# Patient Record
Sex: FEMALE | Race: WHITE | NOT HISPANIC OR LATINO | Employment: FULL TIME | URBAN - METROPOLITAN AREA
[De-identification: names, ages, dates, MRNs, and addresses within clinical notes are randomized per-mention and may not be internally consistent; named-entity substitution may affect disease eponyms.]

---

## 2017-01-03 ENCOUNTER — E-VISIT (OUTPATIENT)
Dept: FAMILY MEDICINE | Facility: CLINIC | Age: 37
End: 2017-01-03
Payer: COMMERCIAL

## 2017-01-03 DIAGNOSIS — I10 HYPERTENSION GOAL BP (BLOOD PRESSURE) < 140/90: Primary | ICD-10-CM

## 2017-01-03 DIAGNOSIS — R82.90 CLOUDY URINE: ICD-10-CM

## 2017-01-03 DIAGNOSIS — R82.90 ABNORMAL URINE ODOR: ICD-10-CM

## 2017-01-03 PROCEDURE — 99444 ZZC PHYSICIAN ONLINE EVALUATION & MANAGEMENT SERVICE: CPT | Performed by: FAMILY MEDICINE

## 2017-01-05 ENCOUNTER — E-VISIT (OUTPATIENT)
Dept: FAMILY MEDICINE | Facility: CLINIC | Age: 37
End: 2017-01-05
Payer: COMMERCIAL

## 2017-01-05 DIAGNOSIS — R21 RASH: ICD-10-CM

## 2017-01-05 DIAGNOSIS — M79.661 PAIN OF RIGHT LOWER LEG: Primary | ICD-10-CM

## 2017-01-05 PROCEDURE — 99207 ZZC NO CHARGE NURSE ONLY: CPT | Performed by: FAMILY MEDICINE

## 2017-01-06 ENCOUNTER — TRANSFERRED RECORDS (OUTPATIENT)
Dept: HEALTH INFORMATION MANAGEMENT | Facility: CLINIC | Age: 37
End: 2017-01-06

## 2017-01-08 ENCOUNTER — E-VISIT (OUTPATIENT)
Dept: FAMILY MEDICINE | Facility: CLINIC | Age: 37
End: 2017-01-08
Payer: COMMERCIAL

## 2017-01-08 DIAGNOSIS — I10 HYPERTENSION GOAL BP (BLOOD PRESSURE) < 140/90: Primary | ICD-10-CM

## 2017-01-08 DIAGNOSIS — R00.2 PALPITATIONS: ICD-10-CM

## 2017-01-08 PROCEDURE — 99444 ZZC PHYSICIAN ONLINE EVALUATION & MANAGEMENT SERVICE: CPT | Performed by: FAMILY MEDICINE

## 2017-01-15 ENCOUNTER — E-VISIT (OUTPATIENT)
Dept: FAMILY MEDICINE | Facility: CLINIC | Age: 37
End: 2017-01-15
Payer: COMMERCIAL

## 2017-01-15 ENCOUNTER — MYC MEDICAL ADVICE (OUTPATIENT)
Dept: FAMILY MEDICINE | Facility: CLINIC | Age: 37
End: 2017-01-15

## 2017-01-15 DIAGNOSIS — R82.90 CLOUDY URINE: Primary | ICD-10-CM

## 2017-01-15 DIAGNOSIS — I10 HYPERTENSION GOAL BP (BLOOD PRESSURE) < 140/90: ICD-10-CM

## 2017-01-15 DIAGNOSIS — K76.0 FATTY INFILTRATION OF LIVER: ICD-10-CM

## 2017-01-15 DIAGNOSIS — R82.90 ABNORMAL URINE ODOR: ICD-10-CM

## 2017-01-15 PROCEDURE — 99444 ZZC PHYSICIAN ONLINE EVALUATION & MANAGEMENT SERVICE: CPT | Performed by: FAMILY MEDICINE

## 2017-01-16 ENCOUNTER — DOCUMENTATION ONLY (OUTPATIENT)
Dept: FAMILY MEDICINE | Facility: CLINIC | Age: 37
End: 2017-01-16

## 2017-01-19 ENCOUNTER — OFFICE VISIT (OUTPATIENT)
Dept: FAMILY MEDICINE | Facility: CLINIC | Age: 37
End: 2017-01-19
Payer: COMMERCIAL

## 2017-01-19 VITALS
WEIGHT: 202.5 LBS | HEART RATE: 115 BPM | HEIGHT: 64 IN | SYSTOLIC BLOOD PRESSURE: 132 MMHG | DIASTOLIC BLOOD PRESSURE: 96 MMHG | OXYGEN SATURATION: 99 % | BODY MASS INDEX: 34.57 KG/M2 | TEMPERATURE: 99.1 F

## 2017-01-19 DIAGNOSIS — K76.0 FATTY INFILTRATION OF LIVER: ICD-10-CM

## 2017-01-19 DIAGNOSIS — I10 HYPERTENSION GOAL BP (BLOOD PRESSURE) < 140/90: Primary | ICD-10-CM

## 2017-01-19 DIAGNOSIS — R82.90 ABNORMAL URINE ODOR: ICD-10-CM

## 2017-01-19 DIAGNOSIS — L93.0 DISCOID LUPUS: ICD-10-CM

## 2017-01-19 DIAGNOSIS — R21 RASH: ICD-10-CM

## 2017-01-19 DIAGNOSIS — Z78.0 MENOPAUSE: ICD-10-CM

## 2017-01-19 DIAGNOSIS — G47.419 PRIMARY NARCOLEPSY WITHOUT CATAPLEXY: ICD-10-CM

## 2017-01-19 DIAGNOSIS — R82.90 CLOUDY URINE: ICD-10-CM

## 2017-01-19 LAB
ALBUMIN SERPL-MCNC: 3.9 G/DL (ref 3.4–5)
ALBUMIN UR-MCNC: NEGATIVE MG/DL
ALP SERPL-CCNC: 82 U/L (ref 40–150)
ALT SERPL W P-5'-P-CCNC: 59 U/L (ref 0–50)
ANION GAP SERPL CALCULATED.3IONS-SCNC: 9 MMOL/L (ref 3–14)
APPEARANCE UR: CLEAR
AST SERPL W P-5'-P-CCNC: 29 U/L (ref 0–45)
BACTERIA #/AREA URNS HPF: ABNORMAL /HPF
BILIRUB SERPL-MCNC: 0.2 MG/DL (ref 0.2–1.3)
BILIRUB UR QL STRIP: NEGATIVE
BUN SERPL-MCNC: 20 MG/DL (ref 7–30)
CALCIUM SERPL-MCNC: 10.1 MG/DL (ref 8.5–10.1)
CHLORIDE SERPL-SCNC: 100 MMOL/L (ref 94–109)
CO2 SERPL-SCNC: 26 MMOL/L (ref 20–32)
COLOR UR AUTO: YELLOW
CREAT SERPL-MCNC: 0.49 MG/DL (ref 0.52–1.04)
CREAT UR-MCNC: 45 MG/DL
GFR SERPL CREATININE-BSD FRML MDRD: ABNORMAL ML/MIN/1.7M2
GLUCOSE SERPL-MCNC: 114 MG/DL (ref 70–99)
GLUCOSE UR STRIP-MCNC: NEGATIVE MG/DL
HGB UR QL STRIP: ABNORMAL
KETONES UR STRIP-MCNC: NEGATIVE MG/DL
LEUKOCYTE ESTERASE UR QL STRIP: NEGATIVE
MICRO REPORT STATUS: NORMAL
MICROALBUMIN UR-MCNC: 11 MG/L
MICROALBUMIN/CREAT UR: 24.5 MG/G CR (ref 0–25)
NITRATE UR QL: NEGATIVE
NON-SQ EPI CELLS #/AREA URNS LPF: ABNORMAL /LPF
PH UR STRIP: 5.5 PH (ref 5–7)
POTASSIUM SERPL-SCNC: 4 MMOL/L (ref 3.4–5.3)
PROT SERPL-MCNC: 7.6 G/DL (ref 6.8–8.8)
RBC #/AREA URNS AUTO: ABNORMAL /HPF (ref 0–2)
SODIUM SERPL-SCNC: 135 MMOL/L (ref 133–144)
SP GR UR STRIP: 1.01 (ref 1–1.03)
SPECIMEN SOURCE: NORMAL
URN SPEC COLLECT METH UR: ABNORMAL
UROBILINOGEN UR STRIP-ACNC: 0.2 EU/DL (ref 0.2–1)
WBC #/AREA URNS AUTO: ABNORMAL /HPF (ref 0–2)
WET PREP SPEC: NORMAL

## 2017-01-19 PROCEDURE — 87210 SMEAR WET MOUNT SALINE/INK: CPT | Performed by: FAMILY MEDICINE

## 2017-01-19 PROCEDURE — 81001 URINALYSIS AUTO W/SCOPE: CPT | Performed by: FAMILY MEDICINE

## 2017-01-19 PROCEDURE — 99214 OFFICE O/P EST MOD 30 MIN: CPT | Performed by: FAMILY MEDICINE

## 2017-01-19 PROCEDURE — 36415 COLL VENOUS BLD VENIPUNCTURE: CPT | Performed by: FAMILY MEDICINE

## 2017-01-19 PROCEDURE — 80053 COMPREHEN METABOLIC PANEL: CPT | Performed by: FAMILY MEDICINE

## 2017-01-19 PROCEDURE — 82043 UR ALBUMIN QUANTITATIVE: CPT | Performed by: FAMILY MEDICINE

## 2017-01-19 RX ORDER — ESTRADIOL 1 MG/1
1 TABLET ORAL DAILY
Qty: 30 TABLET | Refills: 0 | Status: SHIPPED | OUTPATIENT
Start: 2017-01-19 | End: 2017-01-24

## 2017-01-19 RX ORDER — LOSARTAN POTASSIUM 50 MG/1
50 TABLET ORAL 2 TIMES DAILY
Qty: 60 TABLET | Refills: 0 | Status: SHIPPED | OUTPATIENT
Start: 2017-01-19 | End: 2017-02-22

## 2017-01-19 NOTE — PROGRESS NOTES
Discussed with her in clinic.  Prescribed alternative at this time; she is also planning on contacting her insurance.

## 2017-01-19 NOTE — PROGRESS NOTES
SUBJECTIVE:                                                    Sharmaine José is a 36 year old female who presents to clinic today for the following health issues:          Medication Followup of : Losartan    Taking Medication as prescribed: yes    Side Effects:  Tired    Medication Helping Symptoms:  yes         Patient Active Problem List   Diagnosis     Allergic state     Mild intermittent asthma     Generalized hyperhidrosis     Tobacco use disorder     Interstitial cystitis     Menopause - surgical     Narcolepsy     Cervical dysplasia     Chronic pain     CARDIOVASCULAR SCREENING; LDL GOAL LESS THAN 160     Migraine     Lumbago     Hyperlipidemia LDL goal <160     H/O: hysterectomy     Health Care Home     Discoid lupus     Generalized anxiety disorder     Fatty infiltration of liver     Primary narcolepsy without cataplexy     Hypertension goal BP (blood pressure) < 140/90       Current Outpatient Prescriptions   Medication Sig Dispense Refill     losartan (COZAAR) 50 MG tablet Take 1 tablet (50 mg) by mouth daily 30 tablet 0     dextroamphetamine (DEXTROSTAT) 10 MG tablet Take 1 tablet (10 mg) by mouth 2 times daily  0     estrogens, conjugated, (PREMARIN) 1.25 MG tablet Take 1 tablet (1.25 mg) by mouth daily 30 tablet prn     DULoxetine (CYMBALTA) 60 MG capsule Take 1 capsule (60 mg) by mouth daily 90 capsule 1     albuterol (PROAIR HFA, PROVENTIL HFA, VENTOLIN HFA) 108 (90 BASE) MCG/ACT inhaler Inhale 2 puffs into the lungs every 6 hours 1 Inhaler 0     EPINEPHrine (EPIPEN) 0.3 MG/0.3ML injection Inject 0.3 mLs (0.3 mg) into the muscle once as needed 2 each 1     ondansetron (ZOFRAN) 8 MG tablet Take by mouth every 8 hours as needed for nausea       fluticasone (FLONASE) 50 MCG/ACT nasal spray Spray 1-2 sprays into both nostrils daily as needed for rhinitis or allergies       Multiple Vitamin (MULTI VITAMIN DAILY PO)        ibuprofen 200 MG capsule Take 600 mg by mouth 3 times daily 120 capsule       "acetaminophen (TYLENOL) 500 MG tablet Take 1-2 tablets (500-1,000 mg) by mouth every 6 hours as needed for mild pain           ROS:  CONSTITUTIONAL:NEGATIVE for fever, chills, change in weight . Anticipates seeing Endocrinology regarding weight.  CV:  occasional pounding. HR always a little high.  :  See some of her previous documentation. She has noticed some cloudiness and abnormal order to her urine.  PSYCHIATRIC: NEGATIVE for changes in mood or affect    Blood pressure has been all over the place. Has been on for about three weeks.  Has been improved.  Notes especially in the am can be high.  Takes the medication in the am.  Currently on one of the dextroamphetamine; hoping to go to 2.    Ankle lesion improved. Lateral aspect skin. Thick. Will sometimes be thick and bleed. No longer red.   Uses vaseline intensive care lotion.  Does see skin specialist; has not thought lupus.      Her current stimulant medication is at a lower dose due to the blood pressure. She notes feeling best with the current type of medication and anticipates an increased dose when blood pressure stabilizes.     Also notes that she has been on Premarin for a long time. Aware that the prior authorization has been denied at this time.    OBJECTIVE:                                                    /96 mmHg  Pulse 115  Temp(Src) 99.1  F (37.3  C) (Oral)  Ht 5' 4\" (1.626 m)  Wt 202 lb 8 oz (91.853 kg)  BMI 34.74 kg/m2  SpO2 99%  LMP 09/25/2006  Body mass index is 34.74 kg/(m^2).     She notes her cuff read pretty true to ours.    GENERAL APPEARANCE: alert and no distress  RESP: lungs clear to auscultation - no rales, rhonchi or wheezes  CV: regular rates and rhythm  MS: extremities normal- no gross deformities noted  SKIN:  Lateral right ankle has a thickend, lichenified area several inches in diameter.  There is no erythema.  PSYCH: mentation appears normal and affect normal/bright    Recent Labs   Lab Test  05/24/16   0932  " 02/05/14   1358  11/15/11   1032   CHOL  273*  300*  275*   HDL  43*  68  47*   LDL  162*  153*  Cannot estimate LDL when triglyceride exceeds 400 mg/dL  172*   TRIG  339*  397*  454*   CHOLHDLRATIO   --   4.4  5.9*              ASSESSMENT/PLAN:                                                      Hypertension goal BP (blood pressure) < 140/90   Not at optimal control. She does note that this seems to vary some during the day. At this time will increase her losartan to BID.  - Comprehensive metabolic panel  - Albumin Random Urine Quantitative  - losartan (COZAAR) 50 MG tablet; Take 1 tablet (50 mg) by mouth 2 times daily  - Basic metabolic panel  (Ca, Cl, CO2, Creat, Gluc, K, Na, BUN); Future    Primary narcolepsy without cataplexy   This is being managed through a specialist. Anticipating that she will increase her dose of medication once her blood pressure stabilizes. We would then need to observe her blood pressure. She does have her own cuff.    Fatty infiltration of liver   Discuss this. Encourage weight loss. Also discuss it's important to treat metabolic abnormalities such as glucose and cholesterol.  We discussed her lipids. Anticipate adding Lipitor, but will hold off as there are several other medication changes.  - Comprehensive metabolic panel    Menopause - surgical   See some of her MyChart and E visits.  At this time, anticipates trying estradiol. Discussed that we can probably resubmit for Premarin if alternative is not working well for her.    Cloudy urine    - *UA reflex to Microscopic and Culture ([facility] (except [place] and [place])  - Wet prep    Abnormal urine odor    - *UA reflex to Microscopic and Culture ([facility] (except [place] and [place])  - Wet prep    Rash   Uncertain etiology. She is seeing specialists.    Discoid lupus   Sing specialist.      Patient Instructions   Increase losartan to twice daily.    Follow up in  about a month.  We can see how you are doing on the different estrogen, recheck bp and consider starting atorvastatin.          Temi Marsh MD, MD  [facility]

## 2017-01-19 NOTE — NURSING NOTE
"Chief Complaint   Patient presents with     Recheck Medication       Initial LMP 09/25/2006 Estimated body mass index is 33.80 kg/(m^2) as calculated from the following:    Height as of 11/21/16: 5' 4\" (1.626 m).    Weight as of 11/21/16: 197 lb (89.359 kg).  BP completed using cuff size: large  Joseluis Biskupski CMA        "

## 2017-01-19 NOTE — MR AVS SNAPSHOT
After Visit Summary   1/19/2017    Sharmaine José    MRN: [id number]           Patient Information     Date Of Birth          1980        Visit Information        Provider Department      1/19/2017 10:10 AM Temi Marsh MD [facility] [place]        Today's Diagnoses     Hypertension goal BP (blood pressure) < 140/90    -  1     Cloudy urine         Abnormal urine odor         Fatty infiltration of liver         Menopause - surgical           Care Instructions    Increase losartan to twice daily.    Follow up in about a month.  We can see how you are doing on the different estrogen, recheck bp and consider starting atorvastatin.          Follow-ups after your visit        Who to contact     If you have questions or need follow up information about today's clinic visit or your schedule please contact [facility] directly at [phone].  Normal or non-critical lab and imaging results will be communicated to you by MyChart, letter or phone within 4 business days after the clinic has received the results. If you do not hear from us within 7 days, please contact the clinic through [employer] or phone. If you have a critical or abnormal lab result, we will notify you by phone as soon as possible.  Submit refill requests through [employer] or call your pharmacy and they will forward the refill request to us. Please allow 3 business days for your refill to be completed.          Additional Information About Your Visit        [employer] Information     [employer] gives you secure access to your electronic health record. If you see a primary care provider, you can also send messages to your care team and make appointments. If you have questions, please call your primary care clinic.  If you do not have a primary care provider, please call [phone] and they will assist you.        Care EveryWhere ID     This is your Care EveryWhere ID. This could be used by other organizations to access  "your [place] medical records  [id number]        Your Vitals Were     Pulse Temperature Height BMI (Body Mass Index) Pulse Oximetry Last Period    115 99.1  F (37.3  C) (Oral) 5' 4\" (1.626 m) 34.74 kg/m2 99% 09/25/2006       Blood Pressure from Last 3 Encounters:   01/19/17 132/96   11/21/16 148/100   05/24/16 122/84    Weight from Last 3 Encounters:   01/19/17 202 lb 8 oz (91.853 kg)   11/21/16 197 lb (89.359 kg)   05/24/16 191 lb 4.8 oz (86.773 kg)              We Performed the Following     *UA reflex to Microscopic and Culture ([facility] (except [place] and [place])     Albumin Random Urine Quantitative     Basic metabolic panel     Comprehensive metabolic panel     Urine Microscopic     Wet prep          Today's Medication Changes          These changes are accurate as of: 1/19/17 10:53 AM.  If you have any questions, ask your nurse or doctor.               Start taking these medicines.        Dose/Directions    estradiol 1 MG tablet   Commonly known as:  ESTRACE   Used for:  Menopause   Started by:  Temi Marsh MD        Dose:  1 mg   Take 1 tablet (1 mg) by mouth daily   Quantity:  30 tablet   Refills:  0         These medicines have changed or have updated prescriptions.        Dose/Directions    losartan 50 MG tablet   Commonly known as:  COZAAR   This may have changed:  when to take this   Used for:  Hypertension goal BP (blood pressure) < 140/90   Changed by:  Temi Marsh MD        Dose:  50 mg   Take 1 tablet (50 mg) by mouth 2 times daily   Quantity:  60 tablet   Refills:  0         Stop taking these medicines if you haven't already. Please contact your care team if you have questions.     estrogens (conjugated) 1.25 MG tablet   Commonly known as:  PREMARIN   Stopped by:  Temi Marsh MD                Where to get your medicines      [facility] [id number] [place] [address] [address] AT [facility] [address]  [address], [facility] [address]     Phone:  [phone]    - estradiol 1 MG tablet  - losartan 50 MG tablet             Primary Care Provider Office Phone # Fax #    Temi Marsh [phone]       [facility] [address]  [facility] [address]        Thank you!     Thank you for choosing [facility]  for your care. Our goal is always to provide you with excellent care. Hearing back from our patients is one way we can continue to improve our services. Please take a few minutes to complete the written survey that you may receive in the mail after your visit with us. Thank you!             Your Updated Medication List - Protect others around you: Learn how to safely use, store and throw away your medicines at www.disposemymeds.org.          This list is accurate as of: 1/19/17 10:53 AM.  Always use your most recent med list.                   Brand Name Dispense Instructions for use    acetaminophen 500 MG tablet    TYLENOL     Take 1-2 tablets (500-1,000 mg) by mouth every 6 hours as needed for mild pain       albuterol 108 (90 BASE) MCG/ACT Inhaler    PROAIR HFA/PROVENTIL HFA/VENTOLIN HFA    1 Inhaler    Inhale 2 puffs into the lungs every 6 hours       dextroamphetamine 10 MG tablet    DEXTROSTAT     Take 1 tablet (10 mg) by mouth 2 times daily       DULoxetine 60 MG EC capsule    CYMBALTA    90 capsule    Take 1 capsule (60 mg) by mouth daily       EPINEPHrine 0.3 MG/0.3ML injection     2 each    Inject 0.3 mLs (0.3 mg) into the muscle once as needed       estradiol 1 MG tablet    ESTRACE    30 tablet    Take 1 tablet (1 mg) by mouth daily       FLONASE 50 MCG/ACT spray   Generic drug:  fluticasone      Spray 1-2 sprays into both nostrils daily as needed for rhinitis or allergies       ibuprofen 200 MG capsule     120 capsule    Take 600 mg by mouth 3 times daily       losartan 50 MG tablet    COZAAR    60 tablet    Take 1 tablet (50  mg) by mouth 2 times daily       MULTI VITAMIN DAILY PO          ondansetron 8 MG tablet    ZOFRAN     Take by mouth every 8 hours as needed for nausea

## 2017-01-19 NOTE — PATIENT INSTRUCTIONS
Increase losartan to twice daily.    Follow up in about a month.  We can see how you are doing on the different estrogen, recheck bp and consider starting atorvastatin.

## 2017-01-20 ENCOUNTER — E-VISIT (OUTPATIENT)
Dept: FAMILY MEDICINE | Facility: CLINIC | Age: 37
End: 2017-01-20
Payer: COMMERCIAL

## 2017-01-20 DIAGNOSIS — N95.1 SYMPTOMATIC MENOPAUSAL OR FEMALE CLIMACTERIC STATES: Primary | ICD-10-CM

## 2017-01-20 DIAGNOSIS — I10 HYPERTENSION GOAL BP (BLOOD PRESSURE) < 140/90: ICD-10-CM

## 2017-01-20 DIAGNOSIS — N95.1 MENOPAUSAL SYMPTOMS: Primary | ICD-10-CM

## 2017-01-20 PROCEDURE — 99444 ZZC PHYSICIAN ONLINE EVALUATION & MANAGEMENT SERVICE: CPT | Performed by: FAMILY MEDICINE

## 2017-01-20 PROCEDURE — 99207 ZZC NO BILLABLE SERVICE THIS VISIT: CPT | Performed by: FAMILY MEDICINE

## 2017-01-20 NOTE — TELEPHONE ENCOUNTER
Can you please find the PA or call the phone number she notes; to find out why the PA was denied?     From her note:    From what I was told, you, as my physician, are able to   call: [phone], to find out WHY the Premarin PA was   denied, what could be changed, as well as reopening the   prior auth, and starting an appeal, if needed.  (If it comes   to that, please start an appeal.)

## 2017-01-23 ENCOUNTER — MYC MEDICAL ADVICE (OUTPATIENT)
Dept: FAMILY MEDICINE | Facility: CLINIC | Age: 37
End: 2017-01-23

## 2017-01-23 DIAGNOSIS — N95.1 SYMPTOMATIC MENOPAUSAL OR FEMALE CLIMACTERIC STATES: Primary | ICD-10-CM

## 2017-01-23 NOTE — Clinical Note
"[facility]  [address] [facility] [id number] [phone]          January 24, 2017  Re:  Sharmaine José                                                                                                                     [address] [facility] [address]            Dear To Whom it May Concern:    I am writing to request prior authorization for Premarin. I have some clarifying information as well as additional information after a trial of one of the alternatives.    Sharmaine is a 36 year old woman who experienced a surgical menopause in 2007. She certainly is symptomatic if not on her estrogen. We have had this coded as Menopause; I am changing to a different Menopause code as I suspect the other was interpreted as \"asymptomatic\".    Additionally, she has now tried estradiol (pill form). She experienced increased blood pressure, headaches, hot flashes and mood swings/irritability. She is feeling miserable.    Please allow her to return to her previous Premarin where she was feeling better.         Sincerely,         Temi Marsh MD    "

## 2017-01-24 RX ORDER — ESTRADIOL 1 MG/1
1 TABLET ORAL DAILY
Qty: 30 TABLET | Refills: 0 | COMMUNITY
Start: 2017-01-24 | End: 2017-05-08 | Stop reason: SINTOL

## 2017-01-24 NOTE — TELEPHONE ENCOUNTER
Will close this out with a No Charge.  She initiated 2 e visits same day. We have gone back and forth on the other one so leaving that open at this time.

## 2017-01-24 NOTE — TELEPHONE ENCOUNTER
Please resubmit for PA for premarin.     I will draft a letter you can attach. (drafted)    At this time, I am putting in a different code (N95.1). It sounds like they interpreted the previous code as assymptomatic.  Also, she has now tried Estradiol and her bp went up, headaches worsened and she became very irritable...     Thanks!!!

## 2017-01-29 NOTE — TELEPHONE ENCOUNTER
Will close at this time.   She has also had some [employer] messages and has done other E visits; another recent one that I put a no charge on as thinking the discussion was part of this visit.

## 2017-02-01 NOTE — TELEPHONE ENCOUNTER
"Patient asking if insurance has responded to prior auth request.  Please see patient mychart message below:    \"Hi, Dr Marsh & [facility] Staff!    I got a msg from Dr Marsh last week about my Prior Auth for Premarin (with new info), being done with high priority.     Has a response been given? Approval or denial?     I also contacted the main desk to speak to a nurse, but a msg was take instead, last week.     I spoke to [facility] directly, and was given a phone number to do Prior Authorizations faster/on the spot. The number is, (for future reference): [phone] (8-6 CST M-F)    Hope all is well!   Let me know when you hear something, one way or another!  Thx!  Sharmaine\"    Yolie Pascal RN    "

## 2017-02-02 ENCOUNTER — E-VISIT (OUTPATIENT)
Dept: FAMILY MEDICINE | Facility: CLINIC | Age: 37
End: 2017-02-02
Payer: COMMERCIAL

## 2017-02-02 DIAGNOSIS — I10 HYPERTENSION GOAL BP (BLOOD PRESSURE) < 140/90: ICD-10-CM

## 2017-02-02 DIAGNOSIS — N95.1 SYMPTOMATIC MENOPAUSAL OR FEMALE CLIMACTERIC STATES: Primary | ICD-10-CM

## 2017-02-02 PROCEDURE — 99444 ZZC PHYSICIAN ONLINE EVALUATION & MANAGEMENT SERVICE: CPT | Performed by: FAMILY MEDICINE

## 2017-02-02 NOTE — TELEPHONE ENCOUNTER
Sent signed letter along with records related to this medication for [facility] to review.   Rubi Clark, RN  Triage Nurse

## 2017-02-02 NOTE — TELEPHONE ENCOUNTER
See the letter I did 1/24.   I have asked to have it resubmitted back then.    I suppose you could send part of th encounter if they want it documented in her chart...    Thank you!

## 2017-02-02 NOTE — TELEPHONE ENCOUNTER
"Patient called [employer] appeals and there were issues as to \"can only submit a prior auth every 90 days with the same provider and medication\". Also they had the dx code as asymptomatic menopausal state instead of menopause-surgical. She also discussed side effects of estradiol as rash, HA, and elevated BP.    This nurse called the insurance appeal dept at [phone]. Answered their questions including patient side effects and other medication tried. Also that we have dx of medication as menopause-surgical. The rep will send appeal to committee to approve. Is also requesting documentation of patient side effects of estrace noted in chart to be faxed to [phone] as part of the consideration process.    I do see a mychart e-visit from 1/20 concerning patient symptoms. I do not know if it would be appropriate to fax this or a portion of or a letter from you concerning side effects that were discussed.    Please advise.    Yolie Pascal RN      "

## 2017-02-14 ENCOUNTER — MYC MEDICAL ADVICE (OUTPATIENT)
Dept: FAMILY MEDICINE | Facility: CLINIC | Age: 37
End: 2017-02-14

## 2017-02-14 ENCOUNTER — E-VISIT (OUTPATIENT)
Dept: FAMILY MEDICINE | Facility: CLINIC | Age: 37
End: 2017-02-14
Payer: COMMERCIAL

## 2017-02-14 ENCOUNTER — E-VISIT (OUTPATIENT)
Dept: FAMILY MEDICINE | Facility: CLINIC | Age: 37
End: 2017-02-14

## 2017-02-14 DIAGNOSIS — R04.0 EPISTAXIS: Primary | ICD-10-CM

## 2017-02-14 PROCEDURE — 99444 ZZC PHYSICIAN ONLINE EVALUATION & MANAGEMENT SERVICE: CPT | Performed by: FAMILY MEDICINE

## 2017-02-14 NOTE — MR AVS SNAPSHOT
After Visit Summary   2/14/2017    Sharmaine José    MRN: [id number]           Patient Information     Date Of Birth          1980        Visit Information        Provider Department      2/14/2017 8:33 PM Temi Marsh MD [facility]        Today's Diagnoses     Epistaxis    -  1       Follow-ups after your visit        Additional Services     OTOLARYNGOLOGY REFERRAL       Your provider has referred you to: [facility]: [facility] [phone]   [email or web address]  [place] [phone]   [email or web address]    Please be aware that coverage of these services is subject to the terms and limitations of your health insurance plan.  Call member services at your health plan with any benefit or coverage questions.      Please bring the following with you to your appointment:    (1) Any X-Rays, CTs or MRIs which have been performed.  Contact the facility where they were done to arrange for  prior to your scheduled appointment.   (2) List of current medications  (3) This referral request   (4) Any documents/labs given to you for this referral                  Your next 10 appointments already scheduled     Feb 20, 2017  1:30 PM CST   [facility] Visit with [facility] Luna MD   [facility]    [address] [place] [address] [phone]            Feb 23, 2017 10:50 AM CST   MyChart Long with Temi Marsh MD   [facility]    [address] [facility] [address] [phone]              Who to contact     If you have questions or need follow up information about today's clinic visit or your schedule please contact [facility] directly at [phone].  Normal or non-critical lab and imaging results will be communicated to you by Call[employer]  letter or phone within 4 business days after the clinic has received the results. If you do not hear from us within 7 days, please contact the clinic through [employer] or phone. If you have a critical or abnormal lab result, we will notify you by phone as soon as possible.  Submit refill requests through [employer] or call your pharmacy and they will forward the refill request to us. Please allow 3 business days for your refill to be completed.          Additional Information About Your Visit        [email or web address] Information     [employer] gives you secure access to your electronic health record. If you see a primary care provider, you can also send messages to your care team and make appointments. If you have questions, please call your primary care clinic.  If you do not have a primary care provider, please call [phone] and they will assist you.        Care EveryWhere ID     This is your Care EveryWhere ID. This could be used by other organizations to access your [place] medical records  [id number]        Your Vitals Were     Last Period                   09/25/2006            Blood Pressure from Last 3 Encounters:   01/19/17 (!) 132/96   11/21/16 (!) 148/100   05/24/16 122/84    Weight from Last 3 Encounters:   01/19/17 202 lb 8 oz (91.9 kg)   11/21/16 197 lb (89.4 kg)   05/24/16 191 lb 4.8 oz (86.8 kg)              We Performed the Following     OTOLARYNGOLOGY REFERRAL        Primary Care Provider Office Phone # Fax #    Temi Marsh [phone]       [facility] [address]  [facility] [address]        Thank you!     Thank you for choosing [facility]  for your care. Our goal is always to provide you with excellent care. Hearing back from our patients is one way we can continue to improve our services. Please take a few minutes to complete the written survey that you may receive in the mail after your visit with us. Thank you!             Your Updated Medication  List - Protect others around you: Learn how to safely use, store and throw away your medicines at www.disposemymeds.org.          This list is accurate as of: 2/14/17 11:59 PM.  Always use your most recent med list.                   Brand Name Dispense Instructions for use    acetaminophen 500 MG tablet    TYLENOL     Take 1-2 tablets (500-1,000 mg) by mouth every 6 hours as needed for mild pain       albuterol 108 (90 BASE) MCG/ACT Inhaler    PROAIR HFA/PROVENTIL HFA/VENTOLIN HFA    1 Inhaler    Inhale 2 puffs into the lungs every 6 hours       dextroamphetamine 10 MG tablet    DEXTROSTAT     Take 1 tablet (10 mg) by mouth 2 times daily       DULoxetine 60 MG EC capsule    CYMBALTA    90 capsule    Take 1 capsule (60 mg) by mouth daily       EPINEPHrine 0.3 MG/0.3ML injection     2 each    Inject 0.3 mLs (0.3 mg) into the muscle once as needed       estradiol 1 MG tablet    ESTRACE    30 tablet    Take 1 tablet (1 mg) by mouth daily       FLONASE 50 MCG/ACT spray   Generic drug:  fluticasone      Spray 1-2 sprays into both nostrils daily as needed for rhinitis or allergies       ibuprofen 200 MG capsule     120 capsule    Take 600 mg by mouth 3 times daily       losartan 50 MG tablet    COZAAR    60 tablet    Take 1 tablet (50 mg) by mouth 2 times daily       MULTI VITAMIN DAILY PO          ondansetron 8 MG tablet    ZOFRAN     Take by mouth every 8 hours as needed for nausea

## 2017-02-14 NOTE — MR AVS SNAPSHOT
After Visit Summary   2/14/2017    Sharmaine José    MRN: [id number]           Patient Information     Date Of Birth          1980        Visit Information        Provider Department      2/14/2017 5:27 PM Temi Marsh MD [facility]        Today's Diagnoses     Epistaxis    -  1       Follow-ups after your visit        Your next 10 appointments already scheduled     Feb 20, 2017  1:30 PM CST   New Visit with Shirley Luna MD   [facility] ([facility])    [address]  [address]  [place] [address] [phone]            Feb 23, 2017 10:50 AM CST   MyChart Long with Temi Marsh MD   [facility] ([facility]    [address] [address] [address] [phone]              Who to contact     If you have questions or need follow up information about today's clinic visit or your schedule please contact [facility] directly at [phone].  Normal or non-critical lab and imaging results will be communicated to you by Flipiturehart, letter or phone within 4 business days after the clinic has received the results. If you do not hear from us within 7 days, please contact the clinic through Flipiturehart or phone. If you have a critical or abnormal lab result, we will notify you by phone as soon as possible.  Submit refill requests through Mumart or call your pharmacy and they will forward the refill request to us. Please allow 3 business days for your refill to be completed.          Additional Information About Your Visit        MyChart Information     Mumart gives you secure access to your electronic health record. If you see a primary care provider, you can also send messages to your care team and make appointments. If you have questions, please call your primary care clinic.  If you do not have a primary care provider, please call [phone] and they will assist you.         Care EveryWhere ID     This is your Care EveryWhere ID. This could be used by other organizations to access your [place] medical records  [id number]        Your Vitals Were     Last Period                   09/25/2006            Blood Pressure from Last 3 Encounters:   01/19/17 (!) 132/96   11/21/16 (!) 148/100   05/24/16 122/84    Weight from Last 3 Encounters:   01/19/17 202 lb 8 oz (91.9 kg)   11/21/16 197 lb (89.4 kg)   05/24/16 191 lb 4.8 oz (86.8 kg)              Today, you had the following     No orders found for display       Primary Care Provider Office Phone # Fax #    Temi Marsh [phone]       [facility] [address] [facility] [address]        Thank you!     Thank you for choosing [facility]  for your care. Our goal is always to provide you with excellent care. Hearing back from our patients is one way we can continue to improve our services. Please take a few minutes to complete the written survey that you may receive in the mail after your visit with us. Thank you!             Your Updated Medication List - Protect others around you: Learn how to safely use, store and throw away your medicines at www.disposemymeds.org.          This list is accurate as of: 2/14/17 11:59 PM.  Always use your most recent med list.                   Brand Name Dispense Instructions for use    acetaminophen 500 MG tablet    TYLENOL     Take 1-2 tablets (500-1,000 mg) by mouth every 6 hours as needed for mild pain       albuterol 108 (90 BASE) MCG/ACT Inhaler    PROAIR HFA/PROVENTIL HFA/VENTOLIN HFA    1 Inhaler    Inhale 2 puffs into the lungs every 6 hours       dextroamphetamine 10 MG tablet    DEXTROSTAT     Take 1 tablet (10 mg) by mouth 2 times daily       DULoxetine 60 MG EC capsule    CYMBALTA    90 capsule    Take 1 capsule (60 mg) by mouth daily       EPINEPHrine 0.3 MG/0.3ML injection     2 each    Inject 0.3 mLs (0.3 mg) into the muscle once as  needed       estradiol 1 MG tablet    ESTRACE    30 tablet    Take 1 tablet (1 mg) by mouth daily       FLONASE 50 MCG/ACT spray   Generic drug:  fluticasone      Spray 1-2 sprays into both nostrils daily as needed for rhinitis or allergies       ibuprofen 200 MG capsule     120 capsule    Take 600 mg by mouth 3 times daily       losartan 50 MG tablet    COZAAR    60 tablet    Take 1 tablet (50 mg) by mouth 2 times daily       MULTI VITAMIN DAILY PO          ondansetron 8 MG tablet    ZOFRAN     Take by mouth every 8 hours as needed for nausea

## 2017-02-16 ENCOUNTER — E-VISIT (OUTPATIENT)
Dept: FAMILY MEDICINE | Facility: CLINIC | Age: 37
End: 2017-02-16
Payer: COMMERCIAL

## 2017-02-16 DIAGNOSIS — M54.5 ACUTE LOW BACK PAIN, UNSPECIFIED BACK PAIN LATERALITY, WITH SCIATICA PRESENCE UNSPECIFIED: Primary | ICD-10-CM

## 2017-02-16 DIAGNOSIS — J06.9 UPPER RESPIRATORY TRACT INFECTION, UNSPECIFIED TYPE: ICD-10-CM

## 2017-02-16 PROCEDURE — 99444 ZZC PHYSICIAN ONLINE EVALUATION & MANAGEMENT SERVICE: CPT | Performed by: FAMILY MEDICINE

## 2017-02-16 NOTE — TELEPHONE ENCOUNTER
See additional encounter from 2/15/2017. She sent in new e-visit instead of reply; will close this and NC the other one when closing.

## 2017-02-21 ENCOUNTER — MYC MEDICAL ADVICE (OUTPATIENT)
Dept: FAMILY MEDICINE | Facility: CLINIC | Age: 37
End: 2017-02-21

## 2017-02-22 ENCOUNTER — E-VISIT (OUTPATIENT)
Dept: FAMILY MEDICINE | Facility: CLINIC | Age: 37
End: 2017-02-22
Payer: COMMERCIAL

## 2017-02-22 DIAGNOSIS — I10 HYPERTENSION GOAL BP (BLOOD PRESSURE) < 140/90: ICD-10-CM

## 2017-02-22 PROCEDURE — 99207 ZZC NO CHARGE NURSE ONLY: CPT | Performed by: FAMILY MEDICINE

## 2017-02-22 RX ORDER — LOSARTAN POTASSIUM 50 MG/1
50 TABLET ORAL 2 TIMES DAILY
Qty: 60 TABLET | Refills: 0 | Status: SHIPPED | OUTPATIENT
Start: 2017-02-22 | End: 2017-03-23

## 2017-02-27 PROBLEM — E66.9 NON MORBID OBESITY: Status: ACTIVE | Noted: 2017-02-27

## 2017-03-01 ENCOUNTER — E-VISIT (OUTPATIENT)
Dept: FAMILY MEDICINE | Facility: CLINIC | Age: 37
End: 2017-03-01
Payer: COMMERCIAL

## 2017-03-01 DIAGNOSIS — I10 HYPERTENSION GOAL BP (BLOOD PRESSURE) < 140/90: Primary | ICD-10-CM

## 2017-03-01 PROCEDURE — 99444 ZZC PHYSICIAN ONLINE EVALUATION & MANAGEMENT SERVICE: CPT | Performed by: FAMILY MEDICINE

## 2017-03-07 ENCOUNTER — E-VISIT (OUTPATIENT)
Dept: FAMILY MEDICINE | Facility: CLINIC | Age: 37
End: 2017-03-07
Payer: COMMERCIAL

## 2017-03-07 DIAGNOSIS — Z13.6 CARDIOVASCULAR SCREENING; LDL GOAL LESS THAN 160: Primary | ICD-10-CM

## 2017-03-07 DIAGNOSIS — I10 HYPERTENSION GOAL BP (BLOOD PRESSURE) < 140/90: ICD-10-CM

## 2017-03-07 PROCEDURE — 99444 ZZC PHYSICIAN ONLINE EVALUATION & MANAGEMENT SERVICE: CPT | Performed by: FAMILY MEDICINE

## 2017-03-08 ENCOUNTER — E-VISIT (OUTPATIENT)
Dept: FAMILY MEDICINE | Facility: CLINIC | Age: 37
End: 2017-03-08
Payer: COMMERCIAL

## 2017-03-08 DIAGNOSIS — M79.671 RIGHT FOOT PAIN: Primary | ICD-10-CM

## 2017-03-08 PROCEDURE — 99444 ZZC PHYSICIAN ONLINE EVALUATION & MANAGEMENT SERVICE: CPT | Performed by: FAMILY MEDICINE

## 2017-03-10 DIAGNOSIS — I10 HYPERTENSION GOAL BP (BLOOD PRESSURE) < 140/90: ICD-10-CM

## 2017-03-10 DIAGNOSIS — Z13.6 CARDIOVASCULAR SCREENING; LDL GOAL LESS THAN 160: ICD-10-CM

## 2017-03-10 LAB
ANION GAP SERPL CALCULATED.3IONS-SCNC: 7 MMOL/L (ref 3–14)
BUN SERPL-MCNC: 13 MG/DL (ref 7–30)
CALCIUM SERPL-MCNC: 9.2 MG/DL (ref 8.5–10.1)
CHLORIDE SERPL-SCNC: 100 MMOL/L (ref 94–109)
CHOLEST SERPL-MCNC: 237 MG/DL
CO2 SERPL-SCNC: 29 MMOL/L (ref 20–32)
CREAT SERPL-MCNC: 0.47 MG/DL (ref 0.52–1.04)
GFR SERPL CREATININE-BSD FRML MDRD: ABNORMAL ML/MIN/1.7M2
GLUCOSE SERPL-MCNC: 124 MG/DL (ref 70–99)
HDLC SERPL-MCNC: 29 MG/DL
LDLC SERPL CALC-MCNC: ABNORMAL MG/DL
LDLC SERPL DIRECT ASSAY-MCNC: 145 MG/DL
NONHDLC SERPL-MCNC: 208 MG/DL
POTASSIUM SERPL-SCNC: 3.9 MMOL/L (ref 3.4–5.3)
SODIUM SERPL-SCNC: 136 MMOL/L (ref 133–144)
TRIGL SERPL-MCNC: 493 MG/DL

## 2017-03-10 PROCEDURE — 83721 ASSAY OF BLOOD LIPOPROTEIN: CPT | Mod: 59 | Performed by: FAMILY MEDICINE

## 2017-03-10 PROCEDURE — 80048 BASIC METABOLIC PNL TOTAL CA: CPT | Performed by: FAMILY MEDICINE

## 2017-03-10 PROCEDURE — 80061 LIPID PANEL: CPT | Performed by: FAMILY MEDICINE

## 2017-03-10 PROCEDURE — 36415 COLL VENOUS BLD VENIPUNCTURE: CPT | Performed by: FAMILY MEDICINE

## 2017-03-12 ENCOUNTER — E-VISIT (OUTPATIENT)
Dept: FAMILY MEDICINE | Facility: CLINIC | Age: 37
End: 2017-03-12
Payer: COMMERCIAL

## 2017-03-12 DIAGNOSIS — M79.671 RIGHT FOOT PAIN: Primary | ICD-10-CM

## 2017-03-12 PROCEDURE — 99207 ZZC NO BILLABLE SERVICE THIS VISIT: CPT | Performed by: FAMILY MEDICINE

## 2017-03-12 NOTE — MR AVS SNAPSHOT
After Visit Summary   3/12/2017    Sharmaine José    MRN: [id number]           Patient Information     Date Of Birth          1980        Visit Information        Provider Department      3/12/2017 6:02 PM Temi Marsh MD [facility]        Today's Diagnoses     Right foot pain    -  1       Follow-ups after your visit        Your next 10 appointments already scheduled     Mar 13, 2017  9:30 AM CDT   Callie Pitts with Temi Marsh MD   [facility]    [address] [address] [phone]              Who to contact     If you have questions or need follow up information about today's clinic visit or your schedule please contact [facility] directly at [phone].  Normal or non-critical lab and imaging results will be communicated to you by MyChart, letter or phone within 4 business days after the clinic has received the results. If you do not hear from us within 7 days, please contact the clinic through MyChart or phone. If you have a critical or abnormal lab result, we will notify you by phone as soon as possible.  Submit refill requests through App Partner or call your pharmacy and they will forward the refill request to us. Please allow 3 business days for your refill to be completed.          Additional Information About Your Visit        MyChart Information     App Partner gives you secure access to your electronic health record. If you see a primary care provider, you can also send messages to your care team and make appointments. If you have questions, please call your primary care clinic.  If you do not have a primary care provider, please call [phone] and they will assist you.        Care EveryWhere ID     This is your Care EveryWhere ID. This could be used by other organizations to access your [place] medical records  [id number]        Your Vitals Were     Last Period                    09/25/2006            Blood Pressure from Last 3 Encounters:   01/19/17 (!) 132/96   11/21/16 (!) 148/100   05/24/16 122/84    Weight from Last 3 Encounters:   01/19/17 202 lb 8 oz (91.9 kg)   11/21/16 197 lb (89.4 kg)   05/24/16 191 lb 4.8 oz (86.8 kg)              Today, you had the following     No orders found for display       Primary Care Provider Office Phone # Fax #    Temi Marsh [phone]       [facility] [address]  [facility] [id number]        Thank you!     Thank you for choosing [facility]  for your care. Our goal is always to provide you with excellent care. Hearing back from our patients is one way we can continue to improve our services. Please take a few minutes to complete the written survey that you may receive in the mail after your visit with us. Thank you!             Your Updated Medication List - Protect others around you: Learn how to safely use, store and throw away your medicines at www.disposemymeds.org.          This list is accurate as of: 3/12/17  7:15 PM.  Always use your most recent med list.                   Brand Name Dispense Instructions for use    acetaminophen 500 MG tablet    TYLENOL     Take 1-2 tablets (500-1,000 mg) by mouth every 6 hours as needed for mild pain       albuterol 108 (90 BASE) MCG/ACT Inhaler    PROAIR HFA/PROVENTIL HFA/VENTOLIN HFA    1 Inhaler    Inhale 2 puffs into the lungs every 6 hours       dextroamphetamine 10 MG tablet    DEXTROSTAT     Take 1 tablet (10 mg) by mouth 2 times daily       DULoxetine 60 MG EC capsule    CYMBALTA    90 capsule    Take 1 capsule (60 mg) by mouth daily       EPINEPHrine 0.3 MG/0.3ML injection     2 each    Inject 0.3 mLs (0.3 mg) into the muscle once as needed       estradiol 1 MG tablet    ESTRACE    30 tablet    Take 1 tablet (1 mg) by mouth daily       FLONASE 50 MCG/ACT spray   Generic drug:  fluticasone      Spray 1-2 sprays into both nostrils daily as  needed for rhinitis or allergies       ibuprofen 200 MG capsule     120 capsule    Take 600 mg by mouth 3 times daily       losartan 50 MG tablet    COZAAR    60 tablet    Take 1 tablet (50 mg) by mouth 2 times daily       MULTI VITAMIN DAILY PO          ondansetron 8 MG tablet    ZOFRAN     Take by mouth every 8 hours as needed for nausea

## 2017-03-13 ENCOUNTER — OFFICE VISIT (OUTPATIENT)
Dept: FAMILY MEDICINE | Facility: CLINIC | Age: 37
End: 2017-03-13
Payer: COMMERCIAL

## 2017-03-13 VITALS
BODY MASS INDEX: 35.55 KG/M2 | WEIGHT: 208.2 LBS | SYSTOLIC BLOOD PRESSURE: 138 MMHG | TEMPERATURE: 99.9 F | HEART RATE: 122 BPM | DIASTOLIC BLOOD PRESSURE: 94 MMHG | OXYGEN SATURATION: 96 % | HEIGHT: 64 IN | RESPIRATION RATE: 16 BRPM

## 2017-03-13 DIAGNOSIS — E78.1 HYPERTRIGLYCERIDEMIA: ICD-10-CM

## 2017-03-13 DIAGNOSIS — F17.200 SMOKER: ICD-10-CM

## 2017-03-13 DIAGNOSIS — I10 HYPERTENSION GOAL BP (BLOOD PRESSURE) < 140/90: Primary | ICD-10-CM

## 2017-03-13 DIAGNOSIS — Z13.6 CARDIOVASCULAR SCREENING; LDL GOAL LESS THAN 160: ICD-10-CM

## 2017-03-13 DIAGNOSIS — E66.01 MORBID OBESITY, UNSPECIFIED OBESITY TYPE (H): ICD-10-CM

## 2017-03-13 DIAGNOSIS — M79.671 RIGHT FOOT PAIN: ICD-10-CM

## 2017-03-13 PROCEDURE — 99214 OFFICE O/P EST MOD 30 MIN: CPT | Performed by: FAMILY MEDICINE

## 2017-03-13 RX ORDER — LOSARTAN POTASSIUM AND HYDROCHLOROTHIAZIDE 12.5; 1 MG/1; MG/1
1 TABLET ORAL DAILY
Qty: 30 TABLET | Refills: 0 | Status: SHIPPED | OUTPATIENT
Start: 2017-03-13 | End: 2017-03-23

## 2017-03-13 ASSESSMENT — ANXIETY QUESTIONNAIRES
3. WORRYING TOO MUCH ABOUT DIFFERENT THINGS: NOT AT ALL
5. BEING SO RESTLESS THAT IT IS HARD TO SIT STILL: NOT AT ALL
IF YOU CHECKED OFF ANY PROBLEMS ON THIS QUESTIONNAIRE, HOW DIFFICULT HAVE THESE PROBLEMS MADE IT FOR YOU TO DO YOUR WORK, TAKE CARE OF THINGS AT HOME, OR GET ALONG WITH OTHER PEOPLE: NOT DIFFICULT AT ALL
1. FEELING NERVOUS, ANXIOUS, OR ON EDGE: SEVERAL DAYS
6. BECOMING EASILY ANNOYED OR IRRITABLE: NOT AT ALL
2. NOT BEING ABLE TO STOP OR CONTROL WORRYING: NOT AT ALL
7. FEELING AFRAID AS IF SOMETHING AWFUL MIGHT HAPPEN: NOT AT ALL
GAD7 TOTAL SCORE: 1

## 2017-03-13 ASSESSMENT — PATIENT HEALTH QUESTIONNAIRE - PHQ9: 5. POOR APPETITE OR OVEREATING: NOT AT ALL

## 2017-03-13 NOTE — PATIENT INSTRUCTIONS
Look up hip pressure cooking.    ---------------------    Increase the blood pressure medication to the combination with HCTZ.    I would like to recheck in 3-4 weeks.

## 2017-03-13 NOTE — NURSING NOTE
"Chief Complaint   Patient presents with     Hypertension     Musculoskeletal Problem       Initial BP (!) 138/94 (BP Location: Right arm, Patient Position: Chair, Cuff Size: Adult Large)  Pulse 122  Temp 99.9  F (37.7  C) (Oral)  Resp 16  Ht 5' 4\" (1.626 m)  Wt 208 lb 3.2 oz (94.4 kg)  LMP 09/25/2006  SpO2 96%  BMI 35.74 kg/m2 Estimated body mass index is 35.74 kg/(m^2) as calculated from the following:    Height as of this encounter: 5' 4\" (1.626 m).    Weight as of this encounter: 208 lb 3.2 oz (94.4 kg).  Medication Reconciliation: complete   Cesia Almodovar, MERLENE        "

## 2017-03-13 NOTE — MR AVS SNAPSHOT
After Visit Summary   3/13/2017    Sharmaine José    MRN: [id number]           Patient Information     Date Of Birth          1980        Visit Information        Provider Department      3/13/2017 9:30 AM Temi [facility] MD [facility]        Today's Diagnoses     Hypertension goal BP (blood pressure) < 140/90    -  1    CARDIOVASCULAR SCREENING; LDL GOAL LESS THAN 160        Morbid obesity, unspecified obesity type (H)        Right foot pain          Care Instructions        Look up hip pressure cooking.    ---------------------    Increase the blood pressure medication to the combination with HCTZ.    I would like to recheck in 3-4 weeks.            Follow-ups after your visit        Additional Services     NUTRITION REFERRAL       Your provider has referred you to: XANDER: [facility] [phone]   [email or web address]    Please be aware that coverage of these services is subject to the terms and limitations of your health insurance plan.  Call member services at your health plan with any benefit or coverage questions.      Please bring the following with you to your appointment:    (1) This referral request  (2) Any documents given to you regarding this referral  (3) Any specific questions you have about diet and/or food choices            PODIATRY/FOOT & ANKLE SURGERY REFERRAL       Your provider has referred you to: XANDER: [facility] [phone]   [email or web address]    Please be aware that coverage of these services is subject to the terms and limitations of your health insurance plan.  Call member services at your health plan with any benefit or coverage questions.      Please bring the following to your appointment:  >>   Any x-rays, CTs or MRIs which have been performed.  Contact the facility where they were done to arrange for  prior to your  "scheduled appointment.    >>   List of current medications   >>   This referral request   >>   Any documents/labs given to you for this referral                  Who to contact     If you have questions or need follow up information about today's clinic visit or your schedule please contact [facility] directly at [phone].  Normal or non-critical lab and imaging results will be communicated to you by MyChart, letter or phone within 4 business days after the clinic has received the results. If you do not hear from us within 7 days, please contact the clinic through FreshDigitalGrouphart or phone. If you have a critical or abnormal lab result, we will notify you by phone as soon as possible.  Submit refill requests through Xecced or call your pharmacy and they will forward the refill request to us. Please allow 3 business days for your refill to be completed.          Additional Information About Your Visit        MyChart Information     Xecced gives you secure access to your electronic health record. If you see a primary care provider, you can also send messages to your care team and make appointments. If you have questions, please call your primary care clinic.  If you do not have a primary care provider, please call [phone] and they will assist you.        Care EveryWhere ID     This is your Care EveryWhere ID. This could be used by other organizations to access your [place] medical records  [id number]        Your Vitals Were     Pulse Temperature Respirations Height Last Period Pulse Oximetry    122 99.9  F (37.7  C) (Oral) 16 5' 4\" (1.626 m) 09/25/2006 96%    BMI (Body Mass Index)                   35.74 kg/m2            Blood Pressure from Last 3 Encounters:   03/13/17 (!) 138/94   01/19/17 (!) 132/96   11/21/16 (!) 148/100    Weight from Last 3 Encounters:   03/13/17 208 lb 3.2 oz (94.4 kg)   01/19/17 202 lb 8 oz (91.9 kg)   11/21/16 197 lb (89.4 kg)              We Performed the Following     " DEPRESSION ACTION PLAN (DAP)     NUTRITION REFERRAL     PODIATRY/FOOT & ANKLE SURGERY REFERRAL          Today's Medication Changes          These changes are accurate as of: 3/13/17 10:28 AM.  If you have any questions, ask your nurse or doctor.               Start taking these medicines.        Dose/Directions    losartan-hydrochlorothiazide 100-12.5 MG per tablet   Commonly known as:  HYZAAR   Used for:  Hypertension goal BP (blood pressure) < 140/90        Dose:  1 tablet   Take 1 tablet by mouth daily   Quantity:  30 tablet   Refills:  0            Where to get your medicines      These medications were sent to [facility] [address] [address] [facility] [address] AT [facility] [place]  [address], [facility] [address]     Phone:  [phone]     losartan-hydrochlorothiazide 100-12.5 MG per tablet                Primary Care Provider Office Phone # Fax #    Temi Marsh [phone]       [facility] [address]  [facility] [phone]        Thank you!     Thank you for choosing [facility]  for your care. Our goal is always to provide you with excellent care. Hearing back from our patients is one way we can continue to improve our services. Please take a few minutes to complete the written survey that you may receive in the mail after your visit with us. Thank you!             Your Updated Medication List - Protect others around you: Learn how to safely use, store and throw away your medicines at www.disposemymeds.org.          This list is accurate as of: 3/13/17 10:28 AM.  Always use your most recent med list.                   Brand Name Dispense Instructions for use    acetaminophen 500 MG tablet    TYLENOL     Take 1-2 tablets (500-1,000 mg) by mouth every 6 hours as needed for mild pain       albuterol 108 (90 BASE) MCG/ACT Inhaler    PROAIR HFA/PROVENTIL HFA/VENTOLIN HFA    1 Inhaler    Inhale 2 puffs into the  lungs every 6 hours       dextroamphetamine 10 MG tablet    DEXTROSTAT     Take 1 tablet (10 mg) by mouth 2 times daily       DULoxetine 60 MG EC capsule    CYMBALTA    90 capsule    Take 1 capsule (60 mg) by mouth daily       EPINEPHrine 0.3 MG/0.3ML injection     2 each    Inject 0.3 mLs (0.3 mg) into the muscle once as needed       estradiol 1 MG tablet    ESTRACE    30 tablet    Take 1 tablet (1 mg) by mouth daily       FLONASE 50 MCG/ACT spray   Generic drug:  fluticasone      Spray 1-2 sprays into both nostrils daily as needed for rhinitis or allergies       ibuprofen 200 MG capsule     120 capsule    Take 600 mg by mouth 3 times daily       losartan 50 MG tablet    COZAAR    60 tablet    Take 1 tablet (50 mg) by mouth 2 times daily       losartan-hydrochlorothiazide 100-12.5 MG per tablet    HYZAAR    30 tablet    Take 1 tablet by mouth daily       MULTI VITAMIN DAILY PO

## 2017-03-13 NOTE — PROGRESS NOTES
SUBJECTIVE:                                                    Sharmaine José is a 36 year old female who presents to clinic today for the following health issues:      Hypertension Follow-up      Outpatient blood pressures are not being checked.    Low Salt Diet: no added salt       Amount of exercise or physical activity: 2 times per week    Problems taking medications regularly: No    Medication side effects: tiredness  Diet: regular (no restrictions)    Here to discuss labs and foot pain    Problem list and histories reviewed & adjusted, as indicated.  Additional history:   See under ROS    Patient Active Problem List   Diagnosis     Allergic state     Mild intermittent asthma     Generalized hyperhidrosis     Tobacco use disorder     Interstitial cystitis     Symptomatic menopausal or female climacteric states     Narcolepsy     Cervical dysplasia     Chronic pain     CARDIOVASCULAR SCREENING; LDL GOAL LESS THAN 160     Migraine     Lumbago     Hyperlipidemia LDL goal <160     H/O: hysterectomy     Health Care Home     Discoid lupus     Generalized anxiety disorder     Fatty infiltration of liver     Primary narcolepsy without cataplexy     Hypertension goal BP (blood pressure) < 140/90     Elevated BMI       Current Outpatient Prescriptions   Medication Sig Dispense Refill     losartan (COZAAR) 50 MG tablet Take 1 tablet (50 mg) by mouth 2 times daily 60 tablet 0     estradiol (ESTRACE) 1 MG tablet Take 1 tablet (1 mg) by mouth daily 30 tablet 0     dextroamphetamine (DEXTROSTAT) 10 MG tablet Take 1 tablet (10 mg) by mouth 2 times daily  0     DULoxetine (CYMBALTA) 60 MG capsule Take 1 capsule (60 mg) by mouth daily 90 capsule 1     albuterol (PROAIR HFA, PROVENTIL HFA, VENTOLIN HFA) 108 (90 BASE) MCG/ACT inhaler Inhale 2 puffs into the lungs every 6 hours 1 Inhaler 0     EPINEPHrine (EPIPEN) 0.3 MG/0.3ML injection Inject 0.3 mLs (0.3 mg) into the muscle once as needed 2 each 1     fluticasone (FLONASE) 50  "MCG/ACT nasal spray Spray 1-2 sprays into both nostrils daily as needed for rhinitis or allergies       Multiple Vitamin (MULTI VITAMIN DAILY PO)        ibuprofen 200 MG capsule Take 600 mg by mouth 3 times daily 120 capsule      acetaminophen (TYLENOL) 500 MG tablet Take 1-2 tablets (500-1,000 mg) by mouth every 6 hours as needed for mild pain               Reviewed and updated as needed this visit by clinical staff  Allergies       Reviewed and updated as needed this visit by Provider         ROS:  CONSTITUTIONAL:NEGATIVE for fever, chills, change in weight  RESP:NEGATIVE for significant cough or SOB  CV: NEGATIVE for chest pain, palpitations or peripheral edema  MUSCULOSKELETAL: see below  PSYCHIATRIC: NEGATIVE for changes in mood or affect    Right foot pain. A little better today. Has been about 2 weeks. No known injury; there was a keyboard she dropped a couple days earlier from brief height. Probably not related.   Hurts with flexing toes upward; not with walking.    Just started taking cozaar daily.     Starting a class next week around [employer] and yoga.    OBJECTIVE:                                                    BP (!) 138/94 (BP Location: Right arm, Patient Position: Chair, Cuff Size: Adult Large)  Pulse 122  Temp 99.9  F (37.7  C) (Oral)  Resp 16  Ht 5' 4\" (1.626 m)  Wt 208 lb 3.2 oz (94.4 kg)  LMP 09/25/2006  SpO2 96%  BMI 35.74 kg/m2  Body mass index is 35.74 kg/(m^2).  GENERAL APPEARANCE: alert and no distress  RESP: lungs clear to auscultation - no rales, rhonchi or wheezes  CV: regular rates and rhythm  MS: Tender to palpate second metatarsal, right foot.   PSYCH: mentation appears normal and affect normal/bright        Component      Latest Ref Rng & Units 5/24/2016 1/19/2017   Sodium      133 - 144 mmol/L  135   Potassium      3.4 - 5.3 mmol/L  4.0   Chloride      94 - 109 mmol/L  100   Carbon Dioxide      20 - 32 mmol/L  26   Anion Gap      3 - 14 mmol/L  9   Glucose      70 - 99 mg/dL  114 " (H)   Urea Nitrogen      7 - 30 mg/dL  20   Creatinine      0.52 - 1.04 mg/dL  0.49 (L)   GFR Estimate      >60 mL/min/1.7m2  >90 . . .   GFR Estimate If Black      >60 mL/min/1.7m2  >90 . . .   Calcium      8.5 - 10.1 mg/dL  10.1   Bilirubin Total      0.2 - 1.3 mg/dL  0.2   Albumin      3.4 - 5.0 g/dL  3.9   Protein Total      6.8 - 8.8 g/dL  7.6   Alkaline Phosphatase      40 - 150 U/L  82   ALT      0 - 50 U/L  59 (H)   AST      0 - 45 U/L  29   Cholesterol      <200 mg/dL 273 (H)    Triglycerides      <150 mg/dL 339 (H)    HDL Cholesterol      >49 mg/dL 43 (L)    LDL Cholesterol Calculated      <100 mg/dL 162 (H)    Non HDL Cholesterol      <130 mg/dL 230 (H)    Hemoglobin A1C      4.3 - 6.0 % 5.8    LDL Cholesterol Direct      <100 mg/dL       Component      Latest Ref Rng & Units 3/10/2017   Sodium      133 - 144 mmol/L 136   Potassium      3.4 - 5.3 mmol/L 3.9   Chloride      94 - 109 mmol/L 100   Carbon Dioxide      20 - 32 mmol/L 29   Anion Gap      3 - 14 mmol/L 7   Glucose      70 - 99 mg/dL 124 (H)   Urea Nitrogen      7 - 30 mg/dL 13   Creatinine      0.52 - 1.04 mg/dL 0.47 (L)   GFR Estimate      >60 mL/min/1.7m2 >90 . . .   GFR Estimate If Black      >60 mL/min/1.7m2 >90 . . .   Calcium      8.5 - 10.1 mg/dL 9.2   Bilirubin Total      0.2 - 1.3 mg/dL    Albumin      3.4 - 5.0 g/dL    Protein Total      6.8 - 8.8 g/dL    Alkaline Phosphatase      40 - 150 U/L    ALT      0 - 50 U/L    AST      0 - 45 U/L    Cholesterol      <200 mg/dL 237 (H)   Triglycerides      <150 mg/dL 493 (H)   HDL Cholesterol      >49 mg/dL 29 (L)   LDL Cholesterol Calculated      <100 mg/dL Cannot estimate LDL when triglyceride exceeds 400 mg/dL   Non HDL Cholesterol      <130 mg/dL 208 (H)   Hemoglobin A1C      4.3 - 6.0 %    LDL Cholesterol Direct      <100 mg/dL 145 (H)       The ASCVD Risk score (Omawatson MANUEL Jr., et al., 2013) failed to calculate for the following reasons:    The 2013 ASCVD risk score is only valid for ages 40  to 79     10 year CV risk: 22% 10; year optimal risk 0.3% (calculated as if she were age 40)           ASSESSMENT/PLAN:                                                        Hypertension goal BP (blood pressure) < 140/90  Not at optimal control. Adding HCTZ.   - losartan-hydrochlorothiazide (HYZAAR) 100-12.5 MG per tablet; Take 1 tablet by mouth daily    Hypertriglyceridemia  Reviewed with her. discussed fish oil.  - NUTRITION REFERRAL    Morbid obesity, unspecified obesity type (H)  Encourage weight loss. She is starting some exercise.   We also discussed some nutrition.   - NUTRITION REFERRAL    Right foot pain  Uncertain etiology.   - PODIATRY/FOOT & ANKLE SURGERY REFERRAL    Smoker  Encourage cessation. She is not ready at this time. She would like to lose some weight prior to this.     CARDIOVASCULAR SCREENING; LDL GOAL LESS THAN 160    - NUTRITION REFERRAL      Patient Instructions       Look up hip pressure cooking.    ---------------------    Increase the blood pressure medication to the combination with HCTZ.    I would like to recheck in 3-4 weeks.          Temi Marsh MD, MD  [facility]

## 2017-03-13 NOTE — LETTER
My Depression Action Plan  Name: Sharmaine José   Date of Birth 1980  Date: 3/13/2017    My doctor: Temi Marsh   My clinic: [facility]  [id number] [place] [id number] [phone]          GREEN    ZONE   Good Control    What it looks like:     Things are going generally well. You have normal up s and down s. You may even feel depressed from time to time, but bad moods usually last less than a day.   What you need to do:  1. Continue to care for yourself (see self care plan)  2. Check your depression survival kit and update it as needed  3. Follow your physician s recommendations including any medication.  4. Do not stop taking medication unless you consult with your physician first.           YELLOW         ZONE Getting Worse    What it looks like:     Depression is starting to interfere with your life.     It may be hard to get out of bed; you may be starting to isolate yourself from others.    Symptoms of depression are starting to last most all day and this has happened for several days.     You may have suicidal thoughts but they are not constant.   What you need to do:     1. Call your care team, your response to treatment will improve if you keep your care team informed of your progress. Yellow periods are signs an adjustment may need to be made.     2. Continue your self-care, even if you have to fake it!    3. Talk to someone in your support network    4. Open up your depression survival kit           RED    ZONE Medical Alert - Get Help    What it looks like:     Depression is seriously interfering with your life.     You may experience these or other symptoms: You can t get out of bed most days, can t work or engage in other necessary activities, you have trouble taking care of basic hygiene, or basic responsibilities, thoughts of suicide or death that will not go away, self-injurious behavior.     What you need to do:  1. Call your care team and  request a same-day appointment. If they are not available (weekends or after hours) call your local crisis line, emergency room or 911.      Electronically signed by: Cesia Almodovar, March 13, 2017    Depression Self Care Plan / Survival Kit    Self-Care for Depression  Here s the deal. Your body and mind are really not as separate as most people think.  What you do and think affects how you feel and how you feel influences what you do and think. This means if you do things that people who feel good do, it will help you feel better.  Sometimes this is all it takes.  There is also a place for medication and therapy depending on how severe your depression is, so be sure to consult with your medical provider and/ or Behavioral Health Consultant if your symptoms are worsening or not improving.     In order to better manage my stress, I will:    Exercise  Get some form of exercise, every day. This will help reduce pain and release endorphins, the  feel good  chemicals in your brain. This is almost as good as taking antidepressants!  This is not the same as joining a gym and then never going! (they count on that by the way ) It can be as simple as just going for a walk or doing some gardening, anything that will get you moving.      Hygiene   Maintain good hygiene (Get out of bed in the morning, Make your bed, Brush your teeth, Take a shower, and Get dressed like you were going to work, even if you are unemployed).  If your clothes don't fit try to get ones that do.    Diet  I will strive to eat foods that are good for me, drink plenty of water, and avoid excessive sugar, caffeine, alcohol, and other mood-altering substances.  Some foods that are helpful in depression are: complex carbohydrates, B vitamins, flaxseed, fish or fish oil, fresh fruits and vegetables.    Psychotherapy  I agree to participate in Individual Therapy (if recommended).    Medication  If prescribed medications, I agree to take them.  Missing  doses can result in serious side effects.  I understand that drinking alcohol, or other illicit drug use, may cause potential side effects.  I will not stop my medication abruptly without first discussing it with my provider.    Staying Connected With Others  I will stay in touch with my friends, family members, and my primary care provider/team.    Use your imagination  Be creative.  We all have a creative side; it doesn t matter if it s oil painting, sand castles, or mud pies! This will also kick up the endorphins.    Witness Beauty  (AKA stop and smell the roses) Take a look outside, even in mid-winter. Notice colors, textures. Watch the squirrels and birds.     Service to others  Be of service to others.  There is always someone else in need.  By helping others we can  get out of ourselves  and remember the really important things.  This also provides opportunities for practicing all the other parts of the program.    Humor  Laugh and be silly!  Adjust your TV habits for less news and crime-drama and more comedy.    Control your stress  Try breathing deep, massage therapy, biofeedback, and meditation. Find time to relax each day.     My support system    Clinic Contact:  Phone number:    Contact 1:  Phone number:    Contact 2:  Phone number:    Mandaeism/:  Phone number:    Therapist:  Phone number:    Local crisis center:    Phone number:    Other community support:  Phone number:

## 2017-03-14 ENCOUNTER — E-VISIT (OUTPATIENT)
Dept: FAMILY MEDICINE | Facility: CLINIC | Age: 37
End: 2017-03-14
Payer: COMMERCIAL

## 2017-03-14 DIAGNOSIS — Z13.6 CARDIOVASCULAR SCREENING; LDL GOAL LESS THAN 160: ICD-10-CM

## 2017-03-14 DIAGNOSIS — E78.1 HIGH TRIGLYCERIDES: ICD-10-CM

## 2017-03-14 DIAGNOSIS — R79.89 LOW SERUM CREATININE: Primary | ICD-10-CM

## 2017-03-14 PROCEDURE — 99444 ZZC PHYSICIAN ONLINE EVALUATION & MANAGEMENT SERVICE: CPT | Performed by: FAMILY MEDICINE

## 2017-03-14 ASSESSMENT — ANXIETY QUESTIONNAIRES: GAD7 TOTAL SCORE: 1

## 2017-03-14 ASSESSMENT — PATIENT HEALTH QUESTIONNAIRE - PHQ9: SUM OF ALL RESPONSES TO PHQ QUESTIONS 1-9: 2

## 2017-03-16 PROBLEM — E78.1 HIGH TRIGLYCERIDES: Status: ACTIVE | Noted: 2017-03-16

## 2017-03-18 ENCOUNTER — E-VISIT (OUTPATIENT)
Dept: FAMILY MEDICINE | Facility: CLINIC | Age: 37
End: 2017-03-18
Payer: COMMERCIAL

## 2017-03-18 DIAGNOSIS — I10 HYPERTENSION GOAL BP (BLOOD PRESSURE) < 140/90: Primary | ICD-10-CM

## 2017-03-18 PROCEDURE — 99444 ZZC PHYSICIAN ONLINE EVALUATION & MANAGEMENT SERVICE: CPT | Performed by: FAMILY MEDICINE

## 2017-03-18 NOTE — MR AVS SNAPSHOT
After Visit Summary   3/18/2017    Sharmaine José    MRN: [id number]           Patient Information     Date Of Birth          1980        Visit Information        Provider Department      3/18/2017 1:27 PM Temi Marsh MD [facility]        Today's Diagnoses     Hypertension goal BP (blood pressure) < 140/90    -  1       Follow-ups after your visit        Who to contact     If you have questions or need follow up information about today's clinic visit or your schedule please contact [facility] directly at [phone].  Normal or non-critical lab and imaging results will be communicated to you by IronGatehart, letter or phone within 4 business days after the clinic has received the results. If you do not hear from us within 7 days, please contact the clinic through [employer] or phone. If you have a critical or abnormal lab result, we will notify you by phone as soon as possible.  Submit refill requests through [employer] or call your pharmacy and they will forward the refill request to us. Please allow 3 business days for your refill to be completed.          Additional Information About Your Visit        MyChart Information     [employer] gives you secure access to your electronic health record. If you see a primary care provider, you can also send messages to your care team and make appointments. If you have questions, please call your primary care clinic.  If you do not have a primary care provider, please call [phone] and they will assist you.        Care EveryWhere ID     This is your Care EveryWhere ID. This could be used by other organizations to access your [facility] medical records  [id number]        Your Vitals Were     Last Period                   09/25/2006            Blood Pressure from Last 3 Encounters:   03/13/17 (!) 138/94   01/19/17 (!) 132/96   11/21/16 (!) 148/100    Weight from Last 3 Encounters:   03/13/17 208 lb 3.2 oz (94.4 kg)   01/19/17 202  lb 8 oz (91.9 kg)   11/21/16 197 lb (89.4 kg)              Today, you had the following     No orders found for display         Today's Medication Changes          These changes are accurate as of: 3/18/17 11:59 PM.  If you have any questions, ask your nurse or doctor.               Start taking these medicines.        Dose/Directions    amLODIPine 2.5 MG tablet   Commonly known as:  NORVASC   Used for:  Hypertension goal BP (blood pressure) < 140/90        Dose:  2.5 mg   Take 1 tablet (2.5 mg) by mouth daily   Quantity:  30 tablet   Refills:  0         These medicines have changed or have updated prescriptions.        Dose/Directions    losartan 100 MG tablet   Commonly known as:  COZAAR   This may have changed:    - medication strength  - how much to take  - when to take this   Used for:  Hypertension goal BP (blood pressure) < 140/90        Dose:  100 mg   Take 1 tablet (100 mg) by mouth daily   Quantity:  30 tablet   Refills:  0         Stop taking these medicines if you haven't already. Please contact your care team if you have questions.     losartan-hydrochlorothiazide 100-12.5 MG per tablet   Commonly known as:  HYZAAR                Where to get your medicines      These medications were sent to [facility] [address] [place] [address] [address] AT [facility]  [address], [facility] [address]     Phone:  [phone]     amLODIPine 2.5 MG tablet    losartan 100 MG tablet                Primary Care Provider Office Phone # Fax #    Temi Marsh [phone]       [facility] [address]  [facility] [address]        Thank you!     Thank you for choosing [facility]  for your care. Our goal is always to provide you with excellent care. Hearing back from our patients is one way we can continue to improve our services. Please take a few minutes to complete the written survey that you may receive in the mail  after your visit with us. Thank you!             Your Updated Medication List - Protect others around you: Learn how to safely use, store and throw away your medicines at www.disposemymeds.org.          This list is accurate as of: 3/18/17 11:59 PM.  Always use your most recent med list.                   Brand Name Dispense Instructions for use    acetaminophen 500 MG tablet    TYLENOL     Take 1-2 tablets (500-1,000 mg) by mouth every 6 hours as needed for mild pain       albuterol 108 (90 BASE) MCG/ACT Inhaler    PROAIR HFA/PROVENTIL HFA/VENTOLIN HFA    1 Inhaler    Inhale 2 puffs into the lungs every 6 hours       amLODIPine 2.5 MG tablet    NORVASC    30 tablet    Take 1 tablet (2.5 mg) by mouth daily       dextroamphetamine 10 MG tablet    DEXTROSTAT     Take 1 tablet (10 mg) by mouth 2 times daily       DULoxetine 60 MG EC capsule    CYMBALTA    90 capsule    Take 1 capsule (60 mg) by mouth daily       EPINEPHrine 0.3 MG/0.3ML injection     2 each    Inject 0.3 mLs (0.3 mg) into the muscle once as needed       estradiol 1 MG tablet    ESTRACE    30 tablet    Take 1 tablet (1 mg) by mouth daily       FLONASE 50 MCG/ACT spray   Generic drug:  fluticasone      Spray 1-2 sprays into both nostrils daily as needed for rhinitis or allergies       ibuprofen 200 MG capsule     120 capsule    Take 600 mg by mouth 3 times daily       losartan 100 MG tablet    COZAAR    30 tablet    Take 1 tablet (100 mg) by mouth daily       MULTI VITAMIN DAILY PO

## 2017-03-21 ENCOUNTER — E-VISIT (OUTPATIENT)
Dept: FAMILY MEDICINE | Facility: CLINIC | Age: 37
End: 2017-03-21
Payer: COMMERCIAL

## 2017-03-21 DIAGNOSIS — I10 HYPERTENSION GOAL BP (BLOOD PRESSURE) < 140/90: Primary | ICD-10-CM

## 2017-03-21 PROCEDURE — 99444 ZZC PHYSICIAN ONLINE EVALUATION & MANAGEMENT SERVICE: CPT | Performed by: FAMILY MEDICINE

## 2017-03-23 ENCOUNTER — MYC MEDICAL ADVICE (OUTPATIENT)
Dept: FAMILY MEDICINE | Facility: CLINIC | Age: 37
End: 2017-03-23

## 2017-03-23 ENCOUNTER — ALLIED HEALTH/NURSE VISIT (OUTPATIENT)
Dept: EDUCATION SERVICES | Facility: CLINIC | Age: 37
End: 2017-03-23
Payer: COMMERCIAL

## 2017-03-23 ENCOUNTER — E-VISIT (OUTPATIENT)
Dept: FAMILY MEDICINE | Facility: CLINIC | Age: 37
End: 2017-03-23
Payer: COMMERCIAL

## 2017-03-23 DIAGNOSIS — T30.0 BURN: ICD-10-CM

## 2017-03-23 DIAGNOSIS — R09.82 POST-NASAL DRIP: ICD-10-CM

## 2017-03-23 DIAGNOSIS — E78.5 HYPERLIPIDEMIA LDL GOAL <160: Primary | ICD-10-CM

## 2017-03-23 DIAGNOSIS — E66.01 MORBID OBESITY, UNSPECIFIED OBESITY TYPE (H): ICD-10-CM

## 2017-03-23 DIAGNOSIS — I10 HYPERTENSION GOAL BP (BLOOD PRESSURE) < 140/90: Primary | ICD-10-CM

## 2017-03-23 PROCEDURE — 97802 MEDICAL NUTRITION INDIV IN: CPT

## 2017-03-23 PROCEDURE — 99444 ZZC PHYSICIAN ONLINE EVALUATION & MANAGEMENT SERVICE: CPT | Performed by: FAMILY MEDICINE

## 2017-03-23 RX ORDER — AMLODIPINE BESYLATE 2.5 MG/1
2.5 TABLET ORAL DAILY
Qty: 30 TABLET | Refills: 0 | Status: SHIPPED | OUTPATIENT
Start: 2017-03-23 | End: 2017-05-08 | Stop reason: DRUGHIGH

## 2017-03-23 RX ORDER — LOSARTAN POTASSIUM 100 MG/1
100 TABLET ORAL DAILY
Qty: 30 TABLET | Refills: 0 | Status: SHIPPED | OUTPATIENT
Start: 2017-03-23 | End: 2017-04-25

## 2017-03-23 NOTE — PATIENT INSTRUCTIONS
1. Your overnight beverages are likely close to 500+ calories.   Other options: Crystal Light (?), Bertha (?), Powerade Zero    2. Lunch needs to become more consistent -- start small, build from there    3. Eat within an hour of waking, then don't go more than 4-5 hours without eating thereafter     4. Eventually, work on breakfast -- getting in some fruit as well maybe (?), a slice of PB toast (?), etc    5. Ideally you want 14-21 grams of protein (2-3 ounces) at each meal     Physical Activity  Take a look at [employer] pools OR [employer] pools to be able to use in the evenings  Aim for at least 3-4 days a week of physical activity for at least 30 minutes (more is better! :))    For Probiotics:  The more bacteria the better (12 billion or More is best)  Pick the further out expiration date  Doesn't mater if it is a capsule or a powder  Usually tolerated best taken right before a meal  Combo of bifido and lacto bacteria  Fridge keeps them stable longer

## 2017-03-23 NOTE — TELEPHONE ENCOUNTER
Copied from another encounter, but applies to this:      FYI~ I saw the nutritionist/dietitian this morning!  (Um...   LOVE HER!)  She gave me a bunch of info, and a plan of   attack, plus longer/long term goals, etc.  (I assume you   will be able to see her notes.  If not, I can explain/bring   in the info she gave me.)       BP meds:  Yes!  100 mg tabs of the Losartan would be great!    I have some time left before needing a refill, but I don't   think I will make it until we planned for me to be seen.     About the Amlodipine:  Yes, I would like to give it a go,   but, is it okay if you send in a script for it, and then I   can pick it up, read over all the info first, and then make   a final decision?  (I looked at the info online, but   sometimes everything isn't on there.  I find the pamphlet   that comes with the med is the most concise.)

## 2017-03-23 NOTE — MR AVS SNAPSHOT
After Visit Summary   3/23/2017    Sharmaine José    MRN: [id number]           Patient Information     Date Of Birth          1980        Visit Information        Provider Department      3/23/2017 1:46 PM Temi Marsh MD [facility]        Today's Diagnoses     Hypertension goal BP (blood pressure) < 140/90    -  1    Morbid obesity, unspecified obesity type (H)        Burn        Post-nasal drip           Follow-ups after your visit        Who to contact     If you have questions or need follow up information about today's clinic visit or your schedule please contact [facility] directly at [phone].  Normal or non-critical lab and imaging results will be communicated to you by MyChart, letter or phone within 4 business days after the clinic has received the results. If you do not hear from us within 7 days, please contact the clinic through Buzz360[employer] or phone. If you have a critical or abnormal lab result, we will notify you by phone as soon as possible.  Submit refill requests through [employer] or call your pharmacy and they will forward the refill request to us. Please allow 3 business days for your refill to be completed.          Additional Information About Your Visit        MyChart Information     [employer] gives you secure access to your electronic health record. If you see a primary care provider, you can also send messages to your care team and make appointments. If you have questions, please call your primary care clinic.  If you do not have a primary care provider, please call [phone] and they will assist you.        Care EveryWhere ID     This is your Care EveryWhere ID. This could be used by other organizations to access your [facility] medical records  [id number]        Your Vitals Were     Last Period                   09/25/2006            Blood Pressure from Last 3 Encounters:   03/13/17 (!) 138/94   01/19/17 (!) 132/96   11/21/16 (!)  148/100    Weight from Last 3 Encounters:   03/13/17 208 lb 3.2 oz (94.4 kg)   01/19/17 202 lb 8 oz (91.9 kg)   11/21/16 197 lb (89.4 kg)              Today, you had the following     No orders found for display       Primary Care Provider Office Phone # Fax #    Temi Marsh [phone]       [facility] [address]  [facility] [address]        Thank you!     Thank you for choosing [facility]  for your care. Our goal is always to provide you with excellent care. Hearing back from our patients is one way we can continue to improve our services. Please take a few minutes to complete the written survey that you may receive in the mail after your visit with us. Thank you!             Your Updated Medication List - Protect others around you: Learn how to safely use, store and throw away your medicines at www.disposemymeds.org.          This list is accurate as of: 3/23/17 11:59 PM.  Always use your most recent med list.                   Brand Name Dispense Instructions for use    acetaminophen 500 MG tablet    TYLENOL     Take 1-2 tablets (500-1,000 mg) by mouth every 6 hours as needed for mild pain       albuterol 108 (90 BASE) MCG/ACT Inhaler    PROAIR HFA/PROVENTIL HFA/VENTOLIN HFA    1 Inhaler    Inhale 2 puffs into the lungs every 6 hours       amLODIPine 2.5 MG tablet    NORVASC    30 tablet    Take 1 tablet (2.5 mg) by mouth daily       dextroamphetamine 10 MG tablet    DEXTROSTAT     Take 1 tablet (10 mg) by mouth 2 times daily       DULoxetine 60 MG EC capsule    CYMBALTA    90 capsule    Take 1 capsule (60 mg) by mouth daily       EPINEPHrine 0.3 MG/0.3ML injection     2 each    Inject 0.3 mLs (0.3 mg) into the muscle once as needed       estradiol 1 MG tablet    ESTRACE    30 tablet    Take 1 tablet (1 mg) by mouth daily       FLONASE 50 MCG/ACT spray   Generic drug:  fluticasone      Spray 1-2 sprays into both nostrils daily as needed for rhinitis or  allergies       ibuprofen 200 MG capsule     120 capsule    Take 600 mg by mouth 3 times daily       losartan 100 MG tablet    COZAAR    30 tablet    Take 1 tablet (100 mg) by mouth daily       MULTI VITAMIN DAILY PO

## 2017-03-23 NOTE — PROGRESS NOTES
Medical Nutrition Therapy  Visit Type:Initial assessment and intervention    Sharmaine José presents today for MNT and education related to weight management and hyperlipidemia.   She is accompanied by self.     ASSESSMENT:   Patient comments/concerns relating to nutrition: wants to lose weight - prediabetic, high blood pressure, etc is now an issue    Feels she eats too much dairy, and too much orange juice  Also doesn't feel that she eats enough of the right foods  A lot of family issues led to her not thinking about her weight, finally in a place to start making changes    NUTRITION HISTORY:  On meds for narcolepsy, so it not very hungry during the day    Breakfast: cottage cheese Or yogurt with some water  Lunch: skips OR a snack of some kind, glass of juice OR [employer] breakfast bars (blueberry)  Dinner: roast (4oz), veggies, sometimes meat on a bun with milk (FF [employer] - 12oz)  Snacks: middle of the night -- drinks milk (12-24 ounces) + orange juice (16-24 ounces), string cheese maybe (or American Cheese slice)  Beverages: water during the day OR a 1/2 of a small can of PepNitch    Misses meals? yes  Eats out:  2-4 meals/per month ([employer], maybe a [employer])    Previous diet education:  No     Food allergies/intolerances: papaya, cranberries    Diet is high in: calcium and sugar  Diet is low in: fiber, fruits, protein and vegetables    EXERCISE: Just started a 7 week all day [employer] program with her mom, can also do this at home    SOCIO/ECONOMIC:   Lives with: Mom and Dad    MEDICATIONS:  Current Outpatient Prescriptions   Medication     losartan-hydrochlorothiazide (HYZAAR) 100-12.5 MG per tablet     losartan (COZAAR) 50 MG tablet     estradiol (ESTRACE) 1 MG tablet     dextroamphetamine (DEXTROSTAT) 10 MG tablet     DULoxetine (CYMBALTA) 60 MG capsule     albuterol (PROAIR HFA, PROVENTIL HFA, VENTOLIN HFA) 108 (90 BASE) MCG/ACT inhaler     EPINEPHrine (EPIPEN) 0.3 MG/0.3ML injection      fluticasone (FLONASE) 50 MCG/ACT nasal spray     Multiple Vitamin (MULTI VITAMIN DAILY PO)     ibuprofen 200 MG capsule     acetaminophen (TYLENOL) 500 MG tablet     No current facility-administered medications for this visit.        LABS:  Last Basic Metabolic Panel:  Lab Results   Component Value Date     03/10/2017      Lab Results   Component Value Date    POTASSIUM 3.9 03/10/2017     Lab Results   Component Value Date    CHLORIDE 100 03/10/2017     Lab Results   Component Value Date    JULIANA 9.2 03/10/2017     Lab Results   Component Value Date    CO2 29 03/10/2017     Lab Results   Component Value Date    BUN 13 03/10/2017     Lab Results   Component Value Date    CR 0.47 03/10/2017     Lab Results   Component Value Date     03/10/2017       ANTHROPOMETRICS:  Vitals: LMP 09/25/2006  There is no height or weight on file to calculate BMI.      Wt Readings from Last 5 Encounters:   03/13/17 94.4 kg (208 lb 3.2 oz)   01/19/17 91.9 kg (202 lb 8 oz)   11/21/16 89.4 kg (197 lb)   05/24/16 86.8 kg (191 lb 4.8 oz)   05/22/16 81.6 kg (180 lb)       Weight Change: increasing for the last year    NUTRITION DIAGNOSIS: Overweight/Obesity related to poor food choices as evidenced by patient diet history    NUTRITION INTERVENTION:  Education given to support: general nutrition guidelines, weight reduction, consistent meals, exercise, fiber and heart healthy diet  Education Materials Provided: My Plate Planner/Choose My Plate and Heart Health Guidelines  Motivational Interviewing    PATIENT'S BEHAVIOR CHANGE GOALS:   See Patient Instructions for patient stated behavior change goals. AVS was printed and given to patient at today's appointment.    MONITOR / EVALUATE:  RD will monitor/evaluate:  Progress toward meeting stated nutrition-related goals  Weight change    FOLLOW-UP:  Follow up with RD as needed.    Marion Rivera RD LD CDE  Time spent in minutes: 45  Encounter: Individual

## 2017-03-23 NOTE — TELEPHONE ENCOUNTER
From another mychart note; looks like some additional information and questions for this visit, so will respond here:      Dr. KIRK,     Sorry!  I forgot one thing on the weird throat stuff...     I have been getting a lot more of what I call 'nasty throat blobs.'       They are these weird chunks of yuck.  They smell HORRENDOUS, and they seem to hang out in my tonsil area.  I used to try to get them out, but it hurts to do and makes me gag/throw up to reach back that far.  They are yellowish white, smell awful, and randomly come up, (or go down), from the back of my throat, behind the tonsils on each side.  (more the right than the left).  Either way, I have tried brushing my teeth a zillion times a day, mouthwash, gargling, you name it... No difference!  I can deal with the sore throat when they get large/cause soar throat & swollen glands.  The bad nasty breath is my huge issue.  I have talked to my dentist, who looked at me like I was completely insane.       I don't think you have my [facility] medical records.  (I wish you did!)  I had a lot of tonsil issues and chronic strep as a baby and child.  I was on antibiotics for years before I was deemed a 'carrier' of strep.  I am not.  Sometimes my tonsils swell to touching in the middle, but once the yellowish yuck blobs leave, I feel fine.       Strange right?  Should I see you or ENT?     Last thing, I swear!   I burned myself about a week and a half ago, (the day I saw you), and it blistered pretty bad.  I happened to itch it in my sleep and the blisters popped, and it started to bleed.  I kept it clean and dry.  It crusted over, so I tried to keep it from getting soggy wet.  It ended up to oozing greenish goo/pus from cracks in the greenish yellow crust.  At that point it was hot, red, and pretty ishy/gross looking.  I was due for a bleach bath in general, so I did that.  The crust and pus got soggy and went away, but it started to bleed a lot.  I left it open to air,  and cut a shirt so nothing would touch it.  (It is on my stomach, I got it reaching for a high shelf with a cookie sheet fresh from the broiler on the stove.)  Now it has less heat, but still looks pretty angry.  Any thoughts on how to keep it infection free?  (I already know it is going to scar, so no biggie there.)     Thanks!  Have a great weekend!   Sharmaine

## 2017-03-23 NOTE — MR AVS SNAPSHOT
After Visit Summary   3/23/2017    Sharmaine José    MRN: [id number]           Patient Information     Date Of Birth          1980        Visit Information        Provider Department      3/23/2017 8:30 AM [facility] DIABETIC ED RESOURCE [facility]        Care Instructions    1. Your overnight beverages are likely close to 500+ calories.   Other options: Crystal Light (?), Harvey (?), Powerade Zero    2. Lunch needs to become more consistent -- start small, build from there    3. Eat within an hour of waking, then don't go more than 4-5 hours without eating thereafter     4. Eventually, work on breakfast -- getting in some fruit as well maybe (?), a slice of PB toast (?), etc    5. Ideally you want 14-21 grams of protein (2-3 ounces) at each meal     Physical Activity  Take a look at [employer] pools OR [employer] pools to be able to use in the evenings  Aim for at least 3-4 days a week of physical activity for at least 30 minutes (more is better! :))    For Probiotics:  The more bacteria the better (12 billion or More is best)  Pick the further out expiration date  Doesn't mater if it is a capsule or a powder  Usually tolerated best taken right before a meal  Combo of bifido and lacto bacteria  Fridge keeps them stable longer        Follow-ups after your visit        Who to contact     If you have questions or need follow up information about today's clinic visit or your schedule please contact [facility] directly at [phone].  Normal or non-critical lab and imaging results will be communicated to you by MyChart, letter or phone within 4 business days after the clinic has received the results. If you do not hear from us within 7 days, please contact the clinic through "[employer],"t or phone. If you have a critical or abnormal lab result, we will notify you by phone as soon as possible.  Submit refill requests through Okeo or call your pharmacy and they will forward the  refill request to us. Please allow 3 business days for your refill to be completed.          Additional Information About Your Visit        Pivit Labshart Information     TheDigitel gives you secure access to your electronic health record. If you see a primary care provider, you can also send messages to your care team and make appointments. If you have questions, please call your primary care clinic.  If you do not have a primary care provider, please call [phone] and they will assist you.        Care EveryWhere ID     This is your Care EveryWhere ID. This could be used by other organizations to access your [place] medical records  [id number]        Your Vitals Were     Last Period                   09/25/2006            Blood Pressure from Last 3 Encounters:   03/13/17 (!) 138/94   01/19/17 (!) 132/96   11/21/16 (!) 148/100    Weight from Last 3 Encounters:   03/13/17 94.4 kg (208 lb 3.2 oz)   01/19/17 91.9 kg (202 lb 8 oz)   11/21/16 89.4 kg (197 lb)              Today, you had the following     No orders found for display       Primary Care Provider Office Phone # Fax #    Temi Marsh [phone]       [facility] [address] [facility] [address]        Thank you!     Thank you for choosing [facility]  for your care. Our goal is always to provide you with excellent care. Hearing back from our patients is one way we can continue to improve our services. Please take a few minutes to complete the written survey that you may receive in the mail after your visit with us. Thank you!             Your Updated Medication List - Protect others around you: Learn how to safely use, store and throw away your medicines at www.disposemymeds.org.          This list is accurate as of: 3/23/17  9:16 AM.  Always use your most recent med list.                   Brand Name Dispense Instructions for use    acetaminophen 500 MG tablet    TYLENOL     Take 1-2 tablets (500-1,000 mg)  by mouth every 6 hours as needed for mild pain       albuterol 108 (90 BASE) MCG/ACT Inhaler    PROAIR HFA/PROVENTIL HFA/VENTOLIN HFA    1 Inhaler    Inhale 2 puffs into the lungs every 6 hours       dextroamphetamine 10 MG tablet    DEXTROSTAT     Take 1 tablet (10 mg) by mouth 2 times daily       DULoxetine 60 MG EC capsule    CYMBALTA    90 capsule    Take 1 capsule (60 mg) by mouth daily       EPINEPHrine 0.3 MG/0.3ML injection     2 each    Inject 0.3 mLs (0.3 mg) into the muscle once as needed       estradiol 1 MG tablet    ESTRACE    30 tablet    Take 1 tablet (1 mg) by mouth daily       FLONASE 50 MCG/ACT spray   Generic drug:  fluticasone      Spray 1-2 sprays into both nostrils daily as needed for rhinitis or allergies       ibuprofen 200 MG capsule     120 capsule    Take 600 mg by mouth 3 times daily       losartan 50 MG tablet    COZAAR    60 tablet    Take 1 tablet (50 mg) by mouth 2 times daily       losartan-hydrochlorothiazide 100-12.5 MG per tablet    HYZAAR    30 tablet    Take 1 tablet by mouth daily       MULTI VITAMIN DAILY PO

## 2017-04-03 ENCOUNTER — E-VISIT (OUTPATIENT)
Dept: FAMILY MEDICINE | Facility: CLINIC | Age: 37
End: 2017-04-03
Payer: COMMERCIAL

## 2017-04-03 DIAGNOSIS — R45.82 FEELING WORRIED: Primary | ICD-10-CM

## 2017-04-03 PROCEDURE — 99444 ZZC PHYSICIAN ONLINE EVALUATION & MANAGEMENT SERVICE: CPT | Performed by: FAMILY MEDICINE

## 2017-04-13 ENCOUNTER — E-VISIT (OUTPATIENT)
Dept: FAMILY MEDICINE | Facility: CLINIC | Age: 37
End: 2017-04-13
Payer: COMMERCIAL

## 2017-04-13 DIAGNOSIS — R31.9 HEMATURIA: Primary | ICD-10-CM

## 2017-04-13 PROCEDURE — 99444 ZZC PHYSICIAN ONLINE EVALUATION & MANAGEMENT SERVICE: CPT | Performed by: FAMILY MEDICINE

## 2017-04-25 ENCOUNTER — MYC MEDICAL ADVICE (OUTPATIENT)
Dept: FAMILY MEDICINE | Facility: CLINIC | Age: 37
End: 2017-04-25

## 2017-04-25 ENCOUNTER — TELEPHONE (OUTPATIENT)
Dept: FAMILY MEDICINE | Facility: CLINIC | Age: 37
End: 2017-04-25

## 2017-04-25 DIAGNOSIS — I10 HYPERTENSION GOAL BP (BLOOD PRESSURE) < 140/90: ICD-10-CM

## 2017-04-25 RX ORDER — LOSARTAN POTASSIUM 100 MG/1
100 TABLET ORAL DAILY
Qty: 30 TABLET | Refills: 0 | Status: SHIPPED | OUTPATIENT
Start: 2017-04-25 | End: 2017-05-26

## 2017-04-25 NOTE — TELEPHONE ENCOUNTER
losartan      Last Written Prescription Date: 3/17/2017  Last Fill Quantity: 30, # refills: 1  Last Office Visit with [facility] or [facility] prescribing provider: 3/17/2017 e-visit.  Next 5 appointments (look out 90 days)     May 08, 2017 10:50 AM CDT   Callie Pitts with Temi Marsh MD   [facility]    [address] [place] [address] [phone]                   Potassium   Date Value Ref Range Status   03/10/2017 3.9 3.4 - 5.3 mmol/L Final     Creatinine   Date Value Ref Range Status   03/10/2017 0.47 (L) 0.52 - 1.04 mg/dL Final     BP Readings from Last 3 Encounters:   03/13/17 (!) 138/94   01/19/17 (!) 132/96   11/21/16 (!) 148/100     Prescription approved per [facility] Refill Protocol.    One month only. Patient has appt 5/8/2017.    Advised to do nurse only or walk in pharmacy for BP ck.    Yolie Pascal RN

## 2017-05-04 ENCOUNTER — E-VISIT (OUTPATIENT)
Dept: FAMILY MEDICINE | Facility: CLINIC | Age: 37
End: 2017-05-04
Payer: COMMERCIAL

## 2017-05-04 ENCOUNTER — MYC MEDICAL ADVICE (OUTPATIENT)
Dept: FAMILY MEDICINE | Facility: CLINIC | Age: 37
End: 2017-05-04

## 2017-05-04 DIAGNOSIS — Z59.71 INSURANCE COVERAGE PROBLEMS: Primary | ICD-10-CM

## 2017-05-04 PROCEDURE — 99444 ZZC PHYSICIAN ONLINE EVALUATION & MANAGEMENT SERVICE: CPT | Performed by: FAMILY MEDICINE

## 2017-05-04 NOTE — MR AVS SNAPSHOT
After Visit Summary   5/4/2017    Sharmaine José    MRN: [id number]           Patient Information     Date Of Birth          1980        Visit Information        Provider Department      5/4/2017 2:27 PM Temi Marsh MD [facility]        Today's Diagnoses     Insurance coverage problems    -  1       Follow-ups after your visit        Who to contact     If you have questions or need follow up information about today's clinic visit or your schedule please contact [facility] directly at [phone].  Normal or non-critical lab and imaging results will be communicated to you by Indigiohart, letter or phone within 4 business days after the clinic has received the results. If you do not hear from us within 7 days, please contact the clinic through ezTaxit or phone. If you have a critical or abnormal lab result, we will notify you by phone as soon as possible.  Submit refill requests through Cloudmach or call your pharmacy and they will forward the refill request to us. Please allow 3 business days for your refill to be completed.          Additional Information About Your Visit        MyChart Information     Cloudmach gives you secure access to your electronic health record. If you see a primary care provider, you can also send messages to your care team and make appointments. If you have questions, please call your primary care clinic.  If you do not have a primary care provider, please call [phone] and they will assist you.        Care EveryWhere ID     This is your Care EveryWhere ID. This could be used by other organizations to access your [place] medical records  [id number]        Your Vitals Were     Last Period                   09/25/2006            Blood Pressure from Last 3 Encounters:   05/08/17 (!) 132/98   03/13/17 (!) 138/94   01/19/17 (!) 132/96    Weight from Last 3 Encounters:   05/08/17 208 lb 8 oz (94.6 kg)   03/13/17 208 lb 3.2 oz (94.4 kg)    01/19/17 202 lb 8 oz (91.9 kg)              Today, you had the following     No orders found for display       Primary Care Provider Office Phone # Fax #    Temi Marsh [phone]       [facility] [address] [address]  [facility] [address]        Thank you!     Thank you for choosing [facility]  for your care. Our goal is always to provide you with excellent care. Hearing back from our patients is one way we can continue to improve our services. Please take a few minutes to complete the written survey that you may receive in the mail after your visit with us. Thank you!             Your Updated Medication List - Protect others around you: Learn how to safely use, store and throw away your medicines at www.disposemymeds.org.          This list is accurate as of: 5/4/17 11:59 PM.  Always use your most recent med list.                   Brand Name Dispense Instructions for use    acetaminophen 500 MG tablet    TYLENOL     Take 1-2 tablets (500-1,000 mg) by mouth every 6 hours as needed for mild pain       albuterol 108 (90 BASE) MCG/ACT Inhaler    PROAIR HFA/PROVENTIL HFA/VENTOLIN HFA    1 Inhaler    Inhale 2 puffs into the lungs every 6 hours       amLODIPine 2.5 MG tablet    NORVASC    30 tablet    Take 1 tablet (2.5 mg) by mouth daily       dextroamphetamine 10 MG tablet    DEXTROSTAT     Take 1 tablet (10 mg) by mouth 2 times daily       DULoxetine 60 MG EC capsule    CYMBALTA    90 capsule    Take 1 capsule (60 mg) by mouth daily       EPINEPHrine 0.3 MG/0.3ML injection     2 each    Inject 0.3 mLs (0.3 mg) into the muscle once as needed       estradiol 1 MG tablet    ESTRACE    30 tablet    Take 1 tablet (1 mg) by mouth daily       FLONASE 50 MCG/ACT spray   Generic drug:  fluticasone      Spray 1-2 sprays into both nostrils daily as needed for rhinitis or allergies       ibuprofen 200 MG capsule     120 capsule    Take 600 mg by mouth 3 times daily       losartan  100 MG tablet    COZAAR    30 tablet    Take 1 tablet (100 mg) by mouth daily

## 2017-05-04 NOTE — TELEPHONE ENCOUNTER
Can you do a PA for her premarin?       She has new insurance; we did go through this in January with her old.    I did find this; it may be helpful:      Additionally, she has now tried estradiol (pill form). She experienced increased blood pressure, headaches, hot flashes and mood swings/irritability. She is feeling miserable.

## 2017-05-08 ENCOUNTER — OFFICE VISIT (OUTPATIENT)
Dept: FAMILY MEDICINE | Facility: CLINIC | Age: 37
End: 2017-05-08
Payer: COMMERCIAL

## 2017-05-08 ENCOUNTER — MYC MEDICAL ADVICE (OUTPATIENT)
Dept: FAMILY MEDICINE | Facility: CLINIC | Age: 37
End: 2017-05-08

## 2017-05-08 ENCOUNTER — E-VISIT (OUTPATIENT)
Dept: FAMILY MEDICINE | Facility: CLINIC | Age: 37
End: 2017-05-08
Payer: COMMERCIAL

## 2017-05-08 VITALS
TEMPERATURE: 99.1 F | WEIGHT: 208.5 LBS | SYSTOLIC BLOOD PRESSURE: 132 MMHG | OXYGEN SATURATION: 96 % | HEART RATE: 112 BPM | HEIGHT: 64 IN | DIASTOLIC BLOOD PRESSURE: 98 MMHG | BODY MASS INDEX: 35.59 KG/M2

## 2017-05-08 DIAGNOSIS — T43.205D ANTIDEPRESSANT DISCONTINUATION SYNDROME, SUBSEQUENT ENCOUNTER: Primary | ICD-10-CM

## 2017-05-08 DIAGNOSIS — J45.20 MILD INTERMITTENT ASTHMA WITHOUT COMPLICATION: ICD-10-CM

## 2017-05-08 DIAGNOSIS — K21.9 GASTROESOPHAGEAL REFLUX DISEASE, ESOPHAGITIS PRESENCE NOT SPECIFIED: ICD-10-CM

## 2017-05-08 DIAGNOSIS — G47.419 PRIMARY NARCOLEPSY WITHOUT CATAPLEXY: ICD-10-CM

## 2017-05-08 DIAGNOSIS — I10 HYPERTENSION GOAL BP (BLOOD PRESSURE) < 140/90: Primary | ICD-10-CM

## 2017-05-08 DIAGNOSIS — E66.01 MORBID OBESITY, UNSPECIFIED OBESITY TYPE (H): ICD-10-CM

## 2017-05-08 PROCEDURE — 99214 OFFICE O/P EST MOD 30 MIN: CPT | Performed by: FAMILY MEDICINE

## 2017-05-08 PROCEDURE — 99207 ZZC NO BILLABLE SERVICE THIS VISIT: CPT | Performed by: FAMILY MEDICINE

## 2017-05-08 RX ORDER — DULOXETIN HYDROCHLORIDE 30 MG/1
CAPSULE, DELAYED RELEASE ORAL
Qty: 60 CAPSULE | Refills: 0 | Status: SHIPPED | OUTPATIENT
Start: 2017-05-08 | End: 2017-05-08

## 2017-05-08 RX ORDER — AMLODIPINE BESYLATE 5 MG/1
5 TABLET ORAL DAILY
Qty: 30 TABLET | Refills: 1 | Status: SHIPPED | OUTPATIENT
Start: 2017-05-08 | End: 2017-06-06 | Stop reason: SINTOL

## 2017-05-08 RX ORDER — DULOXETIN HYDROCHLORIDE 20 MG/1
20 CAPSULE, DELAYED RELEASE ORAL 2 TIMES DAILY
Qty: 120 CAPSULE | Refills: 0 | Status: SHIPPED | OUTPATIENT
Start: 2017-05-08 | End: 2017-05-08

## 2017-05-08 RX ORDER — DULOXETIN HYDROCHLORIDE 30 MG/1
CAPSULE, DELAYED RELEASE ORAL
Qty: 60 CAPSULE | Refills: 0 | Status: SHIPPED | OUTPATIENT
Start: 2017-05-08 | End: 2017-12-22

## 2017-05-08 RX ORDER — DULOXETIN HYDROCHLORIDE 20 MG/1
CAPSULE, DELAYED RELEASE ORAL
Qty: 120 CAPSULE | Refills: 0 | Status: SHIPPED | OUTPATIENT
Start: 2017-05-08 | End: 2017-09-05

## 2017-05-08 NOTE — MR AVS SNAPSHOT
"              After Visit Summary   5/8/2017    Sharmaine José    MRN: [id number]           Patient Information     Date Of Birth          1980        Visit Information        Provider Department      5/8/2017 10:50 AM Temi Marsh MD [facility]        Today's Diagnoses     Hypertension goal BP (blood pressure) < 140/90    -  1    Mild intermittent asthma without complication           Follow-ups after your visit        Who to contact     If you have questions or need follow up information about today's clinic visit or your schedule please contact [facility] directly at [phone].  Normal or non-critical lab and imaging results will be communicated to you by Vortalhart, letter or phone within 4 business days after the clinic has received the results. If you do not hear from us within 7 days, please contact the clinic through IXI-Playt or phone. If you have a critical or abnormal lab result, we will notify you by phone as soon as possible.  Submit refill requests through Sellbrite or call your pharmacy and they will forward the refill request to us. Please allow 3 business days for your refill to be completed.          Additional Information About Your Visit        MyChart Information     Sellbrite gives you secure access to your electronic health record. If you see a primary care provider, you can also send messages to your care team and make appointments. If you have questions, please call your primary care clinic.  If you do not have a primary care provider, please call [phone] and they will assist you.        Care EveryWhere ID     This is your Care EveryWhere ID. This could be used by other organizations to access your [place] medical records  [id number]        Your Vitals Were     Pulse Temperature Height Last Period Pulse Oximetry BMI (Body Mass Index)    112 99.1  F (37.3  C) (Oral) 5' 4\" (1.626 m) 09/25/2006 96% 35.79 kg/m2       Blood Pressure from Last 3 " Encounters:   05/08/17 (!) 132/98   03/13/17 (!) 138/94   01/19/17 (!) 132/96    Weight from Last 3 Encounters:   05/08/17 208 lb 8 oz (94.6 kg)   03/13/17 208 lb 3.2 oz (94.4 kg)   01/19/17 202 lb 8 oz (91.9 kg)              We Performed the Following     Asthma Action Plan (AAP)          Today's Medication Changes          These changes are accurate as of: 5/8/17 11:34 AM.  If you have any questions, ask your nurse or doctor.               These medicines have changed or have updated prescriptions.        Dose/Directions    amLODIPine 5 MG tablet   Commonly known as:  NORVASC   This may have changed:    - medication strength  - how much to take   Used for:  Hypertension goal BP (blood pressure) < 140/90   Changed by:  Temi Marsh MD        Dose:  5 mg   Take 1 tablet (5 mg) by mouth daily   Quantity:  30 tablet   Refills:  1         Stop taking these medicines if you haven't already. Please contact your care team if you have questions.     estradiol 1 MG tablet   Commonly known as:  ESTRACE   Stopped by:  Temi Marsh MD                Where to get your medicines      These medications were sent to [facility] [address] [place] [address] [address] AT [facility] [place]  [address], [facility] [address]     Phone:  [phone]     amLODIPine 5 MG tablet                Primary Care Provider Office Phone # Fax #    Temi Marsh [phone]       [facility] [address]  [facility] [address]        Thank you!     Thank you for choosing [facility]  for your care. Our goal is always to provide you with excellent care. Hearing back from our patients is one way we can continue to improve our services. Please take a few minutes to complete the written survey that you may receive in the mail after your visit with us. Thank you!             Your Updated Medication List - Protect others around you: Learn how to safely  use, store and throw away your medicines at www.disposemymeds.org.          This list is accurate as of: 5/8/17 11:34 AM.  Always use your most recent med list.                   Brand Name Dispense Instructions for use    acetaminophen 500 MG tablet    TYLENOL     Take 1-2 tablets (500-1,000 mg) by mouth every 6 hours as needed for mild pain       albuterol 108 (90 BASE) MCG/ACT Inhaler    PROAIR HFA/PROVENTIL HFA/VENTOLIN HFA    1 Inhaler    Inhale 2 puffs into the lungs every 6 hours       amLODIPine 5 MG tablet    NORVASC    30 tablet    Take 1 tablet (5 mg) by mouth daily       dextroamphetamine 10 MG tablet    DEXTROSTAT     Take 1 tablet (10 mg) by mouth 2 times daily       DULoxetine 60 MG EC capsule    CYMBALTA    90 capsule    Take 1 capsule (60 mg) by mouth daily       EPINEPHrine 0.3 MG/0.3ML injection     2 each    Inject 0.3 mLs (0.3 mg) into the muscle once as needed       FLONASE 50 MCG/ACT spray   Generic drug:  fluticasone      Spray 1-2 sprays into both nostrils daily as needed for rhinitis or allergies       ibuprofen 200 MG capsule     120 capsule    Take 600 mg by mouth 3 times daily       losartan 100 MG tablet    COZAAR    30 tablet    Take 1 tablet (100 mg) by mouth daily       MULTIVITAMIN/EXTRA VITAMIN D3 PO      Take by mouth daily       PREMARIN 1.25 MG tablet   Generic drug:  estrogens (conjugated)      Take by mouth daily       PROBIOTIC FORMULA PO      Take by mouth daily

## 2017-05-08 NOTE — TELEPHONE ENCOUNTER
See e visit from today and see visit on 5/4.    Please go ahead with PA for premarin.   Looks like we will not see denial at this time.   She has the phone number...

## 2017-05-08 NOTE — NURSING NOTE
"Chief Complaint   Patient presents with     Hypertension     medication recheck     Asthma     medication recheck       Initial LMP 09/25/2006 Estimated body mass index is 35.74 kg/(m^2) as calculated from the following:    Height as of 3/13/17: 5' 4\" (1.626 m).    Weight as of 3/13/17: 208 lb 3.2 oz (94.4 kg).  Medication Reconciliation: complete   Cesia Almodovar, MERLENE      "

## 2017-05-08 NOTE — LETTER
My Asthma Action Plan  Name: Sharmaine José   YOB: 1980  Date: 5/8/2017   My doctor: Temi Marsh MD, MD   My clinic: [facility]        My Control Medicine: none  My Rescue Medicine: Albuterol (Proair/Ventolin/Proventil) inhaler when needed   My Asthma Severity: intermittent  Avoid your asthma triggers: humidity and heavy exercise (use the inhaler preventively)               GREEN ZONE     Good Control    I feel good    No cough or wheeze    Can work, sleep and play without asthma symptoms       Take your asthma control medicine every day.     1. If exercise triggers your asthma, take your rescue medication    15 minutes before exercise or sports, and    During exercise if you have asthma symptoms  2. Spacer to use with inhaler: If you have a spacer, make sure to use it with your inhaler             YELLOW ZONE     Getting Worse  I have ANY of these:    I do not feel good    Cough or wheeze    Chest feels tight    Wake up at night   1. Keep taking your Green Zone medications  2. Start taking your rescue medicine:    every 20 minutes for up to 1 hour. Then every 4 hours for 24-48 hours.  3. If you stay in the Yellow Zone for more than 12-24 hours, contact your doctor.  4. If you do not return to the Green Zone in 12-24 hours or you get worse, start taking your oral steroid medicine if prescribed by your provider.           RED ZONE     Medical Alert - Get Help  I have ANY of these:    I feel awful    Medicine is not helping    Breathing getting harder    Trouble walking or talking    Nose opens wide to breathe       1. Take your rescue medicine NOW  2. If your provider has prescribed an oral steroid medicine, start taking it NOW  3. Call your doctor NOW  4. If you are still in the Red Zone after 20 minutes and you have not reached your doctor:    Take your rescue medicine again and    Call 911 or go to the emergency room right away    See your regular doctor within 2 weeks of an  Emergency Room or Urgent Care visit for follow-up treatment.        Electronically signed by: Temi Marsh MD, May 8, 2017    Annual Reminders:  Meet with Asthma Educator,  Flu Shot in the Fall, consider Pneumonia Vaccination for patients with asthma (aged 19 and older).    Pharmacy: [facility] [address] [place] [address] [address] [place] [address]                    Asthma Triggers  How To Control Things That Make Your Asthma Worse    Triggers are things that make your asthma worse.  Look at the list below to help you find your triggers and what you can do about them.  You can help prevent asthma flare-ups by staying away from your triggers.      Trigger                                                          What you can do   Cigarette Smoke  Tobacco smoke can make asthma worse. Do not allow smoking in your home, car or around you.  Be sure no one smokes at a child s day care or school.  If you smoke, ask your health care provider for ways to help you quit.  Ask family members to quit too.  Ask your health care provider for a referral to Quit Plan to help you quit smoking, or call 7-334-251-PLAN.     Colds, Flu, Bronchitis  These are common triggers of asthma. Wash your hands often.  Don t touch your eyes, nose or mouth.  Get a flu shot every year.     Dust Mites  These are tiny bugs that live in cloth or carpet. They are too small to see. Wash sheets and blankets in hot water every week.   Encase pillows and mattress in dust mite proof covers.  Avoid having carpet if you can. If you have carpet, vacuum weekly.   Use a dust mask and HEPA vacuum.   Pollen and Outdoor Mold  Some people are allergic to trees, grass, or weed pollen, or molds. Try to keep your windows closed.  Limit time out doors when pollen count is high.   Ask you health care provider about taking medicine during allergy season.     Animal Dander  Some people are allergic to skin flakes, urine or saliva from  pets with fur or feathers. Keep pets with fur or feathers out of your home.    If you can t keep the pet outdoors, then keep the pet out of your bedroom.  Keep the bedroom door closed.  Keep pets off cloth furniture and away from stuffed toys.     Mice, Rats, and Cockroaches  Some people are allergic to the waste from these pests.   Cover food and garbage.  Clean up spills and food crumbs.  Store grease in the refrigerator.   Keep food out of the bedroom.   Indoor Mold  This can be a trigger if your home has high moisture. Fix leaking faucets, pipes, or other sources of water.   Clean moldy surfaces.  Dehumidify basement if it is damp and smelly.   Smoke, Strong Odors, and Sprays  These can reduce air quality. Stay away from strong odors and sprays, such as perfume, powder, hair spray, paints, smoke incense, paint, cleaning products, candles and new carpet.   Exercise or Sports  Some people with asthma have this trigger. Be active!  Ask your doctor about taking medicine before sports or exercise to prevent symptoms.    Warm up for 5-10 minutes before and after sports or exercise.     Other Triggers of Asthma  Cold air:  Cover your nose and mouth with a scarf.  Sometimes laughing or crying can be a trigger.  Some medicines and food can trigger asthma.

## 2017-05-08 NOTE — PROGRESS NOTES
SUBJECTIVE:                                                    Sharmaine José is a 36 year old female who presents to clinic today for the following health issues:      Hypertension Follow-up      Outpatient blood pressures are not being checked.    Low Salt Diet: no added salt     Asthma Follow-Up    Was ACT completed today?      Amount of exercise or physical activity: 3 times per week    Problems taking medications regularly: No    Medication side effects: tiredness    Diet: more healthy eating         Problem list and histories reviewed & adjusted, as indicated.  Additional history:   See under ROS    Patient Active Problem List   Diagnosis     Allergic state     Mild intermittent asthma     Generalized hyperhidrosis     Tobacco use disorder     Interstitial cystitis     Symptomatic menopausal or female climacteric states     Narcolepsy     Cervical dysplasia     Chronic pain     CARDIOVASCULAR SCREENING; LDL GOAL LESS THAN 160     Migraine     Lumbago     Hyperlipidemia LDL goal <160     H/O: hysterectomy     Health Care Home     Discoid lupus     Generalized anxiety disorder     Fatty infiltration of liver     Primary narcolepsy without cataplexy     Hypertension goal BP (blood pressure) < 140/90     elevated bmi (H)     Hypertriglyceridemia     High triglycerides       Current Outpatient Prescriptions   Medication Sig Dispense Refill     estrogens, conjugated, (PREMARIN) 1.25 MG tablet Take by mouth daily       Multiple Vitamins-Minerals (MULTIVITAMIN/EXTRA VITAMIN D3 PO) Take by mouth daily       Probiotic Product (PROBIOTIC FORMULA PO) Take by mouth daily       losartan (COZAAR) 100 MG tablet Take 1 tablet (100 mg) by mouth daily 30 tablet 0     amLODIPine (NORVASC) 2.5 MG tablet Take 1 tablet (2.5 mg) by mouth daily 30 tablet 0     dextroamphetamine (DEXTROSTAT) 10 MG tablet Take 1 tablet (10 mg) by mouth 2 times daily  0     DULoxetine (CYMBALTA) 60 MG capsule Take 1 capsule (60 mg) by mouth daily 90  "capsule 1     albuterol (PROAIR HFA, PROVENTIL HFA, VENTOLIN HFA) 108 (90 BASE) MCG/ACT inhaler Inhale 2 puffs into the lungs every 6 hours 1 Inhaler 0     EPINEPHrine (EPIPEN) 0.3 MG/0.3ML injection Inject 0.3 mLs (0.3 mg) into the muscle once as needed 2 each 1     fluticasone (FLONASE) 50 MCG/ACT nasal spray Spray 1-2 sprays into both nostrils daily as needed for rhinitis or allergies       ibuprofen 200 MG capsule Take 600 mg by mouth 3 times daily 120 capsule      acetaminophen (TYLENOL) 500 MG tablet Take 1-2 tablets (500-1,000 mg) by mouth every 6 hours as needed for mild pain               Reviewed and updated as needed this visit by clinical staff       Reviewed and updated as needed this visit by Provider         ROS:  C: NEGATIVE for fever, chills, change in weight  R: NEGATIVE for significant cough or SOB  CV: NEGATIVE for chest pain, palpitations or peripheral edema x occasional palpitations.      May want to try going off cymbalta at some point, but notes 30 mg was too drastic of a decrease.  Had nausea, vomiting, diarrhea, heartburn. Shocky type feeling if moves eyes fast.   Was put on this for fibromyalgia; now notes does not have that. Not sure if helping for mood.     HCTZ; notes had problems with that.     Found the dietician to be helpful.  Is working on losing weight.    Still on one tablet dextroamphetamine. Anticipates going to 1.5 once her bp is controlled; would take another half later in the day.   Has been checking bp at home, but does not seem to be accurate.     Neck is not clicking any more.  Had bad heartburn; took longer to go away, but now better with zantac.     OBJECTIVE:                                                    BP (!) 132/98 (BP Location: Right arm, Patient Position: Chair, Cuff Size: Adult Large)  Pulse 112  Temp 99.1  F (37.3  C) (Oral)  Ht 5' 4\" (1.626 m)  Wt 208 lb 8 oz (94.6 kg)  LMP 09/25/2006  SpO2 96%  BMI 35.79 kg/m2  Body mass index is 35.79 " kg/(m^2).  GENERAL APPEARANCE: alert and no distress  RESP: lungs clear to auscultation - no rales, rhonchi or wheezes  CV: regular rates and rhythm  MS: no ankle edema.   PSYCH: mentation appears normal and affect normal/bright      ACT Total Scores 5/8/2017   ACT TOTAL SCORE (Goal Greater than or Equal to 20) 25   In the past 12 months, how many times did you visit the emergency room for your asthma without being admitted to the hospital? 0   In the past 12 months, how many times were you hospitalized overnight because of your asthma? 0            ASSESSMENT/PLAN:                                                      Hypertension goal BP (blood pressure) < 140/90  Not at optimal control.  Discussed increasing amlodipine. Reviewed potential side effects.  - amLODIPine (NORVASC) 5 MG tablet; Take 1 tablet (5 mg) by mouth daily    Primary narcolepsy without cataplexy  On stimulant. Waiting for bp to be under better control and anticipates increase dose.     Morbid obesity, unspecified obesity type (H)  She is working on this. Found the information from the dietician to be informative. Discussed some around nutrition and exercise.    Gastroesophageal reflux disease, esophagitis presence not specified  She notes currently doing well. Discussed this may improve with weight loss as well.     Mild intermittent asthma without complication  Doing well.  - Asthma Action Plan (AAP)    Follow up in 3-4 weeks regarding change in bp medication.    Temi Marsh MD, MD  [facility]

## 2017-05-09 ASSESSMENT — ASTHMA QUESTIONNAIRES: ACT_TOTALSCORE: 25

## 2017-05-09 NOTE — TELEPHONE ENCOUNTER
Can you let her know that....    I think she is trying to be proactive; you'd think there might be a process and she might know it...    Thanks!

## 2017-05-09 NOTE — TELEPHONE ENCOUNTER
We have not received anything from the pharmacy, and a PA cannot be started without any information. I would assume that we will have to wait for her to fill it next, and once they run it through, they will let us know if it needs a PA. For now, there is not much to be done without anything from the pharmacy.    Gaby Mclaughlin MA

## 2017-05-09 NOTE — TELEPHONE ENCOUNTER
Pt called back, informed her that I attempted to start a PA, but was told that a PA was not needed. She informed me that was not what they told her, but she is going to call her insurance and clarify what really needs to be done.    Will wait to hear from patient before doing anything further.   Gaby Mclaughlin MA

## 2017-05-09 NOTE — TELEPHONE ENCOUNTER
Pt called back to inform that she also talked to insurance, and they informed her that PA is good until 02/2018.   Gaby Mclaughlin MA

## 2017-05-12 ENCOUNTER — E-VISIT (OUTPATIENT)
Dept: FAMILY MEDICINE | Facility: CLINIC | Age: 37
End: 2017-05-12
Payer: COMMERCIAL

## 2017-05-12 DIAGNOSIS — M79.671 RIGHT FOOT PAIN: ICD-10-CM

## 2017-05-12 DIAGNOSIS — R60.9 EDEMA, UNSPECIFIED TYPE: Primary | ICD-10-CM

## 2017-05-12 DIAGNOSIS — I10 HYPERTENSION GOAL BP (BLOOD PRESSURE) < 140/90: ICD-10-CM

## 2017-05-12 PROCEDURE — 99444 ZZC PHYSICIAN ONLINE EVALUATION & MANAGEMENT SERVICE: CPT | Performed by: FAMILY MEDICINE

## 2017-05-12 NOTE — MR AVS SNAPSHOT
After Visit Summary   5/12/2017    Sharmaine José    MRN: [id number]           Patient Information     Date Of Birth          1980        Visit Information        Provider Department      5/12/2017 5:49 PM Temi Marsh MD [facility] [place]        Today's Diagnoses     Edema, unspecified type    -  1    Right foot pain        Hypertension goal BP (blood pressure) < 140/90           Follow-ups after your visit        Who to contact     If you have questions or need follow up information about today's clinic visit or your schedule please contact [facility] directly at [phone].  Normal or non-critical lab and imaging results will be communicated to you by MyChart, letter or phone within 4 business days after the clinic has received the results. If you do not hear from us within 7 days, please contact the clinic through [employer] or phone. If you have a critical or abnormal lab result, we will notify you by phone as soon as possible.  Submit refill requests through [employer] or call your pharmacy and they will forward the refill request to us. Please allow 3 business days for your refill to be completed.          Additional Information About Your Visit        MyChart Information     [employer] gives you secure access to your electronic health record. If you see a primary care provider, you can also send messages to your care team and make appointments. If you have questions, please call your primary care clinic.  If you do not have a primary care provider, please call [phone] and they will assist you.        Care EveryWhere ID     This is your Care EveryWhere ID. This could be used by other organizations to access your [place] medical records  [id number]        Your Vitals Were     Last Period                   09/25/2006            Blood Pressure from Last 3 Encounters:   05/08/17 (!) 132/98   03/13/17 (!) 138/94   01/19/17 (!) 132/96    Weight from Last 3  Encounters:   05/08/17 208 lb 8 oz (94.6 kg)   03/13/17 208 lb 3.2 oz (94.4 kg)   01/19/17 202 lb 8 oz (91.9 kg)              Today, you had the following     No orders found for display       Primary Care Provider Office Phone # Fax #    Temi Marsh [phone]       [facility] [address]  [facility] [address]        Thank you!     Thank you for choosing [facility]  for your care. Our goal is always to provide you with excellent care. Hearing back from our patients is one way we can continue to improve our services. Please take a few minutes to complete the written survey that you may receive in the mail after your visit with us. Thank you!             Your Updated Medication List - Protect others around you: Learn how to safely use, store and throw away your medicines at www.disposemymeds.org.          This list is accurate as of: 5/12/17 11:59 PM.  Always use your most recent med list.                   Brand Name Dispense Instructions for use    acetaminophen 500 MG tablet    TYLENOL     Take 1-2 tablets (500-1,000 mg) by mouth every 6 hours as needed for mild pain       albuterol 108 (90 BASE) MCG/ACT Inhaler    PROAIR HFA/PROVENTIL HFA/VENTOLIN HFA    1 Inhaler    Inhale 2 puffs into the lungs every 6 hours       amLODIPine 5 MG tablet    NORVASC    30 tablet    Take 1 tablet (5 mg) by mouth daily       dextroamphetamine 10 MG tablet    DEXTROSTAT     Take 1 tablet (10 mg) by mouth 2 times daily       * DULoxetine 60 MG EC capsule    CYMBALTA    90 capsule    Take 1 capsule (60 mg) by mouth daily       * DULoxetine 30 MG EC capsule    CYMBALTA    60 capsule    1 po daily with a 20 mg x 4 weeks (50 mg total), then none for the next month (40 mg), then 1 po daily for the subsequent month (30 mg). (weaning slowly)       * DULoxetine 20 MG EC capsule    CYMBALTA    120 capsule    1 po daily with the 30 mg x one month (50 mg total), then 2 po daily for the  next month (40 mg total), then none for the next month (30 mg total), then 1 po daily for the next month (20 mg total). (weaning slowly)       EPINEPHrine 0.3 MG/0.3ML injection     2 each    Inject 0.3 mLs (0.3 mg) into the muscle once as needed       FLONASE 50 MCG/ACT spray   Generic drug:  fluticasone      Spray 1-2 sprays into both nostrils daily as needed for rhinitis or allergies       ibuprofen 200 MG capsule     120 capsule    Take 600 mg by mouth 3 times daily       losartan 100 MG tablet    COZAAR    30 tablet    Take 1 tablet (100 mg) by mouth daily       MULTIVITAMIN/EXTRA VITAMIN D3 PO      Take by mouth daily       PREMARIN 1.25 MG tablet   Generic drug:  estrogens (conjugated)      Take by mouth daily       PROBIOTIC FORMULA PO      Take by mouth daily       * Notice:  This list has 3 medication(s) that are the same as other medications prescribed for you. Read the directions carefully, and ask your doctor or other care provider to review them with you.

## 2017-05-13 NOTE — TELEPHONE ENCOUNTER
Attempted to call after reading her message.   LM on her voice mail; cell phone to look at her [employer] message or can call me back.

## 2017-05-23 DIAGNOSIS — Z78.0 MENOPAUSE: ICD-10-CM

## 2017-05-23 NOTE — TELEPHONE ENCOUNTER
Discontinued  PREMARIN 1.25MG TABLETS  In chart as: PREMARIN 1.25 MG tablet  The source prescription has been discontinued.

## 2017-05-25 RX ORDER — ESTROGENS, CONJUGATED 1.25 MG
TABLET ORAL
Qty: 90 TABLET | Status: SHIPPED | OUTPATIENT
Start: 2017-05-25 | End: 2017-08-19

## 2017-05-25 NOTE — TELEPHONE ENCOUNTER
Routing refill request to provider for review/approval because:  Medication is reported/historical. Looks like a PA was approved until 2/18.  Please sign if ok.  Rubi Clark, MY[facility]  Triage Nurse

## 2017-05-26 DIAGNOSIS — I10 HYPERTENSION GOAL BP (BLOOD PRESSURE) < 140/90: ICD-10-CM

## 2017-05-26 RX ORDER — LOSARTAN POTASSIUM 100 MG/1
100 TABLET ORAL DAILY
Qty: 30 TABLET | Refills: 0 | Status: SHIPPED | OUTPATIENT
Start: 2017-05-26 | End: 2017-06-21

## 2017-05-26 NOTE — TELEPHONE ENCOUNTER
Patient will run out of this medication prior to the end of the long weekend, please send RX to selected pharmacy ASAP if approving.    Losartan (COZAAR) 100 MG tablet        Last Written Prescription Date: 04/25/2017  Last Fill Quantity: 30, # refills: 0  Last Office Visit with [facility], [facility] or [facility] prescribing provider: 05/08/2017       Potassium   Date Value Ref Range Status   03/10/2017 3.9 3.4 - 5.3 mmol/L Final     Creatinine   Date Value Ref Range Status   03/10/2017 0.47 (L) 0.52 - 1.04 mg/dL Final     BP Readings from Last 3 Encounters:   05/08/17 (!) 132/98   03/13/17 (!) 138/94   01/19/17 (!) 132/96

## 2017-05-26 NOTE — TELEPHONE ENCOUNTER
Medication is being filled for 1 time refill only due to:  Patient needs to be seen because LOV 5/8/2017 RTC 3-4 weeks for BP follow up..     Patient informed per kitty.    Yolie Pascal RN

## 2017-05-31 ENCOUNTER — TELEPHONE (OUTPATIENT)
Dept: FAMILY MEDICINE | Facility: CLINIC | Age: 37
End: 2017-05-31

## 2017-05-31 ENCOUNTER — E-VISIT (OUTPATIENT)
Dept: FAMILY MEDICINE | Facility: CLINIC | Age: 37
End: 2017-05-31
Payer: COMMERCIAL

## 2017-05-31 DIAGNOSIS — M54.50 LUMBAGO: ICD-10-CM

## 2017-05-31 DIAGNOSIS — Z71.89 COUNSELING FOR ESTROGEN REPLACEMENT THERAPY: ICD-10-CM

## 2017-05-31 DIAGNOSIS — N87.9 CERVICAL DYSPLASIA: ICD-10-CM

## 2017-05-31 DIAGNOSIS — G89.29 CHRONIC PAIN: Primary | ICD-10-CM

## 2017-05-31 DIAGNOSIS — S91.319A: Primary | ICD-10-CM

## 2017-05-31 DIAGNOSIS — R05.9 COUGH: ICD-10-CM

## 2017-05-31 PROCEDURE — 99444 ZZC PHYSICIAN ONLINE EVALUATION & MANAGEMENT SERVICE: CPT | Performed by: FAMILY MEDICINE

## 2017-05-31 NOTE — TELEPHONE ENCOUNTER
Pt called stating she would like to have a referral for the chiropractor.  Has [facility] and needs referral.  Would like to see Maki Aaron DC [facility]  [address] # [place].  [phone] for her chronic pain.  Thought this could be something that may help with the chronic pain.    Provider Use for [facility] [phone].      PCP to approve and sign if feels pt would benefit from referral to Chiro.      Pharmacy called pt to let her know a Rx was ready, when she picked up Rx she didn't look at it as she figured it was her cozaar, Rx was Cymbalta 60 mg which she is not taking any more.  Writer called pharmacy as pt is worried she will need a PA to  new Rx for weanig off Cymbalta (which is working well weaning-pt stated).  Pharmacist said there should not be a problem with refilling Rx of Cymbalta 30 mg.   Debbie Lizarraga RN

## 2017-06-05 ENCOUNTER — E-VISIT (OUTPATIENT)
Dept: FAMILY MEDICINE | Facility: CLINIC | Age: 37
End: 2017-06-05
Payer: COMMERCIAL

## 2017-06-05 DIAGNOSIS — I10 HYPERTENSION GOAL BP (BLOOD PRESSURE) < 140/90: Primary | ICD-10-CM

## 2017-06-05 DIAGNOSIS — L93.0 DISCOID LUPUS: Primary | ICD-10-CM

## 2017-06-05 PROCEDURE — 99444 ZZC PHYSICIAN ONLINE EVALUATION & MANAGEMENT SERVICE: CPT | Performed by: FAMILY MEDICINE

## 2017-06-05 PROCEDURE — 99207 ZZC NO CHARGE NURSE ONLY: CPT | Performed by: FAMILY MEDICINE

## 2017-06-05 NOTE — MR AVS SNAPSHOT
After Visit Summary   6/5/2017    Sharmaine José    MRN: [id number]           Patient Information     Date Of Birth          1980        Visit Information        Provider Department      6/5/2017 11:52 AM Temi Marsh MD [facility] [place]        Today's Diagnoses     Discoid lupus    -  1       Follow-ups after your visit        Who to contact     If you have questions or need follow up information about today's clinic visit or your schedule please contact [facility] directly at [phone].  Normal or non-critical lab and imaging results will be communicated to you by [employer], letter or phone within 4 business days after the clinic has received the results. If you do not hear from us within 7 days, please contact the clinic through [employer] or phone. If you have a critical or abnormal lab result, we will notify you by phone as soon as possible.  Submit refill requests through [employer] or call your pharmacy and they will forward the refill request to us. Please allow 3 business days for your refill to be completed.          Additional Information About Your Visit        MyChart Information     [employer] gives you secure access to your electronic health record. If you see a primary care provider, you can also send messages to your care team and make appointments. If you have questions, please call your primary care clinic.  If you do not have a primary care provider, please call [phone] and they will assist you.        Care EveryWhere ID     This is your Care EveryWhere ID. This could be used by other organizations to access your [place] medical records  [id number]        Your Vitals Were     Last Period                   09/25/2006            Blood Pressure from Last 3 Encounters:   05/08/17 (!) 132/98   03/13/17 (!) 138/94   01/19/17 (!) 132/96    Weight from Last 3 Encounters:   05/08/17 208 lb 8 oz (94.6 kg)   03/13/17 208 lb 3.2 oz (94.4 kg)   01/19/17 202 lb  8 oz (91.9 kg)              Today, you had the following     No orders found for display       Primary Care Provider Office Phone # Fax #    Temi Marsh [phone]       [facility] [address]  [facility] [address]        Thank you!     Thank you for choosing [facility]  for your care. Our goal is always to provide you with excellent care. Hearing back from our patients is one way we can continue to improve our services. Please take a few minutes to complete the written survey that you may receive in the mail after your visit with us. Thank you!             Your Updated Medication List - Protect others around you: Learn how to safely use, store and throw away your medicines at www.disposemymeds.org.          This list is accurate as of: 6/5/17  1:56 PM.  Always use your most recent med list.                   Brand Name Dispense Instructions for use    acetaminophen 500 MG tablet    TYLENOL     Take 1-2 tablets (500-1,000 mg) by mouth every 6 hours as needed for mild pain       albuterol 108 (90 BASE) MCG/ACT Inhaler    PROAIR HFA/PROVENTIL HFA/VENTOLIN HFA    1 Inhaler    Inhale 2 puffs into the lungs every 6 hours       amLODIPine 5 MG tablet    NORVASC    30 tablet    Take 1 tablet (5 mg) by mouth daily       dextroamphetamine 10 MG tablet    DEXTROSTAT     Take 1 tablet (10 mg) by mouth 2 times daily       * DULoxetine 60 MG EC capsule    CYMBALTA    90 capsule    Take 1 capsule (60 mg) by mouth daily       * DULoxetine 30 MG EC capsule    CYMBALTA    60 capsule    1 po daily with a 20 mg x 4 weeks (50 mg total), then none for the next month (40 mg), then 1 po daily for the subsequent month (30 mg). (weaning slowly)       * DULoxetine 20 MG EC capsule    CYMBALTA    120 capsule    1 po daily with the 30 mg x one month (50 mg total), then 2 po daily for the next month (40 mg total), then none for the next month (30 mg total), then 1 po daily for the next  month (20 mg total). (weaning slowly)       EPINEPHrine 0.3 MG/0.3ML injection     2 each    Inject 0.3 mLs (0.3 mg) into the muscle once as needed       FLONASE 50 MCG/ACT spray   Generic drug:  fluticasone      Spray 1-2 sprays into both nostrils daily as needed for rhinitis or allergies       ibuprofen 200 MG capsule     120 capsule    Take 600 mg by mouth 3 times daily       losartan 100 MG tablet    COZAAR    30 tablet    Take 1 tablet (100 mg) by mouth daily       MULTIVITAMIN/EXTRA VITAMIN D3 PO      Take by mouth daily       PREMARIN 1.25 MG tablet   Generic drug:  estrogens (conjugated)     90 tablet    TAKE 1 TABLET BY MOUTH EVERY DAY       PROBIOTIC FORMULA PO      Take by mouth daily       * Notice:  This list has 3 medication(s) that are the same as other medications prescribed for you. Read the directions carefully, and ask your doctor or other care provider to review them with you.

## 2017-06-05 NOTE — MR AVS SNAPSHOT
After Visit Summary   6/5/2017    Sharmaine José    MRN: [id number]           Patient Information     Date Of Birth          1980        Visit Information        Provider Department      6/5/2017 11:44 AM Temi Marsh MD [facility]        Today's Diagnoses     Hypertension goal BP (blood pressure) < 140/90    -  1       Follow-ups after your visit        Who to contact     If you have questions or need follow up information about today's clinic visit or your schedule please contact [facility] directly at [phone].  Normal or non-critical lab and imaging results will be communicated to you by Syniversehart, letter or phone within 4 business days after the clinic has received the results. If you do not hear from us within 7 days, please contact the clinic through Piczot or phone. If you have a critical or abnormal lab result, we will notify you by phone as soon as possible.  Submit refill requests through Triptrotting or call your pharmacy and they will forward the refill request to us. Please allow 3 business days for your refill to be completed.          Additional Information About Your Visit        MyChart Information     Triptrotting gives you secure access to your electronic health record. If you see a primary care provider, you can also send messages to your care team and make appointments. If you have questions, please call your primary care clinic.  If you do not have a primary care provider, please call [phone] and they will assist you.        Care EveryWhere ID     This is your Care EveryWhere ID. This could be used by other organizations to access your [place] medical records  [id number]        Your Vitals Were     Last Period                   09/25/2006            Blood Pressure from Last 3 Encounters:   05/08/17 (!) 132/98   03/13/17 (!) 138/94   01/19/17 (!) 132/96    Weight from Last 3 Encounters:   05/08/17 208 lb 8 oz (94.6 kg)   03/13/17 208 lb  3.2 oz (94.4 kg)   01/19/17 202 lb 8 oz (91.9 kg)              Today, you had the following     No orders found for display         Today's Medication Changes          These changes are accurate as of: 6/5/17 11:59 PM.  If you have any questions, ask your nurse or doctor.               Start taking these medicines.        Dose/Directions    metoprolol 25 MG 24 hr tablet   Commonly known as:  TOPROL-XL   Used for:  Hypertension goal BP (blood pressure) < 140/90   Started by:  Temi Marsh MD        Dose:  25 mg   Take 1 tablet (25 mg) by mouth daily   Quantity:  30 tablet   Refills:  0         Stop taking these medicines if you haven't already. Please contact your care team if you have questions.     amLODIPine 5 MG tablet   Commonly known as:  NORVASC   Stopped by:  Temi Marsh MD                Where to get your medicines      These medications were sent to [facility] [address] [place] [address] [address] [address] AT [facility] [place]  [address], [facility] [address]     Phone:  [phone]     metoprolol 25 MG 24 hr tablet                Primary Care Provider Office Phone # Fax #    Temi Marsh [phone]       [facility] [address]  [facility] [address]        Thank you!     Thank you for choosing [facility]  for your care. Our goal is always to provide you with excellent care. Hearing back from our patients is one way we can continue to improve our services. Please take a few minutes to complete the written survey that you may receive in the mail after your visit with us. Thank you!             Your Updated Medication List - Protect others around you: Learn how to safely use, store and throw away your medicines at www.disposemymeds.org.          This list is accurate as of: 6/5/17 11:59 PM.  Always use your most recent med list.                   Brand Name Dispense Instructions for use    acetaminophen 500 MG  tablet    TYLENOL     Take 1-2 tablets (500-1,000 mg) by mouth every 6 hours as needed for mild pain       albuterol 108 (90 BASE) MCG/ACT Inhaler    PROAIR HFA/PROVENTIL HFA/VENTOLIN HFA    1 Inhaler    Inhale 2 puffs into the lungs every 6 hours       dextroamphetamine 10 MG tablet    DEXTROSTAT     Take 1 tablet (10 mg) by mouth 2 times daily       * DULoxetine 60 MG EC capsule    CYMBALTA    90 capsule    Take 1 capsule (60 mg) by mouth daily       * DULoxetine 30 MG EC capsule    CYMBALTA    60 capsule    1 po daily with a 20 mg x 4 weeks (50 mg total), then none for the next month (40 mg), then 1 po daily for the subsequent month (30 mg). (weaning slowly)       * DULoxetine 20 MG EC capsule    CYMBALTA    120 capsule    1 po daily with the 30 mg x one month (50 mg total), then 2 po daily for the next month (40 mg total), then none for the next month (30 mg total), then 1 po daily for the next month (20 mg total). (weaning slowly)       EPINEPHrine 0.3 MG/0.3ML injection     2 each    Inject 0.3 mLs (0.3 mg) into the muscle once as needed       FLONASE 50 MCG/ACT spray   Generic drug:  fluticasone      Spray 1-2 sprays into both nostrils daily as needed for rhinitis or allergies       ibuprofen 200 MG capsule     120 capsule    Take 600 mg by mouth 3 times daily       losartan 100 MG tablet    COZAAR    30 tablet    Take 1 tablet (100 mg) by mouth daily       metoprolol 25 MG 24 hr tablet    TOPROL-XL    30 tablet    Take 1 tablet (25 mg) by mouth daily       MULTIVITAMIN/EXTRA VITAMIN D3 PO      Take by mouth daily       PREMARIN 1.25 MG tablet   Generic drug:  estrogens (conjugated)     90 tablet    TAKE 1 TABLET BY MOUTH EVERY DAY       PROBIOTIC FORMULA PO      Take by mouth daily       * Notice:  This list has 3 medication(s) that are the same as other medications prescribed for you. Read the directions carefully, and ask your doctor or other care provider to review them with you.

## 2017-06-05 NOTE — TELEPHONE ENCOUNTER
The following was sent soon after the original message in another e visit encounter. I did NC that and putting here as well:    I forgot to mention this in the evisit I just sent.     I have started a steroid cream for my skin, (Hydrocortisone   Valerate Cream, 0.2%), that I use on all the affected areas   of my discoid lupus.  Mostly arms & buttocks.     My right ankle area is pretty gross, and I was given samples   of an alpha hydroxy acid, and gycolic acid combo, that I   have started using on my ankle area, once per day.  It seems   to be helping, slowly.  I started this about a week ago.   (Just as an fyi, in case the steroid cream, and/or the ankle   cream, could be part of the problem.)     Thanks!  (Sorry for the 2 messages!)  E-Visit Terms/Conditions Accepted: yes

## 2017-06-06 RX ORDER — METOPROLOL SUCCINATE 25 MG/1
25 TABLET, EXTENDED RELEASE ORAL DAILY
Qty: 30 TABLET | Refills: 0 | Status: SHIPPED | OUTPATIENT
Start: 2017-06-06 | End: 2017-07-13 | Stop reason: DRUGHIGH

## 2017-06-09 ENCOUNTER — E-VISIT (OUTPATIENT)
Dept: FAMILY MEDICINE | Facility: CLINIC | Age: 37
End: 2017-06-09
Payer: COMMERCIAL

## 2017-06-09 DIAGNOSIS — R51.9 FACIAL PAIN: ICD-10-CM

## 2017-06-09 DIAGNOSIS — T14.8XXA PUNCTURE WOUND: Primary | ICD-10-CM

## 2017-06-09 PROCEDURE — 99444 ZZC PHYSICIAN ONLINE EVALUATION & MANAGEMENT SERVICE: CPT | Performed by: FAMILY MEDICINE

## 2017-06-09 NOTE — TELEPHONE ENCOUNTER
Please update her chart with her new phone number:      PS!  I got a new cell/home number:  [phone]  E-Visit Terms/Conditions Accepted: yes

## 2017-06-09 NOTE — MR AVS SNAPSHOT
After Visit Summary   6/9/2017    Sharmaine José    MRN: [id number]           Patient Information     Date Of Birth          1980        Visit Information        Provider Department      6/9/2017 2:55 PM Temi Marsh MD [facility]        Today's Diagnoses     Puncture wound    -  1    Facial pain           Follow-ups after your visit        Who to contact     If you have questions or need follow up information about today's clinic visit or your schedule please contact [facility] directly at [phone].  Normal or non-critical lab and imaging results will be communicated to you by trivagohart, letter or phone within 4 business days after the clinic has received the results. If you do not hear from us within 7 days, please contact the clinic through [email or web address] or phone. If you have a critical or abnormal lab result, we will notify you by phone as soon as possible.  Submit refill requests through SI-BONE or call your pharmacy and they will forward the refill request to us. Please allow 3 business days for your refill to be completed.          Additional Information About Your Visit        MyChart Information     SI-BONE gives you secure access to your electronic health record. If you see a primary care provider, you can also send messages to your care team and make appointments. If you have questions, please call your primary care clinic.  If you do not have a primary care provider, please call [phone] and they will assist you.        Care EveryWhere ID     This is your Care EveryWhere ID. This could be used by other organizations to access your [facility] medical records  [id number]        Your Vitals Were     Last Period                   09/25/2006            Blood Pressure from Last 3 Encounters:   05/08/17 (!) 132/98   03/13/17 (!) 138/94   01/19/17 (!) 132/96    Weight from Last 3 Encounters:   05/08/17 208 lb 8 oz (94.6 kg)   03/13/17 208 lb 3.2 oz (94.4 kg)    01/19/17 202 lb 8 oz (91.9 kg)              Today, you had the following     No orders found for display       Primary Care Provider Office Phone # Fax #    Temi Marsh [phone]       [facility] [address]  [facility] [address]        Thank you!     Thank you for choosing [facility]  for your care. Our goal is always to provide you with excellent care. Hearing back from our patients is one way we can continue to improve our services. Please take a few minutes to complete the written survey that you may receive in the mail after your visit with us. Thank you!             Your Updated Medication List - Protect others around you: Learn how to safely use, store and throw away your medicines at www.disposemymeds.org.          This list is accurate as of: 6/9/17 11:59 PM.  Always use your most recent med list.                   Brand Name Dispense Instructions for use    acetaminophen 500 MG tablet    TYLENOL     Take 1-2 tablets (500-1,000 mg) by mouth every 6 hours as needed for mild pain       albuterol 108 (90 BASE) MCG/ACT Inhaler    PROAIR HFA/PROVENTIL HFA/VENTOLIN HFA    1 Inhaler    Inhale 2 puffs into the lungs every 6 hours       dextroamphetamine 10 MG tablet    DEXTROSTAT     Take 1 tablet (10 mg) by mouth 2 times daily       * DULoxetine 60 MG EC capsule    CYMBALTA    90 capsule    Take 1 capsule (60 mg) by mouth daily       * DULoxetine 30 MG EC capsule    CYMBALTA    60 capsule    1 po daily with a 20 mg x 4 weeks (50 mg total), then none for the next month (40 mg), then 1 po daily for the subsequent month (30 mg). (weaning slowly)       * DULoxetine 20 MG EC capsule    CYMBALTA    120 capsule    1 po daily with the 30 mg x one month (50 mg total), then 2 po daily for the next month (40 mg total), then none for the next month (30 mg total), then 1 po daily for the next month (20 mg total). (weaning slowly)       EPINEPHrine 0.3 MG/0.3ML  injection     2 each    Inject 0.3 mLs (0.3 mg) into the muscle once as needed       FLONASE 50 MCG/ACT spray   Generic drug:  fluticasone      Spray 1-2 sprays into both nostrils daily as needed for rhinitis or allergies       ibuprofen 200 MG capsule     120 capsule    Take 600 mg by mouth 3 times daily       losartan 100 MG tablet    COZAAR    30 tablet    Take 1 tablet (100 mg) by mouth daily       metoprolol 25 MG 24 hr tablet    TOPROL-XL    30 tablet    Take 1 tablet (25 mg) by mouth daily       MULTIVITAMIN/EXTRA VITAMIN D3 PO      Take by mouth daily       PREMARIN 1.25 MG tablet   Generic drug:  estrogens (conjugated)     90 tablet    TAKE 1 TABLET BY MOUTH EVERY DAY       PROBIOTIC FORMULA PO      Take by mouth daily       * Notice:  This list has 3 medication(s) that are the same as other medications prescribed for you. Read the directions carefully, and ask your doctor or other care provider to review them with you.

## 2017-06-13 ENCOUNTER — E-VISIT (OUTPATIENT)
Dept: FAMILY MEDICINE | Facility: CLINIC | Age: 37
End: 2017-06-13
Payer: COMMERCIAL

## 2017-06-13 DIAGNOSIS — M62.830 BACK MUSCLE SPASM: Primary | ICD-10-CM

## 2017-06-13 PROCEDURE — 99444 ZZC PHYSICIAN ONLINE EVALUATION & MANAGEMENT SERVICE: CPT | Performed by: FAMILY MEDICINE

## 2017-06-13 RX ORDER — CYCLOBENZAPRINE HCL 10 MG
10 TABLET ORAL
Qty: 12 TABLET | Refills: 0 | Status: SHIPPED | OUTPATIENT
Start: 2017-06-13 | End: 2017-07-03

## 2017-06-21 DIAGNOSIS — I10 HYPERTENSION GOAL BP (BLOOD PRESSURE) < 140/90: ICD-10-CM

## 2017-06-22 RX ORDER — LOSARTAN POTASSIUM 100 MG/1
TABLET ORAL
Qty: 30 TABLET | Refills: 0 | Status: SHIPPED | OUTPATIENT
Start: 2017-06-22 | End: 2017-07-13

## 2017-06-22 NOTE — TELEPHONE ENCOUNTER
She is to continue on this, but we should be having her come in for an appointment.  We should be checking bp and pulse  She also needs a bmp.    (looks like she has one on 7/13)

## 2017-06-22 NOTE — TELEPHONE ENCOUNTER
losartan (COZAAR) 100 MG      Last Written Prescription Date: 5/26/17  Last Fill Quantity: 30, # refills: 0  Last Office Visit with [facility], P or [facility] prescribing provider: 5/8/17  Next 5 appointments (look out 90 days)     Jul 13, 2017  9:30 AM CDT   Callie Pitts with Temi Marsh MD   [facility] ([address] [address] [phone]                   Potassium   Date Value Ref Range Status   03/10/2017 3.9 3.4 - 5.3 mmol/L Final     Creatinine   Date Value Ref Range Status   03/10/2017 0.47 (L) 0.52 - 1.04 mg/dL Final     BP Readings from Last 3 Encounters:   05/08/17 (!) 132/98   03/13/17 (!) 138/94   01/19/17 (!) 132/96

## 2017-06-22 NOTE — TELEPHONE ENCOUNTER
Routing to Dr. Marsh to approve. Chart review unclear if patient is supposed to continue on Losartan with the Metoprolol or stop this med.     Losartan 100mg      Last Written Prescription Date: 5/26/17  Last Fill Quantity: 30, # refills: 0  Last Office Visit with [facility], P or [facility] prescribing provider: 5/8/17  Next 5 appointments (look out 90 days)     Jul 13, 2017  9:30 AM CDT   Callie Pitts with Temi Marsh MD   [facility]    [address] [place] [address] [phone]                   Potassium   Date Value Ref Range Status   03/10/2017 3.9 3.4 - 5.3 mmol/L Final     Creatinine   Date Value Ref Range Status   03/10/2017 0.47 (L) 0.52 - 1.04 mg/dL Final     BP Readings from Last 3 Encounters:   05/08/17 (!) 132/98   03/13/17 (!) 138/94   01/19/17 (!) 132/96

## 2017-07-03 DIAGNOSIS — M62.830 BACK MUSCLE SPASM: ICD-10-CM

## 2017-07-03 RX ORDER — CYCLOBENZAPRINE HCL 10 MG
10 TABLET ORAL
Qty: 12 TABLET | Refills: 0 | Status: SHIPPED | OUTPATIENT
Start: 2017-07-03 | End: 2017-07-24

## 2017-07-03 NOTE — TELEPHONE ENCOUNTER
Pt calls.  She is requesting a refill on her cyclobenzaprine.  She is using it for her back and her jaw.  She has an appt on July 13.  She is requesting a few just to get by.    Routing refill request to provider for review/approval because:  Drug not on the [facility] refill protocol     Will forward to CHANCE Lazcano as Dr. Marsh is out of the office.

## 2017-07-13 ENCOUNTER — OFFICE VISIT (OUTPATIENT)
Dept: FAMILY MEDICINE | Facility: CLINIC | Age: 37
End: 2017-07-13
Payer: COMMERCIAL

## 2017-07-13 VITALS
BODY MASS INDEX: 36.21 KG/M2 | HEIGHT: 64 IN | SYSTOLIC BLOOD PRESSURE: 132 MMHG | OXYGEN SATURATION: 98 % | TEMPERATURE: 99.9 F | HEART RATE: 110 BPM | WEIGHT: 212.1 LBS | DIASTOLIC BLOOD PRESSURE: 90 MMHG | RESPIRATION RATE: 16 BRPM

## 2017-07-13 DIAGNOSIS — E66.01 MORBID OBESITY, UNSPECIFIED OBESITY TYPE (H): ICD-10-CM

## 2017-07-13 DIAGNOSIS — L98.9 SKIN LESION: ICD-10-CM

## 2017-07-13 DIAGNOSIS — I10 HYPERTENSION GOAL BP (BLOOD PRESSURE) < 140/90: Primary | ICD-10-CM

## 2017-07-13 DIAGNOSIS — G47.419 PRIMARY NARCOLEPSY WITHOUT CATAPLEXY: ICD-10-CM

## 2017-07-13 PROCEDURE — 99214 OFFICE O/P EST MOD 30 MIN: CPT | Performed by: FAMILY MEDICINE

## 2017-07-13 RX ORDER — LOSARTAN POTASSIUM 100 MG/1
100 TABLET ORAL DAILY
Qty: 30 TABLET | Refills: 1 | Status: SHIPPED | OUTPATIENT
Start: 2017-07-13 | End: 2017-07-17 | Stop reason: ALTCHOICE

## 2017-07-13 RX ORDER — METOPROLOL SUCCINATE 50 MG/1
50 TABLET, EXTENDED RELEASE ORAL DAILY
Qty: 30 TABLET | Refills: 1 | Status: SHIPPED | OUTPATIENT
Start: 2017-07-13 | End: 2017-07-17 | Stop reason: SINTOL

## 2017-07-13 RX ORDER — DEXTROAMPHETAMINE SULFATE 10 MG/1
10 TABLET ORAL DAILY
Refills: 0 | COMMUNITY
Start: 2017-07-13

## 2017-07-13 NOTE — PROGRESS NOTES
SUBJECTIVE:                                                    Sharmaine José is a 37 year old female who presents to clinic today for the following health issues:      Hypertension Follow-up      Outpatient blood pressures- random     Low Salt Diet: no added salt      Amount of exercise or physical activity: swimming 3-5 times per week    Problems taking medications regularly: No    Medication side effects: none    Diet: regular (no restrictions)          Problem list and histories reviewed & adjusted, as indicated.  Additional history:   See under ROS     Patient Active Problem List   Diagnosis     Allergic state     Mild intermittent asthma     Generalized hyperhidrosis     Tobacco use disorder     Interstitial cystitis     Symptomatic menopausal or female climacteric states     Narcolepsy     Cervical dysplasia     Chronic pain     CARDIOVASCULAR SCREENING; LDL GOAL LESS THAN 160     Migraine     Lumbago     Hyperlipidemia LDL goal <160     H/O: hysterectomy     Health Care Home     Discoid lupus     Generalized anxiety disorder     Fatty infiltration of liver     Primary narcolepsy without cataplexy     Hypertension goal BP (blood pressure) < 140/90     elevated bmi (H)     Hypertriglyceridemia     High triglycerides       Current Outpatient Prescriptions   Medication Sig Dispense Refill     dextroamphetamine (DEXTROSTAT) 10 MG tablet Take 1 tablet (10 mg) by mouth daily  0     cyclobenzaprine (FLEXERIL) 10 MG tablet Take 1 tablet (10 mg) by mouth nightly as needed for muscle spasms 12 tablet 0     losartan (COZAAR) 100 MG tablet TAKE ONE TABLET BY MOUTH ONCE DAILY 30 tablet 0     metoprolol (TOPROL-XL) 25 MG 24 hr tablet Take 1 tablet (25 mg) by mouth daily 30 tablet 0     PREMARIN 1.25 MG tablet TAKE 1 TABLET BY MOUTH EVERY DAY 90 tablet PRN     Multiple Vitamins-Minerals (MULTIVITAMIN/EXTRA VITAMIN D3 PO) Take by mouth daily       Probiotic Product (PROBIOTIC FORMULA PO) Take by mouth daily        DULoxetine (CYMBALTA) 30 MG EC capsule 1 po daily with a 20 mg x 4 weeks (50 mg total), then none for the next month (40 mg), then 1 po daily for the subsequent month (30 mg). (weaning slowly) 60 capsule 0     DULoxetine (CYMBALTA) 20 MG EC capsule 1 po daily with the 30 mg x one month (50 mg total), then 2 po daily for the next month (40 mg total), then none for the next month (30 mg total), then 1 po daily for the next month (20 mg total). (weaning slowly) 120 capsule 0     albuterol (PROAIR HFA, PROVENTIL HFA, VENTOLIN HFA) 108 (90 BASE) MCG/ACT inhaler Inhale 2 puffs into the lungs every 6 hours 1 Inhaler 0     EPINEPHrine (EPIPEN) 0.3 MG/0.3ML injection Inject 0.3 mLs (0.3 mg) into the muscle once as needed 2 each 1     fluticasone (FLONASE) 50 MCG/ACT nasal spray Spray 1-2 sprays into both nostrils daily as needed for rhinitis or allergies       ibuprofen 200 MG capsule Take 600 mg by mouth 3 times daily 120 capsule      [DISCONTINUED] DULoxetine (CYMBALTA) 60 MG capsule Take 1 capsule (60 mg) by mouth daily 90 capsule 1           Reviewed and updated as needed this visit by clinical staff  Tobacco  Allergies  Med Hx  Surg Hx  Fam Hx  Soc Hx      Reviewed and updated as needed this visit by Provider         ROS:  CONSTITUTIONAL:NEGATIVE for fever, chills, change in weight.  pants fitting better. But no weight loss.  RESP:NEGATIVE for significant cough or SOB  CV: NEGATIVE for chest pain, palpitations or peripheral edema  PSYCHIATRIC: NEGATIVE for changes in mood or affect    She notes she has been getting similar readings to us, but her cuff may not be reliable.    Currently on once daily of her stimulant medication.  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  Notes that her stimulant will wear off towards the end of the day. Hoping to be able to go to bid stimulant.   Will see that provider in September.    She was in to see Derm yesterday and forgot to ask about a skin lesion on her elbow.  "Relatively new; within the last 6 months.    OBJECTIVE:     /90 (BP Location: Right arm, Cuff Size: Adult Large)  Pulse 110  Temp 99.9  F (37.7  C) (Oral)  Resp 16  Ht 5' 4\" (1.626 m)  Wt 212 lb 1.6 oz (96.2 kg)  LMP 09/25/2006  SpO2 98%  BMI 36.41 kg/m2  Body mass index is 36.41 kg/(m^2).   Reviewed weight curve.  HR 96 upon repeat    GENERAL APPEARANCE: alert and no distress  CV: regular rates and rhythm  MS: no edema  SKIN: Skin lesion distal to left elbow is well circumscribed, slightly raised, round. Slightly crusty to palpation. ~ 3.5 mm in diameter (measured); one edge is darker than the rest.  She notes new in the last 6 months.  Bandage on right anterior thigh from biopsy.  PSYCH: mentation appears normal and affect normal/bright          ASSESSMENT/PLAN:       Hypertension goal BP (blood pressure) < 140/90  increasing metoprolol.  Discussed effect on HR which may make a difference with exercise tolerability. Her HR is currently on the higher end.   - metoprolol (TOPROL-XL) 50 MG 24 hr tablet; Take 1 tablet (50 mg) by mouth daily  - losartan (COZAAR) 100 MG tablet; Take 1 tablet (100 mg) by mouth daily    Morbid obesity, unspecified obesity type (H)  She is working on both diet and exercise. Feels things are fitting better; perhaps she has started converting to more muscle.     Primary narcolepsy without cataplexy  hoping to increase her stimulant to bid. She will see that provider in September; it may depend on her blood pressure.    Skin lesion  she does have a 6 month follow up with Derm. I don't think the current lesion is worrisome, though there is one area that is darker. Recommend to watch this and if changing, to see Derm sooner.  She does not know of pathology around recent biopsy. She notes feeling surprised they recommended to keep it covered x 10 days. Discussed healing.      Patient Instructions       I would like to see you again in 3-4 weeks. We are going up on the metoprolol. " Let me know if you have any difficulty with that.             Temi Marsh MD, MD  [facility]

## 2017-07-13 NOTE — PATIENT INSTRUCTIONS
I would like to see you again in 3-4 weeks. We are going up on the metoprolol. Let me know if you have any difficulty with that.

## 2017-07-13 NOTE — NURSING NOTE
"Chief Complaint   Patient presents with     Hypertension     medication recheck       Initial /90 (BP Location: Right arm, Cuff Size: Adult Large)  Pulse 110  Temp 99.9  F (37.7  C) (Oral)  Resp 16  Ht 5' 4\" (1.626 m)  Wt 212 lb 1.6 oz (96.2 kg)  LMP 09/25/2006  SpO2 98%  BMI 36.41 kg/m2 Estimated body mass index is 36.41 kg/(m^2) as calculated from the following:    Height as of this encounter: 5' 4\" (1.626 m).    Weight as of this encounter: 212 lb 1.6 oz (96.2 kg).  Medication Reconciliation: complete   Cesia Almodovar, CMA      "

## 2017-07-13 NOTE — MR AVS SNAPSHOT
After Visit Summary   7/13/2017    Sharmaine José    MRN: [id number]           Patient Information     Date Of Birth          1980        Visit Information        Provider Department      7/13/2017 9:30 AM Temi Marsh MD [facility]        Today's Diagnoses     Hypertension goal BP (blood pressure) < 140/90    -  1      Care Instructions        I would like to see you again in 3-4 weeks. We are going up on the metoprolol. Let me know if you have any difficulty with that.                 Follow-ups after your visit        Who to contact     If you have questions or need follow up information about today's clinic visit or your schedule please contact [facility] directly at [phone].  Normal or non-critical lab and imaging results will be communicated to you by [employer], letter or phone within 4 business days after the clinic has received the results. If you do not hear from us within 7 days, please contact the clinic through Grid20[employer] or phone. If you have a critical or abnormal lab result, we will notify you by phone as soon as possible.  Submit refill requests through [facility] or call your pharmacy and they will forward the refill request to us. Please allow 3 business days for your refill to be completed.          Additional Information About Your Visit        MyChart Information     [facility] gives you secure access to your electronic health record. If you see a primary care provider, you can also send messages to your care team and make appointments. If you have questions, please call your primary care clinic.  If you do not have a primary care provider, please call [phone] and they will assist you.        Care EveryWhere ID     This is your Care EveryWhere ID. This could be used by other organizations to access your [place] medical records  [id number]        Your Vitals Were     Pulse Temperature Respirations Height Last Period Pulse Oximetry    110  "99.9  F (37.7  C) (Oral) 16 5' 4\" (1.626 m) 09/25/2006 98%    BMI (Body Mass Index)                   36.41 kg/m2            Blood Pressure from Last 3 Encounters:   07/13/17 132/90   05/08/17 (!) 132/98   03/13/17 (!) 138/94    Weight from Last 3 Encounters:   07/13/17 212 lb 1.6 oz (96.2 kg)   05/08/17 208 lb 8 oz (94.6 kg)   03/13/17 208 lb 3.2 oz (94.4 kg)              Today, you had the following     No orders found for display         Today's Medication Changes          These changes are accurate as of: 7/13/17 10:20 AM.  If you have any questions, ask your nurse or doctor.               These medicines have changed or have updated prescriptions.        Dose/Directions    dextroamphetamine 10 MG tablet   Commonly known as:  DEXTROSTAT   This may have changed:  when to take this   Changed by:  Temi Marsh MD        Dose:  10 mg   Take 1 tablet (10 mg) by mouth daily   Refills:  0       losartan 100 MG tablet   Commonly known as:  COZAAR   This may have changed:  See the new instructions.   Used for:  Hypertension goal BP (blood pressure) < 140/90   Changed by:  Temi Marsh MD        Dose:  100 mg   Take 1 tablet (100 mg) by mouth daily   Quantity:  30 tablet   Refills:  1       metoprolol 50 MG 24 hr tablet   Commonly known as:  TOPROL-XL   This may have changed:    - medication strength  - how much to take   Used for:  Hypertension goal BP (blood pressure) < 140/90   Changed by:  Temi Marsh MD        Dose:  50 mg   Take 1 tablet (50 mg) by mouth daily   Quantity:  30 tablet   Refills:  1            Where to get your medicines      These medications were sent to [facility] - [place], [place] - [address] AT [facility] [address]  [address], [facility] [address]     Phone:  [phone]     losartan 100 MG tablet    metoprolol 50 MG 24 hr tablet                Primary Care Provider Office Phone # Fax #    Temi Marsh [phone]    "    [facility] [address]  [facility] [address]        Equal Access to Services     ROCKANIRUDH CASSI : Hadii aad ku hadasho Soomaali, waaxda luqadaha, qaybta kaalmada adeegyada, kayla mcclellandn lyric nazario laParishlana morgan. So [facility] [phone].    ATENCIÓN: Si habla español, tiene a lema disposición servicios gratuitos de asistencia lingüística. Llame al [phone].    We comply with applicable federal civil rights laws and [place] laws. We do not discriminate on the basis of race, color, national origin, age, disability sex, sexual orientation or gender identity.            Thank you!     Thank you for choosing [facility]  for your care. Our goal is always to provide you with excellent care. Hearing back from our patients is one way we can continue to improve our services. Please take a few minutes to complete the written survey that you may receive in the mail after your visit with us. Thank you!             Your Updated Medication List - Protect others around you: Learn how to safely use, store and throw away your medicines at www.disposemymeds.org.          This list is accurate as of: 7/13/17 10:20 AM.  Always use your most recent med list.                   Brand Name Dispense Instructions for use Diagnosis    albuterol 108 (90 BASE) MCG/ACT Inhaler    PROAIR HFA/PROVENTIL HFA/VENTOLIN HFA    1 Inhaler    Inhale 2 puffs into the lungs every 6 hours    Mild intermittent asthma without complication       cyclobenzaprine 10 MG tablet    FLEXERIL    12 tablet    Take 1 tablet (10 mg) by mouth nightly as needed for muscle spasms    Back muscle spasm       dextroamphetamine 10 MG tablet    DEXTROSTAT     Take 1 tablet (10 mg) by mouth daily        * DULoxetine 30 MG EC capsule    CYMBALTA    60 capsule    1 po daily with a 20 mg x 4 weeks (50 mg total), then none for the next month (40 mg), then 1 po daily for the subsequent month (30 mg). (weaning slowly)    Antidepressant  discontinuation syndrome, subsequent encounter       * DULoxetine 20 MG EC capsule    CYMBALTA    120 capsule    1 po daily with the 30 mg x one month (50 mg total), then 2 po daily for the next month (40 mg total), then none for the next month (30 mg total), then 1 po daily for the next month (20 mg total). (weaning slowly)    Antidepressant discontinuation syndrome, subsequent encounter       EPINEPHrine 0.3 MG/0.3ML injection     2 each    Inject 0.3 mLs (0.3 mg) into the muscle once as needed    Bee sting allergy       FLONASE 50 MCG/ACT spray   Generic drug:  fluticasone      Spray 1-2 sprays into both nostrils daily as needed for rhinitis or allergies        ibuprofen 200 MG capsule     120 capsule    Take 600 mg by mouth 3 times daily        losartan 100 MG tablet    COZAAR    30 tablet    Take 1 tablet (100 mg) by mouth daily    Hypertension goal BP (blood pressure) < 140/90       metoprolol 50 MG 24 hr tablet    TOPROL-XL    30 tablet    Take 1 tablet (50 mg) by mouth daily    Hypertension goal BP (blood pressure) < 140/90       MULTIVITAMIN/EXTRA VITAMIN D3 PO      Take by mouth daily        PREMARIN 1.25 MG tablet   Generic drug:  estrogens (conjugated)     90 tablet    TAKE 1 TABLET BY MOUTH EVERY DAY    Menopause       PROBIOTIC FORMULA PO      Take by mouth daily        * Notice:  This list has 2 medication(s) that are the same as other medications prescribed for you. Read the directions carefully, and ask your doctor or other care provider to review them with you.

## 2017-07-24 ENCOUNTER — MYC REFILL (OUTPATIENT)
Dept: FAMILY MEDICINE | Facility: CLINIC | Age: 37
End: 2017-07-24

## 2017-07-24 DIAGNOSIS — J45.20 MILD INTERMITTENT ASTHMA WITHOUT COMPLICATION: ICD-10-CM

## 2017-07-24 DIAGNOSIS — M62.830 BACK MUSCLE SPASM: ICD-10-CM

## 2017-07-25 RX ORDER — CYCLOBENZAPRINE HCL 10 MG
10 TABLET ORAL
Qty: 30 TABLET | Refills: 0 | Status: SHIPPED | OUTPATIENT
Start: 2017-07-25 | End: 2017-08-23

## 2017-07-25 RX ORDER — ALBUTEROL SULFATE 90 UG/1
2 AEROSOL, METERED RESPIRATORY (INHALATION) EVERY 6 HOURS
Qty: 1 INHALER | Refills: 4 | Status: SHIPPED | OUTPATIENT
Start: 2017-07-25 | End: 2017-12-22

## 2017-07-25 NOTE — TELEPHONE ENCOUNTER
Message from [facility]:  Original authorizing provider: Temi Marsh MD, MD Sharmaine José would like a refill of the following medications:  albuterol (PROAIR HFA, PROVENTIL HFA, VENTOLIN HFA) 108 (90 BASE) MCG/ACT inhaler [Temi Marsh MD, MD]    Preferred pharmacy: [facility] [address] [place] [address] [address] AT [facility] [address]    Comment:      Medication renewals requested in this message routed to other providers:  cyclobenzaprine (FLEXERIL) 10 MG tablet [Clare Dominguez, APRN CNP]

## 2017-07-25 NOTE — TELEPHONE ENCOUNTER
Albuterol Inhaler       Last Written Prescription Date: 5/24/2016  Last Fill Quantity: 1, # refills: 0    Last Office Visit with [facility], [facility] or [facility] prescribing provider:  7/13/2017   Future Office Visit:       Date of Last Asthma Action Plan Letter:   Asthma Action Plan Q1 Year    Topic Date Due     Asthma Action Plan - yearly  05/08/2018      Asthma Control Test:   ACT Total Scores 5/8/2017   ACT TOTAL SCORE -   ASTHMA ER VISITS -   ASTHMA HOSPITALIZATIONS -   ACT TOTAL SCORE (Goal Greater than or Equal to 20) 25   In the past 12 months, how many times did you visit the emergency room for your asthma without being admitted to the hospital? 0   In the past 12 months, how many times were you hospitalized overnight because of your asthma? 0       Date of Last Spirometry Test:   No results found for this or any previous visit.          Prescription approved per [facility] Refill Protocol.    Maki SORENSON RN, BSN, PHN  Veneta Flex RN

## 2017-07-25 NOTE — TELEPHONE ENCOUNTER
cyclobenzaprine      Last Written Prescription Date:  7/3/2017  Last Fill Quantity: 12,   # refills: 0  Last Office Visit with [facility], [facility] or [facility] prescribing provider: 7/13/2017  Future Office visit:       Routing refill request to provider for review/approval because:  Drug not on the [facility], [facility] or [facility] refill protocol or controlled substance      [place] reviewed:  reviewed- no concerns    [place]:  [email or web address]    Maki SORENSON RN, BSN, PHN  Bk Murillo RN\

## 2017-07-25 NOTE — TELEPHONE ENCOUNTER
Message from [employer]:  Original authorizing provider: MARGA Lazcano Ra, CNP would like a refill of the following medications:  cyclobenzaprine (FLEXERIL) 10 MG tablet [MARGA Lazcano Ra, CNP]    Preferred pharmacy: [facility] [address] [place] [address] [address] AT [facility] [address]    Comment:      Medication renewals requested in this message routed to other providers:  albuterol (PROAIR HFA, PROVENTIL HFA, VENTOLIN HFA) 108 (90 BASE) MCG/ACT inhaler [Temi Marsh MD, MD]

## 2017-08-09 ENCOUNTER — TELEPHONE (OUTPATIENT)
Dept: FAMILY MEDICINE | Facility: CLINIC | Age: 37
End: 2017-08-09

## 2017-08-09 DIAGNOSIS — Z23 RABIES, NEED FOR PROPHYLACTIC VACCINATION AGAINST: Primary | ICD-10-CM

## 2017-08-09 NOTE — TELEPHONE ENCOUNTER
Pt calling back to know what alternate options are available for mantoux? Pt states that she is allergic to mantoux(has had localized irritation & swelling in the past from the med). Notified that we can do quanteferon lab test in place on mantoux, if that comes back positive, she might need chest x-ray. Pt will call us back if she would like to make an appointment for quanteferon.     En, RN  Triage Nurse

## 2017-08-09 NOTE — TELEPHONE ENCOUNTER
Patient is calling to ask if she could get the Rabies vaccines.  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  She would like to have them done at the [facility] in [place]  As it is closer to her home. Please advise if this is ok, and not sure if they would   Be ordered by us, and then sent to them? She is aware that this is a 3 shot series.   She will be in and out tomorrow, so ok to leave message for her.     Rubi Clark, RN  Triage Nurse

## 2017-08-10 NOTE — TELEPHONE ENCOUNTER
I think we order rabies vaccine special.  She could call [facility] and see what they recommend/ require.    Thanks!

## 2017-08-10 NOTE — TELEPHONE ENCOUNTER
Called patient to let her know, phone number for [facility] is [phone].  Left message to have her call them.    Rubi Clark, RN  Triage Nurse

## 2017-08-11 NOTE — TELEPHONE ENCOUNTER
Informed patient of provider message. Patient will make appt at [facility].     Yolie Pascal RN

## 2017-08-11 NOTE — TELEPHONE ENCOUNTER
Patient contacted [facility]. They stock the vaccine. Requesting order to be placed by pcp and patient can have done there. Series of 3 shots. 2nd one 7 days after first and 3rd 21-28 days later depending on brand.    Please place order.    Contact patient when done.    Yolie Pascal RN

## 2017-08-11 NOTE — TELEPHONE ENCOUNTER
I did sign the order t'd up; not sure if it is correct.  Please let her know.   Would anticipate they would route me a cosign for any correction.

## 2017-08-15 NOTE — TELEPHONE ENCOUNTER
Medication had to be ordered and we still haven't received it yet.  Left message for pt to call back.  Should be in sometime tomorrow, but can't guarantee when.  Please reschedule pt's nurse only appt for later in the day, or on Thursday to make sure we will have the vaccine available.

## 2017-08-16 ENCOUNTER — MYC MEDICAL ADVICE (OUTPATIENT)
Dept: INTERNAL MEDICINE | Facility: CLINIC | Age: 37
End: 2017-08-16

## 2017-08-18 ENCOUNTER — ALLIED HEALTH/NURSE VISIT (OUTPATIENT)
Dept: NURSING | Facility: CLINIC | Age: 37
End: 2017-08-18
Payer: COMMERCIAL

## 2017-08-18 DIAGNOSIS — Z23 NEED FOR VACCINATION: Primary | ICD-10-CM

## 2017-08-18 PROCEDURE — 90675 RABIES VACCINE IM: CPT

## 2017-08-18 PROCEDURE — 90471 IMMUNIZATION ADMIN: CPT

## 2017-08-19 ENCOUNTER — E-VISIT (OUTPATIENT)
Dept: FAMILY MEDICINE | Facility: CLINIC | Age: 37
End: 2017-08-19
Payer: COMMERCIAL

## 2017-08-19 DIAGNOSIS — Z53.9 ERRONEOUS ENCOUNTER--DISREGARD: Primary | ICD-10-CM

## 2017-08-19 PROCEDURE — 99444 ZZC PHYSICIAN ONLINE EVALUATION & MANAGEMENT SERVICE: CPT | Performed by: FAMILY MEDICINE

## 2017-08-19 NOTE — MR AVS SNAPSHOT
After Visit Summary   8/19/2017    Sharmaine José    MRN: [id number]           Patient Information     Date Of Birth          1980        Visit Information        Provider Department      8/19/2017 1:25 PM Temi Marsh MD [facility]        Today's Diagnoses     ERRONEOUS ENCOUNTER--DISREGARD    -  1       Follow-ups after your visit        Your next 10 appointments already scheduled     Sep 08, 2017 11:00 AM CDT   Nurse Only with [facility]UR[facility]   [facility]    [address] [id number] [phone]            Sep 22, 2017  8:50 AM CDT   MyChart Long with Temi Marsh MD   [facility]    [address] [facility] [address] [phone]              Who to contact     If you have questions or need follow up information about today's clinic visit or your schedule please contact [facility] directly at [phone].  Normal or non-critical lab and imaging results will be communicated to you by [employer], letter or phone within 4 business days after the clinic has received the results. If you do not hear from us within 7 days, please contact the clinic through F[employer]EC[employer] or phone. If you have a critical or abnormal lab result, we will notify you by phone as soon as possible.  Submit refill requests through [employer] or call your pharmacy and they will forward the refill request to us. Please allow 3 business days for your refill to be completed.          Additional Information About Your Visit        MyChart Information     [employer] gives you secure access to your electronic health record. If you see a primary care provider, you can also send messages to your care team and make appointments. If you have questions, please call your primary care clinic.  If you do not have a primary care provider, please call [phone] and they  will assist you.        Care EveryWhere ID     This is your Care EveryWhere ID. This could be used by other organizations to access your [place] medical records  [id number]        Your Vitals Were     Last Period                   09/25/2006            Blood Pressure from Last 3 Encounters:   07/13/17 132/90   05/08/17 (!) 132/98   03/13/17 (!) 138/94    Weight from Last 3 Encounters:   07/13/17 212 lb 1.6 oz (96.2 kg)   05/08/17 208 lb 8 oz (94.6 kg)   03/13/17 208 lb 3.2 oz (94.4 kg)              Today, you had the following     No orders found for display         Today's Medication Changes          These changes are accurate as of: 8/19/17 11:59 PM.  If you have any questions, ask your nurse or doctor.               These medicines have changed or have updated prescriptions.        Dose/Directions    PREMARIN 1.25 MG tablet   This may have changed:  See the new instructions.   Used for:  Menopause   Generic drug:  estrogens (conjugated)   Changed by:  Temi Marsh MD        TAKE ONE TABLET BY MOUTH EVERY DAY   Quantity:  90 tablet   Refills:  3            Where to get your medicines      These medications were sent to [facility] [address] [place] [address] [address] AT [facility] [address]  [address], [facility] [address]     Phone:  [phone]     PREMARIN 1.25 MG tablet                Primary Care Provider Office Phone # Fax #    Temi Marsh [phone] [address]  [facility] [address]        Equal Access to Services     [facility] CenterDREW AH: Hadii aad ku hadasho Soomaali, waaxda luqadaha, qaybta kaalmada adeegyada, kayla morgan. So [facility] [phone].    ATENCIÓN: Si habla español, tiene a lema disposición servicios gratuitos de asistencia lingüística. Llame al [phone].    We comply with applicable federal civil rights laws and [place] laws. We do not discriminate on the basis of race, color, national  origin, age, disability sex, sexual orientation or gender identity.            Thank you!     Thank you for choosing [facility]  for your care. Our goal is always to provide you with excellent care. Hearing back from our patients is one way we can continue to improve our services. Please take a few minutes to complete the written survey that you may receive in the mail after your visit with us. Thank you!             Your Updated Medication List - Protect others around you: Learn how to safely use, store and throw away your medicines at www.disposemymeds.org.          This list is accurate as of: 8/19/17 11:59 PM.  Always use your most recent med list.                   Brand Name Dispense Instructions for use Diagnosis    albuterol 108 (90 BASE) MCG/ACT Inhaler    PROAIR HFA/PROVENTIL HFA/VENTOLIN HFA    1 Inhaler    Inhale 2 puffs into the lungs every 6 hours    Mild intermittent asthma without complication       dextroamphetamine 10 MG tablet    DEXTROSTAT     Take 1 tablet (10 mg) by mouth daily        * DULoxetine 30 MG EC capsule    CYMBALTA    60 capsule    1 po daily with a 20 mg x 4 weeks (50 mg total), then none for the next month (40 mg), then 1 po daily for the subsequent month (30 mg). (weaning slowly)    Antidepressant discontinuation syndrome, subsequent encounter       * DULoxetine 20 MG EC capsule    CYMBALTA    120 capsule    1 po daily with the 30 mg x one month (50 mg total), then 2 po daily for the next month (40 mg total), then none for the next month (30 mg total), then 1 po daily for the next month (20 mg total). (weaning slowly)    Antidepressant discontinuation syndrome, subsequent encounter       EPINEPHrine 0.3 MG/0.3ML injection 2-pack    EPIPEN/ADRENACLICK/or ANY BX GENERIC EQUIV    2 each    Inject 0.3 mLs (0.3 mg) into the muscle once as needed    Bee sting allergy       FLONASE 50 MCG/ACT spray   Generic drug:  fluticasone      Spray 1-2 sprays into both nostrils  daily as needed for rhinitis or allergies        ibuprofen 200 MG capsule     120 capsule    Take 600 mg by mouth 3 times daily        MULTIVITAMIN/EXTRA VITAMIN D3 PO      Take by mouth daily        PREMARIN 1.25 MG tablet   Generic drug:  estrogens (conjugated)     90 tablet    TAKE ONE TABLET BY MOUTH EVERY DAY    Menopause       PROBIOTIC FORMULA PO      Take by mouth daily        * Notice:  This list has 2 medication(s) that are the same as other medications prescribed for you. Read the directions carefully, and ask your doctor or other care provider to review them with you.

## 2017-08-23 ENCOUNTER — MYC REFILL (OUTPATIENT)
Dept: FAMILY MEDICINE | Facility: CLINIC | Age: 37
End: 2017-08-23

## 2017-08-23 DIAGNOSIS — I10 HYPERTENSION GOAL BP (BLOOD PRESSURE) < 140/90: ICD-10-CM

## 2017-08-23 DIAGNOSIS — M62.830 BACK MUSCLE SPASM: ICD-10-CM

## 2017-08-23 RX ORDER — CYCLOBENZAPRINE HCL 10 MG
10 TABLET ORAL
Qty: 30 TABLET | Refills: 0 | Status: CANCELLED | OUTPATIENT
Start: 2017-08-23

## 2017-08-23 RX ORDER — VALSARTAN 320 MG/1
320 TABLET ORAL DAILY
Qty: 30 TABLET | Refills: 0 | Status: SHIPPED | OUTPATIENT
Start: 2017-08-23 | End: 2017-09-22

## 2017-08-23 NOTE — TELEPHONE ENCOUNTER
Message from [employer] [employer]:  Original authorizing provider: Temi Marsh MD, MD Sharmaine José would like a refill of the following medications:  valsartan (DIOVAN) 320 MG tablet [Temi Marsh MD, MD]  cyclobenzaprine (FLEXERIL) 10 MG tablet [Temi Marsh MD, MD]    Preferred pharmacy: [facility] [address] AT [facility] [address]    Comment:

## 2017-08-24 RX ORDER — CYCLOBENZAPRINE HCL 10 MG
10 TABLET ORAL
Qty: 30 TABLET | Refills: 0 | Status: SHIPPED | OUTPATIENT
Start: 2017-08-24 | End: 2017-10-27

## 2017-08-25 ENCOUNTER — ALLIED HEALTH/NURSE VISIT (OUTPATIENT)
Dept: NURSING | Facility: CLINIC | Age: 37
End: 2017-08-25
Payer: COMMERCIAL

## 2017-08-25 DIAGNOSIS — Z23 NEED FOR VACCINATION: Primary | ICD-10-CM

## 2017-08-25 PROCEDURE — 90675 RABIES VACCINE IM: CPT

## 2017-08-25 PROCEDURE — 90471 IMMUNIZATION ADMIN: CPT

## 2017-08-25 NOTE — NURSING NOTE
Screening Questionnaire for Adult Immunization    Are you sick today?   No   Do you have allergies to medications, food, a vaccine component or latex?   Yes   Have you ever had a serious reaction after receiving a vaccination?   Yes   Do you have a long-term health problem with heart disease, lung disease, asthma, kidney disease, metabolic disease (e.g. diabetes), anemia, or other blood disorder?   Yes   Do you have cancer, leukemia, HIV/AIDS, or any other immune system problem?   No   In the past 3 months, have you taken medications that affect  your immune system, such as prednisone, other steroids, or anticancer drugs; drugs for the treatment of rheumatoid arthritis, Crohn s disease, or psoriasis; or have you had radiation treatments?   No   Have you had a seizure, or a brain or other nervous system problem?   No   During the past year, have you received a transfusion of blood or blood     products, or been given immune (gamma) globulin or antiviral drug?   No   For women: Are you pregnant or is there a chance you could become        pregnant during the next month?   No   Have you received any vaccinations in the past 4 weeks?   No     Immunization questionnaire was positive for at least one answer.  Notified Dr. Sales.        Per orders of Dr. Sales, injection of Rabies given by Roseline Snyder. Patient instructed to remain in clinic for 15 minutes afterwards, and to report any adverse reaction to me immediately.       Screening performed by Roseline Snyder on 8/25/2017 at 9:31 AM.

## 2017-08-25 NOTE — MR AVS SNAPSHOT
After Visit Summary   8/25/2017    Sharmaine José    MRN: [id number]           Patient Information     Date Of Birth          1980        Visit Information        Provider Department      8/25/2017 9:00 AM OX IM SOUTH - NURSE [facility]        Today's Diagnoses     Need for vaccination    -  1       Follow-ups after your visit        Your next 10 appointments already scheduled     Sep 08, 2017 11:00 AM CDT   Nurse Only with OX IM [facility]TH - NURSE   [facility]    [address] [id number] [phone]            Sep 22, 2017  8:50 AM CDT   Callie Pitts with Temi Marsh MD   [facility]    [address] [facility] [address] [phone]              Who to contact     If you have questions or need follow up information about today's clinic visit or your schedule please contact [facility] directly at [phone].  Normal or non-critical lab and imaging results will be communicated to you by Kuli Kulihart, letter or phone within 4 business days after the clinic has received the results. If you do not hear from us within 7 days, please contact the clinic through Kuli Kulihart or phone. If you have a critical or abnormal lab result, we will notify you by phone as soon as possible.  Submit refill requests through Spotivate or call your pharmacy and they will forward the refill request to us. Please allow 3 business days for your refill to be completed.          Additional Information About Your Visit        MyChart Information     Spotivate gives you secure access to your electronic health record. If you see a primary care provider, you can also send messages to your care team and make appointments. If you have questions, please call your primary care clinic.  If you do not have a primary care provider, please call  [phone] and they will assist you.        Care EveryWhere ID     This is your Care EveryWhere ID. This could be used by other organizations to access your [place] medical records  [id number]        Your Vitals Were     Last Period                   09/25/2006            Blood Pressure from Last 3 Encounters:   07/13/17 132/90   05/08/17 (!) 132/98   03/13/17 (!) 138/94    Weight from Last 3 Encounters:   07/13/17 212 lb 1.6 oz (96.2 kg)   05/08/17 208 lb 8 oz (94.6 kg)   03/13/17 208 lb 3.2 oz (94.4 kg)              We Performed the Following     RABIES VACCINE, IM (IMOVAX)     VACCINE ADMINISTRATION, INITIAL        Primary Care Provider Office Phone # Fax #    Temi Marsh [phone] [address]  [facility] [address]        Equal Access to Services     [facility]: Hadii aad ku hadasho Soomaali, waaxda luqadaha, qaybta kaalmada adeegyada, waxay idiin hayaan adeeg khluis braga . So [facility] [phone].    ATENCIÓN: Si habla español, tiene a lema disposición servicios gratuitos de asistencia lingüística. Llame al [phone].    We comply with applicable federal civil rights laws and [place] laws. We do not discriminate on the basis of race, color, national origin, age, disability sex, sexual orientation or gender identity.            Thank you!     Thank you for choosing [facility]  for your care. Our goal is always to provide you with excellent care. Hearing back from our patients is one way we can continue to improve our services. Please take a few minutes to complete the written survey that you may receive in the mail after your visit with us. Thank you!             Your Updated Medication List - Protect others around you: Learn how to safely use, store and throw away your medicines at www.disposemymeds.org.          This list is accurate as of: 8/25/17  9:32 AM.  Always use your most recent med list.                   Brand Name Dispense  Instructions for use Diagnosis    albuterol 108 (90 BASE) MCG/ACT Inhaler    PROAIR HFA/PROVENTIL HFA/VENTOLIN HFA    1 Inhaler    Inhale 2 puffs into the lungs every 6 hours    Mild intermittent asthma without complication       cyclobenzaprine 10 MG tablet    FLEXERIL    30 tablet    Take 1 tablet (10 mg) by mouth nightly as needed for muscle spasms    Back muscle spasm       dextroamphetamine 10 MG tablet    DEXTROSTAT     Take 1 tablet (10 mg) by mouth daily        * DULoxetine 30 MG EC capsule    CYMBALTA    60 capsule    1 po daily with a 20 mg x 4 weeks (50 mg total), then none for the next month (40 mg), then 1 po daily for the subsequent month (30 mg). (weaning slowly)    Antidepressant discontinuation syndrome, subsequent encounter       * DULoxetine 20 MG EC capsule    CYMBALTA    120 capsule    1 po daily with the 30 mg x one month (50 mg total), then 2 po daily for the next month (40 mg total), then none for the next month (30 mg total), then 1 po daily for the next month (20 mg total). (weaning slowly)    Antidepressant discontinuation syndrome, subsequent encounter       EPINEPHrine 0.3 MG/0.3ML injection 2-pack    EPIPEN/ADRENACLICK/or ANY BX GENERIC EQUIV    2 each    Inject 0.3 mLs (0.3 mg) into the muscle once as needed    Bee sting allergy       FLONASE 50 MCG/ACT spray   Generic drug:  fluticasone      Spray 1-2 sprays into both nostrils daily as needed for rhinitis or allergies        ibuprofen 200 MG capsule     120 capsule    Take 600 mg by mouth 3 times daily        MULTIVITAMIN/EXTRA VITAMIN D3 PO      Take by mouth daily        PREMARIN 1.25 MG tablet   Generic drug:  estrogens (conjugated)     90 tablet    TAKE ONE TABLET BY MOUTH EVERY DAY    Menopause       PROBIOTIC FORMULA PO      Take by mouth daily        valsartan 320 MG tablet    DIOVAN    30 tablet    Take 1 tablet (320 mg) by mouth daily    Hypertension goal BP (blood pressure) < 140/90       * Notice:  This list has 2  medication(s) that are the same as other medications prescribed for you. Read the directions carefully, and ask your doctor or other care provider to review them with you.

## 2017-09-04 ENCOUNTER — MYC MEDICAL ADVICE (OUTPATIENT)
Dept: FAMILY MEDICINE | Facility: CLINIC | Age: 37
End: 2017-09-04

## 2017-09-04 ENCOUNTER — MYC REFILL (OUTPATIENT)
Dept: FAMILY MEDICINE | Facility: CLINIC | Age: 37
End: 2017-09-04

## 2017-09-04 DIAGNOSIS — T43.205D ANTIDEPRESSANT DISCONTINUATION SYNDROME, SUBSEQUENT ENCOUNTER: ICD-10-CM

## 2017-09-04 RX ORDER — DULOXETIN HYDROCHLORIDE 20 MG/1
CAPSULE, DELAYED RELEASE ORAL
Qty: 120 CAPSULE | Refills: 0 | Status: CANCELLED | OUTPATIENT
Start: 2017-09-04

## 2017-09-05 RX ORDER — DULOXETIN HYDROCHLORIDE 20 MG/1
20 CAPSULE, DELAYED RELEASE ORAL DAILY
Qty: 30 CAPSULE | Refills: 0 | Status: SHIPPED | OUTPATIENT
Start: 2017-09-05 | End: 2017-10-08

## 2017-09-05 NOTE — TELEPHONE ENCOUNTER
Message from Ideal Powerhart:  Original authorizing provider: Temi Marsh MD, MD Sharmaine José would like a refill of the following medications:  DULoxetine (CYMBALTA) 20 MG EC capsule [Temi Marsh MD, MD]    Preferred pharmacy: [facility] [address] [place] [address] [address] AT [facility] [address]    Comment:  Re: Msg to Dr. Marsh

## 2017-09-15 ENCOUNTER — MYC MEDICAL ADVICE (OUTPATIENT)
Dept: FAMILY MEDICINE | Facility: CLINIC | Age: 37
End: 2017-09-15

## 2017-09-15 ENCOUNTER — ALLIED HEALTH/NURSE VISIT (OUTPATIENT)
Dept: NURSING | Facility: CLINIC | Age: 37
End: 2017-09-15
Payer: COMMERCIAL

## 2017-09-15 DIAGNOSIS — Z23 NEED FOR VACCINATION: Primary | ICD-10-CM

## 2017-09-15 PROCEDURE — 90675 RABIES VACCINE IM: CPT

## 2017-09-15 PROCEDURE — 90471 IMMUNIZATION ADMIN: CPT

## 2017-09-15 NOTE — NURSING NOTE
Screening Questionnaire for Adult Immunization    Are you sick today?   No   Do you have allergies to medications, food, a vaccine component or latex?   No   Have you ever had a serious reaction after receiving a vaccination?   No   Do you have a long-term health problem with heart disease, lung disease, asthma, kidney disease, metabolic disease (e.g. diabetes), anemia, or other blood disorder?   No   Do you have cancer, leukemia, HIV/AIDS, or any other immune system problem?   No   In the past 3 months, have you taken medications that affect  your immune system, such as prednisone, other steroids, or anticancer drugs; drugs for the treatment of rheumatoid arthritis, Crohn s disease, or psoriasis; or have you had radiation treatments?   No   Have you had a seizure, or a brain or other nervous system problem?   No   During the past year, have you received a transfusion of blood or blood     products, or been given immune (gamma) globulin or antiviral drug?   No   For women: Are you pregnant or is there a chance you could become        pregnant during the next month?   No   Have you received any vaccinations in the past 4 weeks?   No     Immunization questionnaire answers were all negative.        Per orders of , injection of Rabies given by Jennie Arriaga. Patient instructed to remain in clinic for 15 minutes afterwards, and to report any adverse reaction to me immediately.       Screening performed by Jennie Arriaga on 9/15/2017 at 9:38 AM.

## 2017-09-15 NOTE — MR AVS SNAPSHOT
After Visit Summary   9/15/2017    Sharmaine José    MRN: [id number]           Patient Information     Date Of Birth          1980        Visit Information        Provider Department      9/15/2017 11:00 AM OX IM SOUTH - NURSE [facility]        Today's Diagnoses     Need for vaccination    -  1       Follow-ups after your visit        Your next 10 appointments already scheduled     Sep 15, 2017 11:00 AM CDT   Nurse Only with OX IM SOUTH - NURSE   [facility]    [address] [id number] [phone]            Sep 22, 2017  8:50 AM CDT   Callie Pitts with Temi Marsh MD   [facility]    [address] [facility] [address] [phone]              Who to contact     If you have questions or need follow up information about today's clinic visit or your schedule please contact [facility] directly at [phone].  Normal or non-critical lab and imaging results will be communicated to you by Roving Planethart, letter or phone within 4 business days after the clinic has received the results. If you do not hear from us within 7 days, please contact the clinic through Roving Planethart or phone. If you have a critical or abnormal lab result, we will notify you by phone as soon as possible.  Submit refill requests through FatTail or call your pharmacy and they will forward the refill request to us. Please allow 3 business days for your refill to be completed.          Additional Information About Your Visit        MyChart Information     FatTail gives you secure access to your electronic health record. If you see a primary care provider, you can also send messages to your care team and make appointments. If you have questions, please call your primary care clinic.  If you do not have a primary care provider, please call  [phone] and they will assist you.        Care EveryWhere ID     This is your Care EveryWhere ID. This could be used by other organizations to access your [place] medical records  [id number]        Your Vitals Were     Last Period                   09/25/2006            Blood Pressure from Last 3 Encounters:   07/13/17 132/90   05/08/17 (!) 132/98   03/13/17 (!) 138/94    Weight from Last 3 Encounters:   07/13/17 212 lb 1.6 oz (96.2 kg)   05/08/17 208 lb 8 oz (94.6 kg)   03/13/17 208 lb 3.2 oz (94.4 kg)              Today, you had the following     No orders found for display       Primary Care Provider Office Phone # Fax #    Temi Marsh [phone]       [address] [address] [facility] [address]        Equal Access to Services     [facility]: Hadii aad ku hadasho Soomaali, waaxda luqadaha, qaybta kaalmada adeegyada, waxay eldonin hayaan adeagueda braga . So [facility] [place] [phone].    ATENCIÓN: Si habla español, tiene a lema disposición servicios gratuitos de asistencia lingüística. Llame al [phone].    We comply with applicable federal civil rights laws and [place] laws. We do not discriminate on the basis of race, color, national origin, age, disability sex, sexual orientation or gender identity.            Thank you!     Thank you for choosing [facility]  for your care. Our goal is always to provide you with excellent care. Hearing back from our patients is one way we can continue to improve our services. Please take a few minutes to complete the written survey that you may receive in the mail after your visit with us. Thank you!             Your Updated Medication List - Protect others around you: Learn how to safely use, store and throw away your medicines at www.disposemymeds.org.          This list is accurate as of: 9/15/17  9:43 AM.  Always use your most recent med list.                   Brand Name Dispense Instructions for use Diagnosis    albuterol  108 (90 BASE) MCG/ACT Inhaler    PROAIR HFA/PROVENTIL HFA/VENTOLIN HFA    1 Inhaler    Inhale 2 puffs into the lungs every 6 hours    Mild intermittent asthma without complication       cyclobenzaprine 10 MG tablet    FLEXERIL    30 tablet    Take 1 tablet (10 mg) by mouth nightly as needed for muscle spasms    Back muscle spasm       dextroamphetamine 10 MG tablet    DEXTROSTAT     Take 1 tablet (10 mg) by mouth daily        * DULoxetine 30 MG EC capsule    CYMBALTA    60 capsule    1 po daily with a 20 mg x 4 weeks (50 mg total), then none for the next month (40 mg), then 1 po daily for the subsequent month (30 mg). (weaning slowly)    Antidepressant discontinuation syndrome, subsequent encounter       * DULoxetine 20 MG EC capsule    CYMBALTA    30 capsule    Take 1 capsule (20 mg) by mouth daily    Antidepressant discontinuation syndrome, subsequent encounter       EPINEPHrine 0.3 MG/0.3ML injection 2-pack    EPIPEN/ADRENACLICK/or ANY BX GENERIC EQUIV    2 each    Inject 0.3 mLs (0.3 mg) into the muscle once as needed    Bee sting allergy       FLONASE 50 MCG/ACT spray   Generic drug:  fluticasone      Spray 1-2 sprays into both nostrils daily as needed for rhinitis or allergies        ibuprofen 200 MG capsule     120 capsule    Take 600 mg by mouth 3 times daily        MULTIVITAMIN/EXTRA VITAMIN D3 PO      Take by mouth daily        PREMARIN 1.25 MG tablet   Generic drug:  estrogens (conjugated)     90 tablet    TAKE ONE TABLET BY MOUTH EVERY DAY    Menopause       PROBIOTIC FORMULA PO      Take by mouth daily        valsartan 320 MG tablet    DIOVAN    30 tablet    Take 1 tablet (320 mg) by mouth daily    Hypertension goal BP (blood pressure) < 140/90       * Notice:  This list has 2 medication(s) that are the same as other medications prescribed for you. Read the directions carefully, and ask your doctor or other care provider to review them with you.

## 2017-09-22 ENCOUNTER — OFFICE VISIT (OUTPATIENT)
Dept: FAMILY MEDICINE | Facility: CLINIC | Age: 37
End: 2017-09-22
Payer: COMMERCIAL

## 2017-09-22 VITALS
OXYGEN SATURATION: 97 % | TEMPERATURE: 99.2 F | WEIGHT: 204 LBS | HEART RATE: 116 BPM | SYSTOLIC BLOOD PRESSURE: 122 MMHG | HEIGHT: 64 IN | BODY MASS INDEX: 34.83 KG/M2 | DIASTOLIC BLOOD PRESSURE: 82 MMHG | RESPIRATION RATE: 16 BRPM

## 2017-09-22 DIAGNOSIS — G47.419 PRIMARY NARCOLEPSY WITHOUT CATAPLEXY: ICD-10-CM

## 2017-09-22 DIAGNOSIS — I10 HYPERTENSION GOAL BP (BLOOD PRESSURE) < 140/90: Primary | ICD-10-CM

## 2017-09-22 DIAGNOSIS — Z23 NEED FOR PROPHYLACTIC VACCINATION AND INOCULATION AGAINST INFLUENZA: ICD-10-CM

## 2017-09-22 PROCEDURE — 99213 OFFICE O/P EST LOW 20 MIN: CPT | Mod: 25 | Performed by: FAMILY MEDICINE

## 2017-09-22 PROCEDURE — 36415 COLL VENOUS BLD VENIPUNCTURE: CPT | Performed by: FAMILY MEDICINE

## 2017-09-22 PROCEDURE — 90471 IMMUNIZATION ADMIN: CPT | Performed by: FAMILY MEDICINE

## 2017-09-22 PROCEDURE — 80048 BASIC METABOLIC PNL TOTAL CA: CPT | Performed by: FAMILY MEDICINE

## 2017-09-22 PROCEDURE — 90686 IIV4 VACC NO PRSV 0.5 ML IM: CPT | Performed by: FAMILY MEDICINE

## 2017-09-22 RX ORDER — VALSARTAN 320 MG/1
320 TABLET ORAL DAILY
Qty: 90 TABLET | Refills: 1 | Status: SHIPPED | OUTPATIENT
Start: 2017-09-22 | End: 2018-03-23

## 2017-09-22 NOTE — MR AVS SNAPSHOT
After Visit Summary   9/22/2017    Sharmaine José    MRN: [id number]           Patient Information     Date Of Birth          1980        Visit Information        Provider Department      9/22/2017 8:50 AM Temi Marsh MD [facility]        Today's Diagnoses     Hypertension goal BP (blood pressure) < 140/90    -  1    Need for prophylactic vaccination and inoculation against influenza           Follow-ups after your visit        Follow-up notes from your care team     Return in about 6 months (around 3/22/2018).      Who to contact     If you have questions or need follow up information about today's clinic visit or your schedule please contact [facility] directly at [phone].  Normal or non-critical lab and imaging results will be communicated to you by [employer]ee Informaticshart, letter or phone within 4 business days after the clinic has received the results. If you do not hear from us within 7 days, please contact the clinic through Ap[employer]ee Informaticshart or phone. If you have a critical or abnormal lab result, we will notify you by phone as soon as possible.  Submit refill requests through Meg[employer] Communications or call your pharmacy and they will forward the refill request to us. Please allow 3 business days for your refill to be completed.          Additional Information About Your Visit        MyChart Information     [employer]eg[employer] gives you secure access to your electronic health record. If you see a primary care provider, you can also send messages to your care team and make appointments. If you have questions, please call your primary care clinic.  If you do not have a primary care provider, please call [phone] and they will assist you.        Care EveryWhere ID     This is your Care EveryWhere ID. This could be used by other organizations to access your [place] medical records  [id number]        Your Vitals Were     Pulse Temperature Respirations Height Last Period Pulse Oximetry    116 99.2  " F (37.3  C) (Oral) 16 5' 4\" (1.626 m) 09/25/2006 97%    BMI (Body Mass Index)                   35.02 kg/m2            Blood Pressure from Last 3 Encounters:   09/22/17 122/82   07/13/17 132/90   05/08/17 (!) 132/98    Weight from Last 3 Encounters:   09/22/17 204 lb (92.5 kg)   07/13/17 212 lb 1.6 oz (96.2 kg)   05/08/17 208 lb 8 oz (94.6 kg)              We Performed the Following     Basic metabolic panel     FLU VAC, SPLIT VIRUS IM > 3 YO (QUADRIVALENT) [26463]     Vaccine Administration, Initial [67482]          Where to get your medicines      These medications were sent to [facility] [address] [place] [address] - [address] AT [facility] [place]  [address], [facility] [address]     Phone:  [phone]     valsartan 320 MG tablet          Primary Care Provider Office Phone # Fax #    Temi Marsh [phone]       [address] [facility] [address]        Equal Access to Services     [facility]: Hadii aad ku hadasho Soomaali, waaxda luqadaha, qaybta kaalmada adeegyada, kayla colónin haylana morgan. So [facility] [phone].    ATENCIÓN: Si habla español, tiene a lema disposición servicios gratuitos de asistencia lingüística. Llame al [phone].    We comply with applicable federal civil rights laws and [place] laws. We do not discriminate on the basis of race, color, national origin, age, disability sex, sexual orientation or gender identity.            Thank you!     Thank you for choosing [facility]  for your care. Our goal is always to provide you with excellent care. Hearing back from our patients is one way we can continue to improve our services. Please take a few minutes to complete the written survey that you may receive in the mail after your visit with us. Thank you!             Your Updated Medication List - Protect others around you: Learn how to safely use, store and throw away your medicines at " www.disposemymeds.org.          This list is accurate as of: 9/22/17  9:18 AM.  Always use your most recent med list.                   Brand Name Dispense Instructions for use Diagnosis    albuterol 108 (90 BASE) MCG/ACT Inhaler    PROAIR HFA/PROVENTIL HFA/VENTOLIN HFA    1 Inhaler    Inhale 2 puffs into the lungs every 6 hours    Mild intermittent asthma without complication       cyclobenzaprine 10 MG tablet    FLEXERIL    30 tablet    Take 1 tablet (10 mg) by mouth nightly as needed for muscle spasms    Back muscle spasm       dextroamphetamine 10 MG tablet    DEXTROSTAT     Take 10 mg by mouth daily Takes 1-2 tablets daily        * DULoxetine 30 MG EC capsule    CYMBALTA    60 capsule    1 po daily with a 20 mg x 4 weeks (50 mg total), then none for the next month (40 mg), then 1 po daily for the subsequent month (30 mg). (weaning slowly)    Antidepressant discontinuation syndrome, subsequent encounter       * DULoxetine 20 MG EC capsule    CYMBALTA    30 capsule    Take 1 capsule (20 mg) by mouth daily    Antidepressant discontinuation syndrome, subsequent encounter       EPINEPHrine 0.3 MG/0.3ML injection 2-pack    EPIPEN/ADRENACLICK/or ANY BX GENERIC EQUIV    2 each    Inject 0.3 mLs (0.3 mg) into the muscle once as needed    Bee sting allergy       FLONASE 50 MCG/ACT spray   Generic drug:  fluticasone      Spray 1-2 sprays into both nostrils daily as needed for rhinitis or allergies        ibuprofen 200 MG capsule     120 capsule    Take 600 mg by mouth 3 times daily        MULTIVITAMIN/EXTRA VITAMIN D3 PO      Take by mouth daily        PREMARIN 1.25 MG tablet   Generic drug:  estrogens (conjugated)     90 tablet    TAKE ONE TABLET BY MOUTH EVERY DAY    Menopause       PROBIOTIC FORMULA PO      Take by mouth daily        valsartan 320 MG tablet    DIOVAN    90 tablet    Take 1 tablet (320 mg) by mouth daily    Hypertension goal BP (blood pressure) < 140/90       * Notice:  This list has 2 medication(s)  that are the same as other medications prescribed for you. Read the directions carefully, and ask your doctor or other care provider to review them with you.

## 2017-09-22 NOTE — PROGRESS NOTES
SUBJECTIVE:   Sharmaine José is a 37 year old female who presents to clinic today for the following health issues:      Hypertension Follow-up      Outpatient blood pressures are being checked at home.  Results are tyw897's /low 70's    Low Salt Diet: no added salt        Amount of exercise or physical activity: few times per week    Problems taking medications regularly: No    Medication side effects: none  Diet: regular (no restrictions)          Problem list and histories reviewed & adjusted, as indicated.  Additional history:     See under ROS    Patient Active Problem List   Diagnosis     Allergic state     Mild intermittent asthma     Generalized hyperhidrosis     Tobacco use disorder     Interstitial cystitis     Symptomatic menopausal or female climacteric states     Narcolepsy     Cervical dysplasia     Chronic pain     CARDIOVASCULAR SCREENING; LDL GOAL LESS THAN 160     Migraine     Lumbago     Hyperlipidemia LDL goal <160     H/O: hysterectomy     Health Care Home     Discoid lupus     Generalized anxiety disorder     Fatty infiltration of liver     Primary narcolepsy without cataplexy     Hypertension goal BP (blood pressure) < 140/90     elevated bmi (H)     Hypertriglyceridemia     High triglycerides       Current Outpatient Prescriptions   Medication Sig Dispense Refill     DULoxetine (CYMBALTA) 20 MG EC capsule Take 1 capsule (20 mg) by mouth daily 30 capsule 0     cyclobenzaprine (FLEXERIL) 10 MG tablet Take 1 tablet (10 mg) by mouth nightly as needed for muscle spasms 30 tablet 0     PREMARIN 1.25 MG tablet TAKE ONE TABLET BY MOUTH EVERY DAY 90 tablet 3     valsartan (DIOVAN) 320 MG tablet Take 1 tablet (320 mg) by mouth daily 30 tablet 0     albuterol (PROAIR HFA/PROVENTIL HFA/VENTOLIN HFA) 108 (90 BASE) MCG/ACT Inhaler Inhale 2 puffs into the lungs every 6 hours 1 Inhaler 4     dextroamphetamine (DEXTROSTAT) 10 MG tablet Take 10 mg by mouth daily Takes 1-2 tablets daily  0     Multiple  "Vitamins-Minerals (MULTIVITAMIN/EXTRA VITAMIN D3 PO) Take by mouth daily       Probiotic Product (PROBIOTIC FORMULA PO) Take by mouth daily       EPINEPHrine (EPIPEN) 0.3 MG/0.3ML injection Inject 0.3 mLs (0.3 mg) into the muscle once as needed 2 each 1     fluticasone (FLONASE) 50 MCG/ACT nasal spray Spray 1-2 sprays into both nostrils daily as needed for rhinitis or allergies       ibuprofen 200 MG capsule Take 600 mg by mouth 3 times daily 120 capsule      DULoxetine (CYMBALTA) 30 MG EC capsule 1 po daily with a 20 mg x 4 weeks (50 mg total), then none for the next month (40 mg), then 1 po daily for the subsequent month (30 mg). (weaning slowly) (Patient not taking: Reported on 9/22/2017) 60 capsule 0         Reviewed and updated as needed this visit by clinical staffTobacco  Allergies  Med Hx  Surg Hx  Fam Hx  Soc Hx      Reviewed and updated as needed this visit by Provider         ROS:  CONSTITUTIONAL:some weight loss. Was swimming. Now at school, active.   RESP:NEGATIVE for significant cough or SOB  CV: NEGATIVE for chest pain, palpitations or peripheral edema  PSYCHIATRIC: NEGATIVE for changes in mood or affect    Notes bp lower at home. Usually 120's/70's.   Twice got down to 90/60. Had symptoms.  This is rare. Had been long days in the classroom with hot room, sweaty. Lots of standing.     Is back on 2 tabs of dextroamphetamine.  Notes bp probably lower on the days that she only takes 1-1.5.        OBJECTIVE:     /82 (BP Location: Right arm, Cuff Size: Adult Large)  Pulse 116  Temp 99.2  F (37.3  C) (Oral)  Resp 16  Ht 5' 4\" (1.626 m)  Wt 204 lb (92.5 kg)  LMP 09/25/2006  SpO2 97%  BMI 35.02 kg/m2  Body mass index is 35.02 kg/(m^2).  GENERAL APPEARANCE: alert and no distress  RESP: lungs clear to auscultation - no rales, rhonchi or wheezes  CV: regular rates and rhythm  MS: no edema.   PSYCH: mentation appears normal and affect normal/bright    PHQ-9 SCORE 11/21/2016 3/13/2017 9/15/2017 "   Total Score - - -   Total Score MyChart - - 0   Total Score 2 2 0       KIRBY-7 SCORE 11/21/2016 3/13/2017 9/15/2017   Total Score - - -   Total Score - - 0 (minimal anxiety)   Total Score 2 1 0               ASSESSMENT/PLAN:     Hypertension goal BP (blood pressure) < 140/90  Satisfactory control.  - valsartan (DIOVAN) 320 MG tablet; Take 1 tablet (320 mg) by mouth daily  - Basic metabolic panel    Primary narcolepsy without cataplexy  Currently back on her stimulant. Notes she is doing well.     Need for prophylactic vaccination and inoculation against influenza    - FLU VAC, SPLIT VIRUS IM > 3 YO (QUADRIVALENT) [08387]  - Vaccine Administration, Initial [86905]      Follow up in 6 months.    Temi Marsh MD, MD  [facility]          Injectable Influenza Immunization Documentation    1.  Is the person to be vaccinated sick today?   No    2. Does the person to be vaccinated have an allergy to a component   of the vaccine?   No    3. Has the person to be vaccinated ever had a serious reaction   to influenza vaccine in the past?   No    4. Has the person to be vaccinated ever had Guillain-Barré syndrome?   No    Form completed by Cesia Almodovar [facility]

## 2017-09-22 NOTE — NURSING NOTE
"Chief Complaint   Patient presents with     Hypertension       Initial /82 (BP Location: Right arm, Cuff Size: Adult Large)  Pulse 116  Temp 99.2  F (37.3  C) (Oral)  Resp 16  Ht 5' 4\" (1.626 m)  Wt 204 lb (92.5 kg)  LMP 09/25/2006  SpO2 97%  BMI 35.02 kg/m2 Estimated body mass index is 35.02 kg/(m^2) as calculated from the following:    Height as of this encounter: 5' 4\" (1.626 m).    Weight as of this encounter: 204 lb (92.5 kg).  Medication Reconciliation: complete   Cesia Almodovar, MERLENE    "

## 2017-09-23 LAB
ANION GAP SERPL CALCULATED.3IONS-SCNC: 5 MMOL/L (ref 3–14)
BUN SERPL-MCNC: 8 MG/DL (ref 7–30)
CALCIUM SERPL-MCNC: 9.2 MG/DL (ref 8.5–10.1)
CHLORIDE SERPL-SCNC: 102 MMOL/L (ref 94–109)
CO2 SERPL-SCNC: 29 MMOL/L (ref 20–32)
CREAT SERPL-MCNC: 0.5 MG/DL (ref 0.52–1.04)
GFR SERPL CREATININE-BSD FRML MDRD: >90 ML/MIN/1.7M2
GLUCOSE SERPL-MCNC: 101 MG/DL (ref 70–99)
POTASSIUM SERPL-SCNC: 4 MMOL/L (ref 3.4–5.3)
SODIUM SERPL-SCNC: 136 MMOL/L (ref 133–144)

## 2017-10-01 ENCOUNTER — E-VISIT (OUTPATIENT)
Dept: FAMILY MEDICINE | Facility: CLINIC | Age: 37
End: 2017-10-01
Payer: COMMERCIAL

## 2017-10-01 DIAGNOSIS — J01.00 ACUTE MAXILLARY SINUSITIS, RECURRENCE NOT SPECIFIED: Primary | ICD-10-CM

## 2017-10-01 DIAGNOSIS — J45.21 MILD INTERMITTENT ASTHMA WITH ACUTE EXACERBATION: ICD-10-CM

## 2017-10-01 PROCEDURE — 99207 ZZC NO BILLABLE SERVICE THIS VISIT: CPT | Performed by: FAMILY MEDICINE

## 2017-10-01 NOTE — LETTER
[facility]  [address] [address]  [phone]          October 5, 2017  Re:  Sharmaine José                                                                                                                     [address] [facility] [address]            Dear To Whom it May Concern:    Sharmaine has been ill.  Please excuse her absence from classes 10/2 - 10/5.        Sincerely,         Temi Marsh MD

## 2017-10-01 NOTE — MR AVS SNAPSHOT
After Visit Summary   10/1/2017    Sharmaine José    MRN: [id number]           Patient Information     Date Of Birth          1980        Visit Information        Provider Department      10/1/2017 5:09 PM Temi Marsh MD [facility]        Today's Diagnoses     Acute maxillary sinusitis, recurrence not specified    -  1    Mild intermittent asthma with acute exacerbation           Follow-ups after your visit        Who to contact     If you have questions or need follow up information about today's clinic visit or your schedule please contact [facility] directly at [phone].  Normal or non-critical lab and imaging results will be communicated to you by [employer], letter or phone within 4 business days after the clinic has received the results. If you do not hear from us within 7 days, please contact the clinic through [employer] or phone. If you have a critical or abnormal lab result, we will notify you by phone as soon as possible.  Submit refill requests through [employer] or call your pharmacy and they will forward the refill request to us. Please allow 3 business days for your refill to be completed.          Additional Information About Your Visit        MyChart Information     [employer] gives you secure access to your electronic health record. If you see a primary care provider, you can also send messages to your care team and make appointments. If you have questions, please call your primary care clinic.  If you do not have a primary care provider, please call [phone] and they will assist you.        Care EveryWhere ID     This is your Care EveryWhere ID. This could be used by other organizations to access your [facility] medical records  [id number]        Your Vitals Were     Last Period                   09/25/2006            Blood Pressure from Last 3 Encounters:   10/02/17 120/80   09/22/17 122/82   07/13/17 132/90    Weight from Last 3 Encounters:    10/02/17 203 lb (92.1 kg)   09/22/17 204 lb (92.5 kg)   07/13/17 212 lb 1.6 oz (96.2 kg)              Today, you had the following     No orders found for display       Primary Care Provider Office Phone # Fax #    Temi Marsh [phone]       [address] [address] [facility] [address]        Equal Access to Services     [facility] : Hadii aad ku hadasho Soomaali, waaxda luqadaha, qaybta kaalmada adeegyada, waxay idiin hayaan adeeg kharash la'lana . So [facility] [phone].    ATENCIÓN: Si habla español, tiene a lema disposición servicios gratuitos de asistencia lingüística. Llame al [phone].    We comply with applicable federal civil rights laws and [place] laws. We do not discriminate on the basis of race, color, national origin, age, disability, sex, sexual orientation, or gender identity.            Thank you!     Thank you for choosing [facility]  for your care. Our goal is always to provide you with excellent care. Hearing back from our patients is one way we can continue to improve our services. Please take a few minutes to complete the written survey that you may receive in the mail after your visit with us. Thank you!             Your Updated Medication List - Protect others around you: Learn how to safely use, store and throw away your medicines at www.disposemymeds.org.          This list is accurate as of: 10/1/17 11:59 PM.  Always use your most recent med list.                   Brand Name Dispense Instructions for use Diagnosis    albuterol 108 (90 BASE) MCG/ACT Inhaler    PROAIR HFA/PROVENTIL HFA/VENTOLIN HFA    1 Inhaler    Inhale 2 puffs into the lungs every 6 hours    Mild intermittent asthma without complication       cyclobenzaprine 10 MG tablet    FLEXERIL    30 tablet    Take 1 tablet (10 mg) by mouth nightly as needed for muscle spasms    Back muscle spasm       dextroamphetamine 10 MG tablet    DEXTROSTAT     Take 10 mg by mouth daily Takes 1-2 tablets  daily        * DULoxetine 30 MG EC capsule    CYMBALTA    60 capsule    1 po daily with a 20 mg x 4 weeks (50 mg total), then none for the next month (40 mg), then 1 po daily for the subsequent month (30 mg). (weaning slowly)    Antidepressant discontinuation syndrome, subsequent encounter       * DULoxetine 20 MG EC capsule    CYMBALTA    30 capsule    Take 1 capsule (20 mg) by mouth daily    Antidepressant discontinuation syndrome, subsequent encounter       EPINEPHrine 0.3 MG/0.3ML injection 2-pack    EPIPEN/ADRENACLICK/or ANY BX GENERIC EQUIV    2 each    Inject 0.3 mLs (0.3 mg) into the muscle once as needed    Bee sting allergy       FLONASE 50 MCG/ACT spray   Generic drug:  fluticasone      Spray 1-2 sprays into both nostrils daily as needed for rhinitis or allergies        ibuprofen 200 MG capsule     120 capsule    Take 600 mg by mouth 3 times daily        MULTIVITAMIN/EXTRA VITAMIN D3 PO      Take by mouth daily        PREMARIN 1.25 MG tablet   Generic drug:  estrogens (conjugated)     90 tablet    TAKE ONE TABLET BY MOUTH EVERY DAY    Menopause       PROBIOTIC FORMULA PO      Take by mouth daily        valsartan 320 MG tablet    DIOVAN    90 tablet    Take 1 tablet (320 mg) by mouth daily    Hypertension goal BP (blood pressure) < 140/90       * Notice:  This list has 2 medication(s) that are the same as other medications prescribed for you. Read the directions carefully, and ask your doctor or other care provider to review them with you.

## 2017-10-02 ENCOUNTER — OFFICE VISIT (OUTPATIENT)
Dept: URGENT CARE | Facility: URGENT CARE | Age: 37
End: 2017-10-02
Payer: COMMERCIAL

## 2017-10-02 VITALS
DIASTOLIC BLOOD PRESSURE: 80 MMHG | TEMPERATURE: 99.6 F | SYSTOLIC BLOOD PRESSURE: 120 MMHG | BODY MASS INDEX: 34.84 KG/M2 | WEIGHT: 203 LBS

## 2017-10-02 DIAGNOSIS — R05.8 PRODUCTIVE COUGH: ICD-10-CM

## 2017-10-02 DIAGNOSIS — J45.901 MILD ASTHMA WITH EXACERBATION, UNSPECIFIED WHETHER PERSISTENT: Primary | ICD-10-CM

## 2017-10-02 DIAGNOSIS — J01.90 ACUTE SINUSITIS WITH SYMPTOMS > 10 DAYS: ICD-10-CM

## 2017-10-02 PROCEDURE — 99214 OFFICE O/P EST MOD 30 MIN: CPT | Performed by: PHYSICIAN ASSISTANT

## 2017-10-02 RX ORDER — PREDNISONE 20 MG/1
20 TABLET ORAL 2 TIMES DAILY
Qty: 10 TABLET | Refills: 0 | Status: SHIPPED | OUTPATIENT
Start: 2017-10-02 | End: 2017-12-22

## 2017-10-02 RX ORDER — ALBUTEROL SULFATE 90 UG/1
2 AEROSOL, METERED RESPIRATORY (INHALATION) EVERY 6 HOURS
Qty: 1 INHALER | Refills: 0 | Status: SHIPPED | OUTPATIENT
Start: 2017-10-02 | End: 2018-06-01

## 2017-10-02 RX ORDER — CODEINE PHOSPHATE AND GUAIFENESIN 10; 100 MG/5ML; MG/5ML
5-10 SOLUTION ORAL
Qty: 120 ML | Refills: 0 | Status: SHIPPED | OUTPATIENT
Start: 2017-10-02 | End: 2017-12-22

## 2017-10-02 RX ORDER — AZITHROMYCIN 250 MG/1
TABLET, FILM COATED ORAL
Qty: 6 TABLET | Refills: 0 | Status: SHIPPED | OUTPATIENT
Start: 2017-10-02 | End: 2017-11-15

## 2017-10-02 NOTE — LETTER
[facility]  [address] [address]  [phone]      October 2, 2017    RE:  Sharmaine José                                                                                                                                                       [address] [facility] [address]            To whom it may concern:    Sharmaine TRINITY José was seen in the urgent care today for an illness.         Sincerely,        Suresh Mayorga [facility]

## 2017-10-02 NOTE — NURSING NOTE
"Chief Complaint   Patient presents with     URI     sx for past week,headache,sinus pressure,cough,emesis today     Headache       Initial /80 (BP Location: Left arm, Patient Position: Chair, Cuff Size: Adult Regular)  Temp 99.6  F (37.6  C) (Oral)  Wt 203 lb (92.1 kg)  LMP 09/25/2006  BMI 34.84 kg/m2 Estimated body mass index is 34.84 kg/(m^2) as calculated from the following:    Height as of 9/22/17: 5' 4\" (1.626 m).    Weight as of this encounter: 203 lb (92.1 kg).  Medication Reconciliation: complete [place]    "

## 2017-10-02 NOTE — MR AVS SNAPSHOT
After Visit Summary   10/2/2017    Sharmaine José    MRN: [id number]           Patient Information     Date Of Birth          1980        Visit Information        Provider Department      10/2/2017 11:55 AM Suresh Mayorga PA-C [facility]        Today's Diagnoses     Mild asthma with exacerbation, unspecified whether persistent    -  1    Acute sinusitis with symptoms > 10 days        Productive cough           Follow-ups after your visit        Who to contact     If you have questions or need follow up information about today's clinic visit or your schedule please contact [facility] directly at [phone].  Normal or non-critical lab and imaging results will be communicated to you by MyChart, letter or phone within 4 business days after the clinic has received the results. If you do not hear from us within 7 days, please contact the clinic through [employer] or phone. If you have a critical or abnormal lab result, we will notify you by phone as soon as possible.  Submit refill requests through [employer] or call your pharmacy and they will forward the refill request to us. Please allow 3 business days for your refill to be completed.          Additional Information About Your Visit        MyChart Information     [employer] gives you secure access to your electronic health record. If you see a primary care provider, you can also send messages to your care team and make appointments. If you have questions, please call your primary care clinic.  If you do not have a primary care provider, please call [phone] and they will assist you.        Care EveryWhere ID     This is your Care EveryWhere ID. This could be used by other organizations to access your [facility] medical records  [id number]        Your Vitals Were     Temperature Last Period BMI (Body Mass Index)             99.6  F (37.6  C) (Oral) 09/25/2006 34.84 kg/m2          Blood  Pressure from Last 3 Encounters:   10/02/17 120/80   09/22/17 122/82   07/13/17 132/90    Weight from Last 3 Encounters:   10/02/17 203 lb (92.1 kg)   09/22/17 204 lb (92.5 kg)   07/13/17 212 lb 1.6 oz (96.2 kg)              Today, you had the following     No orders found for display         Today's Medication Changes          These changes are accurate as of: 10/2/17  1:38 PM.  If you have any questions, ask your nurse or doctor.               Start taking these medicines.        Dose/Directions    azithromycin 250 MG tablet   Commonly known as:  ZITHROMAX   Used for:  Acute sinusitis with symptoms > 10 days, Productive cough   Started by:  Suresh Mayorga PA-C        2 tabs po qd day 1, then 1 tab po qd days 2-5   Quantity:  6 tablet   Refills:  0       guaiFENesin-codeine 100-10 MG/5ML Soln solution   Commonly known as:  ROBITUSSIN AC   Used for:  Productive cough   Started by:  Suresh Mayorga PA-C        Dose:  5-10 mL   Take 5-10 mLs by mouth nightly as needed for cough   Quantity:  120 mL   Refills:  0       predniSONE 20 MG tablet   Commonly known as:  DELTASONE   Used for:  Mild asthma with exacerbation, unspecified whether persistent   Started by:  Suresh Mayorga PA-C        Dose:  20 mg   Take 1 tablet (20 mg) by mouth 2 times daily   Quantity:  10 tablet   Refills:  0         These medicines have changed or have updated prescriptions.        Dose/Directions    * albuterol 108 (90 BASE) MCG/ACT Inhaler   Commonly known as:  PROAIR HFA/PROVENTIL HFA/VENTOLIN HFA   This may have changed:  Another medication with the same name was added. Make sure you understand how and when to take each.   Used for:  Mild intermittent asthma without complication   Changed by:  Temi Marsh MD        Dose:  2 puff   Inhale 2 puffs into the lungs every 6 hours   Quantity:  1 Inhaler   Refills:  4       * albuterol 108 (90 BASE) MCG/ACT Inhaler   Commonly known as:  PROVENTIL HFA   This may have changed:  You were  already taking a medication with the same name, and this prescription was added. Make sure you understand how and when to take each.   Used for:  Mild asthma with exacerbation, unspecified whether persistent   Changed by:  Suresh Mayorga PA-C        Dose:  2 puff   Inhale 2 puffs into the lungs every 6 hours   Quantity:  1 Inhaler   Refills:  0       * Notice:  This list has 2 medication(s) that are the same as other medications prescribed for you. Read the directions carefully, and ask your doctor or other care provider to review them with you.         Where to get your medicines      These medications were sent to [facility] [address] [place] [address] [address] AT [facility]  [address], [facility] [address]     Phone:  [phone]     albuterol 108 (90 BASE) MCG/ACT Inhaler    azithromycin 250 MG tablet    predniSONE 20 MG tablet         Some of these will need a paper prescription and others can be bought over the counter.  Ask your nurse if you have questions.     Bring a paper prescription for each of these medications     guaiFENesin-codeine 100-10 MG/5ML Soln solution                Primary Care Provider Office Phone # Fax #    Temi Marsh [phone] [address] [address] [facility] [address]        Equal Access to Services     KATRINA YE AH: Hadii aad ku hadasho Soomaali, waaxda luqadaha, qaybta kaalmada adeegyada, waxay idiin hayaan adeagueda braga . So [facility] [phone].    ATENCIÓN: Si habla español, tiene a lema disposición servicios gratuitos de asistencia lingüística. Llame al [phone].    We comply with applicable federal civil rights laws and [place] laws. We do not discriminate on the basis of race, color, national origin, age, disability, sex, sexual orientation, or gender identity.            Thank you!     Thank you for choosing [facility]  for your care. Our goal is always to  provide you with excellent care. Hearing back from our patients is one way we can continue to improve our services. Please take a few minutes to complete the written survey that you may receive in the mail after your visit with us. Thank you!             Your Updated Medication List - Protect others around you: Learn how to safely use, store and throw away your medicines at www.disposemymeds.org.          This list is accurate as of: 10/2/17  1:38 PM.  Always use your most recent med list.                   Brand Name Dispense Instructions for use Diagnosis    * albuterol 108 (90 BASE) MCG/ACT Inhaler    PROAIR HFA/PROVENTIL HFA/VENTOLIN HFA    1 Inhaler    Inhale 2 puffs into the lungs every 6 hours    Mild intermittent asthma without complication       * albuterol 108 (90 BASE) MCG/ACT Inhaler    PROVENTIL HFA    1 Inhaler    Inhale 2 puffs into the lungs every 6 hours    Mild asthma with exacerbation, unspecified whether persistent       azithromycin 250 MG tablet    ZITHROMAX    6 tablet    2 tabs po qd day 1, then 1 tab po qd days 2-5    Acute sinusitis with symptoms > 10 days, Productive cough       cyclobenzaprine 10 MG tablet    FLEXERIL    30 tablet    Take 1 tablet (10 mg) by mouth nightly as needed for muscle spasms    Back muscle spasm       dextroamphetamine 10 MG tablet    DEXTROSTAT     Take 10 mg by mouth daily Takes 1-2 tablets daily        * DULoxetine 30 MG EC capsule    CYMBALTA    60 capsule    1 po daily with a 20 mg x 4 weeks (50 mg total), then none for the next month (40 mg), then 1 po daily for the subsequent month (30 mg). (weaning slowly)    Antidepressant discontinuation syndrome, subsequent encounter       * DULoxetine 20 MG EC capsule    CYMBALTA    30 capsule    Take 1 capsule (20 mg) by mouth daily    Antidepressant discontinuation syndrome, subsequent encounter       EPINEPHrine 0.3 MG/0.3ML injection 2-pack    EPIPEN/ADRENACLICK/or ANY BX GENERIC EQUIV    2 each    Inject 0.3 mLs  (0.3 mg) into the muscle once as needed    Bee sting allergy       FLONASE 50 MCG/ACT spray   Generic drug:  fluticasone      Spray 1-2 sprays into both nostrils daily as needed for rhinitis or allergies        guaiFENesin-codeine 100-10 MG/5ML Soln solution    ROBITUSSIN AC    120 mL    Take 5-10 mLs by mouth nightly as needed for cough    Productive cough       ibuprofen 200 MG capsule     120 capsule    Take 600 mg by mouth 3 times daily        MULTIVITAMIN/EXTRA VITAMIN D3 PO      Take by mouth daily        predniSONE 20 MG tablet    DELTASONE    10 tablet    Take 1 tablet (20 mg) by mouth 2 times daily    Mild asthma with exacerbation, unspecified whether persistent       PREMARIN 1.25 MG tablet   Generic drug:  estrogens (conjugated)     90 tablet    TAKE ONE TABLET BY MOUTH EVERY DAY    Menopause       PROBIOTIC FORMULA PO      Take by mouth daily        valsartan 320 MG tablet    DIOVAN    90 tablet    Take 1 tablet (320 mg) by mouth daily    Hypertension goal BP (blood pressure) < 140/90       * Notice:  This list has 4 medication(s) that are the same as other medications prescribed for you. Read the directions carefully, and ask your doctor or other care provider to review them with you.

## 2017-10-02 NOTE — PROGRESS NOTES
"SUBJECTIVE:   Sharmaine José is a 37 year old female presenting with a chief complaint of sinus congestion, sore throat and coughing, wheezing.  Onset of symptoms was 1 week(s) ago.  Course of illness is worsening.    Severity moderate  Current and Associated symptoms: chest congestion, asthma  Treatment measures tried include OTC medications.  Predisposing factors include recent illness.    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     Endometriosis of other specified sites 2006     Fatty infiltration of liver 5/22/2016     Generalized anxiety disorder 2006    manifested by nausea; improved with treatment of narcolepsy     Herpes zoster without mention of complication      Migraine headaches 12/3/2008     Mild intermittent asthma      Narcolepsy      Proteinuria     distant past; when very physically active     Urinary incontinence     improved p surgery        Allergies   Allergen Reactions     Bee Venom Anaphylaxis     Ultram [Tramadol Hcl] Anaphylaxis     Armodafinil Rash     Pt reports skin reaction-- blisters and ulcerations     Ceclor [Cefaclor]      ceclor     Doxycycline Nausea and Vomiting     Suture [Suture]      She notes did ok with vicryl.  Had hives and extrusion of \"monocryl\"     Tuberculin Purified Protein Derivative          Social History   Substance Use Topics     Smoking status: Current Every Day Smoker     Packs/day: 1.00     Types: Cigarettes     Smokeless tobacco: Never Used      Comment: 1/2- 1 pk  daily     Alcohol use Yes      Comment: rarely       ROS:  CONSTITUTIONAL:NEGATIVE for fever, chills, change in weight  INTEGUMENTARY/SKIN: NEGATIVE for worrisome rashes, moles or lesions  ENT/MOUTH: POSITIVE for sore throat, sinus congestion  RESP:POSITIVE for " cough-productive  CV: NEGATIVE for chest pain, palpitations or peripheral edema  GI: NEGATIVE for nausea, abdominal pain, heartburn, or change in bowel habits  MUSCULOSKELETAL: NEGATIVE for significant arthralgias or myalgia  NEURO: NEGATIVE for weakness, dizziness or paresthesias    OBJECTIVE  :/80 (BP Location: Left arm, Patient Position: Chair, Cuff Size: Adult Regular)  Temp 99.6  F (37.6  C) (Oral)  Wt 203 lb (92.1 kg)  LMP 09/25/2006  BMI 34.84 kg/m2  GENERAL APPEARANCE: healthy, alert and no distress  EYES: EOMI,  PERRL, conjunctiva clear  HENT: TM's normal bilaterally, nasal turbinates erythematous, swollen, rhinorrhea , frontal sinus tenderness  and maxillary sinus tenderness   NECK: supple, nontender, no lymphadenopathy  RESP: expiratory wheezes generalized  CV: regular rates and rhythm, normal S1 S2, no murmur noted  ABDOMEN:  soft, nontender, no HSM or masses and bowel sounds normal  NEURO: Normal strength and tone, sensory exam grossly normal,  normal speech and mentation  SKIN: no suspicious lesions or rashes    ASSESSMENT/PLAN:      ICD-10-CM    1. Mild asthma with exacerbation, unspecified whether persistent J45.901 albuterol (PROVENTIL HFA) 108 (90 BASE) MCG/ACT Inhaler     predniSONE (DELTASONE) 20 MG tablet   2. Acute sinusitis with symptoms > 10 days J01.90 azithromycin (ZITHROMAX) 250 MG tablet   3. Productive cough R05 azithromycin (ZITHROMAX) 250 MG tablet     guaiFENesin-codeine (ROBITUSSIN AC) 100-10 MG/5ML SOLN solution       Follow up with PCP as needed  See orders in Epic

## 2017-10-04 NOTE — TELEPHONE ENCOUNTER
I think we have enough documentation for a note!  I can see the urgent care note as well.    But do you want me to say 10/4 or 10/5 (?) or wait until we know what day you are returning.    Temi Marsh MD    Also, if you are not feeling better in the near future, or if you get worse, you may need to be seen again...

## 2017-10-06 DIAGNOSIS — T43.205D ANTIDEPRESSANT DISCONTINUATION SYNDROME, SUBSEQUENT ENCOUNTER: ICD-10-CM

## 2017-10-08 DIAGNOSIS — T43.205D ANTIDEPRESSANT DISCONTINUATION SYNDROME, SUBSEQUENT ENCOUNTER: ICD-10-CM

## 2017-10-09 RX ORDER — DULOXETIN HYDROCHLORIDE 20 MG/1
CAPSULE, DELAYED RELEASE ORAL
Qty: 30 CAPSULE | Refills: 1 | Status: SHIPPED | OUTPATIENT
Start: 2017-10-09 | End: 2017-10-11

## 2017-10-09 NOTE — TELEPHONE ENCOUNTER
cymbalta    Last Written Prescription Date: 9/5/17  Last Fill Quantity: 30, # refills: 0  Last Office Visit with G, P or [facility] prescribing provider: 9/22/17        BP Readings from Last 3 Encounters:   10/02/17 120/80   09/22/17 122/82   07/13/17 132/90     Pulse: (for Fetzima)  Creatinine   Date Value Ref Range Status   09/22/2017 0.50 (L) 0.52 - 1.04 mg/dL Final   ]    Last PHQ-9 score on record=   PHQ-9 SCORE 9/15/2017   Total Score -   Total Score MyChart 0   Total Score 0     Diagnosis is anti-depressant discontinuation syndrome. Please sign if ok.  Rubi Clark, RN  Triage Nurse

## 2017-10-09 NOTE — TELEPHONE ENCOUNTER
Chart review shows patient is in the process of weaning off this medication and plan was to stay on this dose until December. Refills sent till then.

## 2017-10-09 NOTE — TELEPHONE ENCOUNTER
DULoxetine (CYMBALTA) 20 MG    Last Written Prescription Date: 9/5/17  Last Fill Quantity: 30, # refills: 0  Last Office Visit with [facility], [facility] or [facility] prescribing provider: 9/22/17        BP Readings from Last 3 Encounters:   10/02/17 120/80   09/22/17 122/82   07/13/17 132/90     Pulse: (for Fetzima)  Creatinine   Date Value Ref Range Status   09/22/2017 0.50 (L) 0.52 - 1.04 mg/dL Final   ]    Last PHQ-9 score on record=   PHQ-9 SCORE 9/15/2017   Total Score -   Total Score MyChart 0   Total Score 0

## 2017-10-09 NOTE — TELEPHONE ENCOUNTER
Please contact her to see if indeed she still really wants this... She had been weaning down; she may want to stop at this time.

## 2017-10-11 ENCOUNTER — TELEPHONE (OUTPATIENT)
Dept: FAMILY MEDICINE | Facility: CLINIC | Age: 37
End: 2017-10-11

## 2017-10-11 DIAGNOSIS — B37.0 THRUSH: Primary | ICD-10-CM

## 2017-10-11 RX ORDER — NYSTATIN 100000/ML
500000 SUSPENSION, ORAL (FINAL DOSE FORM) ORAL 4 TIMES DAILY
Qty: 60 ML | Refills: 0 | Status: SHIPPED | OUTPATIENT
Start: 2017-10-11 | End: 2017-10-11

## 2017-10-11 RX ORDER — DULOXETIN HYDROCHLORIDE 20 MG/1
CAPSULE, DELAYED RELEASE ORAL
Qty: 30 CAPSULE | Refills: 1 | Status: SHIPPED | OUTPATIENT
Start: 2017-10-11 | End: 2017-12-08

## 2017-10-11 RX ORDER — NYSTATIN 100000/ML
500000 SUSPENSION, ORAL (FINAL DOSE FORM) ORAL 4 TIMES DAILY
Qty: 60 ML | Refills: 0 | Status: SHIPPED | OUTPATIENT
Start: 2017-10-11 | End: 2017-12-22

## 2017-10-11 NOTE — TELEPHONE ENCOUNTER
Patient stated pharmacy did not receive this.     Writer confirmed with pharmacy. Last refill was sent in September.     Prescription resent.     Prescription approved per [facility].    Maki SORENSON RN, BSN, PHN  [place] [facility] RN

## 2017-10-11 NOTE — TELEPHONE ENCOUNTER
I usually have them swish and swallow; that way it might get anything if it might be down the throat.    Thanks!

## 2017-10-11 NOTE — TELEPHONE ENCOUNTER
Patient reports sx of thrush developed about 1-2 days ago. Finished zpak and Prednisone over the weekend.    Notices white spots and tenderness on tongue. Stings when brushing teeth or drinking acidic liquids. Reports hx of thrush when used a steroid inhaler in the past. No fever or swallowing difficulties.     Patient would like magic mouth wash for this. Please advise. Pharmacy Td'up. Please send if appropriate.    FYI- Patient reported she has also developed yeast infection sx. Treated with OTC Monistat 3 day. Sx have resolved.     Maki SORENSON RN, BSN, PHN  [place] [facility] RN

## 2017-10-11 NOTE — TELEPHONE ENCOUNTER
Looks like it e-prescribed.  Called the pt and advised.  She is wondering if she should swish and spit or swish and swallow?  She will swish and spit for now.  Will forward to Dr. Marsh for advisal.

## 2017-10-11 NOTE — TELEPHONE ENCOUNTER
Sent nystatin...   Though it looks like it may have printed... Please send.  Will try to do it by computer again.

## 2017-10-13 ENCOUNTER — TELEPHONE (OUTPATIENT)
Dept: FAMILY MEDICINE | Facility: CLINIC | Age: 37
End: 2017-10-13

## 2017-10-13 NOTE — TELEPHONE ENCOUNTER
Patient picked up second bottle of nystatin from pharmacy. Patient asking if should continue swish and swallow till second bottle gone. Looks like 2 prescriptions were sent with 1st one discontinued. Patient states is just starting to get better.    Please advise if patient should continue med.    Yolie Pascal RN

## 2017-10-13 NOTE — TELEPHONE ENCOUNTER
Informed patient to finish 1st bottle and if not better to be seen. Patient agreeable to plan.    Yolie Pascal RN

## 2017-10-27 ENCOUNTER — MYC MEDICAL ADVICE (OUTPATIENT)
Dept: FAMILY MEDICINE | Facility: CLINIC | Age: 37
End: 2017-10-27

## 2017-10-27 ENCOUNTER — E-VISIT (OUTPATIENT)
Dept: FAMILY MEDICINE | Facility: CLINIC | Age: 37
End: 2017-10-27
Payer: COMMERCIAL

## 2017-10-27 DIAGNOSIS — M62.830 BACK MUSCLE SPASM: ICD-10-CM

## 2017-10-27 DIAGNOSIS — R05.9 COUGH: ICD-10-CM

## 2017-10-27 DIAGNOSIS — M26.609 TEMPOROMANDIBULAR JOINT DISORDER: Primary | ICD-10-CM

## 2017-10-27 PROCEDURE — 99444 ZZC PHYSICIAN ONLINE EVALUATION & MANAGEMENT SERVICE: CPT | Performed by: FAMILY MEDICINE

## 2017-10-27 RX ORDER — BENZONATATE 100 MG/1
100-200 CAPSULE ORAL 3 TIMES DAILY PRN
Qty: 42 CAPSULE | Refills: 0 | Status: SHIPPED | OUTPATIENT
Start: 2017-10-27 | End: 2017-11-17

## 2017-10-27 RX ORDER — CYCLOBENZAPRINE HCL 10 MG
10 TABLET ORAL
Qty: 30 TABLET | Refills: 0 | Status: SHIPPED | OUTPATIENT
Start: 2017-10-27 | End: 2017-12-22

## 2017-10-27 NOTE — MR AVS SNAPSHOT
After Visit Summary   10/27/2017    Sharmaine José    MRN: [id number]           Patient Information     Date Of Birth          1980        Visit Information        Provider Department      10/27/2017 2:28 PM Temi Marsh MD [facility] [place]        Today's Diagnoses     Temporomandibular joint disorder    -  1    Back muscle spasm        Cough           Follow-ups after your visit        Who to contact     If you have questions or need follow up information about today's clinic visit or your schedule please contact [facility] directly at [phone].  Normal or non-critical lab and imaging results will be communicated to you by [employer], letter or phone within 4 business days after the clinic has received the results. If you do not hear from us within 7 days, please contact the clinic through [employer] or phone. If you have a critical or abnormal lab result, we will notify you by phone as soon as possible.  Submit refill requests through [employer] or call your pharmacy and they will forward the refill request to us. Please allow 3 business days for your refill to be completed.          Additional Information About Your Visit        MyChart Information     [employer] gives you secure access to your electronic health record. If you see a primary care provider, you can also send messages to your care team and make appointments. If you have questions, please call your primary care clinic.  If you do not have a primary care provider, please call [phone] and they will assist you.        Care EveryWhere ID     This is your Care EveryWhere ID. This could be used by other organizations to access your [place] medical records  [id number]        Your Vitals Were     Last Period                   09/25/2006            Blood Pressure from Last 3 Encounters:   10/02/17 120/80   09/22/17 122/82   07/13/17 132/90    Weight from Last 3 Encounters:   10/02/17 203 lb (92.1 kg)   09/22/17  204 lb (92.5 kg)   07/13/17 212 lb 1.6 oz (96.2 kg)              Today, you had the following     No orders found for display         Today's Medication Changes          These changes are accurate as of: 10/27/17  5:57 PM.  If you have any questions, ask your nurse or doctor.               Start taking these medicines.        Dose/Directions    benzonatate 100 MG capsule   Commonly known as:  TESSALON   Used for:  Cough   Started by:  Temi Marsh MD        Dose:  100-200 mg   Take 1-2 capsules (100-200 mg) by mouth 3 times daily as needed for cough   Quantity:  42 capsule   Refills:  0            Where to get your medicines      These medications were sent to [facility] [address] [place] [address] [address] AT [facility]  [address], [facility] [address]     Phone:  [phone]     benzonatate 100 MG capsule    cyclobenzaprine 10 MG tablet                Primary Care Provider Office Phone # Fax #    Temi Marsh [phone]       [address] [facility] [address]        Equal Access to Services     [facility] AH: Hadii aad ku hadasho Soomaali, waaxda luqadaha, qaybta kaalmada adeegyada, waxay idiin hayaan adeeg kharash lalani ah. So [facility] [phone].    ATENCIÓN: Si habla español, tiene a lema disposición servicios gratuitos de asistencia lingüística. Llame al [phone].    We comply with applicable federal civil rights laws and [place] laws. We do not discriminate on the basis of race, color, national origin, age, disability, sex, sexual orientation, or gender identity.            Thank you!     Thank you for choosing [facility]  for your care. Our goal is always to provide you with excellent care. Hearing back from our patients is one way we can continue to improve our services. Please take a few minutes to complete the written survey that you may receive in the mail after your visit with us. Thank you!              Your Updated Medication List - Protect others around you: Learn how to safely use, store and throw away your medicines at www.disposemymeds.org.          This list is accurate as of: 10/27/17  5:57 PM.  Always use your most recent med list.                   Brand Name Dispense Instructions for use Diagnosis    * albuterol 108 (90 BASE) MCG/ACT Inhaler    PROAIR HFA/PROVENTIL HFA/VENTOLIN HFA    1 Inhaler    Inhale 2 puffs into the lungs every 6 hours    Mild intermittent asthma without complication       * albuterol 108 (90 BASE) MCG/ACT Inhaler    PROVENTIL HFA    1 Inhaler    Inhale 2 puffs into the lungs every 6 hours    Mild asthma with exacerbation, unspecified whether persistent       azithromycin 250 MG tablet    ZITHROMAX    6 tablet    2 tabs po qd day 1, then 1 tab po qd days 2-5    Acute sinusitis with symptoms > 10 days, Productive cough       benzonatate 100 MG capsule    TESSALON    42 capsule    Take 1-2 capsules (100-200 mg) by mouth 3 times daily as needed for cough    Cough       cyclobenzaprine 10 MG tablet    FLEXERIL    30 tablet    Take 1 tablet (10 mg) by mouth nightly as needed for muscle spasms    Back muscle spasm       dextroamphetamine 10 MG tablet    DEXTROSTAT     Take 10 mg by mouth daily Takes 1-2 tablets daily        * DULoxetine 30 MG EC capsule    CYMBALTA    60 capsule    1 po daily with a 20 mg x 4 weeks (50 mg total), then none for the next month (40 mg), then 1 po daily for the subsequent month (30 mg). (weaning slowly)    Antidepressant discontinuation syndrome, subsequent encounter       * DULoxetine 20 MG EC capsule    CYMBALTA    30 capsule    TAKE 1 CAPSULE(20 MG) BY MOUTH DAILY    Antidepressant discontinuation syndrome, subsequent encounter       EPINEPHrine 0.3 MG/0.3ML injection 2-pack    EPIPEN/ADRENACLICK/or ANY BX GENERIC EQUIV    2 each    Inject 0.3 mLs (0.3 mg) into the muscle once as needed    Bee sting allergy       FLONASE 50 MCG/ACT spray   Generic drug:   fluticasone      Spray 1-2 sprays into both nostrils daily as needed for rhinitis or allergies        guaiFENesin-codeine 100-10 MG/5ML Soln solution    ROBITUSSIN AC    120 mL    Take 5-10 mLs by mouth nightly as needed for cough    Productive cough       ibuprofen 200 MG capsule     120 capsule    Take 600 mg by mouth 3 times daily        MULTIVITAMIN/EXTRA VITAMIN D3 PO      Take by mouth daily        nystatin 142826 UNIT/ML suspension    MYCOSTATIN    60 mL    Take 5 mLs (500,000 Units) by mouth 4 times daily    Thrush       predniSONE 20 MG tablet    DELTASONE    10 tablet    Take 1 tablet (20 mg) by mouth 2 times daily    Mild asthma with exacerbation, unspecified whether persistent       PREMARIN 1.25 MG tablet   Generic drug:  estrogens (conjugated)     90 tablet    TAKE ONE TABLET BY MOUTH EVERY DAY    Menopause       PROBIOTIC FORMULA PO      Take by mouth daily        valsartan 320 MG tablet    DIOVAN    90 tablet    Take 1 tablet (320 mg) by mouth daily    Hypertension goal BP (blood pressure) < 140/90       * Notice:  This list has 4 medication(s) that are the same as other medications prescribed for you. Read the directions carefully, and ask your doctor or other care provider to review them with you.

## 2017-10-27 NOTE — TELEPHONE ENCOUNTER
I am sorry!  I spoke to a nurse, and had to send two different evisits. 1.) I sent an evisit about a med refill, before the weekend, for back and jaw spasms.  (For a med I have been on before. ~See evisit.)     The other is about sinus and cough.  I attempted to put in sinus, then cough, then other, and it would not let me 'continue'.  (I put in my pharmacy info and everything.  I even logged out and started over.)       Anyway.  I was seen for sinus and chest at [facility] a few weeks ago.  The meds went great, I recovered, felt fine, and went on with my life.  Unfortunately, I started coughing again at night.  I have also had right sided sinus congestion.       It feels similar in the sinus, but not as bad as what I had before, however the cough feels different.  Not phlegmy/or like before.  This feels different.  When I lay down there is no tickle, it is just an urge to cough, that I can't help.  (Before I could breathe shallow, and keep from coughing, now I can't do that.)  I am hoping this is allergies and will go away.  (I am using flonase, but it doesn't seem to help/wears off after an hour or two.)  Any thoughts/suggestions?  I have a massively busy next 2-3 weeks at school.  I also cleaned and turned on my home humidifier last night.  It hasn't helped so far.       I have nasal mrsa, and have Mupirocin 2% ointment for my nose.  Should I try using that?  (Normally I use it when I get nose/facial/upper lip sores.  But I am wondering if the antibiotics and prednisone may have decreased my natural bacteria, and thus are causing weirdness.)       Thank you in advance for any ideas/suggestions!  (Obviously, if it doesn't get better/gets worse, I will go to an urgent care to be seen, when I have a moment!)     Sharmaine José

## 2017-11-10 ENCOUNTER — E-VISIT (OUTPATIENT)
Dept: FAMILY MEDICINE | Facility: CLINIC | Age: 37
End: 2017-11-10
Payer: COMMERCIAL

## 2017-11-10 DIAGNOSIS — R05.9 COUGH: ICD-10-CM

## 2017-11-10 DIAGNOSIS — J01.01 ACUTE RECURRENT MAXILLARY SINUSITIS: Primary | ICD-10-CM

## 2017-11-10 PROCEDURE — 99444 ZZC PHYSICIAN ONLINE EVALUATION & MANAGEMENT SERVICE: CPT | Performed by: FAMILY MEDICINE

## 2017-11-10 RX ORDER — DULOXETIN HYDROCHLORIDE 20 MG/1
CAPSULE, DELAYED RELEASE ORAL
Qty: 30 CAPSULE | Refills: 0 | OUTPATIENT
Start: 2017-11-10

## 2017-11-10 NOTE — TELEPHONE ENCOUNTER
Sent this request back to pharmacy to confirm with patient,  Had been weaning.   Rubi Clark, RN  Triage Nurse

## 2017-11-15 RX ORDER — AZITHROMYCIN 250 MG/1
TABLET, FILM COATED ORAL
Qty: 6 TABLET | Refills: 0 | Status: SHIPPED | OUTPATIENT
Start: 2017-11-15 | End: 2017-12-22

## 2017-11-17 DIAGNOSIS — R05.9 COUGH: ICD-10-CM

## 2017-11-17 RX ORDER — BENZONATATE 100 MG/1
100-200 CAPSULE ORAL 3 TIMES DAILY PRN
Qty: 42 CAPSULE | Refills: 0 | Status: SHIPPED | OUTPATIENT
Start: 2017-11-17 | End: 2017-12-22

## 2017-11-17 NOTE — TELEPHONE ENCOUNTER
Patient requesting a refill of Tessalon Perles to help with her cough. Routing to Dr. Marsh to advise.     If sent, no call back needed. Only call if rx is not sent.     TESSALON PERLJABARI      Last Written Prescription Date: 10/27/17  Last Fill Quantity: 42,  # refills: 0   Last Office Visit with [facility], [facility] or [facility] prescribing provider: 9/22/17                                         Next 5 appointments (look out 90 days)     Dec 22, 2017  8:50 AM [facility] with Temi Marsh MD   [facility]    [address] [facility] [address] [phone]

## 2017-12-08 DIAGNOSIS — T43.205D ANTIDEPRESSANT DISCONTINUATION SYNDROME, SUBSEQUENT ENCOUNTER: ICD-10-CM

## 2017-12-08 RX ORDER — DULOXETIN HYDROCHLORIDE 20 MG/1
CAPSULE, DELAYED RELEASE ORAL
Qty: 30 CAPSULE | Refills: 0 | Status: SHIPPED | OUTPATIENT
Start: 2017-12-08 | End: 2017-12-22

## 2017-12-08 NOTE — TELEPHONE ENCOUNTER
PHQ-9 SCORE 11/21/2016 3/13/2017 9/15/2017   Total Score - - -   Total Score MyChart - - 0   Total Score 2 2 0       KIRBY-7 SCORE 11/21/2016 3/13/2017 9/15/2017   Total Score - - -   Total Score - - 0 (minimal anxiety)   Total Score 2 1 0       I did send.

## 2017-12-08 NOTE — TELEPHONE ENCOUNTER
Patient is calling to ask if we can refill her Cymbalta for another month,   And then she will be seen to discuss how to wean this medication.   She will run out before the weekend is over, so please sign if ok.  Transferred her to the scheduling desk to help her set up appointment coming up.    Rubi Clark, MYRON  Triage Nurse

## 2017-12-22 ENCOUNTER — OFFICE VISIT (OUTPATIENT)
Dept: FAMILY MEDICINE | Facility: CLINIC | Age: 37
End: 2017-12-22
Payer: COMMERCIAL

## 2017-12-22 ENCOUNTER — MYC MEDICAL ADVICE (OUTPATIENT)
Dept: FAMILY MEDICINE | Facility: CLINIC | Age: 37
End: 2017-12-22

## 2017-12-22 VITALS
OXYGEN SATURATION: 96 % | TEMPERATURE: 98.7 F | HEIGHT: 64 IN | DIASTOLIC BLOOD PRESSURE: 78 MMHG | RESPIRATION RATE: 16 BRPM | HEART RATE: 107 BPM | SYSTOLIC BLOOD PRESSURE: 118 MMHG | BODY MASS INDEX: 34.19 KG/M2 | WEIGHT: 200.3 LBS

## 2017-12-22 DIAGNOSIS — M62.830 BACK MUSCLE SPASM: ICD-10-CM

## 2017-12-22 DIAGNOSIS — R73.01 IMPAIRED FASTING GLUCOSE: ICD-10-CM

## 2017-12-22 DIAGNOSIS — K76.0 FATTY INFILTRATION OF LIVER: ICD-10-CM

## 2017-12-22 DIAGNOSIS — Z00.00 ENCOUNTER FOR ROUTINE ADULT HEALTH EXAMINATION WITHOUT ABNORMAL FINDINGS: Primary | ICD-10-CM

## 2017-12-22 DIAGNOSIS — Z87.410 HISTORY OF CERVICAL DYSPLASIA: ICD-10-CM

## 2017-12-22 DIAGNOSIS — T43.205D ANTIDEPRESSANT DISCONTINUATION SYNDROME, SUBSEQUENT ENCOUNTER: ICD-10-CM

## 2017-12-22 DIAGNOSIS — R73.09 ELEVATED HEMOGLOBIN A1C: Primary | ICD-10-CM

## 2017-12-22 DIAGNOSIS — R11.0 NAUSEA: ICD-10-CM

## 2017-12-22 DIAGNOSIS — Z78.0 MENOPAUSE: ICD-10-CM

## 2017-12-22 DIAGNOSIS — Z13.6 CARDIOVASCULAR SCREENING; LDL GOAL LESS THAN 160: ICD-10-CM

## 2017-12-22 DIAGNOSIS — R14.0 BLOATING: ICD-10-CM

## 2017-12-22 DIAGNOSIS — R73.03 PREDIABETES: ICD-10-CM

## 2017-12-22 DIAGNOSIS — I10 HYPERTENSION GOAL BP (BLOOD PRESSURE) < 140/90: ICD-10-CM

## 2017-12-22 DIAGNOSIS — Z12.4 SCREENING FOR MALIGNANT NEOPLASM OF CERVIX: ICD-10-CM

## 2017-12-22 DIAGNOSIS — R04.0 EPISTAXIS: ICD-10-CM

## 2017-12-22 LAB — HBA1C MFR BLD: 7.4 % (ref 4.3–6)

## 2017-12-22 PROCEDURE — 80061 LIPID PANEL: CPT | Performed by: FAMILY MEDICINE

## 2017-12-22 PROCEDURE — 80053 COMPREHEN METABOLIC PANEL: CPT | Performed by: FAMILY MEDICINE

## 2017-12-22 PROCEDURE — 83721 ASSAY OF BLOOD LIPOPROTEIN: CPT | Performed by: FAMILY MEDICINE

## 2017-12-22 PROCEDURE — G0476 HPV COMBO ASSAY CA SCREEN: HCPCS | Performed by: FAMILY MEDICINE

## 2017-12-22 PROCEDURE — 99213 OFFICE O/P EST LOW 20 MIN: CPT | Mod: 25 | Performed by: FAMILY MEDICINE

## 2017-12-22 PROCEDURE — 83036 HEMOGLOBIN GLYCOSYLATED A1C: CPT | Performed by: FAMILY MEDICINE

## 2017-12-22 PROCEDURE — 82043 UR ALBUMIN QUANTITATIVE: CPT | Performed by: FAMILY MEDICINE

## 2017-12-22 PROCEDURE — 36415 COLL VENOUS BLD VENIPUNCTURE: CPT | Performed by: FAMILY MEDICINE

## 2017-12-22 PROCEDURE — G0123 SCREEN CERV/VAG THIN LAYER: HCPCS | Performed by: FAMILY MEDICINE

## 2017-12-22 PROCEDURE — 99395 PREV VISIT EST AGE 18-39: CPT | Performed by: FAMILY MEDICINE

## 2017-12-22 RX ORDER — DULOXETIN HYDROCHLORIDE 20 MG/1
CAPSULE, DELAYED RELEASE ORAL
Qty: 90 CAPSULE | Refills: 0 | Status: SHIPPED | OUTPATIENT
Start: 2017-12-22 | End: 2018-04-02

## 2017-12-22 RX ORDER — CYCLOBENZAPRINE HCL 10 MG
10 TABLET ORAL
Qty: 30 TABLET | Refills: 0 | Status: SHIPPED | OUTPATIENT
Start: 2017-12-22 | End: 2018-01-26

## 2017-12-22 NOTE — PATIENT INSTRUCTIONS

## 2017-12-22 NOTE — MR AVS SNAPSHOT
After Visit Summary   12/22/2017    Sharmaine José    MRN: [id number]           Patient Information     Date Of Birth          1980        Visit Information        Provider Department      12/22/2017 8:50 AM Temi Marsh MD [facility]        Today's Diagnoses     Epistaxis    -  1    Antidepressant discontinuation syndrome, subsequent encounter        Menopause - surgical        Nausea        Encounter for routine adult health examination without abnormal findings        Fatty infiltration of liver        Hypertension goal BP (blood pressure) < 140/90        Impaired fasting glucose        CARDIOVASCULAR SCREENING; LDL GOAL LESS THAN 160        Screening for malignant neoplasm of cervix        History of cervical dysplasia        Back muscle spasm          Care Instructions      Preventive Health Recommendations  Female Ages 26 - 39  Yearly exam:   See your health care provider every year in order to    Review health changes.     Discuss preventive care.      Review your medicines if you your doctor has prescribed any.    Until age 30: Get a Pap test every three years (more often if you have had an abnormal result).    After age 30: Talk to your doctor about whether you should have a Pap test every 3 years or have a Pap test with HPV screening every 5 years.   You do not need a Pap test if your uterus was removed (hysterectomy) and you have not had cancer.  You should be tested each year for STDs (sexually transmitted diseases), if you're at risk.   Talk to your provider about how often to have your cholesterol checked.  If you are at risk for diabetes, you should have a diabetes test (fasting glucose).  Shots: Get a flu shot each year. Get a tetanus shot every 10 years.   Nutrition:     Eat at least 5 servings of fruits and vegetables each day.    Eat whole-grain bread, whole-wheat pasta and brown rice instead of white grains and rice.    Talk to your provider about Calcium  and Vitamin D.     Lifestyle    Exercise at least 150 minutes a week (30 minutes a day, 5 days of the week). This will help you control your weight and prevent disease.    Limit alcohol to one drink per day.    No smoking.     Wear sunscreen to prevent skin cancer.    See your dentist every six months for an exam and cleaning.      ------------------------------------------------------    Lean forward with a bloody nose.    Try the zantac twice daily at this time.              Follow-ups after your visit        Additional Services     OTOLARYNGOLOGY REFERRAL       Your provider has referred you to: [facility]: [facility] [place] [phone]   [email or web address]  [facility] [place] [phone]   [email or web address]  [facility]: [facility] - [place] [phone]   [email or web address]    Please be aware that coverage of these services is subject to the terms and limitations of your health insurance plan.  Call member services at your health plan with any benefit or coverage questions.      Please bring the following with you to your appointment:    (1) Any X-Rays, CTs or MRIs which have been performed.  Contact the facility where they were done to arrange for  prior to your scheduled appointment.   (2) List of current medications  (3) This referral request   (4) Any documents/labs given to you for this referral                  Who to contact     If you have questions or need follow up information about today's clinic visit or your schedule please contact [facility] directly at [phone].  Normal or non-critical lab and imaging results will be communicated to you by MyChart, letter or phone within 4 business days after the clinic has received the results. If you do not hear from us within 7 days, please contact the clinic through MyChart or phone. If you have  "a critical or abnormal lab result, we will notify you by phone as soon as possible.  Submit refill requests through [employer] or call your pharmacy and they will forward the refill request to us. Please allow 3 business days for your refill to be completed.          Additional Information About Your Visit        [employer] Information     [employer] gives you secure access to your electronic health record. If you see a primary care provider, you can also send messages to your care team and make appointments. If you have questions, please call your primary care clinic.  If you do not have a primary care provider, please call [phone] and they will assist you.        Care EveryWhere ID     This is your Care EveryWhere ID. This could be used by other organizations to access your [facility] medical records  [id number]        Your Vitals Were     Pulse Temperature Respirations Height Last Period Pulse Oximetry    107 98.7  F (37.1  C) (Oral) 16 5' 4\" (1.626 m) 09/25/2006 96%    BMI (Body Mass Index)                   34.38 kg/m2            Blood Pressure from Last 3 Encounters:   12/22/17 118/78   10/02/17 120/80   09/22/17 122/82    Weight from Last 3 Encounters:   12/22/17 200 lb 4.8 oz (90.9 kg)   10/02/17 203 lb (92.1 kg)   09/22/17 204 lb (92.5 kg)              We Performed the Following     Albumin Random Urine Quantitative with Creat Ratio     Comprehensive metabolic panel     Hemoglobin A1c     HPV High Risk Types DNA Cervical     Lipid panel reflex to direct LDL Fasting     OTOLARYNGOLOGY REFERRAL     Pap imaged thin layer screen with HPV - recommended age 30 - 65 years (select HPV order below)          Today's Medication Changes          These changes are accurate as of: 12/22/17  9:32 AM.  If you have any questions, ask your nurse or doctor.               These medicines have changed or have updated prescriptions.        Dose/Directions    albuterol 108 (90 BASE) MCG/ACT Inhaler   Commonly known as:  PROVENTIL HFA "   This may have changed:  Another medication with the same name was removed. Continue taking this medication, and follow the directions you see here.   Used for:  Mild asthma with exacerbation, unspecified whether persistent   Changed by:  Suresh Mayorga PA-C        Dose:  2 puff   Inhale 2 puffs into the lungs every 6 hours   Quantity:  1 Inhaler   Refills:  0       DULoxetine 20 MG EC capsule   Commonly known as:  CYMBALTA   This may have changed:  Another medication with the same name was removed. Continue taking this medication, and follow the directions you see here.   Used for:  Antidepressant discontinuation syndrome, subsequent encounter   Changed by:  Temi Marsh MD        TAKE 1 CAPSULE(20 MG) BY MOUTH DAILY   Quantity:  90 capsule   Refills:  0       estrogens (conjugated) 1.25 MG tablet   Commonly known as:  PREMARIN   This may have changed:  See the new instructions.   Used for:  Menopause   Changed by:  Temi Marsh MD        Dose:  1.25 mg   Take 1 tablet (1.25 mg) by mouth daily   Quantity:  90 tablet   Refills:  3            Where to get your medicines      These medications were sent to [facility] [address] [place] [address] [address] AT [facility]  [address], [facility] [address]     Phone:  [phone]     cyclobenzaprine 10 MG tablet    DULoxetine 20 MG EC capsule    estrogens (conjugated) 1.25 MG tablet                Primary Care Provider Office Phone # Fax #    Temi Marsh [phone] [address] [place] [address]        Equal Access to Services     [facility] AH: Hadii aad ku hadasho Soomaali, waaxda luqadaha, qaybta kaalmada adeegyada, waxay fan morgan. So [facility] [phone].    ATENCIÓN: Si habla español, tiene a lema disposición servicios gratuitos de asistencia lingüística. Llame al [phone].    We comply with applicable federal civil rights laws and [place]  laws. We do not discriminate on the basis of race, color, national origin, age, disability, sex, sexual orientation, or gender identity.            Thank you!     Thank you for choosing [facility]  for your care. Our goal is always to provide you with excellent care. Hearing back from our patients is one way we can continue to improve our services. Please take a few minutes to complete the written survey that you may receive in the mail after your visit with us. Thank you!             Your Updated Medication List - Protect others around you: Learn how to safely use, store and throw away your medicines at www.disposemymeds.org.          This list is accurate as of: 12/22/17  9:32 AM.  Always use your most recent med list.                   Brand Name Dispense Instructions for use Diagnosis    albuterol 108 (90 BASE) MCG/ACT Inhaler    PROVENTIL HFA    1 Inhaler    Inhale 2 puffs into the lungs every 6 hours    Mild asthma with exacerbation, unspecified whether persistent       cyclobenzaprine 10 MG tablet    FLEXERIL    30 tablet    Take 1 tablet (10 mg) by mouth nightly as needed for muscle spasms    Back muscle spasm       dextroamphetamine 10 MG tablet    DEXTROSTAT     Take 10 mg by mouth daily Takes 1-2 tablets daily        DULoxetine 20 MG EC capsule    CYMBALTA    90 capsule    TAKE 1 CAPSULE(20 MG) BY MOUTH DAILY    Antidepressant discontinuation syndrome, subsequent encounter       EPINEPHrine 0.3 MG/0.3ML injection 2-pack    EPIPEN/ADRENACLICK/or ANY BX GENERIC EQUIV    2 each    Inject 0.3 mLs (0.3 mg) into the muscle once as needed    Bee sting allergy       estrogens (conjugated) 1.25 MG tablet    PREMARIN    90 tablet    Take 1 tablet (1.25 mg) by mouth daily    Menopause       FLONASE 50 MCG/ACT spray   Generic drug:  fluticasone      Spray 1-2 sprays into both nostrils daily as needed for rhinitis or allergies        ibuprofen 200 MG capsule     120 capsule    Take 600 mg by mouth 3  times daily        MULTIVITAMIN/EXTRA VITAMIN D3 PO      Take by mouth daily        PROBIOTIC FORMULA PO      Take by mouth daily        valsartan 320 MG tablet    DIOVAN    90 tablet    Take 1 tablet (320 mg) by mouth daily    Hypertension goal BP (blood pressure) < 140/90

## 2017-12-22 NOTE — NURSING NOTE
"Chief Complaint   Patient presents with     Physical       Initial /78 (BP Location: Right arm, Cuff Size: Adult Large)  Pulse 107  Temp 98.7  F (37.1  C) (Oral)  Resp 16  Ht 5' 4\" (1.626 m)  Wt 200 lb 4.8 oz (90.9 kg)  LMP 09/25/2006  SpO2 96%  BMI 34.38 kg/m2 Estimated body mass index is 34.38 kg/(m^2) as calculated from the following:    Height as of this encounter: 5' 4\" (1.626 m).    Weight as of this encounter: 200 lb 4.8 oz (90.9 kg).  Medication Reconciliation: complete   Cesia Almodovar, MERLENE    "

## 2017-12-22 NOTE — PROGRESS NOTES
SUBJECTIVE:   CC: Sharmaine José is an 37 year old woman who presents for preventive health visit.     Physical   Annual:     Getting at least 3 servings of Calcium per day::  Yes    Bi-annual eye exam::  Yes    Dental care twice a year::  Yes    Sleep apnea or symptoms of sleep apnea::  Daytime drowsiness    Diet::  Regular (no restrictions)    Frequency of exercise::  2-3 days/week    Duration of exercise::  30-45 minutes    Taking medications regularly::  Yes    Medication side effects::  None    Additional concerns today::  YES              Today's PHQ-2 Score: PHQ-2 ( 1999 Pfizer) 12/22/2017   Q1: Little interest or pleasure in doing things 0   Q2: Feeling down, depressed or hopeless 0   PHQ-2 Score 0   Q1: Little interest or pleasure in doing things Not at all   Q2: Feeling down, depressed or hopeless Not at all   PHQ-2 Score 0       Abuse: Current or Past(Physical, Sexual or Emotional)- No  Do you feel safe in your environment - Yes    Social History   Substance Use Topics     Smoking status: Current Every Day Smoker     Packs/day: 1.00     Types: Cigarettes     Smokeless tobacco: Never Used      Comment: 1/2- 1 pk  daily     Alcohol use Yes      Comment: rarely     Alcohol Use 12/22/2017   If you drink alcohol, do you typically have greater than 3 drinks per day OR greater than 7 drinks per week?   No       Reviewed orders with patient.  Reviewed health maintenance and updated orders accordingly - Yes      Mammogram not appropriate for this patient based on age.    Pertinent mammograms are reviewed under the imaging tab.  History of abnormal Pap smear: YES - updated in Problem List and Health Maintenance accordingly    Reviewed and updated as needed this visit by clinical staff         Reviewed and updated as needed this visit by Provider        Patient Active Problem List   Diagnosis     Allergic state     Mild intermittent asthma     Generalized hyperhidrosis     Tobacco use disorder     Interstitial  cystitis     Symptomatic menopausal or female climacteric states     Narcolepsy     Cervical dysplasia     Chronic pain     CARDIOVASCULAR SCREENING; LDL GOAL LESS THAN 160     Migraine     Lumbago     Hyperlipidemia LDL goal <160     H/O: hysterectomy     Health Care Home     Discoid lupus     Generalized anxiety disorder     Fatty infiltration of liver     Primary narcolepsy without cataplexy     Hypertension goal BP (blood pressure) < 140/90     elevated bmi (H)     Hypertriglyceridemia     High triglycerides     Impaired fasting glucose       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, including hip     DYPLASTIC     Cervical dysplasia      Depressive disorder, not elsewhere classified     improved with treatment of narcolepsy     Discoid lupus 2015     Endometriosis of other specified sites 2006     Fatty infiltration of liver 2016     Generalized anxiety disorder 2006    manifested by nausea; improved with treatment of narcolepsy     Herpes zoster without mention of complication      Migraine headaches 12/3/2008     Mild intermittent asthma      Narcolepsy      Proteinuria     distant past; when very physically active     Urinary incontinence     improved p surgery       Past Surgical History:   Procedure Laterality Date     C TOTAL ABDOM HYSTERECTOMY  10/22/07    Yearly pap. endometriosis and cervical dysplasia; ALSO BILATERAL SALPINGO-OOPHERECTOMY, SURGERY DONE AT [facility] BY DR. YUSUF      SURGICAL HISTORY OF -            SURGICAL HISTORY OF -   2006    ~ 4 laparoscopies for endometriosis and adhesions and ovarian cysts     SURGICAL HISTORY OF -   16    robotic ventral rectopexy, cystoscopy, mid urethral sling       Obstetric History       T0      L1     SAB0   TAB0   Ectopic0   Multiple0   Live Births0       # Outcome Date GA Lbr Hima/2nd Weight Sex Delivery Anes PTL  Lv   1  02/15/05 36w0d  9 lb 8 oz (4.309 kg) M CS-LTranv             Current Outpatient Prescriptions   Medication Sig Dispense Refill     DULoxetine (CYMBALTA) 20 MG EC capsule TAKE 1 CAPSULE(20 MG) BY MOUTH DAILY 90 capsule 0     estrogens, conjugated, (PREMARIN) 1.25 MG tablet Take 1 tablet (1.25 mg) by mouth daily 90 tablet 3     cyclobenzaprine (FLEXERIL) 10 MG tablet Take 1 tablet (10 mg) by mouth nightly as needed for muscle spasms 30 tablet 0     albuterol (PROVENTIL HFA) 108 (90 BASE) MCG/ACT Inhaler Inhale 2 puffs into the lungs every 6 hours 1 Inhaler 0     valsartan (DIOVAN) 320 MG tablet Take 1 tablet (320 mg) by mouth daily 90 tablet 1     dextroamphetamine (DEXTROSTAT) 10 MG tablet Take 10 mg by mouth daily Takes 1-2 tablets daily  0     Multiple Vitamins-Minerals (MULTIVITAMIN/EXTRA VITAMIN D3 PO) Take by mouth daily       Probiotic Product (PROBIOTIC FORMULA PO) Take by mouth daily       EPINEPHrine (EPIPEN) 0.3 MG/0.3ML injection Inject 0.3 mLs (0.3 mg) into the muscle once as needed 2 each 1     fluticasone (FLONASE) 50 MCG/ACT nasal spray Spray 1-2 sprays into both nostrils daily as needed for rhinitis or allergies       ibuprofen 200 MG capsule Take 600 mg by mouth 3 times daily 120 capsule        Family History   Problem Relation Age of Onset     Adopted: Yes     Alcohol/Drug Maternal Grandmother      alcohol ?     CANCER Mother      stomach cancer ?     Unknown/Adopted Mother      Unknown/Adopted Father        Social History   Substance Use Topics     Smoking status: Current Every Day Smoker     Packs/day: 1.00     Types: Cigarettes     Smokeless tobacco: Never Used      Comment: 12- 1 pk  daily     Alcohol use Yes      Comment: rarely       Immunization History   Administered Date(s) Administered     HEPA 2004     HepB 1996, 1996, 1997     Influenza (IIV3) PF 10/25/2004, 2005, 2006, 2011, 2012, 2013, 10/25/2015     Influenza  "Vaccine IM 3yrs+ 4 Valent IIV4 09/22/2017     Influenza Vaccine, 3 YRS +, IM (QUADRIVALENT W/PRESERVATIVES) 09/16/2014, 09/28/2016     Mantoux Tuberculin Skin Test 01/15/2003, 06/17/2003, 06/30/2004     Meningococcal (Menomune ) 07/19/2000     Pneumococcal 23 valent 02/12/2004     Rabies - IM Diploid Cell Culture 08/18/2017, 08/25/2017, 09/15/2017     TD (ADULT, 7+) 07/24/1996, 01/31/2006     TDAP Vaccine (Adacel) 04/19/2011       Review of Systems  C: NEGATIVE for fever, chills, change in weight  I: NEGATIVE for worrisome rashes, moles or lesions  E: NEGATIVE for vision changes or irritation  ENT: NEGATIVE for ear, mouth and throat problems x nasal congestion and nose bleeds.   Jaw tight.  Does see chiropractor for TMJ. Does have a mouth guard.  R: NEGATIVE for significant cough or SOB  B: NEGATIVE for masses, tenderness or discharge  CV: NEGATIVE for chest pain, palpitations or peripheral edema  GI: NEGATIVE for nausea, abdominal pain, heartburn, or change in bowel habits x see below.  : NEGATIVE for unusual urinary or vaginal symptoms. No vaginal bleeding.  M: NEGATIVE for significant arthralgias or myalgia  N: NEGATIVE for weakness, dizziness or paresthesias  P: NEGATIVE for changes in mood or affect     Currently on 20 mg duloxetine.   Overall doing well.  Eventually would like to go off; not sure if this is the right time.     Nausea lately. Lots of bloating. Pain LUQ.  It is a brief pain. Worse if bending over; like something in way of ribs.    Has a lot of nosebleeds lately. Runs back of throat.  ? Nausea from this.  Will use nasal saline if nose dry; does not feel dry.  Her left side seems real congested. Bleeding seems back further.     Lots of burping.   Has tried some zantac intermittently.150 mg.     Rare ibuprofen.     OBJECTIVE:   /78 (BP Location: Right arm, Cuff Size: Adult Large)  Pulse 107  Temp 98.7  F (37.1  C) (Oral)  Resp 16  Ht 5' 4\" (1.626 m)  Wt 200 lb 4.8 oz (90.9 kg)  LMP " 09/25/2006  SpO2 96%  BMI 34.38 kg/m2  Physical Exam  GENERAL: alert and no distress  EYES: Eyes grossly normal to inspection, PERRL and conjunctivae and sclerae normal  HENT: ear canals and TM's normal, nose and mouth without ulcers or lesions  NECK: no adenopathy, no asymmetry, masses, or scars and thyroid normal to palpation  RESP: lungs clear to auscultation - no rales, rhonchi or wheezes  BREAST: normal without masses, tenderness or nipple discharge and no palpable axillary masses or adenopathy  CV: regular rates and rhythm and no peripheral edema  ABDOMEN: soft, nontender, no hepatosplenomegaly, no masses and bowel sounds normal   (female): normal female external genitalia, normal urethral meatus, vaginal mucosa pink, moist, well rugated, and normal. Cuff intact. Bimanual without mass or tenderness.  MS: no gross musculoskeletal defects noted, no edema  SKIN: no suspicious lesions or rashes  NEURO: Normal strength and tone, mentation intact and speech normal  PSYCH: mentation appears normal, affect normal/bright    Recent Labs   Lab Test  03/10/17   0903  05/24/16   0932  02/05/14   1358  11/15/11   1032   CHOL  237*  273*  300*  275*   HDL  29*  43*  68  47*   LDL  Cannot estimate LDL when triglyceride exceeds 400 mg/dL  145*  162*  153*  Cannot estimate LDL when triglyceride exceeds 400 mg/dL  172*   TRIG  493*  339*  397*  454*   CHOLHDLRATIO   --    --   4.4  5.9*       ASSESSMENT/PLAN:   Encounter for routine adult health examination without abnormal findings      Epistaxis  Discussed leaning forward so the blood will drain forward and not back of throat.   Discussed moisturizing. Refer to ENT  - OTOLARYNGOLOGY REFERRAL  - LDL cholesterol direct    Nausea  Uncertain etiology.   Swallowing blood could do this.   Discussed trying the zantac on a steady basis.   Consider GI if persists.     Bloating:   As above.     Hypertension goal BP (blood pressure) < 140/90  Doing well.   - Comprehensive metabolic  "panel  - Albumin Random Urine Quantitative with Creat Ratio    Antidepressant discontinuation syndrome, subsequent encounter  Doing well; she is looking at getting off medication, however, is nervous it may be early. At this time, have continued on low dose. May want to wait until spring and look at what stresses she has.   - DULoxetine (CYMBALTA) 20 MG EC capsule; TAKE 1 CAPSULE(20 MG) BY MOUTH DAILY    Fatty infiltration of liver  Discussed. Encourage weight loss.   - Comprehensive metabolic panel    Menopause - surgical  Refilling.   - estrogens, conjugated, (PREMARIN) 1.25 MG tablet; Take 1 tablet (1.25 mg) by mouth daily    Impaired fasting glucose    - Comprehensive metabolic panel  - Hemoglobin A1c    Back muscle spasm    - cyclobenzaprine (FLEXERIL) 10 MG tablet; Take 1 tablet (10 mg) by mouth nightly as needed for muscle spasms    History of cervical dysplasia    - Pap imaged thin layer screen with HPV - recommended age 30 - 65 years (select HPV order below)  - HPV High Risk Types DNA Cervical    Screening for malignant neoplasm of cervix      CARDIOVASCULAR SCREENING; LDL GOAL LESS THAN 160    - Lipid panel reflex to direct LDL Fasting      COUNSELING:  Reviewed preventive health counseling, as reflected in patient instructions       Regular exercise       Healthy diet/nutrition       Osteoporosis Prevention/Bone Health         reports that she has been smoking Cigarettes.  She has been smoking about 1.00 pack per day. She has never used smokeless tobacco.    Estimated body mass index is 34.38 kg/(m^2) as calculated from the following:    Height as of this encounter: 5' 4\" (1.626 m).    Weight as of this encounter: 200 lb 4.8 oz (90.9 kg).   Weight management plan: Discussed healthy diet and exercise guidelines and patient will follow up in 12 months in clinic to re-evaluate.    Counseling Resources:  ATP IV Guidelines  Pooled Cohorts Equation Calculator  Breast Cancer Risk Calculator  FRAX Risk " Assessment  ICSI Preventive Guidelines  Dietary Guidelines for Americans, 2010  [employer]'s MyPlate  ASA Prophylaxis  Lung CA Screening    Temi Marsh MD, MD  [facility]  Answers for HPI/ROS submitted by the patient on 12/22/2017   PHQ-2 Score: 0

## 2017-12-23 LAB
ALBUMIN SERPL-MCNC: 4 G/DL (ref 3.4–5)
ALP SERPL-CCNC: 117 U/L (ref 40–150)
ALT SERPL W P-5'-P-CCNC: 80 U/L (ref 0–50)
ANION GAP SERPL CALCULATED.3IONS-SCNC: 11 MMOL/L (ref 3–14)
AST SERPL W P-5'-P-CCNC: 113 U/L (ref 0–45)
BILIRUB SERPL-MCNC: 0.3 MG/DL (ref 0.2–1.3)
BUN SERPL-MCNC: 14 MG/DL (ref 7–30)
CALCIUM SERPL-MCNC: 9.7 MG/DL (ref 8.5–10.1)
CHLORIDE SERPL-SCNC: 100 MMOL/L (ref 94–109)
CHOLEST SERPL-MCNC: 316 MG/DL
CO2 SERPL-SCNC: 24 MMOL/L (ref 20–32)
CREAT SERPL-MCNC: 0.55 MG/DL (ref 0.52–1.04)
CREAT UR-MCNC: 67 MG/DL
GFR SERPL CREATININE-BSD FRML MDRD: >90 ML/MIN/1.7M2
GLUCOSE SERPL-MCNC: 120 MG/DL (ref 70–99)
HDLC SERPL-MCNC: 32 MG/DL
LDLC SERPL CALC-MCNC: ABNORMAL MG/DL
LDLC SERPL DIRECT ASSAY-MCNC: 216 MG/DL
MICROALBUMIN UR-MCNC: 5 MG/L
MICROALBUMIN/CREAT UR: 7.81 MG/G CR (ref 0–25)
NONHDLC SERPL-MCNC: 284 MG/DL
POTASSIUM SERPL-SCNC: 4 MMOL/L (ref 3.4–5.3)
PROT SERPL-MCNC: 7.7 G/DL (ref 6.8–8.8)
SODIUM SERPL-SCNC: 135 MMOL/L (ref 133–144)
TRIGL SERPL-MCNC: 586 MG/DL

## 2017-12-23 ASSESSMENT — ASTHMA QUESTIONNAIRES: ACT_TOTALSCORE: 25

## 2017-12-26 PROBLEM — R73.09 ELEVATED HEMOGLOBIN A1C: Status: ACTIVE | Noted: 2017-12-26

## 2017-12-28 ENCOUNTER — TRANSFERRED RECORDS (OUTPATIENT)
Dept: HEALTH INFORMATION MANAGEMENT | Facility: CLINIC | Age: 37
End: 2017-12-28

## 2017-12-28 LAB
COPATH REPORT: NORMAL
PAP: NORMAL

## 2018-01-02 LAB
FINAL DIAGNOSIS: NORMAL
HPV HR 12 DNA CVX QL NAA+PROBE: NEGATIVE
HPV16 DNA SPEC QL NAA+PROBE: NEGATIVE
HPV18 DNA SPEC QL NAA+PROBE: NEGATIVE
SPECIMEN DESCRIPTION: NORMAL

## 2018-01-24 ENCOUNTER — MYC REFILL (OUTPATIENT)
Dept: FAMILY MEDICINE | Facility: CLINIC | Age: 38
End: 2018-01-24

## 2018-01-24 DIAGNOSIS — M62.830 BACK MUSCLE SPASM: ICD-10-CM

## 2018-01-24 RX ORDER — CYCLOBENZAPRINE HCL 10 MG
10 TABLET ORAL
Qty: 30 TABLET | Refills: 0 | Status: CANCELLED | OUTPATIENT
Start: 2018-01-24

## 2018-01-25 NOTE — TELEPHONE ENCOUNTER
Message from [employer]:  Original authorizing provider: Temi Marsh MD, MD Sharmaine José would like a refill of the following medications:  cyclobenzaprine (FLEXERIL) 10 MG tablet [Temi Marsh MD, MD]    Preferred pharmacy: [facility] [address] [place] [address] [address] AT [facility] [address]    Comment:

## 2018-01-26 RX ORDER — CYCLOBENZAPRINE HCL 10 MG
10 TABLET ORAL
Qty: 30 TABLET | Refills: 0 | Status: SHIPPED | OUTPATIENT
Start: 2018-01-26 | End: 2018-02-28

## 2018-02-09 ENCOUNTER — TELEPHONE (OUTPATIENT)
Dept: FAMILY MEDICINE | Facility: CLINIC | Age: 38
End: 2018-02-09

## 2018-02-09 NOTE — TELEPHONE ENCOUNTER
Called patient to let her know she should go in to be seen.  She is going to see how it does over the weekend.  Rubi Clark, RN  Triage Nurse

## 2018-02-09 NOTE — TELEPHONE ENCOUNTER
"Patient is calling to ask about a possible skin infection on her finger. She did clean it out, and it is now puffy and red, and tender, some white drainage.  She got bit by a rat few days ago. Ring finger, end of her finger. The rats are free of disease.  Covered it today, used antibiotic cream on it today, as it did become tender. No fever, no red streaking.   She is asking for an antibiotic, Would you want to do an E-visit? They are not covered by her insurance, she said \"i dont have to pay   For them but I am not sure you get paid either. She will do this if you want her to. She is just concerned that she has lupus and has  Had issues with skin infections in the past. If you do decide to prescribe something, she cant take Doxy due to stomach upset.    Rubi Clark, RN  Triage Nurse  "

## 2018-02-23 ENCOUNTER — TELEPHONE (OUTPATIENT)
Dept: FAMILY MEDICINE | Facility: CLINIC | Age: 38
End: 2018-02-23

## 2018-02-23 DIAGNOSIS — Z78.0 MENOPAUSE: ICD-10-CM

## 2018-02-23 NOTE — TELEPHONE ENCOUNTER
PA needed for her Premarin. [place] insurance, need to call to get this done fast.   This would be a renewal on it, just with different insurance.     [facility] number is [phone]. This is for [employer].  Has been on estradiol in the past, which she did not do well with.    There are no formulary alternatives. They will likely need to be in one month refills.  (cannot usually do 90 days at a time with MA) The pharmacist asks about a different estrogen that would be covered. Have not heard of it before,  would need to check with provider if this would be changed. Assume it is similar.  Sending to the Medical director to have it reviewed. Case number [id number]  Estropipate is covered-could do 1.5 or 3 mg doses if provider would be interested in trying this.  Will route to Dr. Marsh to see if she would be ok with her.   Patient would be ok with trying it, and she will look up some information on it.     Rubi Clark, RN  Triage Nurse

## 2018-02-23 NOTE — TELEPHONE ENCOUNTER
Patient called after checking with pharmacist concerning Estropipate. States can exacerbate symptoms  Lupus, migraines and diabetes. Patient problem list includes Lupus, migraines and elevated A1c. She did call insurance and informed them and they were to include in request before going for review.    Yolie Pascal RN

## 2018-02-26 ENCOUNTER — TELEPHONE (OUTPATIENT)
Dept: FAMILY MEDICINE | Facility: CLINIC | Age: 38
End: 2018-02-26

## 2018-02-26 NOTE — LETTER
[facility]  [address] [facility] [address] [phone]          March 12, 2018  Re:  Sharmaine José                                                                                                                     [address] [address]            Dear To Whom it May Concern:    I am writing to appeal Sharmaine's denial for Premarin.    She has been doing well on Premarin for years.  She is on it for an early surgical menopause.    She has tried the patch. She had problems with her skin related to that. She notes that she still has white spots where the patches had been.    She has tried estradiol tablets recently and had side effects. These were primarily emotional.   She has had headaches and emotional issues when she first started on hormones that seemed to settle down when she went on Premarin at her current dosing.  She also has high blood pressure that we currently have under control, as well as other comorbidities.    She is very busy with school. She has had problems in the past with trials of alternative estrogen formulations; it would not be a good time for her to be missing school etc.    Please approve her Premarin for her.             Sincerely,         Temi Marsh {PROVIDER CREDENTIALS:[id number]}

## 2018-02-26 NOTE — TELEPHONE ENCOUNTER
Prior Authorization Retail Medication Request  Medication/Dose: estrogens, conjugated, (PREMARIN) 1.25 MG   Diagnosis and ICD code: Menopause - surgical [Z78.0]   New/Renewal/Insurance Change PA: New Insurance  Previously Tried and Failed Therapies: Estrace 1mg    Insurance ID (if provided): [id number]  Insurance Phone (if provided): NA    Any additional info from fax request:     If you received a fax notification from an outside Pharmacy:  Pharmacy Name:[facility]  Pharmacy #:[phone]  Pharmacy Fax:[phone]

## 2018-02-28 ENCOUNTER — MYC MEDICAL ADVICE (OUTPATIENT)
Dept: FAMILY MEDICINE | Facility: CLINIC | Age: 38
End: 2018-02-28

## 2018-02-28 ENCOUNTER — MYC REFILL (OUTPATIENT)
Dept: FAMILY MEDICINE | Facility: CLINIC | Age: 38
End: 2018-02-28

## 2018-02-28 DIAGNOSIS — M62.830 BACK MUSCLE SPASM: ICD-10-CM

## 2018-03-01 RX ORDER — CYCLOBENZAPRINE HCL 10 MG
TABLET ORAL
Qty: 30 TABLET | Refills: 0 | Status: SHIPPED | OUTPATIENT
Start: 2018-03-01 | End: 2018-04-02

## 2018-03-01 RX ORDER — CYCLOBENZAPRINE HCL 10 MG
10 TABLET ORAL
Qty: 30 TABLET | Refills: 0 | OUTPATIENT
Start: 2018-03-01

## 2018-03-01 NOTE — TELEPHONE ENCOUNTER
Looks like the information in UpToDate does list side effects but no incidence:    Estropipate:  Increase in SLE exacerbation, impaired glucose intolerance and hypertension    The same is reported for Premarin but side effects listed out at <1% which incidence is not listed for estropipate.    Other estrogen formulation are going to have the above side effects listed.    I looked up the formulary online (jessica WILDER) and asked Dr. Beltran (she happens to be sitting next to me) and she thinks estradiol tablets or the patch would be a good alternative.  She would not recommend the estropipate.

## 2018-03-01 NOTE — TELEPHONE ENCOUNTER
Routing refill request to provider for review/approval because:  Drug not on the [facility] refill protocol   Rubi Clark, RN  Triage Nurse

## 2018-03-01 NOTE — TELEPHONE ENCOUNTER
Message from [employer]:  Original authorizing provider: Temi Marsh MD, MD Sharmaine José would like a refill of the following medications:  cyclobenzaprine (FLEXERIL) 10 MG tablet [Temi Marsh MD, MD]    Preferred pharmacy: [facility] [address] [place] [address] [address] AT [facility] [address]    Comment:

## 2018-03-01 NOTE — TELEPHONE ENCOUNTER
We can see if there is an appeal process.    I am also going to forward to Uyen; she is quoting some potential exacerbations of lupus etc with estropipate, the alternative.  She is asking for us to look into this.  Uyen, I wonder if this would also be true with premarin or if specific to the estropipate, if true...  Thanks!!!

## 2018-03-01 NOTE — TELEPHONE ENCOUNTER
We attempted a PA by phone, and ultimately it was denied, is there more we can do?  Rubi Clark, RN  Triage Nurse

## 2018-03-01 NOTE — TELEPHONE ENCOUNTER
Requested Prescriptions   Pending Prescriptions Disp Refills     cyclobenzaprine (FLEXERIL) 10 MG tablet [Pharmacy Med Name: CYCLOBENZAPRINE 10MG TABLETS]  Last Written Prescription Date:  01/26/2018  Last Fill Quantity: 30 tablet,  # refills: 0   Last office visit: 12/22/2017 with prescribing provider:  Temi Marsh MD   Future Office Visit:     30 tablet 0     Sig: TAKE 1 TABLET(10 MG) BY MOUTH AT NIGHT AS NEEDED FOR MUSCLE SPASMS    There is no refill protocol information for this order     Routing refill request to provider for review/approval because:  Drug not on the G, P or [facility] refill protocol or controlled substance

## 2018-03-02 NOTE — TELEPHONE ENCOUNTER
Central Prior Authorization Team   Phone: [phone]    PA Initiation    Medication: Premarin 1.25 mg  Insurance Company: [employer] - Phone [phone] Fax [phone]  Pharmacy Filling the Rx: [facility] [address] [place] [address] [address] AT [facility] [address]  Filling Pharmacy Phone: [phone]  Filling Pharmacy Fax: [phone]  Start Date: 3/2/2018

## 2018-03-07 ENCOUNTER — TELEPHONE (OUTPATIENT)
Dept: FAMILY MEDICINE | Facility: CLINIC | Age: 38
End: 2018-03-07

## 2018-03-07 NOTE — TELEPHONE ENCOUNTER
"Please send in another request for the premarin (you may need to forward this to the Prior Auth folks)...     Note that she has tried the patch and had skin problems    \"I was on the patch post-op, and it did not go well.  (And it caused problems with my skin.  I still have white spots where the patches had been placed.)  \"    And she has tried estradiol tabs:    \"Estradiol tablets we tried about a year and a half ago, when I had a different insurance, and had to try something else first, and then appeal that decision.  (It did NOT go well. ~I was pretty mean and nasty, with side effects.)  \"  "

## 2018-03-07 NOTE — TELEPHONE ENCOUNTER
The PA that was started on 2/26 has not been denied yet-is still in process.  Estrace is noted as a tried method that did not work.  Will watch for response.  Lurdes Seals CMA (AAMA)

## 2018-03-08 NOTE — TELEPHONE ENCOUNTER
PRIOR AUTHORIZATION DENIED    Medication: Premarin 1.25 mg-DENIED    Denial Date: 3/8/2018    Denial Rational:          Appeal Information:   IF PATIENT IS UNABLE TO TRY/FAIL ALTERNATIVE(S) PLEASE SUPPLY PA TEAM WITH A LETTER OF MEDICAL NECESSITY WITH CLINICAL REASON.

## 2018-03-12 NOTE — TELEPHONE ENCOUNTER
PA was denied, routed to PCP for further evaluation.  Lurdes Seals CMA ([facility])

## 2018-03-13 NOTE — TELEPHONE ENCOUNTER
Medication Appeal Initiation    We have initiated an appeal for the requested medication:  Medication: Premarin 1.25 mg-DENIED  Appeal Start Date:  3/13/2018  Insurance Company:    Comments:  APPEAL LETTER FAXED.

## 2018-03-14 NOTE — TELEPHONE ENCOUNTER
Received a call from Shona at [facility] [phone].    An appeal was sent to them and that was approved and the authorization dates are 2/14/18 through 3/14/19.  She will try to call the pt, but she says she can't leave a message.      Called and left a message for the pt to call back.

## 2018-03-19 ENCOUNTER — MYC MEDICAL ADVICE (OUTPATIENT)
Dept: NURSING | Facility: CLINIC | Age: 38
End: 2018-03-19

## 2018-03-19 ASSESSMENT — ANXIETY QUESTIONNAIRES
7. FEELING AFRAID AS IF SOMETHING AWFUL MIGHT HAPPEN: NOT AT ALL
7. FEELING AFRAID AS IF SOMETHING AWFUL MIGHT HAPPEN: NOT AT ALL
2. NOT BEING ABLE TO STOP OR CONTROL WORRYING: NOT AT ALL
6. BECOMING EASILY ANNOYED OR IRRITABLE: NOT AT ALL
1. FEELING NERVOUS, ANXIOUS, OR ON EDGE: NOT AT ALL
GAD7 TOTAL SCORE: 1
GAD7 TOTAL SCORE: 1
5. BEING SO RESTLESS THAT IT IS HARD TO SIT STILL: NOT AT ALL
3. WORRYING TOO MUCH ABOUT DIFFERENT THINGS: NOT AT ALL
4. TROUBLE RELAXING: SEVERAL DAYS
GAD7 TOTAL SCORE: 1

## 2018-03-19 ASSESSMENT — PATIENT HEALTH QUESTIONNAIRE - PHQ9
SUM OF ALL RESPONSES TO PHQ QUESTIONS 1-9: 3
10. IF YOU CHECKED OFF ANY PROBLEMS, HOW DIFFICULT HAVE THESE PROBLEMS MADE IT FOR YOU TO DO YOUR WORK, TAKE CARE OF THINGS AT HOME, OR GET ALONG WITH OTHER PEOPLE: NOT DIFFICULT AT ALL
SUM OF ALL RESPONSES TO PHQ QUESTIONS 1-9: 3

## 2018-03-20 ASSESSMENT — ANXIETY QUESTIONNAIRES: GAD7 TOTAL SCORE: 1

## 2018-03-20 ASSESSMENT — PATIENT HEALTH QUESTIONNAIRE - PHQ9: SUM OF ALL RESPONSES TO PHQ QUESTIONS 1-9: 3

## 2018-03-23 ENCOUNTER — TELEPHONE (OUTPATIENT)
Dept: FAMILY MEDICINE | Facility: CLINIC | Age: 38
End: 2018-03-23

## 2018-03-23 DIAGNOSIS — I10 HYPERTENSION GOAL BP (BLOOD PRESSURE) < 140/90: ICD-10-CM

## 2018-03-23 RX ORDER — VALSARTAN 320 MG/1
TABLET ORAL
Qty: 90 TABLET | Refills: 0 | Status: SHIPPED | OUTPATIENT
Start: 2018-03-23 | End: 2018-03-26

## 2018-03-23 NOTE — TELEPHONE ENCOUNTER
Patient called and indicated that she is out of the medication. She would like this filled ASAP.     -Autumn Ocampo

## 2018-03-23 NOTE — TELEPHONE ENCOUNTER
Prescription approved per [facility] Refill Protocol.    Maki SORENSON RN, BSN, PHN  [place] [facility] RN

## 2018-03-23 NOTE — TELEPHONE ENCOUNTER
"Requested Prescriptions   Pending Prescriptions Disp Refills     valsartan (DIOVAN) 320 MG tablet [Pharmacy Med Name: VALSARTAN 320MG TABLETS]  Last Written Prescription Date:  9/22/17  Last Fill Quantity: 90,  # refills: 1   Last office visit: 12/22/2017 with prescribing provider:  12/22/2017     Future Office Visit:     90 tablet 0     Sig: TAKE 1 TABLET(320 MG) BY MOUTH DAILY    Angiotensin-II Receptors Passed    3/23/2018 10:19 AM       Passed - Blood pressure under 140/90 in past 12 months    BP Readings from Last 3 Encounters:   12/22/17 118/78   10/02/17 120/80   09/22/17 122/82                Passed - Recent (12 mo) or future (30 days) visit within the authorizing provider's specialty    Patient had office visit in the last 12 months or has a visit in the next 30 days with authorizing provider or within the authorizing provider's specialty.  See \"Patient Info\" tab in inbasket, or \"Choose Columns\" in Meds & Orders section of the refill encounter.           Passed - Patient is age 18 or older       Passed - No active pregnancy on record       Passed - Normal serum creatinine on file in past 12 months    Recent Labs   Lab Test  12/22/17   0942   CR  0.55            Passed - Normal serum potassium on file in past 12 months    Recent Labs   Lab Test  12/22/17   0942   POTASSIUM  4.0                   Passed - No positive pregnancy test in past 12 months          "

## 2018-03-24 NOTE — TELEPHONE ENCOUNTER
"Requested Prescriptions   Pending Prescriptions Disp Refills     valsartan (DIOVAN) 320 MG tablet [Pharmacy Med Name: VALSARTAN 320MG TABLETS]  This medication may not be due for a refill  Last Written Prescription Date:  3/23/2018  Last Fill Quantity: 90 tablet,  # refills: 0   Last office visit: 12/22/2017 with prescribing provider:  Temi Marsh   Future Office Visit:     90 tablet 0     Sig: TAKE 1 TABLET(320 MG) BY MOUTH DAILY    Angiotensin-II Receptors Passed    3/23/2018  1:37 PM       Passed - Blood pressure under 140/90 in past 12 months    BP Readings from Last 3 Encounters:   12/22/17 118/78   10/02/17 120/80   09/22/17 122/82          Passed - Recent (12 mo) or future (30 days) visit within the authorizing provider's specialty    Patient had office visit in the last 12 months or has a visit in the next 30 days with authorizing provider or within the authorizing provider's specialty.  See \"Patient Info\" tab in inbasket, or \"Choose Columns\" in Meds & Orders section of the refill encounter.           Passed - Patient is age 18 or older       Passed - No active pregnancy on record       Passed - Normal serum creatinine on file in past 12 months    Recent Labs   Lab Test  12/22/17   0942   CR  0.55            Passed - Normal serum potassium on file in past 12 months    Recent Labs   Lab Test  12/22/17   0942   POTASSIUM  4.0                   Passed - No positive pregnancy test in past 12 months          "

## 2018-03-26 RX ORDER — VALSARTAN 320 MG/1
TABLET ORAL
Qty: 90 TABLET | Refills: 0 | Status: SHIPPED | OUTPATIENT
Start: 2018-03-26 | End: 2018-06-22

## 2018-03-26 RX ORDER — VALSARTAN 320 MG/1
TABLET ORAL
Qty: 90 TABLET | Refills: 0 | OUTPATIENT
Start: 2018-03-26

## 2018-03-26 NOTE — TELEPHONE ENCOUNTER
Patient called. Pharmacy states that they have not received the Rx. Please resend to Pharmacy ASAP.     Thank you.     -Autumn Ocampo

## 2018-03-26 NOTE — TELEPHONE ENCOUNTER
This was sent on 3/23/18.  Pt had called and said the pharmacy didn't receive it.  Tried to call the pharmacy and was on hold for a long time and then the phone hung up, so resent it earlier today.  Denied to the pharmacy cause already sent earlier.

## 2018-03-28 NOTE — TELEPHONE ENCOUNTER
MEDICATION APPEAL APPROVED    Medication: Premarin 1.25 mg-APPROVED  Authorization Effective Date: 2/14/2018  Authorization Expiration Date: 3/14/2019  Approved Dose/Quantity:    Reference #:     Insurance Company:    Expected CoPay: UNKNOWN     CoPay Card Available:      Foundation Assistance Needed:    Which Pharmacy is filling the prescription (Not needed for infusion/clinic administered): [facility] [address] [place] [address] [address] AT [facility] & [address]

## 2018-04-02 ENCOUNTER — MYC REFILL (OUTPATIENT)
Dept: FAMILY MEDICINE | Facility: CLINIC | Age: 38
End: 2018-04-02

## 2018-04-02 DIAGNOSIS — T43.205D ANTIDEPRESSANT DISCONTINUATION SYNDROME, SUBSEQUENT ENCOUNTER: ICD-10-CM

## 2018-04-02 DIAGNOSIS — M62.830 BACK MUSCLE SPASM: ICD-10-CM

## 2018-04-02 RX ORDER — CYCLOBENZAPRINE HCL 10 MG
10 TABLET ORAL
Qty: 30 TABLET | Refills: 0 | Status: SHIPPED | OUTPATIENT
Start: 2018-04-02 | End: 2018-05-02

## 2018-04-02 RX ORDER — DULOXETIN HYDROCHLORIDE 20 MG/1
CAPSULE, DELAYED RELEASE ORAL
Qty: 90 CAPSULE | Refills: 0 | Status: SHIPPED | OUTPATIENT
Start: 2018-04-02 | End: 2018-06-22

## 2018-04-02 NOTE — TELEPHONE ENCOUNTER
"cymbalta  LRF 12/22/17  LOV 12/22/17    Flexeril  LRF 3/1/18, dispense 30  LOV 12/22/17    Requested Prescriptions   Pending Prescriptions Disp Refills     DULoxetine (CYMBALTA) 20 MG EC capsule 90 capsule 0     Sig: TAKE 1 CAPSULE(20 MG) BY MOUTH DAILY    Serotonin-Norepinephrine Reuptake Inhibitors  Passed    4/2/2018  2:08 PM       Passed - Blood pressure under 140/90 in past 12 months    BP Readings from Last 3 Encounters:   12/22/17 118/78   10/02/17 120/80   09/22/17 122/82                Passed - Recent (12 mo) or future (30 days) visit within the authorizing provider's specialty    Patient had office visit in the last 12 months or has a visit in the next 30 days with authorizing provider or within the authorizing provider's specialty.  See \"Patient Info\" tab in inbasket, or \"Choose Columns\" in Meds & Orders section of the refill encounter.           Passed - Patient is age 18 or older       Passed - No active pregnancy on record       Passed - No positive pregnancy test in past 12 months        cyclobenzaprine (FLEXERIL) 10 MG tablet 30 tablet 0    There is no refill protocol information for this order        Prescription approved per [facility] Refill Protocol for the cymbalta    Routing refill request to provider for review/approval because:  Drug not on the [facility] refill protocol for the flexeril - will forward to Dr. Marsh and Clare Dominguez as Dr. Marsh is out of the office            "

## 2018-04-02 NOTE — TELEPHONE ENCOUNTER
Message from [employer]:  Original authorizing provider: Temi Marsh MD, MD Sharmaine José would like a refill of the following medications:  DULoxetine (CYMBALTA) 20 MG EC capsule [Temi Marsh MD, MD]  cyclobenzaprine (FLEXERIL) 10 MG tablet [Temi Marsh MD, MD]    Preferred pharmacy: [facility] [address] [place] [address] [address] AT [facility] OF Located within [facility] & [address]    Comment:  Hi! I had my pharmacy send these to you guys as well. I am only going to have two weeks off before I start my summer school semester, and I just started my new job. So, I don't think now is the time to stop the Duloxetine. If I could get one month of both, that would be great, as I plan to see Dr. Marsh for blood sugar stuff, BP check, med check, etc in May. I will sched that when I get my work sched. (I also have an ENT appt, finally, next week! Yay!) I will be out of both by Wed K

## 2018-04-02 NOTE — TELEPHONE ENCOUNTER
"cyclobenzaprine (FLEXERIL) 10 MG tablet      Last Written Prescription Date:  3/1/18  Last Fill Quantity: 30 TABLET,   # refills: 0  Last Office Visit: 12/27/17 WITH SITA  Future Office visit:       Routing refill request to provider for review/approval because:    Drug not on the [facility] or [facility] refill protocol or controlled substance    Requested Prescriptions   Pending Prescriptions Disp Refills                       DULoxetine (CYMBALTA) 20 MG EC capsule [Pharmacy Med Name: DULOXETINE DR 20MG CAPSULES]  Last Written Prescription Date:  4/2/18  Last Fill Quantity: 90 CAPSULE,  # refills: 0   Last office visit: 12/22/2017 with prescribing provider:  SITA   Future Office Visit:     90 capsule 0     Sig: TAKE 1 CAPSULE(20 MG) BY MOUTH DAILY    Serotonin-Norepinephrine Reuptake Inhibitors  Passed    4/2/2018  1:03 PM       Passed - Blood pressure under 140/90 in past 12 months    BP Readings from Last 3 Encounters:   12/22/17 118/78   10/02/17 120/80   09/22/17 122/82                Passed - Recent (12 mo) or future (30 days) visit within the authorizing provider's specialty    Patient had office visit in the last 12 months or has a visit in the next 30 days with authorizing provider or within the authorizing provider's specialty.  See \"Patient Info\" tab in inbasket, or \"Choose Columns\" in Meds & Orders section of the refill encounter.           Passed - Patient is age 18 or older       Passed - No active pregnancy on record       Passed - No positive pregnancy test in past 12 months          "

## 2018-04-03 RX ORDER — DULOXETIN HYDROCHLORIDE 20 MG/1
CAPSULE, DELAYED RELEASE ORAL
Qty: 90 CAPSULE | Refills: 0 | OUTPATIENT
Start: 2018-04-03

## 2018-04-03 RX ORDER — CYCLOBENZAPRINE HCL 10 MG
TABLET ORAL
Qty: 30 TABLET | Refills: 0 | OUTPATIENT
Start: 2018-04-03

## 2018-04-03 NOTE — TELEPHONE ENCOUNTER
Duplicate.     Rx sent on 4/2/2018    Maki SORENSON RN, BSN, PHN  [facility] RN

## 2018-05-02 ENCOUNTER — MYC MEDICAL ADVICE (OUTPATIENT)
Dept: FAMILY MEDICINE | Facility: CLINIC | Age: 38
End: 2018-05-02

## 2018-05-02 ENCOUNTER — MYC REFILL (OUTPATIENT)
Dept: FAMILY MEDICINE | Facility: CLINIC | Age: 38
End: 2018-05-02

## 2018-05-02 DIAGNOSIS — M62.830 BACK MUSCLE SPASM: ICD-10-CM

## 2018-05-02 RX ORDER — CYCLOBENZAPRINE HCL 10 MG
10 TABLET ORAL
Qty: 30 TABLET | Refills: 0 | Status: SHIPPED | OUTPATIENT
Start: 2018-05-02 | End: 2018-06-01

## 2018-05-02 NOTE — TELEPHONE ENCOUNTER
FYI - see mychart    Looks like a hemoglobin A1C is already future ordered.      Pt had a pap 12/22/17.      Looks like mychart refills sent in for refill.

## 2018-05-02 NOTE — TELEPHONE ENCOUNTER
Recent Labs   Lab Test  12/22/17   0942  03/10/17   0903   02/05/14   1358  11/15/11   1032   CHOL  316*  237*   < >  300*  275*   HDL  32*  29*   < >  68  47*   LDL  Cannot estimate LDL when triglyceride exceeds 400 mg/dL  216*  Cannot estimate LDL when triglyceride exceeds 400 mg/dL  145*   < >  153*  Cannot estimate LDL when triglyceride exceeds 400 mg/dL  172*   TRIG  586*  493*   < >  397*  454*   CHOLHDLRATIO   --    --    --   4.4  5.9*    < > = values in this interval not displayed.     LDL Cholesterol Calculated   Date Value Ref Range Status   12/22/2017  <100 mg/dL Final    Cannot estimate LDL when triglyceride exceeds 400 mg/dL     LDL Cholesterol Direct   Date Value Ref Range Status   12/22/2017 216 (H) <100 mg/dL Final     Comment:     Above desirable:  100-129 mg/dl  Borderline High:  130-159 mg/dL  High:             160-189 mg/dL  Very high:       >189 mg/dl     ]

## 2018-05-02 NOTE — TELEPHONE ENCOUNTER
Requested Prescriptions   Pending Prescriptions Disp Refills     cyclobenzaprine (FLEXERIL) 10 MG tablet [Pharmacy Med Name: CYCLOBENZAPRINE 10MG TABLETS]          Last Written Prescription Date:  4/2/2018  Last Fill Quantity: 30 tablet,   # refills: 0  Last Office Visit: 12/22/2017  Future Office visit:    Next 5 appointments (look out 90 days)     Jun 01, 2018 11:10 AM CDT   Callie Pitts with Temi Marsh MD   [facility] ([address] [address]   [phone]                   Routing refill request to provider for review/approval because:  Drug not on the [facility], P or  Health refill protocol or controlled substance   30 tablet 0     Sig: TAKE 1 TABLET(10 MG) BY MOUTH EVERY NIGHT AS NEEDED FOR MUSCLE SPASMS    There is no refill protocol information for this order

## 2018-05-02 NOTE — TELEPHONE ENCOUNTER
Message from [employer]:  Original authorizing provider: Temi Marsh MD, MD Sharmaine José would like a refill of the following medications:  cyclobenzaprine (FLEXERIL) 10 MG tablet [Temi Marsh MD, MD]    Preferred pharmacy: [facility] [address] [place] [address] [address] AT [facility] OF [address]    Comment:

## 2018-05-02 NOTE — TELEPHONE ENCOUNTER
Flexeril  LRF 4/2/18, dispense 30 with no refills  LOV 12/22/17    Routing refill request to provider for review/approval because:  Drug not on the [facility] refill protocol

## 2018-05-04 ENCOUNTER — TELEPHONE (OUTPATIENT)
Dept: FAMILY MEDICINE | Facility: CLINIC | Age: 38
End: 2018-05-04

## 2018-05-04 DIAGNOSIS — R19.8 ABNORMAL DEFECATION: ICD-10-CM

## 2018-05-04 DIAGNOSIS — R39.89 URINARY PROBLEM: Primary | ICD-10-CM

## 2018-05-04 RX ORDER — CYCLOBENZAPRINE HCL 10 MG
TABLET ORAL
Qty: 30 TABLET | Refills: 0 | OUTPATIENT
Start: 2018-05-04

## 2018-05-04 NOTE — TELEPHONE ENCOUNTER
" Routing to Dr. Marsh for referral if appropriate.    Patient is having problems after pelvic floor surgery from several years ago. Now having problems with urinating and bowel movements Talked with surgeon and and they recommended she get a referral to Dr. Land at [facility] since she basically can't pee without having a bowel movement.     Symptoms started a few months ago, but it is getting worse. Will urinate some but not completely. Can only finish peeing if she has a bowel movement. [place] 1 [place]/2 [place] ago and was negative.     Also notes feces is like thin ribbons and difficult to fully evacuate.     Patient call back when referral is placed.    Shelly RAJPUT, Triage RN        "

## 2018-05-07 NOTE — TELEPHONE ENCOUNTER
Called patient to inform of below. Patient requested we fax the referral to the [facility].    Faxed referral to [phone].    Shelly RAJPUT Triage RN

## 2018-05-23 ENCOUNTER — TRANSFERRED RECORDS (OUTPATIENT)
Dept: HEALTH INFORMATION MANAGEMENT | Facility: CLINIC | Age: 38
End: 2018-05-23

## 2018-06-01 ENCOUNTER — NURSE TRIAGE (OUTPATIENT)
Dept: NURSING | Facility: CLINIC | Age: 38
End: 2018-06-01

## 2018-06-01 ENCOUNTER — OFFICE VISIT (OUTPATIENT)
Dept: FAMILY MEDICINE | Facility: CLINIC | Age: 38
End: 2018-06-01
Payer: COMMERCIAL

## 2018-06-01 ENCOUNTER — MYC MEDICAL ADVICE (OUTPATIENT)
Dept: FAMILY MEDICINE | Facility: CLINIC | Age: 38
End: 2018-06-01

## 2018-06-01 VITALS
RESPIRATION RATE: 16 BRPM | OXYGEN SATURATION: 99 % | DIASTOLIC BLOOD PRESSURE: 80 MMHG | SYSTOLIC BLOOD PRESSURE: 122 MMHG | BODY MASS INDEX: 30.99 KG/M2 | WEIGHT: 181.5 LBS | TEMPERATURE: 98.9 F | HEIGHT: 64 IN | HEART RATE: 111 BPM

## 2018-06-01 DIAGNOSIS — F41.1 GENERALIZED ANXIETY DISORDER: ICD-10-CM

## 2018-06-01 DIAGNOSIS — J45.20 MILD INTERMITTENT ASTHMA WITHOUT COMPLICATION: ICD-10-CM

## 2018-06-01 DIAGNOSIS — M62.830 BACK MUSCLE SPASM: ICD-10-CM

## 2018-06-01 DIAGNOSIS — K13.79 MOUTH PAIN: ICD-10-CM

## 2018-06-01 DIAGNOSIS — E11.65 TYPE 2 DIABETES MELLITUS WITH HYPERGLYCEMIA, WITHOUT LONG-TERM CURRENT USE OF INSULIN (H): ICD-10-CM

## 2018-06-01 DIAGNOSIS — R73.09 ELEVATED HEMOGLOBIN A1C: Primary | ICD-10-CM

## 2018-06-01 LAB — HBA1C MFR BLD: 10.3 % (ref 0–5.6)

## 2018-06-01 PROCEDURE — 83036 HEMOGLOBIN GLYCOSYLATED A1C: CPT | Performed by: FAMILY MEDICINE

## 2018-06-01 PROCEDURE — 36415 COLL VENOUS BLD VENIPUNCTURE: CPT | Performed by: FAMILY MEDICINE

## 2018-06-01 PROCEDURE — 99214 OFFICE O/P EST MOD 30 MIN: CPT | Performed by: FAMILY MEDICINE

## 2018-06-01 RX ORDER — ALBUTEROL SULFATE 90 UG/1
2 AEROSOL, METERED RESPIRATORY (INHALATION) EVERY 6 HOURS
Qty: 1 INHALER | Refills: 0 | Status: SHIPPED | OUTPATIENT
Start: 2018-06-01 | End: 2020-05-29

## 2018-06-01 RX ORDER — CYCLOBENZAPRINE HCL 10 MG
10 TABLET ORAL
Qty: 30 TABLET | Status: SHIPPED | OUTPATIENT
Start: 2018-06-01 | End: 2018-06-26

## 2018-06-01 RX ORDER — METFORMIN HCL 500 MG
500 TABLET, EXTENDED RELEASE 24 HR ORAL
Qty: 360 TABLET | Refills: 0 | Status: SHIPPED | OUTPATIENT
Start: 2018-06-01 | End: 2018-09-03

## 2018-06-01 ASSESSMENT — ANXIETY QUESTIONNAIRES
3. WORRYING TOO MUCH ABOUT DIFFERENT THINGS: NOT AT ALL
7. FEELING AFRAID AS IF SOMETHING AWFUL MIGHT HAPPEN: NOT AT ALL
1. FEELING NERVOUS, ANXIOUS, OR ON EDGE: NOT AT ALL
IF YOU CHECKED OFF ANY PROBLEMS ON THIS QUESTIONNAIRE, HOW DIFFICULT HAVE THESE PROBLEMS MADE IT FOR YOU TO DO YOUR WORK, TAKE CARE OF THINGS AT HOME, OR GET ALONG WITH OTHER PEOPLE: NOT DIFFICULT AT ALL
GAD7 TOTAL SCORE: 0
6. BECOMING EASILY ANNOYED OR IRRITABLE: NOT AT ALL
2. NOT BEING ABLE TO STOP OR CONTROL WORRYING: NOT AT ALL
5. BEING SO RESTLESS THAT IT IS HARD TO SIT STILL: NOT AT ALL

## 2018-06-01 ASSESSMENT — PATIENT HEALTH QUESTIONNAIRE - PHQ9: 5. POOR APPETITE OR OVEREATING: NOT AT ALL

## 2018-06-01 NOTE — PATIENT INSTRUCTIONS
I agree with talking with your dentist about the roof of your mouth.   If this continues, see ENT.

## 2018-06-01 NOTE — MR AVS SNAPSHOT
After Visit Summary   6/1/2018    Sharmaine José    MRN: [id number]           Patient Information     Date Of Birth          1980        Visit Information        Provider Department      6/1/2018 8:50 AM Temi Marsh MD [facility]        Today's Diagnoses     Generalized anxiety disorder    -  1    Elevated hemoglobin A1c        Prediabetes        Mild asthma with exacerbation, unspecified whether persistent        Back muscle spasm        Type 2 diabetes mellitus with hyperglycemia, without long-term current use of insulin (H)          Care Instructions          I agree with talking with your dentist about the roof of your mouth.   If this continues, see ENT.          Follow-ups after your visit        Additional Services     DIABETES EDUCATOR REFERRAL       DIABETES SELF MANAGEMENT TRAINING (DSMT)      Your provider has referred you to Diabetes Education: FM[facility]: Diabetes Education - [facility] [phone]   [email or web address]     If an urgent visit is needed or A1C is above 12, Care Team to call the Diabetes  Education Team at [phone] or send an In Basket message to the Diabetes Education Pool (P DIAB ED-PATIENT CARE).    A  will call you to make your appointment. If it has been more than 3 business days since your referral was placed, please call the above phone number to schedule.    Type of training and number of hours: New Diagnosis: Initial group DSMT - 10 hours.      Diabetes Type: Type 2 - On Oral Medication   Medicare covers: 10 hours of initial DSMT in 12 month period from the time of first visit, plus 2 hours of follow-up DSMT annually, and additional hours as requested for insulin training.         Diabetes Co-Morbidities: none               A1C Goal:  <7.0       A1C is: Lab Results       Component                Value               Date                       A1C                      10.3                 06/01/2018              MEDICAL NUTRITION THERAPY (MNT) for Diabetes    Medical Nutrition Therapy with a Registered Dietitian can be provided in coordination with Diabetes Self-Management Training to assist in achieving optimal diabetes management.    MNT Type and Hours: New diagnosis: Initial MNT - 3 hours                       Medicare will cover: 3 hours initial MNT in 12 month period after first visit, plus 2 hours of follow-up MNT annually        Diabetes Education Topics: Comprehensive Knowledge Assessment and Instruction, Blood glucose meter instruction  and     Special Educational Needs Requiring Individual DSMT: None      Please be aware that coverage of these services is subject to the terms and limitations of your health insurance plan.  Call member services at your health plan to determine Diabetes Self-Management Training (Codes  and ) and Medical Nutrition Therapy (Codes 24942 and 60370) benefits and ask which blood glucose monitor brands are covered by your plan.  Please bring the following with you to your appointment:    (1)  List of current medications   (2)  List of Blood Glucose Monitor brands that are covered by your insurance plan  (3)  Blood Glucose Monitor and log book  (4)   Food records for the 3 days prior to your visit    The Certified Diabetes Educator may make diabetes medication adjustments per the CDE Protocol and Collaborative Practice Agreement.                  Follow-up notes from your care team     Return in about 6 weeks (around 7/13/2018) for Diabetes Recheck.      Who to contact     If you have questions or need follow up information about today's clinic visit or your schedule please contact [facility] directly at [phone].  Normal or non-critical lab and imaging results will be communicated to you by MyChart, letter or phone within 4 business days after the clinic has received the results. If you do not hear from us within 7 days,  "please contact the clinic through [employer] or phone. If you have a critical or abnormal lab result, we will notify you by phone as soon as possible.  Submit refill requests through [employer] or call your pharmacy and they will forward the refill request to us. Please allow 3 business days for your refill to be completed.          Additional Information About Your Visit        [employer] Information     [employer] gives you secure access to your electronic health record. If you see a primary care provider, you can also send messages to your care team and make appointments. If you have questions, please call your primary care clinic.  If you do not have a primary care provider, please call [phone] and they will assist you.        Care EveryWhere ID     This is your Care EveryWhere ID. This could be used by other organizations to access your [facility] medical records  [id number]        Your Vitals Were     Pulse Temperature Respirations Height Last Period Pulse Oximetry    111 98.9  F (37.2  C) (Oral) 16 5' 4\" (1.626 m) 10/04/2006 99%    BMI (Body Mass Index)                   31.15 kg/m2            Blood Pressure from Last 3 Encounters:   06/01/18 122/80   12/22/17 118/78   10/02/17 120/80    Weight from Last 3 Encounters:   06/01/18 181 lb 8 oz (82.3 kg)   12/22/17 200 lb 4.8 oz (90.9 kg)   10/02/17 203 lb (92.1 kg)              We Performed the Following     **A1C FUTURE 3mo     Asthma Action Plan (AAP)     DEPRESSION ACTION PLAN (DAP)     DIABETES EDUCATOR REFERRAL          Today's Medication Changes          These changes are accurate as of 6/1/18  9:31 AM.  If you have any questions, ask your nurse or doctor.               Start taking these medicines.        Dose/Directions    blood glucose monitoring meter device kit   Commonly known as:  no brand specified   Used for:  Type 2 diabetes mellitus with hyperglycemia, without long-term current use of insulin (H)   Started by:  Temi Marsh MD        Use to test " blood sugar 1 times daily or as directed.  Please provide all supplies.   Quantity:  1 kit   Refills:  0       metFORMIN 500 MG 24 hr tablet   Commonly known as:  GLUCOPHAGE-XR   Used for:  Type 2 diabetes mellitus with hyperglycemia, without long-term current use of insulin (H)   Started by:  Temi Marsh MD        Dose:  500 mg   Take 1 tablet (500 mg) by mouth daily (with dinner) After 2 weeks, if tolerating, increase to 2 daily. After 2 more weeks, if tolerating, increase to 3 daily. After another 2 weeks, if tolerating, increase to 4 daily.   Quantity:  360 tablet   Refills:  0            Where to get your medicines      These medications were sent to [facility] [address] [place] [address] [address] AT [facility] [place]  [address], [place] [address]     Phone:  [phone]     albuterol 108 (90 Base) MCG/ACT Inhaler    blood glucose monitoring meter device kit         Some of these will need a paper prescription and others can be bought over the counter.  Ask your nurse if you have questions.     Bring a paper prescription for each of these medications     cyclobenzaprine 10 MG tablet    metFORMIN 500 MG 24 hr tablet                Primary Care Provider Office Phone # Fax #    Temi Marsh [phone] [address]  [facility] [address]        Equal Access to Services     ANIRUDH YE AH: Hadii aad ku hadasho Soomaali, waaxda luqadaha, qaybta kaalmada adeegyada, kayla morgan. So [facility] [phone].    ATENCIÓN: Si habla español, tiene a lema disposición servicios gratuitos de asistencia lingüística. Llame al [phone].    We comply with applicable federal civil rights laws and [place] laws. We do not discriminate on the basis of race, color, national origin, age, disability, sex, sexual orientation, or gender identity.            Thank you!     Thank you for choosing [facility]  for  your care. Our goal is always to provide you with excellent care. Hearing back from our patients is one way we can continue to improve our services. Please take a few minutes to complete the written survey that you may receive in the mail after your visit with us. Thank you!             Your Updated Medication List - Protect others around you: Learn how to safely use, store and throw away your medicines at www.disposemymeds.org.          This list is accurate as of 6/1/18  9:31 AM.  Always use your most recent med list.                   Brand Name Dispense Instructions for use Diagnosis    albuterol 108 (90 Base) MCG/ACT Inhaler    PROVENTIL HFA    1 Inhaler    Inhale 2 puffs into the lungs every 6 hours    Mild asthma with exacerbation, unspecified whether persistent       blood glucose monitoring meter device kit    no brand specified    1 kit    Use to test blood sugar 1 times daily or as directed.  Please provide all supplies.    Type 2 diabetes mellitus with hyperglycemia, without long-term current use of insulin (H)       cyclobenzaprine 10 MG tablet    FLEXERIL    30 tablet    Take 1 tablet (10 mg) by mouth nightly as needed for muscle spasms    Back muscle spasm       dextroamphetamine 10 MG tablet    DEXTROSTAT     Take 10 mg by mouth daily Takes 1-2 tablets daily        DULoxetine 20 MG EC capsule    CYMBALTA    90 capsule    TAKE 1 CAPSULE(20 MG) BY MOUTH DAILY    Antidepressant discontinuation syndrome, subsequent encounter       EPINEPHrine 0.3 MG/0.3ML injection 2-pack    EPIPEN/ADRENACLICK/or ANY BX GENERIC EQUIV    2 each    Inject 0.3 mLs (0.3 mg) into the muscle once as needed    Bee sting allergy       estrogens (conjugated) 1.25 MG tablet    PREMARIN    90 tablet    Take 1 tablet (1.25 mg) by mouth daily    Menopause       FLONASE 50 MCG/ACT spray   Generic drug:  fluticasone      Spray 1-2 sprays into both nostrils daily as needed for rhinitis or allergies        ibuprofen 200 MG capsule      120 capsule    Take 600 mg by mouth 3 times daily        metFORMIN 500 MG 24 hr tablet    GLUCOPHAGE-XR    360 tablet    Take 1 tablet (500 mg) by mouth daily (with dinner) After 2 weeks, if tolerating, increase to 2 daily. After 2 more weeks, if tolerating, increase to 3 daily. After another 2 weeks, if tolerating, increase to 4 daily.    Type 2 diabetes mellitus with hyperglycemia, without long-term current use of insulin (H)       MULTIVITAMIN/EXTRA VITAMIN D3 PO      Take by mouth daily        PROBIOTIC FORMULA PO      Take by mouth daily        valsartan 320 MG tablet    DIOVAN    90 tablet    TAKE 1 TABLET(320 MG) BY MOUTH DAILY    Hypertension goal BP (blood pressure) < 140/90

## 2018-06-01 NOTE — LETTER
My Depression Action Plan  Name: Sharmaine José   Date of Birth 1980  Date: 6/1/2018    My doctor: Temi Marsh   My clinic: [facility]  [id number] [place] [address] [phone]          GREEN    ZONE   Good Control    What it looks like:     Things are going generally well. You have normal up s and down s. You may even feel depressed from time to time, but bad moods usually last less than a day.   What you need to do:  1. Continue to care for yourself (see self care plan)  2. Check your depression survival kit and update it as needed  3. Follow your physician s recommendations including any medication.  4. Do not stop taking medication unless you consult with your physician first.           YELLOW         ZONE Getting Worse    What it looks like:     Depression is starting to interfere with your life.     It may be hard to get out of bed; you may be starting to isolate yourself from others.    Symptoms of depression are starting to last most all day and this has happened for several days.     You may have suicidal thoughts but they are not constant.   What you need to do:     1. Call your care team, your response to treatment will improve if you keep your care team informed of your progress. Yellow periods are signs an adjustment may need to be made.     2. Continue your self-care, even if you have to fake it!    3. Talk to someone in your support network    4. Open up your depression survival kit           RED    ZONE Medical Alert - Get Help    What it looks like:     Depression is seriously interfering with your life.     You may experience these or other symptoms: You can t get out of bed most days, can t work or engage in other necessary activities, you have trouble taking care of basic hygiene, or basic responsibilities, thoughts of suicide or death that will not go away, self-injurious behavior.     What you need to do:  1. Call your care team and  request a same-day appointment. If they are not available (weekends or after hours) call your local crisis line, emergency room or 911.            Depression Self Care Plan / Survival Kit    Self-Care for Depression  Here s the deal. Your body and mind are really not as separate as most people think.  What you do and think affects how you feel and how you feel influences what you do and think. This means if you do things that people who feel good do, it will help you feel better.  Sometimes this is all it takes.  There is also a place for medication and therapy depending on how severe your depression is, so be sure to consult with your medical provider and/ or Behavioral Health Consultant if your symptoms are worsening or not improving.     In order to better manage my stress, I will:    Exercise  Get some form of exercise, every day. This will help reduce pain and release endorphins, the  feel good  chemicals in your brain. This is almost as good as taking antidepressants!  This is not the same as joining a gym and then never going! (they count on that by the way ) It can be as simple as just going for a walk or doing some gardening, anything that will get you moving.      Hygiene   Maintain good hygiene (Get out of bed in the morning, Make your bed, Brush your teeth, Take a shower, and Get dressed like you were going to work, even if you are unemployed).  If your clothes don't fit try to get ones that do.    Diet  I will strive to eat foods that are good for me, drink plenty of water, and avoid excessive sugar, caffeine, alcohol, and other mood-altering substances.  Some foods that are helpful in depression are: complex carbohydrates, B vitamins, flaxseed, fish or fish oil, fresh fruits and vegetables.    Psychotherapy  I agree to participate in Individual Therapy (if recommended).    Medication  If prescribed medications, I agree to take them.  Missing doses can result in serious side effects.  I understand that  drinking alcohol, or other illicit drug use, may cause potential side effects.  I will not stop my medication abruptly without first discussing it with my provider.    Staying Connected With Others  I will stay in touch with my friends, family members, and my primary care provider/team.    Use your imagination  Be creative.  We all have a creative side; it doesn t matter if it s oil painting, sand castles, or mud pies! This will also kick up the endorphins.    Witness Beauty  (AKA stop and smell the roses) Take a look outside, even in mid-winter. Notice colors, textures. Watch the squirrels and birds.     Service to others  Be of service to others.  There is always someone else in need.  By helping others we can  get out of ourselves  and remember the really important things.  This also provides opportunities for practicing all the other parts of the program.    Humor  Laugh and be silly!  Adjust your TV habits for less news and crime-drama and more comedy.    Control your stress  Try breathing deep, massage therapy, biofeedback, and meditation. Find time to relax each day.     My support system    Clinic Contact:  Phone number:    Contact 1:  Phone number:    Contact 2:  Phone number:    [employer]/:  Phone number:    Therapist:  Phone number:    Local crisis center:    Phone number:    Other community support:  Phone number:

## 2018-06-01 NOTE — LETTER
My Asthma Action Plan  Name: Sharmaine José   YOB: 1980  Date: 6/1/2018   My doctor: Temi Marsh MD, MD   My clinic: [facility]        My Control Medicine: None  My Rescue Medicine: Albuterol (Proair/Ventolin/Proventil) inhaler as needed   My Asthma Severity: intermittent  Avoid your asthma triggers: dust mites and pollens               GREEN ZONE   Good Control    I feel good    No cough or wheeze    Can work, sleep and play without asthma symptoms       Take your asthma control medicine every day.     1. If exercise triggers your asthma, take your rescue medication    15 minutes before exercise or sports, and    During exercise if you have asthma symptoms  2. Spacer to use with inhaler: If you have a spacer, make sure to use it with your inhaler             YELLOW ZONE Getting Worse  I have ANY of these:    I do not feel good    Cough or wheeze    Chest feels tight    Wake up at night   1. Keep taking your Green Zone medications  2. Start taking your rescue medicine:    every 20 minutes for up to 1 hour. Then every 4 hours for 24-48 hours.  3. If you stay in the Yellow Zone for more than 12-24 hours, contact your doctor.  4. If you do not return to the Green Zone in 12-24 hours or you get worse, start taking your oral steroid medicine if prescribed by your provider.           RED ZONE Medical Alert - Get Help  I have ANY of these:    I feel awful    Medicine is not helping    Breathing getting harder    Trouble walking or talking    Nose opens wide to breathe       1. Take your rescue medicine NOW  2. If your provider has prescribed an oral steroid medicine, start taking it NOW  3. Call your doctor NOW  4. If you are still in the Red Zone after 20 minutes and you have not reached your doctor:    Take your rescue medicine again and    Call 911 or go to the emergency room right away    See your regular doctor within 2 weeks of an Emergency Room or Urgent Care visit for follow-up  treatment.          Annual Reminders:  Meet with Asthma Educator,  Flu Shot in the Fall, consider Pneumonia Vaccination for patients with asthma (aged 19 and older).    Pharmacy:    [id number] - [place], [place] - [address] AT [facility] - [place] - [address] [facility] [address]  [facility] - [place], [place] - [address] [place] [address]                      Asthma Triggers  How To Control Things That Make Your Asthma Worse    Triggers are things that make your asthma worse.  Look at the list below to help you find your triggers and what you can do about them.  You can help prevent asthma flare-ups by staying away from your triggers.      Trigger                                                          What you can do   Cigarette Smoke  Tobacco smoke can make asthma worse. Do not allow smoking in your home, car or around you.  Be sure no one smokes at a child s day care or school.  If you smoke, ask your health care provider for ways to help you quit.  Ask family members to quit too.  Ask your health care provider for a referral to Quit Plan to help you quit smoking, or call 7-467-338-PLAN.     Colds, Flu, Bronchitis  These are common triggers of asthma. Wash your hands often.  Don t touch your eyes, nose or mouth.  Get a flu shot every year.     Dust Mites  These are tiny bugs that live in cloth or carpet. They are too small to see. Wash sheets and blankets in hot water every week.   Encase pillows and mattress in dust mite proof covers.  Avoid having carpet if you can. If you have carpet, vacuum weekly.   Use a dust mask and HEPA vacuum.   Pollen and Outdoor Mold  Some people are allergic to trees, grass, or weed pollen, or molds. Try to keep your windows closed.  Limit time out doors when pollen count is high.   Ask you health care provider about taking medicine during allergy season.     Animal Dander  Some  people are allergic to skin flakes, urine or saliva from pets with fur or feathers. Keep pets with fur or feathers out of your home.    If you can t keep the pet outdoors, then keep the pet out of your bedroom.  Keep the bedroom door closed.  Keep pets off cloth furniture and away from stuffed toys.     Mice, Rats, and Cockroaches  Some people are allergic to the waste from these pests.   Cover food and garbage.  Clean up spills and food crumbs.  Store grease in the refrigerator.   Keep food out of the bedroom.   Indoor Mold  This can be a trigger if your home has high moisture. Fix leaking faucets, pipes, or other sources of water.   Clean moldy surfaces.  Dehumidify basement if it is damp and smelly.   Smoke, Strong Odors, and Sprays  These can reduce air quality. Stay away from strong odors and sprays, such as perfume, powder, hair spray, paints, smoke incense, paint, cleaning products, candles and new carpet.   Exercise or Sports  Some people with asthma have this trigger. Be active!  Ask your doctor about taking medicine before sports or exercise to prevent symptoms.    Warm up for 5-10 minutes before and after sports or exercise.     Other Triggers of Asthma  Cold air:  Cover your nose and mouth with a scarf.  Sometimes laughing or crying can be a trigger.  Some medicines and food can trigger asthma.

## 2018-06-01 NOTE — PROGRESS NOTES
SUBJECTIVE:   Sharmaine José is a 38 year old female who presents to clinic today for the following health issues:      Here to discuss lab results for A1C.  Is having pain on the upper roof of the mouth. No lesions present. Is having swelling after eating or the tongue is hitting the top of the mouth. Would like to discuss Flexeril.        Problem list and histories reviewed & adjusted, as indicated.  Additional history:     See under ROS     Patient Active Problem List   Diagnosis     Allergic state     Mild intermittent asthma     Generalized hyperhidrosis     Tobacco use disorder     Interstitial cystitis     Surgical menopause     Narcolepsy     Cervical dysplasia     Chronic pain     CARDIOVASCULAR SCREENING; LDL GOAL LESS THAN 160     Migraine     Lumbago     Hyperlipidemia LDL goal <160     H/O: hysterectomy     Health Care Home     Discoid lupus     Generalized anxiety disorder     Fatty infiltration of liver     Primary narcolepsy without cataplexy     Hypertension goal BP (blood pressure) < 140/90     elevated bmi (H)     Hypertriglyceridemia     High triglycerides     Impaired fasting glucose     Elevated hemoglobin A1c       Current Outpatient Prescriptions   Medication Sig Dispense Refill     albuterol (PROVENTIL HFA) 108 (90 BASE) MCG/ACT Inhaler Inhale 2 puffs into the lungs every 6 hours 1 Inhaler 0     cyclobenzaprine (FLEXERIL) 10 MG tablet Take 1 tablet (10 mg) by mouth nightly as needed for muscle spasms 30 tablet 0     dextroamphetamine (DEXTROSTAT) 10 MG tablet Take 10 mg by mouth daily Takes 1-2 tablets daily  0     DULoxetine (CYMBALTA) 20 MG EC capsule TAKE 1 CAPSULE(20 MG) BY MOUTH DAILY 90 capsule 0     EPINEPHrine (EPIPEN) 0.3 MG/0.3ML injection Inject 0.3 mLs (0.3 mg) into the muscle once as needed 2 each 1     estrogens, conjugated, (PREMARIN) 1.25 MG tablet Take 1 tablet (1.25 mg) by mouth daily 90 tablet 3     fluticasone (FLONASE) 50 MCG/ACT nasal spray Spray 1-2 sprays into  "both nostrils daily as needed for rhinitis or allergies       ibuprofen 200 MG capsule Take 600 mg by mouth 3 times daily 120 capsule      Multiple Vitamins-Minerals (MULTIVITAMIN/EXTRA VITAMIN D3 PO) Take by mouth daily       Probiotic Product (PROBIOTIC FORMULA PO) Take by mouth daily       valsartan (DIOVAN) 320 MG tablet TAKE 1 TABLET(320 MG) BY MOUTH DAILY 90 tablet 0         Reviewed and updated as needed this visit by clinical staff  Tobacco  Allergies  Meds  Med Hx  Surg Hx  Fam Hx  Soc Hx      Reviewed and updated as needed this visit by Provider         ROS:  CONSTITUTIONAL:NEGATIVE for fever, chills, change in weight x some weight loss.  RESP: has had some increased symptoms; in her room.  Has a cat that sleeps with her, but is OK on the couch with the cat.  CV: NEGATIVE for chest pain, palpitations or peripheral edema  GI: NEGATIVE for nausea, abdominal pain, heartburn, or change in bowel habits  Nocturia.  PSYCHIATRIC: NEGATIVE for changes in mood or affect    Has lost some weight.  Has been doing some Slimfast meal replacement.   Feeling better; not hungry.    Flexeril works well.  Notes she has back spasms.  Takes at night. Helps sleep through the night, but less hard.     Roof of mouth has some intermittent swelling and pain. Not currently.   More after chewing something for awhile.  About a week or so. Some improvement.      OBJECTIVE:     /80 (BP Location: Right arm, Cuff Size: Adult Large)  Pulse 111  Temp 98.9  F (37.2  C) (Oral)  Resp 16  Ht 5' 4\" (1.626 m)  Wt 181 lb 8 oz (82.3 kg)  LMP 10/04/2006  SpO2 99%  BMI 31.15 kg/m2  Body mass index is 31.15 kg/(m^2).  GENERAL APPEARANCE: alert and no distress  HENT: Left side hard palate, posteriorly, with some tiny dots  RESP: lungs clear to auscultation - no rales, rhonchi or wheezes  CV: regular rates and rhythm  MS: no edema.   PSYCH: mentation appears normal and affect normal/bright          Lab Results   Component Value Date "    A1C 10.3 06/01/2018    A1C 7.4 12/22/2017    A1C 5.8 05/24/2016    A1C 5.5 02/05/2014    A1C 5.2 11/15/2011     Fasting sugar 120 in December.    ACT Total Scores 6/1/2018   ACT TOTAL SCORE -   ASTHMA ER VISITS -   ASTHMA HOSPITALIZATIONS -   ACT TOTAL SCORE (Goal Greater than or Equal to 20) 20   In the past 12 months, how many times did you visit the emergency room for your asthma without being admitted to the hospital? 0   In the past 12 months, how many times were you hospitalized overnight because of your asthma? 0           ASSESSMENT/PLAN:     Elevated hemoglobin A1c  See below  - **A1C FUTURE 3mo    Type 2 diabetes mellitus with hyperglycemia, without long-term current use of insulin (H)  Newly diagnosed.  Discussed several aspects.   Will start her on metformin. Discussed metformin in detail; discussing side effects including diarrhea and risk including lactic acidosis and its precipitants. Recommend holding med if getting dehydrated or with dye study.  Strongly recommend education. Suspect her nocturia may be related to this.   Discussed effects on eyes.   - DIABETES EDUCATOR REFERRAL  - blood glucose monitoring (NO BRAND SPECIFIED) meter device kit; Use to test blood sugar 1 times daily or as directed.  Please provide all supplies.  - metFORMIN (GLUCOPHAGE-XR) 500 MG 24 hr tablet; Take 1 tablet (500 mg) by mouth daily (with dinner) After 2 weeks, if tolerating, increase to 2 daily. After 2 more weeks, if tolerating, increase to 3 daily. After another 2 weeks, if tolerating, increase to 4 daily.    Back muscle spasm  Discussed some of the potential side effects flexeril, particularly the drowsiness.  - cyclobenzaprine (FLEXERIL) 10 MG tablet; Take 1 tablet (10 mg) by mouth nightly as needed for muscle spasms    Mouth pain  Uncertain etiology. The dots may be sign of inflammation.  If symptoms persist, to see ENT    Mild intermittent asthma without complication  Currently doing well.  Did do  AAP.  Discussed seeing allergist if symptoms in room persist; we discussed some potential possibilities.   - albuterol (PROVENTIL HFA) 108 (90 Base) MCG/ACT Inhaler; Inhale 2 puffs into the lungs every 6 hours    Generalized anxiety disorder  Stable.          Temi Marsh MD, MD  [facility]

## 2018-06-01 NOTE — TELEPHONE ENCOUNTER
Patient reports fever 102.5 (TM) tonight. Has had swelling of roof of her mouth the past couple of weeks. Was seen today but fever not present at that time. Diagnosed with diabetes today.   Reason for Disposition    Diabetes mellitus or weak immune system (e.g., HIV positive,  transplant patient)    Additional Information    Negative: Severe difficulty breathing (e.g., struggling for each breath, speaks in single words, stridor)    Negative: Sounds like a life-threatening emergency to the triager    Negative: Injury to tooth or teeth    Negative: Injury to mouth    Negative: Canker sore suspected (i.e., aphthous ulcer) in the mouth    Negative: Cold sore suspected (i.e., fever blister sore) on the outer lip    Negative: Tooth is painful or swelling around a tooth    Negative: Mouth is painful    Negative: Throat is painful    Negative: Tongue swelling    Negative: Lip swelling    Negative: Severe difficulty breathing (e.g., struggling for each breath, speaks in single words, stridor)    Negative: Sounds like a life-threatening emergency to the triager    Negative: Followed a tooth (or teeth) injury    Negative: Followed a mouth injury    Negative: Throat is painful    Negative: Canker sore suspected (i.e., aphthous ulcer) in the mouth    Negative: Cold sore suspected (i.e., fever blister sore) on the outer lip    Negative: Tooth is painful or swelling around a tooth    Negative: [1] Drooling or spitting out saliva (because can't swallow) AND [2] new onset    Negative: Electrical burn of mouth    Negative: Chemical burn of mouth    Negative: Tongue is very swollen and tender    Negative: [1] Difficulty breathing AND [2] not severe    Negative: [1] Face is swollen AND [2] fever    Negative: [1] Drinking very little AND [2] dehydration suspected (e.g., no urine > 12 hours, very dry mouth, very lightheaded)    Negative: Patient sounds very sick or weak to the triager    Negative: [1] SEVERE mouth pain (e.g.,  excruciating) AND [2] not improved after 2 hours of pain medicine    Negative: Face is very swollen    Negative: Large lymph node (> 1 inch or 2.5 cm) under the jaw    Negative: White patches that stick to tongue or inner cheek    Protocols used: MOUTH PAIN-ADULT-, MOUTH SYMPTOMS-ADULT-

## 2018-06-02 ENCOUNTER — E-VISIT (OUTPATIENT)
Dept: FAMILY MEDICINE | Facility: CLINIC | Age: 38
End: 2018-06-02
Payer: COMMERCIAL

## 2018-06-02 DIAGNOSIS — R50.9 FEVER, UNSPECIFIED FEVER CAUSE: Primary | ICD-10-CM

## 2018-06-02 DIAGNOSIS — E11.65 TYPE 2 DIABETES MELLITUS WITH HYPERGLYCEMIA, WITHOUT LONG-TERM CURRENT USE OF INSULIN (H): ICD-10-CM

## 2018-06-02 PROBLEM — R73.01 IMPAIRED FASTING GLUCOSE: Status: RESOLVED | Noted: 2017-12-22 | Resolved: 2018-06-02

## 2018-06-02 PROCEDURE — 99444 ZZC PHYSICIAN ONLINE EVALUATION & MANAGEMENT SERVICE: CPT | Performed by: FAMILY MEDICINE

## 2018-06-02 ASSESSMENT — ASTHMA QUESTIONNAIRES: ACT_TOTALSCORE: 20

## 2018-06-02 ASSESSMENT — ANXIETY QUESTIONNAIRES: GAD7 TOTAL SCORE: 0

## 2018-06-02 ASSESSMENT — PATIENT HEALTH QUESTIONNAIRE - PHQ9: SUM OF ALL RESPONSES TO PHQ QUESTIONS 1-9: 2

## 2018-06-04 NOTE — TELEPHONE ENCOUNTER
Routing message to Dr. Marsh for additional lab order. Please see  message.    Lab t'd up, please associate and approve if okay.    Shelly RAJPUT, Triage RN

## 2018-06-10 ENCOUNTER — E-VISIT (OUTPATIENT)
Dept: FAMILY MEDICINE | Facility: CLINIC | Age: 38
End: 2018-06-10
Payer: COMMERCIAL

## 2018-06-10 DIAGNOSIS — E11.65 TYPE 2 DIABETES MELLITUS WITH HYPERGLYCEMIA, WITHOUT LONG-TERM CURRENT USE OF INSULIN (H): Primary | ICD-10-CM

## 2018-06-10 PROCEDURE — 99444 ZZC PHYSICIAN ONLINE EVALUATION & MANAGEMENT SERVICE: CPT | Performed by: FAMILY MEDICINE

## 2018-06-19 ENCOUNTER — E-VISIT (OUTPATIENT)
Dept: FAMILY MEDICINE | Facility: CLINIC | Age: 38
End: 2018-06-19
Payer: COMMERCIAL

## 2018-06-19 ENCOUNTER — OFFICE VISIT (OUTPATIENT)
Dept: PHARMACY | Facility: CLINIC | Age: 38
End: 2018-06-19
Payer: COMMERCIAL

## 2018-06-19 VITALS
SYSTOLIC BLOOD PRESSURE: 131 MMHG | BODY MASS INDEX: 30.86 KG/M2 | WEIGHT: 179.8 LBS | DIASTOLIC BLOOD PRESSURE: 76 MMHG | HEART RATE: 85 BPM

## 2018-06-19 DIAGNOSIS — E11.65 TYPE 2 DIABETES MELLITUS WITH HYPERGLYCEMIA, WITHOUT LONG-TERM CURRENT USE OF INSULIN (H): Primary | ICD-10-CM

## 2018-06-19 DIAGNOSIS — E78.1 HIGH TRIGLYCERIDES: ICD-10-CM

## 2018-06-19 DIAGNOSIS — E89.40 SURGICAL MENOPAUSE: ICD-10-CM

## 2018-06-19 DIAGNOSIS — M62.838 MUSCLE SPASM: ICD-10-CM

## 2018-06-19 DIAGNOSIS — M79.7 FIBROMYALGIA: ICD-10-CM

## 2018-06-19 DIAGNOSIS — J30.2 SEASONAL ALLERGIC RHINITIS, UNSPECIFIED CHRONICITY, UNSPECIFIED TRIGGER: ICD-10-CM

## 2018-06-19 DIAGNOSIS — T78.40XS ALLERGIC STATE, SEQUELA: ICD-10-CM

## 2018-06-19 DIAGNOSIS — K21.9 GASTROESOPHAGEAL REFLUX DISEASE WITHOUT ESOPHAGITIS: ICD-10-CM

## 2018-06-19 DIAGNOSIS — G47.419 PRIMARY NARCOLEPSY WITHOUT CATAPLEXY: ICD-10-CM

## 2018-06-19 DIAGNOSIS — J45.20 MILD INTERMITTENT ASTHMA WITHOUT COMPLICATION: ICD-10-CM

## 2018-06-19 DIAGNOSIS — Z90.710 H/O: HYSTERECTOMY: ICD-10-CM

## 2018-06-19 PROCEDURE — 99444 ZZC PHYSICIAN ONLINE EVALUATION & MANAGEMENT SERVICE: CPT | Performed by: FAMILY MEDICINE

## 2018-06-19 PROCEDURE — 99607 MTMS BY PHARM ADDL 15 MIN: CPT | Performed by: PHARMACIST

## 2018-06-19 PROCEDURE — 99605 MTMS BY PHARM NP 15 MIN: CPT | Performed by: PHARMACIST

## 2018-06-19 RX ORDER — INSULIN GLARGINE 100 [IU]/ML
10 INJECTION, SOLUTION SUBCUTANEOUS DAILY
Qty: 3 ML | Refills: 1 | Status: SHIPPED | OUTPATIENT
Start: 2018-06-19 | End: 2018-06-27

## 2018-06-19 NOTE — MR AVS SNAPSHOT
After Visit Summary   6/19/2018    Sharmaine José    MRN: [id number]           Patient Information     Date Of Birth          1980        Visit Information        Provider Department      6/19/2018 10:00 AM Mandy Robertson, [facility] MTM        Care Instructions    Recommendations from today's MTM visit:                                                    MTM (medication therapy management) is a service provided by a clinical pharmacist designed to help you get the most of out of your medicines.   Today we reviewed what your medicines are for, how to know if they are working, that your medicines are safe and how to make your medicine regimen as easy as possible.     1. When checking your blood sugars, check in AM before eating, then 2 hours after starting largest meal.     2. Start Basaglar insulin 10 units once daily in AM.     Next MTM visit: 1 week     To schedule another MTM appointment, please call the clinic directly or you may call the MTM scheduling line at [phone] or toll-free at [phone].     My Clinical Pharmacist's contact information:                                                      It was a pleasure seeing you today!  Please feel free to contact me with any questions or concerns you have.      Esme Hicks, PharmD  Pharmaceutical Care Resident   Pager: [phone]      You may receive a survey about the MTM services you received.  I would appreciate your feedback to help me serve you better in the future. Please fill it out and return it when you can. Your comments will be anonymous.              Insulin Glargine injection  Brand Names: BASAGLAR, Lantus, Lantus SoloStar, Toujeo SoloStar  What is this medicine?  INSULIN GLARGINE (IN lema yifan GLAR geen) is a human-made form of insulin. This drug lowers the amount of sugar in your blood. It is a long-acting insulin that is usually given once a day.  How should I use this medicine?  This medicine is for  injection under the skin. Use this medicine at the same time each day. Use exactly as directed. This insulin should never be mixed in the same syringe with other insulins before injection. Do not vigorously shake before use. You will be taught how to use this medicine and how to adjust doses for activities and illness. Do not use more insulin than prescribed.  Always check the appearance of your insulin before using it. This medicine should be clear and colorless like water. Do not use it if it is cloudy, thickened, colored, or has solid particles in it.  It is important that you put your used needles and syringes in a special sharps container. Do not put them in a trash can. If you do not have a sharps container, call your pharmacist or healthcare provider to get one.  Talk to your pediatrician regarding the use of this medicine in children. Special care may be needed.  What side effects may I notice from receiving this medicine?  Side effects that you should report to your doctor or health care professional as soon as possible:    allergic reactions like skin rash, itching or hives, swelling of the face, lips, or tongue    breathing problems    signs and symptoms of high blood sugar such as dizziness, dry mouth, dry skin, fruity breath, nausea, stomach pain, increased hunger or thirst, increased urination    signs and symptoms of low blood sugar such as feeling anxious, confusion, dizziness, increased hunger, unusually weak or tired, sweating, shakiness, cold, irritable, headache, blurred vision, fast heartbeat, loss of consciousness  Side effects that usually do not require medical attention (report to your doctor or health care professional if they continue or are bothersome):    increase or decrease in fatty tissue under the skin due to overuse of a particular injection site    itching, burning, swelling, or rash at site where injected    What if I miss a dose?  It is important not to miss a dose. Your health  care professional or doctor should discuss a plan for missed doses with you. If you do miss a dose, follow their plan. Do not take double doses.  Where should I keep my medicine?  Keep out of the reach of children.  Store unopened vials in a refrigerator between 2 and 8 degrees C (36 and 46 degrees F). Do not freeze or use if the insulin has been frozen. Opened vials (vials currently in use) may be stored in the refrigerator or at room temperature, at approximately 25 degrees C (77 degrees F) or cooler. Keeping your insulin at room temperature decreases the amount of pain during injection. Once opened, your insulin can be used for 28 days. After 28 days, the vial should be thrown away.  Store Lantus Solostar Pens or Basaglar KwikPens in a refrigerator between 2 and 8 degrees C (36 and 46 degrees F) or at room temperature below 30 degrees C (86 degrees F). Do not freeze or use if the insulin has been frozen. Once opened, the pens should be kept at room temperature. Do not store in the refrigerator once opened. Once opened, the insulin can be used for 28 days. After 28 days, the Lantus Solostar Pen or Basaglar KwikPen should be thrown away.  Store Toujeo Solostar Pens in a refrigerator between 2 and 8 degrees C (36 and 46 degrees F). Do not freeze or use if the insulin has been frozen. Once opened, the pens should be kept at room temperature below 30 degrees C (86 degrees F). Do not store in the refrigerator once opened. Once opened, the insulin can be used for 42 days. After 42 days, the Toujeo Solostar Pen should be thrown away.  Protect from light and excessive heat. Throw away any unused medicine after the expiration date or after the specified time for room temperature storage has passed.  What should I tell my health care provider before I take this medicine?  They need to know if you have any of these conditions:    episodes of low blood sugar    kidney disease    liver disease    an unusual or allergic  reaction to insulin, metacresol, other medicines, foods, dyes, or preservatives    pregnant or trying to get pregnant    breast-feeding  What should I watch for while using this medicine?  Visit your health care professional or doctor for regular checks on your progress.  Do not drive, use machinery, or do anything that needs mental alertness until you know how this medicine affects you. Alcohol may interfere with the effect of this medicine. Avoid alcoholic drinks.  A test called the HbA1C (A1C) will be monitored. This is a simple blood test. It measures your blood sugar control over the last 2 to 3 months. You will receive this test every 3 to 6 months.  Learn how to check your blood sugar. Learn the symptoms of low and high blood sugar and how to manage them.  Always carry a quick-source of sugar with you in case you have symptoms of low blood sugar. Examples include hard sugar candy or glucose tablets. Make sure others know that you can choke if you eat or drink when you develop serious symptoms of low blood sugar, such as seizures or unconsciousness. They must get medical help at once.  Tell your doctor or health care professional if you have high blood sugar. You might need to change the dose of your medicine. If you are sick or exercising more than usual, you might need to change the dose of your medicine.  Do not skip meals. Ask your doctor or health care professional if you should avoid alcohol. Many nonprescription cough and cold products contain sugar or alcohol. These can affect blood sugar.  Make sure that you have the right kind of syringe for the type of insulin you use. Try not to change the brand and type of insulin or syringe unless your health care professional or doctor tells you to. Switching insulin brand or type can cause dangerously high or low blood sugar. Always keep an extra supply of insulin, syringes, and needles on hand. Use a syringe one time only. Throw away syringe and needle in a  closed container to prevent accidental needle sticks.  Insulin pens and cartridges should never be shared. Even if the needle is changed, sharing may result in passing of viruses like hepatitis or HIV.  Wear a medical ID bracelet or chain, and carry a card that describes your disease and details of your medicine and dosage times.  NOTE:This sheet is a summary. It may not cover all possible information. If you have questions about this medicine, talk to your doctor, pharmacist, or health care provider. Copyright  2018 [employer]                Follow-ups after your visit        Your next 10 appointments already scheduled     Jun 27, 2018  9:30 AM CDT   Diabetic Education with [place] DIABETIC ED RESOURCE   [facility] ([facility])    [address] [facility] [address]  [facility] [address] [phone]              Who to contact     If you have questions or need follow up information about today's clinic visit or your schedule please contact [facility][note 3] directly at [phone].  Normal or non-critical lab and imaging results will be communicated to you by MySQUARhart, letter or phone within 4 business days after the clinic has received the results. If you do not hear from us within 7 days, please contact the clinic through Copperfastent or phone. If you have a critical or abnormal lab result, we will notify you by phone as soon as possible.  Submit refill requests through [employer] or call your pharmacy and they will forward the refill request to us. Please allow 3 business days for your refill to be completed.          Additional Information About Your Visit        Mystery Science Information     [employer] gives you secure access to your electronic health record. If you see a primary care provider, you can also send messages to your care team and make appointments. If you have questions, please call your primary care clinic.  If you do not have a primary care provider, please call  [phone] and they will assist you.        Care EveryWhere ID     This is your Care EveryWhere ID. This could be used by other organizations to access your [facility] medical records  [id number]        Your Vitals Were     Last Period                   10/04/2006            Blood Pressure from Last 3 Encounters:   06/01/18 122/80   12/22/17 118/78   10/02/17 120/80    Weight from Last 3 Encounters:   06/01/18 181 lb 8 oz (82.3 kg)   12/22/17 200 lb 4.8 oz (90.9 kg)   10/02/17 203 lb (92.1 kg)              Today, you had the following     No orders found for display       Primary Care Provider Office Phone # Fax #    Temi Marsh [phone] [address]  [facility] [address]        Equal Access to Services     [facility] : Hadii aad ku hadasho Soomaali, waaxda luqadaha, qaybta kaalmada adeegyada, kayla colónin hayaan adeagueda braga . So [facility] [phone].    ATENCIÓN: Si habla español, tiene a lema disposición servicios gratuitos de asistencia lingüística. Denilson al [phone].    We comply with applicable federal civil rights laws and [place] laws. We do not discriminate on the basis of race, color, national origin, age, disability, sex, sexual orientation, or gender identity.            Thank you!     Thank you for choosing [facility]  for your care. Our goal is always to provide you with excellent care. Hearing back from our patients is one way we can continue to improve our services. Please take a few minutes to complete the written survey that you may receive in the mail after your visit with us. Thank you!             Your Updated Medication List - Protect others around you: Learn how to safely use, store and throw away your medicines at www.disposemymeds.org.          This list is accurate as of 6/19/18 10:48 AM.  Always use your most recent med list.                   Brand Name Dispense Instructions for use Diagnosis    albuterol 108 (90 Base) MCG/ACT Inhaler     PROVENTIL HFA    1 Inhaler    Inhale 2 puffs into the lungs every 6 hours    Mild intermittent asthma without complication       blood glucose monitoring meter device kit    no brand specified    1 kit    Use to test blood sugar 1 times daily or as directed.  Please provide all supplies.    Type 2 diabetes mellitus with hyperglycemia, without long-term current use of insulin (H)       blood glucose monitoring test strip    no brand specified    100 strip    Use to test blood sugars up to 4 times daily as needed    Type 2 diabetes mellitus with hyperglycemia, without long-term current use of insulin (H)       cyclobenzaprine 10 MG tablet    FLEXERIL    30 tablet    Take 1 tablet (10 mg) by mouth nightly as needed for muscle spasms    Back muscle spasm       dextroamphetamine 10 MG tablet    DEXTROSTAT     Take 10 mg by mouth daily Takes 1-2 tablets daily        DULoxetine 20 MG EC capsule    CYMBALTA    90 capsule    TAKE 1 CAPSULE(20 MG) BY MOUTH DAILY    Antidepressant discontinuation syndrome, subsequent encounter       EPINEPHrine 0.3 MG/0.3ML injection 2-pack    EPIPEN/ADRENACLICK/or ANY BX GENERIC EQUIV    2 each    Inject 0.3 mLs (0.3 mg) into the muscle once as needed    Bee sting allergy       estrogens (conjugated) 1.25 MG tablet    PREMARIN    90 tablet    Take 1 tablet (1.25 mg) by mouth daily    Menopause       FLONASE 50 MCG/ACT spray   Generic drug:  fluticasone      Spray 1-2 sprays into both nostrils daily as needed for rhinitis or allergies        ibuprofen 200 MG capsule     120 capsule    Take 600 mg by mouth 3 times daily        metFORMIN 500 MG 24 hr tablet    GLUCOPHAGE-XR    360 tablet    Take 1 tablet (500 mg) by mouth daily (with dinner) After 2 weeks, if tolerating, increase to 2 daily. After 2 more weeks, if tolerating, increase to 3 daily. After another 2 weeks, if tolerating, increase to 4 daily.    Type 2 diabetes mellitus with hyperglycemia, without long-term current use of insulin  (H)       ranitidine 150 MG tablet    ZANTAC     Take 150 mg by mouth 2 times daily        valsartan 320 MG tablet    DIOVAN    90 tablet    TAKE 1 TABLET(320 MG) BY MOUTH DAILY    Hypertension goal BP (blood pressure) < 140/90

## 2018-06-19 NOTE — PROGRESS NOTES
"SUBJECTIVE/OBJECTIVE:                           Sharmaine José is a 38 year old female coming in for an initial visit for Medication Therapy Management.  She was referred to me from Dr. Marsh.     Chief Complaint: Diabetes     Allergies/ADRs: Reviewed in Epic  Tobacco: 0-1 pack per day - is not interested in quitting  Alcohol: 1-3 beverages / week  Caffeine: no caffeine  Activity: Walks a lot of with school. 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 Never forgets to take medications.     Diabetes:  Pt currently taking metformin  mg once daily, and titrating up to 2000 mg daily. Recently diagnosis of T2DM. Pt previously stopped because thought GI upset was due to metformin, but found out she got a stomach bug. Restarted - no issues. Pt is not experiencing side effects. Was told that she has delayed gastric emptying when she was at [facility]. Patient states that recently lost 30+ lb - states that she wasn't trying to lose weight, \"just happened\".   SMBG: AM, afternoon, bedtime.   Ranges (patient reported):   AM fastin/19: 300s, mid-high 200's-low 300's  Patient is not experiencing hypoglycemia  Recent symptoms of high blood sugar? polydipsia and polyuria when in 400s.   Eye exam: due  Foot exam: due  Microalbumin is < 30 mg/g. Pt is not taking an ACEi/ARB.  Aspirin: Not taking due to age  Diet: Breakfast: Hard boiled eggs and/or Slimfast beverage (6 g carbs each bottle) Lunch: Slimfast beverage. Has been drinking Slimfast beverages for a long time. Drinks Slimfast for years, drinks due to lack of time to make breakfast and lunch. Dinner:  Salad with low-carb dressing, fish, steak, green beans. Doesn't traditionally eat sweets. Was drinking juice frequently, but noticed that blood sugars became higher so stopped. Snack: handful of cheez-its (4-5 crackers) to eat when taking " "medications.   Wt Readings from Last 10 Encounters:   06/01/18 181 lb 8 oz (82.3 kg)   12/22/17 200 lb 4.8 oz (90.9 kg)   10/02/17 203 lb (92.1 kg)   09/22/17 204 lb (92.5 kg)   07/13/17 212 lb 1.6 oz (96.2 kg)   05/08/17 208 lb 8 oz (94.6 kg)   03/13/17 208 lb 3.2 oz (94.4 kg)   01/19/17 202 lb 8 oz (91.9 kg)   11/21/16 197 lb (89.4 kg)   05/24/16 191 lb 4.8 oz (86.8 kg)     Hyperlipidemia: Current therapy includes no current medications. Was previously on simvastatin and \"another statin\" (doesn't remember name), had muscle pains and felt like she was \"coming down with the flu\".     Hypertension: Current medications include valsartan 320 mg daily.  Patient does self-monitor BP. Home BP monitoring in range of 110s-120s's systolic. Patient reports the following medication side effects: dizziness, but thinks that was due to high blood sugars because lessened as blood sugars have lowered from 400s to 200s-300s.     Allergic rhinitis: Current medications include fluticasone nasal spray 1-2 spray(s) once daily. Primary symptoms are nasal congestion. Pt feels that current therapy is effective.      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 Has not had to use. Knows how to use. No questions or concerns at this time.     Narcolepsy: Currently takes dextroamphentamine 10 mg 1-2 times per day. No longer falls asleep while driving after starting medication. Started Ritalin, but had heart palpitations. So, switched to dextroamphetamine. Finds the medication effective.  No medication side effects.     Fibromyalgia: Currently taking duloxetine 20 mg daily. Patient states that she was started on duloxetine when she was diagnosed with fibromyalgia. States that this was a misdiagnosis, that she does not have fibromyalgia so has been tapering off duloxetine. She is currently on lowest dose and will be tapering off in the near future. No questions/concerns. Tapering so slowly because she got \"very sick\" when she was tapering " "off of venlafaxine previously.     S/p Hysterectomy: Currently taking premarin 1.25 mg daily. Has tried other items. Has been on medication for over 10 years. No medication side effects. Hysterectomy completed in 2007 due to endometriosis and cervical dysplasia. Patient states that she is on to regulate hormones, after hysterectomy she was \"angry, mean\" and would get hot flashes- estrogen helped to stabilize her.     Muscle spasms: Currently taking cyclobenzaprine 10 mg nightly as needed. Sees Chiropractor. Helps with sleeping at night. Has muscle pains in back and jaw. Has TMJ, cyclobenzaprine keeps from grinding teeth at night which in turn prevents migraine.     GERD: Current medications include: Zantac (ranitidine) 150 mg twice daily. Pt c/o no current symptoms.  Patient feels that current regimen is effective. Does have history of ulcer.     Asthma:  Current asthma medications: Albuterol MDI, did not have to use this week.  Asthma triggers include: pollens. Found that when allergy season started, she was using her albuterol more, but now has not needed to use.   Pt reports the following symptoms: none.  AAP on file: YES  ACT Total Scores 5/8/2017 12/22/2017 6/1/2018   ACT TOTAL SCORE - - -   ASTHMA ER VISITS - - -   ASTHMA HOSPITALIZATIONS - - -   ACT TOTAL SCORE (Goal Greater than or Equal to 20) 25 25 20   In the past 12 months, how many times did you visit the emergency room for your asthma without being admitted to the hospital? 0 0 0   In the past 12 months, how many times were you hospitalized overnight because of your asthma? 0 0 0     Current labs include:  Today's Vitals: /76  Pulse 85  Wt 179 lb 12.8 oz (81.6 kg)  LMP 10/04/2006  BMI 30.86 kg/m2  BP Readings from Last 3 Encounters:   06/19/18 131/76   06/01/18 122/80   12/22/17 118/78     Lab Results   Component Value Date    A1C 10.3 06/01/2018   .  Lab Results   Component Value Date    CHOL 316 12/22/2017     Lab Results   Component Value " Date    TRIG 586 12/22/2017     Lab Results   Component Value Date    HDL 32 12/22/2017     Lab Results   Component Value Date    LDL  12/22/2017     Cannot estimate LDL when triglyceride exceeds 400 mg/dL     12/22/2017       Liver Function Studies -   Recent Labs   Lab Test  12/22/17   0942   PROTTOTAL  7.7   ALBUMIN  4.0   BILITOTAL  0.3   ALKPHOS  117   AST  113*   ALT  80*       Lab Results   Component Value Date    UCRR 67 12/22/2017    MICROL 5 12/22/2017    UMALCR 7.81 12/22/2017       Last Basic Metabolic Panel:  Lab Results   Component Value Date     12/22/2017      Lab Results   Component Value Date    POTASSIUM 4.0 12/22/2017     Lab Results   Component Value Date    CHLORIDE 100 12/22/2017     Lab Results   Component Value Date    BUN 14 12/22/2017     Lab Results   Component Value Date    CR 0.55 12/22/2017     GFR Estimate   Date Value Ref Range Status   12/22/2017 >90 >60 mL/min/1.7m2 Final     Comment:     Non  GFR Calc   09/22/2017 >90 >60 mL/min/1.7m2 Final     Comment:     Non  GFR Calc   03/10/2017 >90  Non  GFR Calc   >60 mL/min/1.7m2 Final     Most Recent Immunizations   Administered Date(s) Administered     HEPA 02/12/2004     HepB 02/03/1997     Influenza (IIV3) PF 10/25/2015     Influenza Vaccine IM 3yrs+ 4 Valent IIV4 09/22/2017     Influenza Vaccine, 3 YRS +, IM (QUADRIVALENT W/PRESERVATIVES) 09/28/2016     Mantoux Tuberculin Skin Test 06/30/2004     Meningococcal (Menomune ) 07/19/2000     Pneumococcal 23 valent 02/12/2004     Rabies - IM Diploid Cell Culture 09/15/2017     TD (ADULT, 7+) 01/31/2006     TDAP Vaccine (Adacel) 04/19/2011       ASSESSMENT:                             Current medications were reviewed today.     Medication Adherence: good, no issues identified    Diabetes: Needs Improvement. Patient is not meeting A1c goal of < 7%. Self monitoring of blood glucose is not at goal of fasting  mg/dL and post  prandial < 180 mg/dL. Pt would benefit from starting Basaglar (long acting insulin covered by insurance) -Start at dose : 10 units daily (0.13 units/kg/day). Patient would benefit from testing blood sugars fasting in AM and 2 hours after starting largest meal. Due to symptoms high blood sugars also with unexplained weight loss, may be benefit from assessing for potential for Type 1 diabetes, such as obtaining C-peptide.     Hyperlipidemia: Needs Improvement. Pt is not on statin therapy which is indicated based on 2013 ACC/AHA guidelines for lipid management due to < 40 years of age and ASCVD risk factors (smoking, diabetes, ). However, patient wished to hold off until next MTM appointment to discuss.     Hypertension: Stable. BP at goal <140/90 mmHg.     Allergic rhinitis: Stable.      Anaphylaxis: Stable.     Narcolepsy: Stable.     Fibromyalgia: Stable, patient following taper.     S/p Hysterectomy: Unimproved. Due to patient's cardiovascular risk factors (tocasso use, Diabetes) in presence of HRT, important to weight the risk versus benefit of HRT due to potential increased risk of VTE and stroke. Could consider discussion of continued estrogen use or if estrogen continues to think about addition of low dose aspirin in future.     Muscle spasms: Stable.     GERD: Stable.     Asthma:  Stable. ACT is >19.     PLAN:                            MTM will reach out to Dr. Marsh to get thoughts on:   1) Starting Basaglar 10 units once daily   2) Thoughts on potential to assess for T1D due to symptoms of hyperglycemia in the presence of unexplained weight loss.       1) Patient to test blood sugars fasting in AM and 2 hours after starting largest meal     Future considerations: statin, risk versus benefit of HRT therapy     I spent 60 minutes with this patient today. I offer these suggestions for consideration by the PCP. A copy of the visit note was provided to the patient's primary care provider.    Will follow  up in 1 week.     I concur with the note as dictated above which reflects our joint assessment and plan.   Mandy Robertson PharmD    The patient was given a summary of these recommendations as an after visit summary.     Esme Hicks, PharmD  Pharmaceutical Care Resident   Pager: [phone]

## 2018-06-19 NOTE — PATIENT INSTRUCTIONS
Recommendations from today's MTM visit:                                                    MTM (medication therapy management) is a service provided by a clinical pharmacist designed to help you get the most of out of your medicines.   Today we reviewed what your medicines are for, how to know if they are working, that your medicines are safe and how to make your medicine regimen as easy as possible.     1. When checking your blood sugars, check in AM before eating, then 2 hours after starting largest meal.     2. Start Basaglar insulin 10 units once daily in AM - will contact Dr. Marsh regarding this.     Next MTM visit: 1 week     To schedule another MTM appointment, please call the clinic directly or you may call the MTM scheduling line at [phone] or toll-free at [phone].     My Clinical Pharmacist's contact information:                                                      It was a pleasure seeing you today!  Please feel free to contact me with any questions or concerns you have.      Esme Hicks, PharmD  Pharmaceutical Care Resident   Pager: [phone]      You may receive a survey about the MTM services you received.  I would appreciate your feedback to help me serve you better in the future. Please fill it out and return it when you can. Your comments will be anonymous.              Insulin Glargine injection  Brand Names: BASAGLAR, Lantus, Lantus SoloStar, Toujeo SoloStar  What is this medicine?  INSULIN GLARGINE (IN lema yifan GLAR geen) is a human-made form of insulin. This drug lowers the amount of sugar in your blood. It is a long-acting insulin that is usually given once a day.  How should I use this medicine?  This medicine is for injection under the skin. Use this medicine at the same time each day. Use exactly as directed. This insulin should never be mixed in the same syringe with other insulins before injection. Do not vigorously shake before use. You will be taught how to use this  medicine and how to adjust doses for activities and illness. Do not use more insulin than prescribed.  Always check the appearance of your insulin before using it. This medicine should be clear and colorless like water. Do not use it if it is cloudy, thickened, colored, or has solid particles in it.  It is important that you put your used needles and syringes in a special sharps container. Do not put them in a trash can. If you do not have a sharps container, call your pharmacist or healthcare provider to get one.  Talk to your pediatrician regarding the use of this medicine in children. Special care may be needed.  What side effects may I notice from receiving this medicine?  Side effects that you should report to your doctor or health care professional as soon as possible:    allergic reactions like skin rash, itching or hives, swelling of the face, lips, or tongue    breathing problems    signs and symptoms of high blood sugar such as dizziness, dry mouth, dry skin, fruity breath, nausea, stomach pain, increased hunger or thirst, increased urination    signs and symptoms of low blood sugar such as feeling anxious, confusion, dizziness, increased hunger, unusually weak or tired, sweating, shakiness, cold, irritable, headache, blurred vision, fast heartbeat, loss of consciousness  Side effects that usually do not require medical attention (report to your doctor or health care professional if they continue or are bothersome):    increase or decrease in fatty tissue under the skin due to overuse of a particular injection site    itching, burning, swelling, or rash at site where injected    What if I miss a dose?  It is important not to miss a dose. Your health care professional or doctor should discuss a plan for missed doses with you. If you do miss a dose, follow their plan. Do not take double doses.  Where should I keep my medicine?  Keep out of the reach of children.  Store unopened vials in a refrigerator  between 2 and 8 degrees C (36 and 46 degrees F). Do not freeze or use if the insulin has been frozen. Opened vials (vials currently in use) may be stored in the refrigerator or at room temperature, at approximately 25 degrees C (77 degrees F) or cooler. Keeping your insulin at room temperature decreases the amount of pain during injection. Once opened, your insulin can be used for 28 days. After 28 days, the vial should be thrown away.  Store Lantus Solostar Pens or Basaglar KwikPens in a refrigerator between 2 and 8 degrees C (36 and 46 degrees F) or at room temperature below 30 degrees C (86 degrees F). Do not freeze or use if the insulin has been frozen. Once opened, the pens should be kept at room temperature. Do not store in the refrigerator once opened. Once opened, the insulin can be used for 28 days. After 28 days, the Lantus Solostar Pen or Basaglar KwikPen should be thrown away.  Store Toujeo Solostar Pens in a refrigerator between 2 and 8 degrees C (36 and 46 degrees F). Do not freeze or use if the insulin has been frozen. Once opened, the pens should be kept at room temperature below 30 degrees C (86 degrees F). Do not store in the refrigerator once opened. Once opened, the insulin can be used for 42 days. After 42 days, the Toujeo Solostar Pen should be thrown away.  Protect from light and excessive heat. Throw away any unused medicine after the expiration date or after the specified time for room temperature storage has passed.  What should I tell my health care provider before I take this medicine?  They need to know if you have any of these conditions:    episodes of low blood sugar    kidney disease    liver disease    an unusual or allergic reaction to insulin, metacresol, other medicines, foods, dyes, or preservatives    pregnant or trying to get pregnant    breast-feeding  What should I watch for while using this medicine?  Visit your health care professional or doctor for regular checks on your  progress.  Do not drive, use machinery, or do anything that needs mental alertness until you know how this medicine affects you. Alcohol may interfere with the effect of this medicine. Avoid alcoholic drinks.  A test called the HbA1C (A1C) will be monitored. This is a simple blood test. It measures your blood sugar control over the last 2 to 3 months. You will receive this test every 3 to 6 months.  Learn how to check your blood sugar. Learn the symptoms of low and high blood sugar and how to manage them.  Always carry a quick-source of sugar with you in case you have symptoms of low blood sugar. Examples include hard sugar candy or glucose tablets. Make sure others know that you can choke if you eat or drink when you develop serious symptoms of low blood sugar, such as seizures or unconsciousness. They must get medical help at once.  Tell your doctor or health care professional if you have high blood sugar. You might need to change the dose of your medicine. If you are sick or exercising more than usual, you might need to change the dose of your medicine.  Do not skip meals. Ask your doctor or health care professional if you should avoid alcohol. Many nonprescription cough and cold products contain sugar or alcohol. These can affect blood sugar.  Make sure that you have the right kind of syringe for the type of insulin you use. Try not to change the brand and type of insulin or syringe unless your health care professional or doctor tells you to. Switching insulin brand or type can cause dangerously high or low blood sugar. Always keep an extra supply of insulin, syringes, and needles on hand. Use a syringe one time only. Throw away syringe and needle in a closed container to prevent accidental needle sticks.  Insulin pens and cartridges should never be shared. Even if the needle is changed, sharing may result in passing of viruses like hepatitis or HIV.  Wear a medical ID bracelet or chain, and carry a card that  describes your disease and details of your medicine and dosage times.  NOTE:This sheet is a summary. It may not cover all possible information. If you have questions about this medicine, talk to your doctor, pharmacist, or health care provider. Copyright  2018 [employer]

## 2018-06-19 NOTE — Clinical Note
Hi Dr Marsh,   Please see note as FYI from MTM visit with Sharmaine. Sent staff message to you for my thoughts/recommendations. Thank you!   Esme Hicks, PharmD Pharmaceutical Care Resident  Pager: [phone]

## 2018-06-20 NOTE — PROGRESS NOTES
Dr. Marsh has authorized starting Basaglar from e-visit today. It does not appear that the needles were sent into the pharmacy along with Basaglar pen. Will send over needles for patient to use with Basaglar pen.     Esme Hicks, PharmD  Pharmaceutical Care Resident   Pager: [phone]

## 2018-06-22 DIAGNOSIS — I10 HYPERTENSION GOAL BP (BLOOD PRESSURE) < 140/90: ICD-10-CM

## 2018-06-22 DIAGNOSIS — T43.205D ANTIDEPRESSANT DISCONTINUATION SYNDROME, SUBSEQUENT ENCOUNTER: ICD-10-CM

## 2018-06-23 NOTE — TELEPHONE ENCOUNTER
"Requested Prescriptions   Pending Prescriptions Disp Refills     DULoxetine (CYMBALTA) 20 MG EC capsule [Pharmacy Med Name: DULOXETINE DR 20MG CAPSULES]  Last Written Prescription Date:  4/2/18  Last Fill Quantity: 90,  # refills: 0   Last office visit: 6/1/2018 with prescribing provider:  6/1/18   Future Office Visit:     90 capsule 0     Sig: TAKE 1 CAPSULE(20 MG) BY MOUTH DAILY    Serotonin-Norepinephrine Reuptake Inhibitors  Passed    6/22/2018  3:14 PM       Passed - Blood pressure under 140/90 in past 12 months    BP Readings from Last 3 Encounters:   06/19/18 131/76   06/01/18 122/80   12/22/17 118/78                Passed - Recent (12 mo) or future (30 days) visit within the authorizing provider's specialty    Patient had office visit in the last 12 months or has a visit in the next 30 days with authorizing provider or within the authorizing provider's specialty.  See \"Patient Info\" tab in inbasket, or \"Choose Columns\" in Meds & Orders section of the refill encounter.           Passed - Patient is age 18 or older       Passed - No active pregnancy on record       Passed - No positive pregnancy test in past 12 months        valsartan (DIOVAN) 320 MG tablet [Pharmacy Med Name: VALSARTAN 320MG TABLETS]  Last Written Prescription Date:  3/26/18  Last Fill Quantity: 90,  # refills: 0   Last office visit: 6/1/2018 with prescribing provider:  6/1/18   Future Office Visit:     90 tablet 0     Sig: TAKE 1 TABLET BY MOUTH DAILY    Angiotensin-II Receptors Passed    6/22/2018  3:14 PM       Passed - Blood pressure under 140/90 in past 12 months    BP Readings from Last 3 Encounters:   06/19/18 131/76   06/01/18 122/80   12/22/17 118/78                Passed - Recent (12 mo) or future (30 days) visit within the authorizing provider's specialty    Patient had office visit in the last 12 months or has a visit in the next 30 days with authorizing provider or within the authorizing provider's specialty.  See \"Patient Info\" " "tab in inbasket, or \"Choose Columns\" in Meds & Orders section of the refill encounter.           Passed - Patient is age 18 or older       Passed - No active pregnancy on record       Passed - Normal serum creatinine on file in past 12 months    Recent Labs   Lab Test  12/22/17   0942   CR  0.55            Passed - Normal serum potassium on file in past 12 months    Recent Labs   Lab Test  12/22/17   0942   POTASSIUM  4.0                   Passed - No positive pregnancy test in past 12 months          "

## 2018-06-25 ENCOUNTER — MYC MEDICAL ADVICE (OUTPATIENT)
Dept: PHARMACY | Facility: CLINIC | Age: 38
End: 2018-06-25

## 2018-06-25 DIAGNOSIS — T43.205D ANTIDEPRESSANT DISCONTINUATION SYNDROME, SUBSEQUENT ENCOUNTER: ICD-10-CM

## 2018-06-25 NOTE — TELEPHONE ENCOUNTER
Called patient to f/u on blood sugars. Left message with CB#.     Sent patient MyChart message to follow up as well.     Esme Hicks, PharmD  Pharmaceutical Care Resident   Pager: [phone]

## 2018-06-26 ENCOUNTER — TELEPHONE (OUTPATIENT)
Dept: FAMILY MEDICINE | Facility: CLINIC | Age: 38
End: 2018-06-26

## 2018-06-26 ENCOUNTER — MYC REFILL (OUTPATIENT)
Dept: FAMILY MEDICINE | Facility: CLINIC | Age: 38
End: 2018-06-26

## 2018-06-26 DIAGNOSIS — Z78.0 MENOPAUSE: ICD-10-CM

## 2018-06-26 DIAGNOSIS — M62.830 BACK MUSCLE SPASM: ICD-10-CM

## 2018-06-26 RX ORDER — CYCLOBENZAPRINE HCL 10 MG
10 TABLET ORAL
Qty: 30 TABLET | Status: SHIPPED | OUTPATIENT
Start: 2018-06-26 | End: 2018-07-27

## 2018-06-26 RX ORDER — DULOXETIN HYDROCHLORIDE 20 MG/1
CAPSULE, DELAYED RELEASE ORAL
Qty: 90 CAPSULE | Refills: 0 | Status: SHIPPED | OUTPATIENT
Start: 2018-06-26 | End: 2019-01-03

## 2018-06-26 RX ORDER — VALSARTAN 320 MG/1
TABLET ORAL
Qty: 90 TABLET | Refills: 1 | Status: SHIPPED | OUTPATIENT
Start: 2018-06-26 | End: 2018-08-07 | Stop reason: ALTCHOICE

## 2018-06-26 NOTE — TELEPHONE ENCOUNTER
Premarin - should still have refills left - sent in years worth to the same pharmacy 12/22/17.  Called the pharmacy because pt called also and says she does not have refills.  Called and spoke to Charis.  She says she does have refills and will get it ready.      Flexeril  LRF 6/1/18, dispense 30  LOV 6/1/18 - a little early

## 2018-06-26 NOTE — TELEPHONE ENCOUNTER
Pt calls.  She is looking for her cymbalta.  Was sent in today.  She says they say they don't have it.      Called the pharmacy and spoke to Charis.  She said they didn't get it.  Verbal order given.

## 2018-06-26 NOTE — TELEPHONE ENCOUNTER
Prescription approved per [facility] Refill Protocol.    Maki SORENSON RN, BSN, PHN  [facility] RN

## 2018-06-26 NOTE — TELEPHONE ENCOUNTER
"Requested Prescriptions   Pending Prescriptions Disp Refills     DULoxetine (CYMBALTA) 20 MG EC capsule [Pharmacy Med Name: DULOXETINE DR 20MG CAPSULES]  Last Written Prescription Date:  6/26/18  Last Fill Quantity: 90 CAPSULE,  # refills: 0   Last office visit: 6/1/2018 with prescribing provider:  SITA   Future Office Visit:     90 capsule 0     Sig: TAKE 1 CAPSULE(20 MG) BY MOUTH DAILY    Serotonin-Norepinephrine Reuptake Inhibitors  Passed    6/25/2018  1:50 PM       Passed - Blood pressure under 140/90 in past 12 months    BP Readings from Last 3 Encounters:   06/19/18 131/76   06/01/18 122/80   12/22/17 118/78                Passed - Recent (12 mo) or future (30 days) visit within the authorizing provider's specialty    Patient had office visit in the last 12 months or has a visit in the next 30 days with authorizing provider or within the authorizing provider's specialty.  See \"Patient Info\" tab in inbasket, or \"Choose Columns\" in Meds & Orders section of the refill encounter.           Passed - Patient is age 18 or older       Passed - No active pregnancy on record       Passed - No positive pregnancy test in past 12 months          "

## 2018-06-26 NOTE — TELEPHONE ENCOUNTER
Message from [employer]:  Original authorizing provider: Temi Marsh MD, MD    Sharmaine José would like a refill of the following medications:  estrogens, conjugated, (PREMARIN) 1.25 MG tablet [Temi Marsh MD, MD]  cyclobenzaprine (FLEXERIL) 10 MG tablet [Temi Marsh MD, MD]    Preferred pharmacy: [facility] [address] [place] [address] [address] AT [facility] [address]    Comment:  Sorry! My pharmacy has been dropping the ball lately. (Half my refills that are sent in, are not received.) It has been a bit of a pain in my butt. (Not getting my Duloxetine refill after it was sent, etc.) So, for these two: 2 people told me I have 1 month left on the premarin, and 1 person told me I had 3 months. For the Cyclobenzaprine, I thought I would ask for a refill now, since my refills seem to take about a week. Thanks in advance! Sharmaine José 1980

## 2018-06-27 ENCOUNTER — ALLIED HEALTH/NURSE VISIT (OUTPATIENT)
Dept: EDUCATION SERVICES | Facility: CLINIC | Age: 38
End: 2018-06-27
Payer: COMMERCIAL

## 2018-06-27 DIAGNOSIS — E11.65 TYPE 2 DIABETES MELLITUS WITH HYPERGLYCEMIA, WITHOUT LONG-TERM CURRENT USE OF INSULIN (H): Primary | ICD-10-CM

## 2018-06-27 PROCEDURE — G0108 DIAB MANAGE TRN  PER INDIV: HCPCS

## 2018-06-27 PROCEDURE — 99207 ZZC DROP WITH A PROCEDURE: CPT

## 2018-06-27 RX ORDER — INSULIN GLARGINE 100 [IU]/ML
12 INJECTION, SOLUTION SUBCUTANEOUS DAILY
Qty: 3 ML | Refills: 1 | Status: SHIPPED | OUTPATIENT
Start: 2018-06-27 | End: 2018-07-25

## 2018-06-27 RX ORDER — DULOXETIN HYDROCHLORIDE 20 MG/1
CAPSULE, DELAYED RELEASE ORAL
Qty: 90 CAPSULE | Refills: 0 | OUTPATIENT
Start: 2018-06-27

## 2018-06-27 NOTE — MR AVS SNAPSHOT
After Visit Summary   6/27/2018    Sharmaine José    MRN: [id number]           Patient Information     Date Of Birth          1980        Visit Information        Provider Department      6/27/2018 9:30 AM [facility] DI[facility]IC ED RESOURCE [facility]        Today's Diagnoses     Type 2 diabetes mellitus with hyperglycemia, without long-term current use of insulin (H)    -  1      Care Instructions    MY DIABETES TODAY:    1)  Goal A1C is under <7.0  Mine is:      Lab Results   Component Value Date    A1C 10.3 06/01/2018       2)  Goal LDL (bad cholesterol) under 100  (measured at least yearly)- I am currently at:   Lab Results   Component Value Date    LDL  12/22/2017     Cannot estimate LDL when triglyceride exceeds 400 mg/dL     12/22/2017       3)  Goal blood pressure under 130/80- mine was Data Unavailable today    Care Plan:  Meal Plan Recommendation: eat 3 meals a day, have small snacks between meals, if needed and Aim for 2-3 carb servings at meals and 0-1 carb servings at snacks  Exercise / activity plan: activity goal 30 minutes daily  Check blood sugars 2-3x/day, premeal and 2 hrs postmeal  Recommend increase to insulin - 12 units,   Ok to increase by 1-2 units every 3 days until fasting BG is <130    Follow up:  Call ([phone]), e-mail ([email or web address]), or send [address] message with questions, concerns or if follow-up is needed.  F/U in 6 months or sooner     Bring blood glucose meter and logbook with you to all doctor and follow-up appointments.     [facility] for the [facility]:  For Your Diabetes or Nutrition Education Appointments Call:  [phone]   For Diabetes or Nutrition Related Questions Call or Email:   [phone]  [email or web address]  Fax: [phone]   If you need a medication refill please contact your pharmacy. Please allow 3 business days for your refills to be completed.      Instructions for emailing the Diabetes Educators    If you need to communicate a non-urgent message to a Diabetes Educator via email, please send to [email or web address].    Please follow the following email guidelines:    Subject line: Secure: your clinic name (example: Secure: [facility])  In the email please include: First name, middle initial, last name and date of birth.    We will be in touch with you within one (1) business day.          Follow-ups after your visit        Who to contact     If you have questions or need follow up information about today's clinic visit or your schedule please contact [facility] directly at [phone].  Normal or non-critical lab and imaging results will be communicated to you by MyChart, letter or phone within 4 business days after the clinic has received the results. If you do not hear from us within 7 days, please contact the clinic through Factualhart or phone. If you have a critical or abnormal lab result, we will notify you by phone as soon as possible.  Submit refill requests through [employer] or call your pharmacy and they will forward the refill request to us. Please allow 3 business days for your refill to be completed.          Additional Information About Your Visit        Factual[employer]ict[employer]ae Information     [employer] gives you secure access to your electronic health record. If you see a primary care provider, you can also send messages to your care team and make appointments. If you have questions, please call your primary care clinic.  If you do not have a primary care provider, please call [phone] and they will assist you.        Care EveryWhere ID     This is your Care EveryWhere ID. This could be used by other organizations to access your [facility] medical records  [id number]        Your Vitals Were     Last Period                   10/04/2006            Blood Pressure from Last 3 Encounters:   06/19/18 131/76   06/01/18 122/80   12/22/17  118/78    Weight from Last 3 Encounters:   06/19/18 81.6 kg (179 lb 12.8 oz)   06/01/18 82.3 kg (181 lb 8 oz)   12/22/17 90.9 kg (200 lb 4.8 oz)              Today, you had the following     No orders found for display       Primary Care Provider Office Phone # Fax #    Temi Marsh [phone] [id number]  On license of [facility] [id number]        Equal Access to Services     [facility] French CenterDREW : Hadii aad ku hadasho Soomaali, waaxda luqadaha, qaybta kaalmada adeegyada, waxay idiin hayaan adeeg kharash lalani . So [facility] [phone].    ATENCIÓN: Si habla español, tiene a lema disposición servicios gratuitos de asistencia lingüística. LlL[facility] [phone].    We comply with applicable federal civil rights laws and [place] laws. We do not discriminate on the basis of race, color, national origin, age, disability, sex, sexual orientation, or gender identity.            Thank you!     Thank you for choosing [facility]  for your care. Our goal is always to provide you with excellent care. Hearing back from our patients is one way we can continue to improve our services. Please take a few minutes to complete the written survey that you may receive in the mail after your visit with us. Thank you!             Your Updated Medication List - Protect others around you: Learn how to safely use, store and throw away your medicines at www.disposemymeds.org.          This list is accurate as of 6/27/18 10:27 AM.  Always use your most recent med list.                   Brand Name Dispense Instructions for use Diagnosis    albuterol 108 (90 Base) MCG/ACT Inhaler    PROVENTIL HFA    1 Inhaler    Inhale 2 puffs into the lungs every 6 hours    Mild intermittent asthma without complication       BASAGLAR 100 UNIT/ML injection     3 mL    Inject 10 Units Subcutaneous daily    Type 2 diabetes mellitus with hyperglycemia, without long-term current use of insulin (H)       blood glucose monitoring  meter device kit    no brand specified    1 kit    Use to test blood sugar 1 times daily or as directed.  Please provide all supplies.    Type 2 diabetes mellitus with hyperglycemia, without long-term current use of insulin (H)       blood glucose monitoring test strip    no brand specified    100 strip    Use to test blood sugars up to 4 times daily as needed    Type 2 diabetes mellitus with hyperglycemia, without long-term current use of insulin (H)       cyclobenzaprine 10 MG tablet    FLEXERIL    30 tablet    Take 1 tablet (10 mg) by mouth nightly as needed for muscle spasms    Back muscle spasm       dextroamphetamine 10 MG tablet    DEXTROSTAT     Take 10 mg by mouth daily Takes 1-2 tablets daily        DULoxetine 20 MG EC capsule    CYMBALTA    90 capsule    TAKE 1 CAPSULE(20 MG) BY MOUTH DAILY    Antidepressant discontinuation syndrome, subsequent encounter       EPINEPHrine 0.3 MG/0.3ML injection 2-pack    EPIPEN/ADRENACLICK/or ANY BX GENERIC EQUIV    2 each    Inject 0.3 mLs (0.3 mg) into the muscle once as needed    Bee sting allergy       estrogens (conjugated) 1.25 MG tablet    PREMARIN    90 tablet    Take 1 tablet (1.25 mg) by mouth daily    Menopause       FLONASE 50 MCG/ACT spray   Generic drug:  fluticasone      Spray 1-2 sprays into both nostrils daily as needed for rhinitis or allergies        insulin pen needle 31G X 5 MM    B-D U/F    100 each    Use once daily as directed with Basaglar pen.    Type 2 diabetes mellitus with hyperglycemia, without long-term current use of insulin (H)       metFORMIN 500 MG 24 hr tablet    GLUCOPHAGE-XR    360 tablet    Take 1 tablet (500 mg) by mouth daily (with dinner) After 2 weeks, if tolerating, increase to 2 daily. After 2 more weeks, if tolerating, increase to 3 daily. After another 2 weeks, if tolerating, increase to 4 daily.    Type 2 diabetes mellitus with hyperglycemia, without long-term current use of insulin (H)       ranitidine 150 MG tablet     ZANTAC     Take 150 mg by mouth 2 times daily        valsartan 320 MG tablet    DIOVAN    90 tablet    TAKE 1 TABLET BY MOUTH DAILY    Hypertension goal BP (blood pressure) < 140/90

## 2018-06-27 NOTE — PATIENT INSTRUCTIONS
MY DIABETES TODAY:    1)  Goal A1C is under <7.0  Mine is:      Lab Results   Component Value Date    A1C 10.3 06/01/2018       2)  Goal LDL (bad cholesterol) under 100  (measured at least yearly)- I am currently at:   Lab Results   Component Value Date    LDL  12/22/2017     Cannot estimate LDL when triglyceride exceeds 400 mg/dL     12/22/2017       3)  Goal blood pressure under 130/80- mine was Data Unavailable today    Care Plan:  Meal Plan Recommendation: eat 3 meals a day, have small snacks between meals, if needed and Aim for 2-3 carb servings at meals and 0-1 carb servings at snacks  Exercise / activity plan: activity goal 30 minutes daily  Check blood sugars 2-3x/day, premeal and 2 hrs postmeal  Recommend increase to insulin - 12 units,   Ok to increase by 1-2 units every 3 days until fasting BG is <130    Follow up:  Call ([phone]), e-mail ([email or web address]), or send etaskr message with questions, concerns or if follow-up is needed.  F/U in 6 months or sooner     Bring blood glucose meter and logbook with you to all doctor and follow-up appointments.     [facility] for the [facility]:  For Your Diabetes or Nutrition Education Appointments Call:  [phone]   For Diabetes or Nutrition Related Questions Call or Email:   [phone]  [email or web address]  Fax: [phone]   If you need a medication refill please contact your pharmacy. Please allow 3 business days for your refills to be completed.     Instructions for emailing the Diabetes Educators    If you need to communicate a non-urgent message to a Diabetes Educator via email, please send to [email or web address].    Please follow the following email guidelines:    Subject line: Secure: your clinic name (example: Secure: [facility])  In the email please include: First name, middle initial, last name and date of birth.    We will be in touch with you within one (1) business day.

## 2018-06-27 NOTE — PROGRESS NOTES
Diabetes Self Management Training: Initial Assessment Visit for Newly Diagnosed Patients (Complete AADE Goals Flowsheet)    Sharmaine José presents today for education and evaluation of glucose control related to Type 2 diabetes.    She is accompanied by self    Patient's diabetes management related comments/concerns: none    Patient's emotional response to diabetes: expresses readiness to learn and concern for health and well-being    Patient would like this visit to be focused around the following diabetes-related behaviors and goals: Assistance with making lifestyle changes, Healthy Eating, Monitoring, Taking Medication, Problem Solving and Reducing Risks    ASSESSMENT:  Patient Problem List and Family Medical History reviewed for relevant medical history, current medical status, and diabetes risk factors.    Current Diabetes Management per Patient:  Taking diabetes medications?   yes:     Diabetes Medication(s)     Biguanides Sig    metFORMIN (GLUCOPHAGE-XR) 500 MG 24 hr tablet Take 1 tablet (500 mg) by mouth daily (with dinner) After 2 weeks, if tolerating, increase to 2 daily. After 2 more weeks, if tolerating, increase to 3 daily. After another 2 weeks, if tolerating, increase to 4 daily.    Insulin Sig    BASAGLAR 100 UNIT/ML injection Inject 10 Units Subcutaneous daily          Do you have any difficulty affording your medications or glucose monitoring supplies?     No     *Abbreviated insulin dose documentation key: Insulin trade name (bqorrafbi-fzcrr-etbnnh-bedtime) - i.e. Humalog 5-5-5-0 (Humalog 5 units at breakfast, 5 units at lunch, and 5 units at dinner).     Patient glucose self monitoring as follows: 2 times daily, before and/or 2 hours after the start of a meal.   BG meter: One Touch Ultra 2 meter  BG results:      BG values are: Not in goal  Patient's most recent   Lab Results   Component Value Date    A1C 10.3 06/01/2018    is not meeting goal of <7.0    Nutrition:  Patient eats 3 meals per  "day and has a low intake of carbohydrates.  Was eating more fast food prior to diagnosis.  Also was skipping breakfast and lunch prior to diagnosis.    Breakfast (4:30am) - Slimfast shake (6 gm carb)  Lunch - Slimfast shake   Dinner (4:30/5) - salad OR pork chop, brussel sprouts OR tuna lettuce wrap OR steak, green beans, 8-12oz Fairlife skim milk  Snacks - (1pm) hardboiled egg or small salad    Beverages: water (used to drink lots of milk and juice)    Cultural/Mosque diet restrictions: No     Biggest Challenge to Healthy Eating: knowing what to eat    Physical Activity:    Type: walking at work, swimming      Diabetes Risk Factors:  hypertension, hyperlipidemia, hypertriglyceridemia and overweight/obesity    Relevant co-morbidities and related health problems:  Significant for:  dyslipidemia and hypertension    Diabetes Complications:  Chronic Complication Prevention: Eyes: exam within in the last year? Yes  Nerve/Circulation: foot exam within the last year No  Heart Health: BP to goal Yes, LDL to goal No, Daily Aspirin No  Dental Health: brushing/flossing regularly Yes, dental exam within last year Yes  Immunizations (flu/pneumonia) up to date? Yes    Recent health service and resource utilization related to diabetes (hyperglycemia, hypoglycemia, etc):   None    Vitals:  LMP 10/04/2006  Estimated body mass index is 30.86 kg/(m^2) as calculated from the following:    Height as of 6/1/18: 1.626 m (5' 4\").    Weight as of 6/19/18: 81.6 kg (179 lb 12.8 oz).   Last 3 BP:   BP Readings from Last 3 Encounters:   06/19/18 131/76   06/01/18 122/80   12/22/17 118/78       History   Smoking Status     Current Every Day Smoker     Packs/day: 1.00     Types: Cigarettes   Smokeless Tobacco     Never Used     Comment: 1/2- 1 pk  daily       Labs:  Lab Results   Component Value Date    A1C 10.3 06/01/2018     Lab Results   Component Value Date     12/22/2017     Lab Results   Component Value Date    LDL  12/22/2017     " "Cannot estimate LDL when triglyceride exceeds 400 mg/dL     12/22/2017     HDL Cholesterol   Date Value Ref Range Status   12/22/2017 32 (L) >49 mg/dL Final   ]  GFR Estimate   Date Value Ref Range Status   12/22/2017 >90 >60 mL/min/1.7m2 Final     Comment:     Non  GFR Calc     GFR Estimate If Black   Date Value Ref Range Status   12/22/2017 >90 >60 mL/min/1.7m2 Final     Comment:      GFR Calc     Lab Results   Component Value Date    CR 0.55 12/22/2017     No results found for: MICROALBUMIN    Socio/Economic/Cultural Considerations:    Support system: family    Cultural Influences/Ethnic Background:  American      Health Literacy/Numeracy:  \"With diabetes, it's helpful to use forms and log books to write down blood sugars and what you're eating at times to help understand how foods affect your blood sugars. With this in mind how confident are you at filling out medical forms, such as these, by yourself?  Extremely    Health Beliefs and Attitudes:   Patient Activation Measure Survey Score:  CHRIS Score (Last Two) 5/28/2010 4/19/2011   CHRIS Raw Score 44 39   Activation Score 70.8 56.4   CHRIS Level 4 3       Stage of Change: ACTION (Actively working towards change)      Diabetes knowledge and skills assessment:     Patient is knowledgeable in diabetes management concepts related to: Monitoring and Taking Medication    Patient needs further education on the following diabetes management concepts: Healthy Eating, Being Active, Problem Solving and Reducing Risks    Barriers to Learning Assessment: No Barriers identified    Based on learning assessment above, most appropriate setting for further diabetes education would be: Group class or Individual setting.    INTERVENTION:   Education provided today on:  AADE Self-Care Behaviors:  Healthy Eating: carbohydrate counting, consistency in amount, composition, and timing of food intake, weight reduction, portion control and label " reading  Being Active: relationship to blood glucose  Monitoring: individual blood glucose targets and frequency of monitoring  Taking Medication: action of prescribed medication  Problem Solving: high blood glucose - causes, signs/symptoms, treatment and prevention and low blood glucose - causes, signs/symptoms, treatment and prevention  Reducing Risks: major complications of diabetes, prevention, early diagnostic measures and treatment of complications, foot care, appropriate dental care, annual eye exam, lipids levels and goals and blood pressure and goals    Opportunities for ongoing education and support in diabetes-self management were discussed.    Pt verbalized understanding of concepts discussed and recommendations provided today.       Education Materials Provided:  [employer] Understanding Diabetes Booklet, Safe Disposal Options for Needles & Syringes and BG Log Sheet    PLAN:  See Patient Instructions for co-developed, patient-stated behavior change goals.  AVS printed and provided to patient today.    FOLLOW-UP:  Follow-up as needed.   Follow-up yearly for diabetes education review.  Chart routed to referring provider.    Ongoing plan for education and support: Written resources (magazines, books, etc.), Follow-up visit with diabetes educator in 6 months and Follow-up with primary care provider    QUITA Martin CDE    Time Spent: 60 minutes  Encounter Type: Individual    Any diabetes medication dose changes were made via the CDE Protocol and Collaborative Practice Agreement with the patient's referring provider. A copy of this encounter was shared with the provider.

## 2018-07-13 ENCOUNTER — MYC MEDICAL ADVICE (OUTPATIENT)
Dept: FAMILY MEDICINE | Facility: CLINIC | Age: 38
End: 2018-07-13

## 2018-07-19 ENCOUNTER — TELEPHONE (OUTPATIENT)
Dept: FAMILY MEDICINE | Facility: CLINIC | Age: 38
End: 2018-07-19

## 2018-07-19 NOTE — TELEPHONE ENCOUNTER
Sharmaine calling wondering if she could get on Dr. Marsh's schedule around 9:30 tomorrow to have her look at a vaginal cyst/abscess. She tried warm baths and warm compresses but the area has grown to the size of a marble.     Notified Nina there are no openings on Dr. Marsh's schedule. Offered appt with another provider. She agreed to having appt at 8:50 with Nina, but would like to ask Dr. Marsh if she could see her if possible.     Huddled with . Ok to use same-day appt time at 10:30 a.m.  Called patient to notify.

## 2018-07-20 ENCOUNTER — OFFICE VISIT (OUTPATIENT)
Dept: FAMILY MEDICINE | Facility: CLINIC | Age: 38
End: 2018-07-20
Payer: COMMERCIAL

## 2018-07-20 VITALS
DIASTOLIC BLOOD PRESSURE: 70 MMHG | SYSTOLIC BLOOD PRESSURE: 122 MMHG | OXYGEN SATURATION: 100 % | BODY MASS INDEX: 29.57 KG/M2 | WEIGHT: 173.2 LBS | HEIGHT: 64 IN | TEMPERATURE: 99.3 F | HEART RATE: 118 BPM

## 2018-07-20 DIAGNOSIS — R39.198 DIFFICULTY URINATING: ICD-10-CM

## 2018-07-20 DIAGNOSIS — R31.0 GROSS HEMATURIA: ICD-10-CM

## 2018-07-20 DIAGNOSIS — N76.4 LEFT GENITAL LABIAL ABSCESS: Primary | ICD-10-CM

## 2018-07-20 LAB
ALBUMIN UR-MCNC: NEGATIVE MG/DL
APPEARANCE UR: CLEAR
BILIRUB UR QL STRIP: NEGATIVE
COLOR UR AUTO: YELLOW
GLUCOSE UR STRIP-MCNC: NEGATIVE MG/DL
HGB UR QL STRIP: ABNORMAL
KETONES UR STRIP-MCNC: NEGATIVE MG/DL
LEUKOCYTE ESTERASE UR QL STRIP: NEGATIVE
NITRATE UR QL: NEGATIVE
NON-SQ EPI CELLS #/AREA URNS LPF: NORMAL /LPF
PH UR STRIP: 5.5 PH (ref 5–7)
RBC #/AREA URNS AUTO: NORMAL /HPF
SOURCE: ABNORMAL
SP GR UR STRIP: 1.01 (ref 1–1.03)
UROBILINOGEN UR STRIP-ACNC: 0.2 EU/DL (ref 0.2–1)
WBC #/AREA URNS AUTO: NORMAL /HPF

## 2018-07-20 PROCEDURE — 81001 URINALYSIS AUTO W/SCOPE: CPT | Performed by: FAMILY MEDICINE

## 2018-07-20 PROCEDURE — 99214 OFFICE O/P EST MOD 30 MIN: CPT | Performed by: FAMILY MEDICINE

## 2018-07-20 RX ORDER — CEPHALEXIN 500 MG/1
500 CAPSULE ORAL 3 TIMES DAILY
Qty: 21 CAPSULE | Refills: 0 | Status: SHIPPED | OUTPATIENT
Start: 2018-07-20 | End: 2018-07-20

## 2018-07-20 RX ORDER — SULFAMETHOXAZOLE/TRIMETHOPRIM 800-160 MG
1 TABLET ORAL 2 TIMES DAILY
Qty: 14 TABLET | Refills: 0 | Status: SHIPPED | OUTPATIENT
Start: 2018-07-20 | End: 2018-07-27

## 2018-07-20 NOTE — MR AVS SNAPSHOT
After Visit Summary   7/20/2018    Sharmaine José    MRN: [id number]           Patient Information     Date Of Birth          1980        Visit Information        Provider Department      7/20/2018 10:30 AM Temi Marsh MD [facility]        Today's Diagnoses     Gross hematuria    -  1    Difficulty urinating        Abscess          Care Instructions        I would have a low threshold for having you visit a Urologist if you have additional bleeding or continued symptoms.      Heat.  Let us know if not improving.          Follow-ups after your visit        Who to contact     If you have questions or need follow up information about today's clinic visit or your schedule please contact [facility] directly at [phone].  Normal or non-critical lab and imaging results will be communicated to you by MyChart, letter or phone within 4 business days after the clinic has received the results. If you do not hear from us within 7 days, please contact the clinic through [employer] or phone. If you have a critical or abnormal lab result, we will notify you by phone as soon as possible.  Submit refill requests through [employer] or call your pharmacy and they will forward the refill request to us. Please allow 3 business days for your refill to be completed.          Additional Information About Your Visit        MyChart Information     [employer] gives you secure access to your electronic health record. If you see a primary care provider, you can also send messages to your care team and make appointments. If you have questions, please call your primary care clinic.  If you do not have a primary care provider, please call [phone] and they will assist you.        Care EveryWhere ID     This is your Care EveryWhere ID. This could be used by other organizations to access your [place] medical records  [id number]        Your Vitals Were     Pulse Temperature Height Last Period  "Pulse Oximetry BMI (Body Mass Index)    118 99.3  F (37.4  C) (Oral) 5' 4\" (1.626 m) 10/04/2006 100% 29.73 kg/m2       Blood Pressure from Last 3 Encounters:   07/20/18 122/70   06/19/18 131/76   06/01/18 122/80    Weight from Last 3 Encounters:   07/20/18 173 lb 3.2 oz (78.6 kg)   06/19/18 179 lb 12.8 oz (81.6 kg)   06/01/18 181 lb 8 oz (82.3 kg)              We Performed the Following     *UA reflex to Microscopic and Culture ([place] [facility] (except [place] and [place])     Urine Microscopic          Today's Medication Changes          These changes are accurate as of 7/20/18 11:20 AM.  If you have any questions, ask your nurse or doctor.               Start taking these medicines.        Dose/Directions    sulfamethoxazole-trimethoprim 800-160 MG per tablet   Commonly known as:  BACTRIM DS/SEPTRA DS   Used for:  Abscess   Started by:  Temi Marsh MD        Dose:  1 tablet   Take 1 tablet by mouth 2 times daily   Quantity:  14 tablet   Refills:  0            Where to get your medicines      These medications were sent to [facility] [address] [place] [address] [address] AT [facility] [address]  [address], [facility] [address]     Phone:  [phone]     sulfamethoxazole-trimethoprim 800-160 MG per tablet                Primary Care Provider Office Phone # Fax #    Temi Marsh [phone]       [address] [facility] [address]        Equal Access to Services     ANIRUDH YE AH: Hadalexey sanchez Sorobin, waaxda luqadaha, qaybta kaalmakayla nuñez. So [facility] [phone].    ATENCIÓN: Si habla español, tiene a lema disposición servicios gratuitos de asistencia lingüística. Denilson al [phone].    We comply with applicable federal civil rights laws and [place] laws. We do not discriminate on the basis of race, color, national origin, age, disability, sex, sexual orientation, or " gender identity.            Thank you!     Thank you for choosing [facility]  for your care. Our goal is always to provide you with excellent care. Hearing back from our patients is one way we can continue to improve our services. Please take a few minutes to complete the written survey that you may receive in the mail after your visit with us. Thank you!             Your Updated Medication List - Protect others around you: Learn how to safely use, store and throw away your medicines at www.disposemymeds.org.          This list is accurate as of 7/20/18 11:20 AM.  Always use your most recent med list.                   Brand Name Dispense Instructions for use Diagnosis    albuterol 108 (90 Base) MCG/ACT Inhaler    PROVENTIL HFA    1 Inhaler    Inhale 2 puffs into the lungs every 6 hours    Mild intermittent asthma without complication       BASAGLAR 100 UNIT/ML injection     3 mL    Inject 12 Units Subcutaneous daily    Type 2 diabetes mellitus with hyperglycemia, without long-term current use of insulin (H)       blood glucose monitoring meter device kit    no brand specified    1 kit    Use to test blood sugar 1 times daily or as directed.  Please provide all supplies.    Type 2 diabetes mellitus with hyperglycemia, without long-term current use of insulin (H)       blood glucose monitoring test strip    no brand specified    100 strip    Use to test blood sugars up to 4 times daily as needed    Type 2 diabetes mellitus with hyperglycemia, without long-term current use of insulin (H)       cyclobenzaprine 10 MG tablet    FLEXERIL    30 tablet    Take 1 tablet (10 mg) by mouth nightly as needed for muscle spasms    Back muscle spasm       dextroamphetamine 10 MG tablet    DEXTROSTAT     Take 10 mg by mouth daily Takes 1-2 tablets daily        DULoxetine 20 MG EC capsule    CYMBALTA    90 capsule    TAKE 1 CAPSULE(20 MG) BY MOUTH DAILY    Antidepressant discontinuation syndrome, subsequent encounter        EPINEPHrine 0.3 MG/0.3ML injection 2-pack    EPIPEN/ADRENACLICK/or ANY BX GENERIC EQUIV    2 each    Inject 0.3 mLs (0.3 mg) into the muscle once as needed    Bee sting allergy       estrogens (conjugated) 1.25 MG tablet    PREMARIN    90 tablet    Take 1 tablet (1.25 mg) by mouth daily    Menopause       FLONASE 50 MCG/ACT spray   Generic drug:  fluticasone      Spray 1-2 sprays into both nostrils daily as needed for rhinitis or allergies        insulin pen needle 31G X 5 MM    B-D U/F    100 each    Use once daily as directed with Basaglar pen.    Type 2 diabetes mellitus with hyperglycemia, without long-term current use of insulin (H)       metFORMIN 500 MG 24 hr tablet    GLUCOPHAGE-XR    360 tablet    Take 1 tablet (500 mg) by mouth daily (with dinner) After 2 weeks, if tolerating, increase to 2 daily. After 2 more weeks, if tolerating, increase to 3 daily. After another 2 weeks, if tolerating, increase to 4 daily.    Type 2 diabetes mellitus with hyperglycemia, without long-term current use of insulin (H)       ranitidine 150 MG tablet    ZANTAC     Take 150 mg by mouth 2 times daily        sulfamethoxazole-trimethoprim 800-160 MG per tablet    BACTRIM DS/SEPTRA DS    14 tablet    Take 1 tablet by mouth 2 times daily    Abscess       valsartan 320 MG tablet    DIOVAN    90 tablet    TAKE 1 TABLET BY MOUTH DAILY    Hypertension goal BP (blood pressure) < 140/90

## 2018-07-20 NOTE — PROGRESS NOTES
"  SUBJECTIVE:   Sharmaine José is a 38 year old female who presents to clinic today for the following health issues:      Abscess         Duration: x 1 week ago     Description (location/character/radiation): left bjmri-[id number]-brcpmx than marble size    Intensity:  Moderate to severe    Accompanying signs and symptoms: fever 102     History (similar episodes/previous evaluation): None    Precipitating or alleviating factors: started as small lump-\"thought was an ingrown hair and now has gotten larger-    Therapies tried and outcome: warm compresses and warm baths- not effective           Problem list and histories reviewed & adjusted, as indicated.  Additional history:     See under ROS     Patient Active Problem List   Diagnosis     Allergic state     Mild intermittent asthma     Generalized hyperhidrosis     Tobacco use disorder     Interstitial cystitis     Surgical menopause     Narcolepsy     Cervical dysplasia     Chronic pain     CARDIOVASCULAR SCREENING; LDL GOAL LESS THAN 160     Migraine     Lumbago     Hyperlipidemia LDL goal <160     H/O: hysterectomy     Health Care Home     Discoid lupus     Generalized anxiety disorder     Fatty infiltration of liver     Primary narcolepsy without cataplexy     Hypertension goal BP (blood pressure) < 140/90     elevated bmi (H)     Hypertriglyceridemia     High triglycerides     Elevated hemoglobin A1c     Type 2 diabetes mellitus with hyperglycemia, without long-term current use of insulin (H)       Current Outpatient Prescriptions   Medication Sig Dispense Refill     albuterol (PROVENTIL HFA) 108 (90 Base) MCG/ACT Inhaler Inhale 2 puffs into the lungs every 6 hours 1 Inhaler 0     blood glucose monitoring (NO BRAND SPECIFIED) meter device kit Use to test blood sugar 1 times daily or as directed.  Please provide all supplies. 1 kit 0     blood glucose monitoring (NO BRAND SPECIFIED) test strip Use to test blood sugars up to 4 times daily as needed 100 strip prn "     dextroamphetamine (DEXTROSTAT) 10 MG tablet Take 10 mg by mouth daily Takes 1-2 tablets daily  0     DULoxetine (CYMBALTA) 20 MG EC capsule TAKE 1 CAPSULE(20 MG) BY MOUTH DAILY 90 capsule 0     EPINEPHrine (EPIPEN) 0.3 MG/0.3ML injection Inject 0.3 mLs (0.3 mg) into the muscle once as needed 2 each 1     estrogens, conjugated, (PREMARIN) 1.25 MG tablet Take 1 tablet (1.25 mg) by mouth daily 90 tablet 3     fluticasone (FLONASE) 50 MCG/ACT nasal spray Spray 1-2 sprays into both nostrils daily as needed for rhinitis or allergies       insulin pen needle (B-D U/F) 31G X 5 MM Use once daily as directed with Basaglar pen. 100 each 1     metFORMIN (GLUCOPHAGE-XR) 500 MG 24 hr tablet Take 1 tablet (500 mg) by mouth daily (with dinner) After 2 weeks, if tolerating, increase to 2 daily. After 2 more weeks, if tolerating, increase to 3 daily. After another 2 weeks, if tolerating, increase to 4 daily. 360 tablet 0     ranitidine (ZANTAC) 150 MG tablet Take 150 mg by mouth 2 times daily       valsartan (DIOVAN) 320 MG tablet TAKE 1 TABLET BY MOUTH DAILY 90 tablet 1     BASAGLAR 100 UNIT/ML injection Inject 16 Units Subcutaneous daily . Titrate as directed 3 mL 1     cyclobenzaprine (FLEXERIL) 10 MG tablet Take 1 tablet (10 mg) by mouth nightly as needed for muscle spasms 30 tablet 0     irbesartan (AVAPRO) 150 MG tablet Take 1 tablet (150 mg) by mouth daily 90 tablet 0                       Reviewed and updated as needed this visit by clinical staff  Tobacco  Allergies  Meds  Med Hx  Surg Hx  Fam Hx  Soc Hx      Reviewed and updated as needed this visit by Provider         ROS:  CONSTITUTIONAL:NEGATIVE for fever, chills, change in weight  CV: NEGATIVE for chest pain, palpitations or peripheral edema  : see below  PSYCHIATRIC: NEGATIVE for changes in mood or affect    Couple weeks ago, notes a lump in the vaginal area. It seemed like an ingrown hair.  Worsened. Did try heat.  Yesterday, flared.     Did have some  "blood in her urine. Felt like could not go for awhile; was bloody then went away. No dysuria.    OBJECTIVE:     /70 (BP Location: Right arm, Cuff Size: Adult Large)  Pulse 118  Temp 99.3  F (37.4  C) (Oral)  Ht 5' 4\" (1.626 m)  Wt 173 lb 3.2 oz (78.6 kg)  LMP 10/04/2006  SpO2 100%  BMI 29.73 kg/m2  Body mass index is 29.73 kg/(m^2).  GENERAL APPEARANCE: alert and no distress  ABDOMEN: soft, nontender, without hepatosplenomegaly or masses   (female): there is a palpable lump in the labia majora, left side. No erythema. Tender. ~ 3/4 cm diameter. Did not feel fluctuance.  PSYCH: mentation appears normal and affect normal/bright    Component      Latest Ref Rng & Units 7/20/2018   Color Urine       Yellow   Appearance Urine       Clear   Glucose Urine      NEG:Negative mg/dL Negative   Bilirubin Urine      NEG:Negative Negative   Ketones Urine      NEG:Negative mg/dL Negative   Specific Gravity Urine      1.003 - 1.035 1.015   Blood Urine      NEG:Negative Trace (A)   pH Urine      5.0 - 7.0 pH 5.5   Protein Albumin Urine      NEG:Negative mg/dL Negative   Urobilinogen Urine      0.2 - 1.0 EU/dL 0.2   Nitrite Urine      NEG:Negative Negative   Leukocyte Esterase Urine      NEG:Negative Negative   Source       Midstream Urine   WBC Urine      OTO5:0 - 5 /HPF 0 - 5   RBC Urine      OTO2:O - 2 /HPF O - 2   Squamous Epithelial /LPF Urine      FEW:Few /LPF Few           ASSESSMENT/PLAN:     Left genital labial abscess  At this time, it feels somewhat deep. Do not feel fluctuance.  Was give Septra. Recommend heat. Discussed low threshold for seeing GYN.     Gross hematuria  Strongly consider Urology.  At this time, some uncertainty about where it is coming from.   - *UA reflex to Microscopic and Culture ([facility] (except [place] and [place])  - Urine Microscopic    Difficulty urinating  As above.   - *UA reflex to Microscopic and Culture ([facility] (except [place] and " [place])      Patient Instructions       I would have a low threshold for having you visit a Urologist if you have additional bleeding or continued symptoms.      Heat.  Let us know if not improving.      Temi Marsh MD, MD  [facility]

## 2018-07-20 NOTE — PATIENT INSTRUCTIONS
I would have a low threshold for having you visit a Urologist if you have additional bleeding or continued symptoms.      Heat.  Let us know if not improving.

## 2018-07-20 NOTE — LETTER
[facility]  [address] [facility] [address]  [phone]          July 20, 2018  Sharonda José                                                                                                                     [address] [facility] [address]            Dear To Whom it May Concern:      Sharmaine was seen in clinic today.  Please excuse her absence from school.        Sincerely,         Temi Marsh MD

## 2018-07-22 ENCOUNTER — E-VISIT (OUTPATIENT)
Dept: FAMILY MEDICINE | Facility: CLINIC | Age: 38
End: 2018-07-22
Payer: COMMERCIAL

## 2018-07-22 DIAGNOSIS — N94.9 PAIN OF FEMALE GENITALIA: Primary | ICD-10-CM

## 2018-07-22 PROCEDURE — 99444 ZZC PHYSICIAN ONLINE EVALUATION & MANAGEMENT SERVICE: CPT | Performed by: FAMILY MEDICINE

## 2018-07-23 RX ORDER — LIDOCAINE/PRILOCAINE 2.5 %-2.5%
CREAM (GRAM) TOPICAL PRN
Qty: 30 G | Refills: 0 | Status: SHIPPED | OUTPATIENT
Start: 2018-07-23 | End: 2019-01-03

## 2018-07-25 DIAGNOSIS — I10 HYPERTENSION GOAL BP (BLOOD PRESSURE) < 140/90: ICD-10-CM

## 2018-07-25 DIAGNOSIS — E11.65 TYPE 2 DIABETES MELLITUS WITH HYPERGLYCEMIA, WITHOUT LONG-TERM CURRENT USE OF INSULIN (H): ICD-10-CM

## 2018-07-25 RX ORDER — INSULIN GLARGINE 100 [IU]/ML
16 INJECTION, SOLUTION SUBCUTANEOUS DAILY
Qty: 3 ML | Refills: 1 | Status: SHIPPED | OUTPATIENT
Start: 2018-07-25 | End: 2018-08-28

## 2018-07-25 RX ORDER — IRBESARTAN 150 MG/1
150 TABLET ORAL DAILY
Qty: 90 TABLET | Refills: 0 | Status: SHIPPED | OUTPATIENT
Start: 2018-07-25 | End: 2018-10-20

## 2018-07-25 NOTE — TELEPHONE ENCOUNTER
Pt calls.  She went to refill her valsartan.  It is being recalled.  Advised to call her insurance to see what they cover.  She says her bp was in the 90/50's at home.  She is wondering if she could decrease the dose.  She says she does is not going to call her insurance.  She does not care because she does pay for it.      She says she is using 16 units of basaglar.  She says she needs an updated script because the old script says 12 units.  She says she was to titrate up 1-2 units every 3 days until her blood sugars are less than 130 in the morning fasting and less than 180 2 hours after dinner.  She said her diabetic educator told her to do this - Isabella Wisdom.  She is out of the [address] [facility], see note of 6/27/18, not seeing any notes about the titration.      She has some insulin for tonight and tomorrow a.m.       Will route to Isabella Wisdom and Dr. Marsh.

## 2018-07-26 NOTE — TELEPHONE ENCOUNTER
Patient returned call. Notified of meds ordered and need for f/u visit beginning of September. Offered to help make appointment. Patient stated she would need to call back to make this appointment.    Shelly RAJPUT, Triage RN

## 2018-07-26 NOTE — TELEPHONE ENCOUNTER
I sent in a different but related medication that would be equivalent to a lower dose of what she is on.    I do want to check things again in usually about a month. Would recommend a clinic visit. If making in September, we could do diabetic check...   Please help her schedule.    I did send in the Basaglar as well.    Thanks!      Drug Low Dose Inter Dose High Dose   Irbesartan (Avapro) 75 150 300   Olmesartan (Benicar) < 20 20 40   Candesartan (Atacand) 8 16 32   Eprosartan (Teveten) 400 600 - 800 -   Losartan (Cozaar) 50 100 -   Telmisartan (Micardis) 20 40 80   Valsartan (Diovan) 80 160 320   Azilsartan (Edarbi) 40 80 -

## 2018-07-27 DIAGNOSIS — L02.91 ABSCESS: ICD-10-CM

## 2018-07-27 DIAGNOSIS — M62.830 BACK MUSCLE SPASM: ICD-10-CM

## 2018-07-27 RX ORDER — CYCLOBENZAPRINE HCL 10 MG
10 TABLET ORAL
Qty: 30 TABLET | Refills: 0 | Status: SHIPPED | OUTPATIENT
Start: 2018-07-27 | End: 2018-08-28

## 2018-07-27 RX ORDER — SULFAMETHOXAZOLE/TRIMETHOPRIM 800-160 MG
1 TABLET ORAL 2 TIMES DAILY
Qty: 10 TABLET | Refills: 0 | Status: SHIPPED | OUTPATIENT
Start: 2018-07-27 | End: 2018-09-03

## 2018-07-27 NOTE — TELEPHONE ENCOUNTER
Routing refill request to provider for review/approval because:  Drug not on the G refill protocol     Patient calling because of abscess and placed on bactrim for 7 days BID. Took last one this morning. Within last 3 days it has stopped getting better. No redness, swelling and fever noted. When she uses the lidocaine cream she has no pain but area is still uncomfortable with activity. It is now very hard. She is wondering will this eventually absorb or will something more need to be done.     Huddled with Dr. Marsh: Continue with 5 days of abx. Lump may not go away, could absorb, but if bothersome, would need to f/u with gynecologist for further treatment options.  Prescription sent to pharmacy. Patient notified and in agreement with plan.    Patient also requesting refill on cylobenzaprine, 10 mg tabs.     Last Written Prescription Date:  6/26/18  Last Fill Quantity: 30,  # refills: roof (???)   Last office visit: 7/20/2018 with prescribing provider:  Salma   Future Office Visit:      Requested Prescriptions   Pending Prescriptions Disp Refills     cyclobenzaprine (FLEXERIL) 10 MG tablet 30 tablet      Sig: Take 1 tablet (10 mg) by mouth nightly as needed for muscle spasms    There is no refill protocol information for this order     Matias Pimentel RN

## 2018-08-01 ENCOUNTER — MYC MEDICAL ADVICE (OUTPATIENT)
Dept: FAMILY MEDICINE | Facility: CLINIC | Age: 38
End: 2018-08-01

## 2018-08-01 ENCOUNTER — TELEPHONE (OUTPATIENT)
Dept: FAMILY MEDICINE | Facility: CLINIC | Age: 38
End: 2018-08-01

## 2018-08-01 DIAGNOSIS — E11.65 TYPE 2 DIABETES MELLITUS WITH HYPERGLYCEMIA, WITHOUT LONG-TERM CURRENT USE OF INSULIN (H): Primary | ICD-10-CM

## 2018-08-01 NOTE — TELEPHONE ENCOUNTER
Patient scheduled an office visit for a med check/follow up.    She would like to have a thyroid lab and and A1C.    Please place orders.

## 2018-08-01 NOTE — TELEPHONE ENCOUNTER
Patient scheduled for OV 8/30. Messaged TC that she would have labs done before visit. Wondering if it would be too soon for an A1c as last one was 6/1.    Is due for TSH per health maintenance.    Please sign orders if approved.    Yolie Pascal RN

## 2018-08-07 ENCOUNTER — OFFICE VISIT (OUTPATIENT)
Dept: PHARMACY | Facility: CLINIC | Age: 38
End: 2018-08-07
Payer: COMMERCIAL

## 2018-08-07 VITALS
BODY MASS INDEX: 29.51 KG/M2 | DIASTOLIC BLOOD PRESSURE: 82 MMHG | WEIGHT: 171.9 LBS | HEART RATE: 96 BPM | SYSTOLIC BLOOD PRESSURE: 129 MMHG

## 2018-08-07 DIAGNOSIS — E11.65 TYPE 2 DIABETES MELLITUS WITH HYPERGLYCEMIA, WITHOUT LONG-TERM CURRENT USE OF INSULIN (H): ICD-10-CM

## 2018-08-07 DIAGNOSIS — Z13.6 CARDIOVASCULAR SCREENING; LDL GOAL LESS THAN 160: Primary | ICD-10-CM

## 2018-08-07 DIAGNOSIS — M79.7 FIBROMYALGIA: ICD-10-CM

## 2018-08-07 DIAGNOSIS — I10 HYPERTENSION GOAL BP (BLOOD PRESSURE) < 140/90: ICD-10-CM

## 2018-08-07 DIAGNOSIS — M62.838 MUSCLE SPASM: ICD-10-CM

## 2018-08-07 PROCEDURE — 99607 MTMS BY PHARM ADDL 15 MIN: CPT | Performed by: PHARMACIST

## 2018-08-07 PROCEDURE — 99606 MTMS BY PHARM EST 15 MIN: CPT | Performed by: PHARMACIST

## 2018-08-07 NOTE — Clinical Note
Francis Mason- could you reach out to Sharmaine via [employer], she has a few diet questions for you?  Thank you

## 2018-08-07 NOTE — PROGRESS NOTES
SUBJECTIVE/OBJECTIVE:                           Sharmaine José is a 38 year old female coming in for a follow-up visit for Medication Therapy Management.  She was referred to me from Dr. Marsh.     Chief Complaint: Last MTM visit on 18.  Diabetes     Allergies/ADRs: Reviewed in Epic  Tobacco: 0-1 pack per day - is not interested in quitting  Alcohol: 1-3 beverages / week  Caffeine: no caffeine  Activity: Walks a lot of with school. 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 Never forgets to take medications.     Diabetes:  Pt currently taking metformin XL 1500 mg once daily (just increased to 3 tablets a couple of days ago) and Basaglar 16 units once daily.  Tried 18 units but then readings in the morning in the 80s so she decreased again.  Having loose stools on metformin.    SMBG: AM, afternoon, bedtime.   Ranges (patient reported):   7 day ave 127, 14 day 130, 30 day ave 134    AM fastin, 111,121, 118, 122  Before Lunch 247, 136, 104  , 112  Before Dinner 85  , 95, 98    Patient is not experiencing hypoglycemia.  Just one time had a low reading in the 70s.    Recent symptoms of high blood sugar? Nauseated, hungry and heart burn if her readings get close to 200  Eye exam: due  Foot exam: due  Microalbumin is < 30 mg/g. Pt is not taking an ACEi/ARB.  Aspirin: Not taking due to age  Diet: Breakfast: Hard boiled eggs and/or Slimfast beverage (6 g carbs each bottle) Lunch: Slimfast beverage. Has been drinking Slimfast beverages for a long time. Drinks Slimfast for years, drinks due to lack of time to make breakfast and lunch. Dinner:  Salad with low-carb dressing, fish, steak, green beans. Doesn't traditionally eat sweets. Was drinking juice frequently, but noticed that blood sugars became higher so stopped. Snack: handful of cheez-its (4-5 crackers) to eat when taking  "medications.   Wt Readings from Last 10 Encounters:   08/07/18 171 lb 14.4 oz (78 kg)   07/20/18 173 lb 3.2 oz (78.6 kg)   06/19/18 179 lb 12.8 oz (81.6 kg)   06/01/18 181 lb 8 oz (82.3 kg)   12/22/17 200 lb 4.8 oz (90.9 kg)   10/02/17 203 lb (92.1 kg)   09/22/17 204 lb (92.5 kg)   07/13/17 212 lb 1.6 oz (96.2 kg)   05/08/17 208 lb 8 oz (94.6 kg)   03/13/17 208 lb 3.2 oz (94.4 kg)     Hyperlipidemia: Current therapy includes no current medications. Was previously on simvastatin and \"another statin\" (doesn't remember name), had muscle pains and felt like she was \"coming down with the flu\".     Hypertension: Current medications include irbesartan 150 mg daily;  Recently changed from valsartan due to recall.  Patient does self-monitor BP. Home BP monitoring in range of 110s-120s's systolic. Patient reports the following medication side effects: nausea but that improved after moving the medication to PM dosing.    Pain/Fibromyalgia (misdiagnosed): Currently taking duloxetine 20 mg daily. Has been wanting to get off of it for a long time; everytime she is late taking her dose she is symptomatic (nausea, electrical feelings).  Historically tapered off venlafaxine and that was very difficult for her as well.  Lymphomas were treated with Botox and now pain has improved.  Also going to chiropractor which has been helpful.    Taking Cyclobenazaprine as needed for pains.        Current labs include:  Today's Vitals: /82  Pulse 96  Wt 171 lb 14.4 oz (78 kg)  LMP 10/04/2006  BMI 29.51 kg/m2  BP Readings from Last 3 Encounters:   07/20/18 122/70   06/19/18 131/76   06/01/18 122/80     Lab Results   Component Value Date    A1C 10.3 06/01/2018   .  Lab Results   Component Value Date    CHOL 316 12/22/2017     Lab Results   Component Value Date    TRIG 586 12/22/2017     Lab Results   Component Value Date    HDL 32 12/22/2017     Lab Results   Component Value Date    LDL  12/22/2017     Cannot estimate LDL when " triglyceride exceeds 400 mg/dL     12/22/2017       Liver Function Studies -   Recent Labs   Lab Test  12/22/17   0942   PROTTOTAL  7.7   ALBUMIN  4.0   BILITOTAL  0.3   ALKPHOS  117   AST  113*   ALT  80*       Lab Results   Component Value Date    UCRR 67 12/22/2017    MICROL 5 12/22/2017    UMALCR 7.81 12/22/2017       Last Basic Metabolic Panel:  Lab Results   Component Value Date     12/22/2017      Lab Results   Component Value Date    POTASSIUM 4.0 12/22/2017     Lab Results   Component Value Date    CHLORIDE 100 12/22/2017     Lab Results   Component Value Date    BUN 14 12/22/2017     Lab Results   Component Value Date    CR 0.55 12/22/2017     GFR Estimate   Date Value Ref Range Status   12/22/2017 >90 >60 mL/min/1.7m2 Final     Comment:     Non  GFR Calc   09/22/2017 >90 >60 mL/min/1.7m2 Final     Comment:     Non  GFR Calc   03/10/2017 >90  Non  GFR Calc   >60 mL/min/1.7m2 Final     Most Recent Immunizations   Administered Date(s) Administered     HEPA 02/12/2004     HepB 02/03/1997     Influenza (IIV3) PF 10/25/2015     Influenza Vaccine IM 3yrs+ 4 Valent IIV4 09/22/2017     Influenza Vaccine, 3 YRS +, IM (QUADRIVALENT W/PRESERVATIVES) 09/28/2016     Mantoux Tuberculin Skin Test 06/30/2004     Meningococcal (Menomune ) 07/19/2000     Pneumococcal 23 valent 02/12/2004     Rabies - IM Diploid Cell Culture 09/15/2017     TD (ADULT, 7+) 01/31/2006     TDAP Vaccine (Adacel) 04/19/2011       ASSESSMENT:                             Current medications were reviewed today.     Medication Adherence: good, no issues identified    Diabetes:  Improved.  Home readings look much improved.  Discussed that she will see variability in the readings but overall much better.  She has a few diet questions so will ask Isabella to follow-up with her.  She can try spacing metformin throughout the day to see if GI symptoms improve.    Hyperlipidemia:  stable    Hypertension: stable    Pain/Fibromyalgia (misdiagnosed): suggested taper plan with duloxetine- extending dosing period by 1 hour every week.  If she becomes symptomatic consider starting fluoxetine, which is a long-acting medication and would help with the taper.    PLAN:                            1.  Will have Isabella reach out about carbs and blood sugar responses  2.  Try taking metformin throughout the day, 2 tablets after evening meal and 1 after breakfast  3.  Consider extending duloxetine dosing by 1 hour every 1 week.      I spent 45 minutes with this patient today. I offer these suggestions for consideration by the PCP. A copy of the visit note was provided to the patient's primary care provider.    Will follow up in 1 month.       The patient was given a summary of these recommendations as an after visit summary.     Mandy Robertson , Pharm D  [phone] (phone)  [phone] (pager)  Medication Therapy Management Pharmacist

## 2018-08-07 NOTE — MR AVS SNAPSHOT
After Visit Summary   8/7/2018    Sharmaine José    MRN: [id number]           Patient Information     Date Of Birth          1980        Visit Information        Provider Department      8/7/2018 12:00 PM Mandy Robertson, [facility] MTM        Care Instructions    Recommendations from today's MTM visit:                                                      1.  Will have Isabella reach out about carb intake and blood sugar responses    2.  Try taking metformin throughout the day, 2 tablets after evening meal and 1 after breakfast    3.  Consider extending duloxetine dosing by 1 hour every 1 week; for example instead of taking every 24-hours you can try taking every 25 hours.  If you get withdrawal symptoms, we could consider starting fluoxetine.      Next MTM visit: 1 month    To schedule another MTM appointment, please call the clinic directly or you may call the MTM scheduling line at [phone] or toll-free at [phone].     My Clinical Pharmacist's contact information:                                                      It was a pleasure seeing you today!  Please feel free to contact me with any questions or concerns you have.      Mandy Robertson , Pharm D  [phone] (phone)  [phone] (pager)  Medication Therapy Management Pharmacist     You may receive a survey about the MTM services you received.  I would appreciate your feedback to help me serve you better in the future. Please fill it out and return it when you can. Your comments will be anonymous.                        Follow-ups after your visit        Your next 10 appointments already scheduled     Aug 30, 2018  8:30 AM NENA Pitts with Temi Marsh MD   [facility]    [address] [address] [facility] [address] [phone]              Who to contact     If you have questions or need follow up information about today's clinic visit or your schedule please  contact [facility] directly at [phone].  Normal or non-critical lab and imaging results will be communicated to you by MyChart, letter or phone within 4 business days after the clinic has received the results. If you do not hear from us within 7 days, please contact the clinic through [employer] or phone. If you have a critical or abnormal lab result, we will notify you by phone as soon as possible.  Submit refill requests through [facility] or call your pharmacy and they will forward the refill request to us. Please allow 3 business days for your refill to be completed.          Additional Information About Your Visit        [employer] Information     [facility] gives you secure access to your electronic health record. If you see a primary care provider, you can also send messages to your care team and make appointments. If you have questions, please call your primary care clinic.  If you do not have a primary care provider, please call [phone] and they will assist you.        Care EveryWhere ID     This is your Care EveryWhere ID. This could be used by other organizations to access your [place] medical records  [id number]        Your Vitals Were     Pulse Last Period BMI (Body Mass Index)             96 10/04/2006 29.51 kg/m2          Blood Pressure from Last 3 Encounters:   08/07/18 129/82   07/20/18 122/70   06/19/18 131/76    Weight from Last 3 Encounters:   08/07/18 171 lb 14.4 oz (78 kg)   07/20/18 173 lb 3.2 oz (78.6 kg)   06/19/18 179 lb 12.8 oz (81.6 kg)              Today, you had the following     No orders found for display         Today's Medication Changes          These changes are accurate as of 8/7/18 12:05 PM.  If you have any questions, ask your nurse or doctor.               Stop taking these medicines if you haven't already. Please contact your care team if you have questions.     valsartan 320 MG tablet   Commonly known as:  DIOVAN   Stopped by:  Mandy Robertson, [facility]                     Primary Care Provider Office Phone # Fax #    Temi Marsh [phone]       [address] [address] [facility] [address]        Equal Access to Services     ROCKANIRUDH CASSI : Hadii kelly ramírez osielo Somoniqueali, waaxda luqadaha, qaybta kaalmada aderasda, kayla nazario lareivance morgan. So [facility] [phone].    ATENCIÓN: Si habla español, tiene a lema disposición servicios gratuitos de asistencia lingüística. Llame al [phone].    We comply with applicable federal civil rights laws and [place] laws. We do not discriminate on the basis of race, color, national origin, age, disability, sex, sexual orientation, or gender identity.            Thank you!     Thank you for choosing [facility]  for your care. Our goal is always to provide you with excellent care. Hearing back from our patients is one way we can continue to improve our services. Please take a few minutes to complete the written survey that you may receive in the mail after your visit with us. Thank you!             Your Updated Medication List - Protect others around you: Learn how to safely use, store and throw away your medicines at www.disposemymeds.org.          This list is accurate as of 8/7/18 12:05 PM.  Always use your most recent med list.                   Brand Name Dispense Instructions for use Diagnosis    albuterol 108 (90 Base) MCG/ACT Inhaler    PROVENTIL HFA    1 Inhaler    Inhale 2 puffs into the lungs every 6 hours    Mild intermittent asthma without complication       BASAGLAR 100 UNIT/ML injection     3 mL    Inject 16 Units Subcutaneous daily . Titrate as directed    Type 2 diabetes mellitus with hyperglycemia, without long-term current use of insulin (H)       blood glucose monitoring meter device kit    no brand specified    1 kit    Use to test blood sugar 1 times daily or as directed.  Please provide all supplies.    Type 2 diabetes mellitus with hyperglycemia, without long-term current use of insulin  (H)       blood glucose monitoring test strip    no brand specified    100 strip    Use to test blood sugars up to 4 times daily as needed    Type 2 diabetes mellitus with hyperglycemia, without long-term current use of insulin (H)       cyclobenzaprine 10 MG tablet    FLEXERIL    30 tablet    Take 1 tablet (10 mg) by mouth nightly as needed for muscle spasms    Back muscle spasm       dextroamphetamine 10 MG tablet    DEXTROSTAT     Take 10 mg by mouth daily Takes 1-2 tablets daily        DULoxetine 20 MG EC capsule    CYMBALTA    90 capsule    TAKE 1 CAPSULE(20 MG) BY MOUTH DAILY    Antidepressant discontinuation syndrome, subsequent encounter       EPINEPHrine 0.3 MG/0.3ML injection 2-pack    EPIPEN/ADRENACLICK/or ANY BX GENERIC EQUIV    2 each    Inject 0.3 mLs (0.3 mg) into the muscle once as needed    Bee sting allergy       estrogens (conjugated) 1.25 MG tablet    PREMARIN    90 tablet    Take 1 tablet (1.25 mg) by mouth daily    Menopause       FLONASE 50 MCG/ACT spray   Generic drug:  fluticasone      Spray 1-2 sprays into both nostrils daily as needed for rhinitis or allergies        insulin pen needle 31G X 5 MM    B-D U/F    100 each    Use once daily as directed with Basaglar pen.    Type 2 diabetes mellitus with hyperglycemia, without long-term current use of insulin (H)       irbesartan 150 MG tablet    AVAPRO    90 tablet    Take 1 tablet (150 mg) by mouth daily    Hypertension goal BP (blood pressure) < 140/90       lidocaine-prilocaine cream    EMLA    30 g    Apply topically as needed for moderate pain    Pain of female genitalia       metFORMIN 500 MG 24 hr tablet    GLUCOPHAGE-XR    360 tablet    Take 1 tablet (500 mg) by mouth daily (with dinner) After 2 weeks, if tolerating, increase to 2 daily. After 2 more weeks, if tolerating, increase to 3 daily. After another 2 weeks, if tolerating, increase to 4 daily.    Type 2 diabetes mellitus with hyperglycemia, without long-term current use of  insulin (H)       ranitidine 150 MG tablet    ZANTAC     Take 150 mg by mouth 2 times daily        sulfamethoxazole-trimethoprim 800-160 MG per tablet    BACTRIM DS/SEPTRA DS    10 tablet    Take 1 tablet by mouth 2 times daily    Abscess

## 2018-08-07 NOTE — PATIENT INSTRUCTIONS
Recommendations from today's MTM visit:                                                      1.  Will have Isabella reach out about carb intake and blood sugar responses    2.  Try taking metformin throughout the day, 2 tablets after evening meal and 1 after breakfast    3.  Consider extending duloxetine dosing by 1 hour every 1 week; for example instead of taking every 24-hours you can try taking every 25 hours.  If you get withdrawal symptoms, we could consider starting fluoxetine.      Next MTM visit: 1 month    To schedule another MTM appointment, please call the clinic directly or you may call the MTM scheduling line at [phone] or toll-free at [phone].     My Clinical Pharmacist's contact information:                                                      It was a pleasure seeing you today!  Please feel free to contact me with any questions or concerns you have.      Mandy Robertson , Pharm D  [phone] (phone)  [phone] (pager)  Medication Therapy Management Pharmacist     You may receive a survey about the MTM services you received.  I would appreciate your feedback to help me serve you better in the future. Please fill it out and return it when you can. Your comments will be anonymous.

## 2018-08-28 DIAGNOSIS — E11.65 TYPE 2 DIABETES MELLITUS WITH HYPERGLYCEMIA, WITHOUT LONG-TERM CURRENT USE OF INSULIN (H): ICD-10-CM

## 2018-08-28 DIAGNOSIS — M62.830 BACK MUSCLE SPASM: ICD-10-CM

## 2018-08-28 RX ORDER — CYCLOBENZAPRINE HCL 10 MG
10 TABLET ORAL
Qty: 30 TABLET | Refills: 0 | Status: SHIPPED | OUTPATIENT
Start: 2018-08-28 | End: 2018-10-02

## 2018-08-28 RX ORDER — INSULIN GLARGINE 100 [IU]/ML
INJECTION, SOLUTION SUBCUTANEOUS
Qty: 3 ML | Refills: 0 | Status: SHIPPED | OUTPATIENT
Start: 2018-08-28 | End: 2018-09-11

## 2018-08-28 NOTE — TELEPHONE ENCOUNTER
Should have one refill left, but filled.  Patient is due for appointment.  Scheduled;  Next 5 appointments (look out 90 days)     Aug 30, 2018  8:30 AM CDT   Callie Pitts with Temi Marsh MD   [facility]    [address] [facility] [address]   [phone]                Flexeril-Routing refill request to provider for review/approval because:  Drug not on the [facility] refill protocol       Prescription approved per [facility] Refill Protocol.  Prachi Chandler RN  Message handled by Nurse Triage.

## 2018-08-30 ENCOUNTER — OFFICE VISIT (OUTPATIENT)
Dept: FAMILY MEDICINE | Facility: CLINIC | Age: 38
End: 2018-08-30
Payer: COMMERCIAL

## 2018-08-30 VITALS
HEART RATE: 121 BPM | SYSTOLIC BLOOD PRESSURE: 118 MMHG | DIASTOLIC BLOOD PRESSURE: 76 MMHG | TEMPERATURE: 100.2 F | BODY MASS INDEX: 28.19 KG/M2 | HEIGHT: 64 IN | WEIGHT: 165.1 LBS | OXYGEN SATURATION: 98 % | RESPIRATION RATE: 16 BRPM

## 2018-08-30 DIAGNOSIS — E11.65 TYPE 2 DIABETES MELLITUS WITH HYPERGLYCEMIA, WITHOUT LONG-TERM CURRENT USE OF INSULIN (H): ICD-10-CM

## 2018-08-30 DIAGNOSIS — I10 HYPERTENSION GOAL BP (BLOOD PRESSURE) < 140/90: Primary | ICD-10-CM

## 2018-08-30 DIAGNOSIS — Z13.6 CARDIOVASCULAR SCREENING; LDL GOAL LESS THAN 160: ICD-10-CM

## 2018-08-30 DIAGNOSIS — E78.5 HYPERLIPIDEMIA WITH TARGET LDL LESS THAN 100: ICD-10-CM

## 2018-08-30 DIAGNOSIS — F17.200 TOBACCO USE DISORDER: ICD-10-CM

## 2018-08-30 PROBLEM — E78.1 HIGH TRIGLYCERIDES: Status: RESOLVED | Noted: 2017-03-16 | Resolved: 2018-08-30

## 2018-08-30 PROCEDURE — 99214 OFFICE O/P EST MOD 30 MIN: CPT | Performed by: FAMILY MEDICINE

## 2018-08-30 PROCEDURE — 99207 C FOOT EXAM  NO CHARGE: CPT | Performed by: FAMILY MEDICINE

## 2018-08-30 NOTE — MR AVS SNAPSHOT
After Visit Summary   8/30/2018    Sharmaine José    MRN: [id number]           Patient Information     Date Of Birth          1980        Visit Information        Provider Department      8/30/2018 8:30 AM Temi Marsh MD [facility]        Today's Diagnoses     Hypertension goal BP (blood pressure) < 140/90    -  1    Type 2 diabetes mellitus with hyperglycemia, without long-term current use of insulin (H)        Hyperlipidemia with target LDL less than 100        Tobacco use disorder           Follow-ups after your visit        Additional Services     ENDOCRINOLOGY ADULT REFERRAL       Your provider has referred you to: FM[facility]: [facility] [phone]   [email or web address]/  FM[facility]: [facility] [phone]   [email or web address]/[place]/  FM[facility]: [facility] [phone]   [email or web address]/[place]/      Please be aware that coverage of these services is subject to the terms and limitations of your health insurance plan.  Call member services at your health plan with any benefit or coverage questions.      Please bring the following to your appointment:    >>   Any x-rays, CTs or MRIs which have been performed.  Contact the facility where they were done to arrange for  prior to your scheduled appointment.    >>   List of current medications   >>   This referral request   >>   Any documents/labs given to you for this referral                  Follow-up notes from your care team     Return in about 2 weeks (around 9/13/2018).      Future tests that were ordered for you today     Open Future Orders        Priority Expected Expires Ordered    Lipid panel reflex to direct LDL Fasting Routine 8/30/2018 9/13/2018 8/30/2018    Comprehensive metabolic panel Routine  11/30/2018 8/30/2018            Who to contact     If you have questions or need follow  "up information about today's clinic visit or your schedule please contact [facility] directly at [phone].  Normal or non-critical lab and imaging results will be communicated to you by MyChart, letter or phone within 4 business days after the clinic has received the results. If you do not hear from us within 7 days, please contact the clinic through [facility] or phone. If you have a critical or abnormal lab result, we will notify you by phone as soon as possible.  Submit refill requests through [facility] or call your pharmacy and they will forward the refill request to us. Please allow 3 business days for your refill to be completed.          Additional Information About Your Visit        [address] Information     [facility] gives you secure access to your electronic health record. If you see a primary care provider, you can also send messages to your care team and make appointments. If you have questions, please call your primary care clinic.  If you do not have a primary care provider, please call [phone] and they will assist you.        Care EveryWhere ID     This is your Care EveryWhere ID. This could be used by other organizations to access your [place] medical records  [id number]        Your Vitals Were     Pulse Temperature Respirations Height Last Period Pulse Oximetry    121 100.2  F (37.9  C) (Oral) 16 5' 4\" (1.626 m) 10/04/2006 98%    BMI (Body Mass Index)                   28.34 kg/m2            Blood Pressure from Last 3 Encounters:   08/30/18 118/76   08/07/18 129/82   07/20/18 122/70    Weight from Last 3 Encounters:   08/30/18 165 lb 1.6 oz (74.9 kg)   08/07/18 171 lb 14.4 oz (78 kg)   07/20/18 173 lb 3.2 oz (78.6 kg)              We Performed the Following     ENDOCRINOLOGY ADULT REFERRAL     FOOT EXAM     JUST IN CASE        Primary Care Provider Office Phone # Fax #    Temi Marsh [phone]       [id number]  [facility] [phone]        Equal Access to " Services     [facility]: Hadii aad ku hadanujojo Werner, waaxda luqadaha, qaybta kaalmaerika banegas, kayla braga . So [facility] [phone].    ATENCIÓN: Si habla espbrina, tiene a lema disposición servicios gratuitos de asistencia lingüística. Llame al [phone].    We comply with applicable federal civil rights laws and [place] laws. We do not discriminate on the basis of race, color, national origin, age, disability, sex, sexual orientation, or gender identity.            Thank you!     Thank you for choosing [facility]  for your care. Our goal is always to provide you with excellent care. Hearing back from our patients is one way we can continue to improve our services. Please take a few minutes to complete the written survey that you may receive in the mail after your visit with us. Thank you!             Your Updated Medication List - Protect others around you: Learn how to safely use, store and throw away your medicines at www.disposemymeds.org.          This list is accurate as of 8/30/18  8:59 AM.  Always use your most recent med list.                   Brand Name Dispense Instructions for use Diagnosis    albuterol 108 (90 Base) MCG/ACT inhaler    PROVENTIL HFA    1 Inhaler    Inhale 2 puffs into the lungs every 6 hours    Mild intermittent asthma without complication       BASAGLAR 100 UNIT/ML injection     3 mL    INJECT 16 UNITS UNDER THE SKIN DAILY. TITRATE AS DIRECTED    Type 2 diabetes mellitus with hyperglycemia, without long-term current use of insulin (H)       blood glucose monitoring meter device kit    no brand specified    1 kit    Use to test blood sugar 1 times daily or as directed.  Please provide all supplies.    Type 2 diabetes mellitus with hyperglycemia, without long-term current use of insulin (H)       blood glucose monitoring test strip    no brand specified    100 strip    Use to test blood sugars up to 4 times daily as needed    Type 2  diabetes mellitus with hyperglycemia, without long-term current use of insulin (H)       cyclobenzaprine 10 MG tablet    FLEXERIL    30 tablet    Take 1 tablet (10 mg) by mouth nightly as needed for muscle spasms    Back muscle spasm       dextroamphetamine 10 MG tablet    DEXTROSTAT     Take 10 mg by mouth daily Takes 1-2 tablets daily        DULoxetine 20 MG EC capsule    CYMBALTA    90 capsule    TAKE 1 CAPSULE(20 MG) BY MOUTH DAILY    Antidepressant discontinuation syndrome, subsequent encounter       EPINEPHrine 0.3 MG/0.3ML injection 2-pack    EPIPEN/ADRENACLICK/or ANY BX GENERIC EQUIV    2 each    Inject 0.3 mLs (0.3 mg) into the muscle once as needed    Bee sting allergy       estrogens (conjugated) 1.25 MG tablet    PREMARIN    90 tablet    Take 1 tablet (1.25 mg) by mouth daily    Menopause       FLONASE 50 MCG/ACT spray   Generic drug:  fluticasone      Spray 1-2 sprays into both nostrils daily as needed for rhinitis or allergies        insulin pen needle 31G X 5 MM    B-D U/F    100 each    Use once daily as directed with Basaglar pen.    Type 2 diabetes mellitus with hyperglycemia, without long-term current use of insulin (H)       irbesartan 150 MG tablet    AVAPRO    90 tablet    Take 1 tablet (150 mg) by mouth daily    Hypertension goal BP (blood pressure) < 140/90       lidocaine-prilocaine cream    EMLA    30 g    Apply topically as needed for moderate pain    Pain of female genitalia       metFORMIN 500 MG 24 hr tablet    GLUCOPHAGE-XR    360 tablet    Take 1 tablet (500 mg) by mouth daily (with dinner) After 2 weeks, if tolerating, increase to 2 daily. After 2 more weeks, if tolerating, increase to 3 daily. After another 2 weeks, if tolerating, increase to 4 daily.    Type 2 diabetes mellitus with hyperglycemia, without long-term current use of insulin (H)       ranitidine 150 MG tablet    ZANTAC     Take 150 mg by mouth 2 times daily        STATIN NOT PRESCRIBED (INTENTIONAL)      Statin not  prescribed intentionally due to {CHOOSE REASON BEFORE SIGNING!!!:229951}    CARDIOVASCULAR SCREENING; LDL GOAL LESS THAN 160       sulfamethoxazole-trimethoprim 800-160 MG per tablet    BACTRIM DS/SEPTRA DS    10 tablet    Take 1 tablet by mouth 2 times daily    Abscess

## 2018-08-30 NOTE — PROGRESS NOTES
SUBJECTIVE:   Sharmaine José is a 38 year old female who presents to clinic today for the following health issues:      Diabetes Follow-up    Patient is checking blood sugars: three times daily.   Blood sugar testing frequency justification: Uncontrolled diabetes and on insulin  Results are as follows:         am - 100-130         Two hours after dinner and random times- 120-200- random times     Diabetic concerns: None and other - seeing endocrinologist- worried about checking blood sugars during school classes.     Symptoms of hypoglycemia (low blood sugar): shaky, cold, sweating, anxious     Paresthesias (numbness or burning in feet) or sores: No- in the fingers     Date of last diabetic eye exam: none     BP Readings from Last 2 Encounters:   08/07/18 129/82   07/20/18 122/70     Hemoglobin A1C (%)   Date Value   06/01/2018 10.3 (H)   12/22/2017 7.4 (H)     LDL Cholesterol Calculated (mg/dL)   Date Value   12/22/2017     Cannot estimate LDL when triglyceride exceeds 400 mg/dL   03/10/2017     Cannot estimate LDL when triglyceride exceeds 400 mg/dL     LDL Cholesterol Direct (mg/dL)   Date Value   12/22/2017 216 (H)   03/10/2017 145 (H)       Diabetes Management Resources  Hypertension Follow-up      Outpatient blood pressures are not being checked.    Low Salt Diet: no added salt      Amount of exercise or physical activity: moderate    Problems taking medications regularly: No    Medication side effects: diarrhea     Diet: regular (no restrictions)            Problem list and histories reviewed & adjusted, as indicated.  Additional history:     See under ROS     Patient Active Problem List   Diagnosis     Allergic state     Mild intermittent asthma     Generalized hyperhidrosis     Tobacco use disorder     Interstitial cystitis     Surgical menopause     Narcolepsy     Cervical dysplasia     Chronic pain     Migraine     Lumbago     Hyperlipidemia LDL goal <160     H/O: hysterectomy     Health Care  Home     Discoid lupus     Generalized anxiety disorder     Fatty infiltration of liver     Primary narcolepsy without cataplexy     Hypertension goal BP (blood pressure) < 140/90     elevated bmi (H)     Hypertriglyceridemia     High triglycerides     Type 2 diabetes mellitus with hyperglycemia, without long-term current use of insulin (H)       Current Outpatient Prescriptions   Medication Sig Dispense Refill     albuterol (PROVENTIL HFA) 108 (90 Base) MCG/ACT Inhaler Inhale 2 puffs into the lungs every 6 hours 1 Inhaler 0     BASAGLAR 100 UNIT/ML injection INJECT 16 UNITS UNDER THE SKIN DAILY. TITRATE AS DIRECTED 3 mL 0     cyclobenzaprine (FLEXERIL) 10 MG tablet Take 1 tablet (10 mg) by mouth nightly as needed for muscle spasms 30 tablet 0     dextroamphetamine (DEXTROSTAT) 10 MG tablet Take 10 mg by mouth daily Takes 1-2 tablets daily  0     DULoxetine (CYMBALTA) 20 MG EC capsule TAKE 1 CAPSULE(20 MG) BY MOUTH DAILY 90 capsule 0     EPINEPHrine (EPIPEN) 0.3 MG/0.3ML injection Inject 0.3 mLs (0.3 mg) into the muscle once as needed 2 each 1     estrogens, conjugated, (PREMARIN) 1.25 MG tablet Take 1 tablet (1.25 mg) by mouth daily 90 tablet 3     fluticasone (FLONASE) 50 MCG/ACT nasal spray Spray 1-2 sprays into both nostrils daily as needed for rhinitis or allergies       irbesartan (AVAPRO) 150 MG tablet Take 1 tablet (150 mg) by mouth daily 90 tablet 0     lidocaine-prilocaine (EMLA) cream Apply topically as needed for moderate pain 30 g 0     metFORMIN (GLUCOPHAGE-XR) 500 MG 24 hr tablet Take 2 tablets (1,000 mg) by mouth daily (with dinner) 360 tablet 0     ranitidine (ZANTAC) 150 MG tablet Take 150 mg by mouth 2 times daily       STATIN NOT PRESCRIBED, INTENTIONAL, Statin not prescribed intentionally due to Other drug intolerance; more than 5 years trial (This option does not exclude patient from measure)       [DISCONTINUED] metFORMIN (GLUCOPHAGE-XR) 500 MG 24 hr tablet Take 1 tablet (500 mg) by mouth  "daily (with dinner) After 2 weeks, if tolerating, increase to 2 daily. After 2 more weeks, if tolerating, increase to 3 daily. After another 2 weeks, if tolerating, increase to 4 daily. 360 tablet 0       Reviewed and updated as needed this visit by clinical staff       Reviewed and updated as needed this visit by Provider         ROS:  CONSTITUTIONAL:NEGATIVE for fever, chills, change in weight x weight loss as she has been watching things with her new dx DM  RESP:NEGATIVE for significant cough or SOB  CV: NEGATIVE for chest pain, palpitations or peripheral edema  PSYCHIATRIC: NEGATIVE for changes in mood or affect    Diabetic educator questionning seeing Endocrinology; thinking might not be type 2.    Did have sleep study recently. No sleep apnea.  Able to do 2 metformin; unable to do 3.        OBJECTIVE:     /76 (BP Location: Right arm, Cuff Size: Adult Regular)  Pulse 121  Temp 100.2  F (37.9  C) (Oral)  Resp 16  Ht 5' 4\" (1.626 m)  Wt 165 lb 1.6 oz (74.9 kg)  LMP 10/04/2006  SpO2 98%  BMI 28.34 kg/m2  Body mass index is 28.34 kg/(m^2).  GENERAL APPEARANCE: alert and no distress  RESP: lungs clear to auscultation - no rales, rhonchi or wheezes  CV: regular rates and rhythm  MS: no edema.   PSYCH: mentation appears normal and affect normal/bright    DP pulse present bilaterally.  No sores or ulcerations.  Little callous.  Monofilament exam normal.  Couple callouses top of left foot over couple MTPs. Not painful.    Lab Results   Component Value Date    A1C 10.3 06/01/2018    A1C 7.4 12/22/2017    A1C 5.8 05/24/2016    A1C 5.5 02/05/2014    A1C 5.2 11/15/2011     Reviewed diabetic educator note.         ASSESSMENT/PLAN:     Hypertension goal BP (blood pressure) < 140/90  Meeting goal. Continue current dose Irbesartan.   - Comprehensive metabolic panel; Future    Type 2 diabetes mellitus with hyperglycemia, without long-term current use of insulin (H)  A little early to check HgbA1C.   She is " interested in seeing Endocrinology; referral made.   - Comprehensive metabolic panel; Future  - JUST IN CASE  - FOOT EXAM  - Lipid panel reflex to direct LDL Fasting; Future  - ENDOCRINOLOGY ADULT REFERRAL    Hyperlipidemia with target LDL less than 100    - Lipid panel reflex to direct LDL Fasting; Future    Tobacco use disorder  Encourage smoking cessation.        Temi Marsh MD, MD  [facility]

## 2018-08-31 ENCOUNTER — MYC MEDICAL ADVICE (OUTPATIENT)
Dept: FAMILY MEDICINE | Facility: CLINIC | Age: 38
End: 2018-08-31

## 2018-08-31 DIAGNOSIS — Z80.3 FAMILY HISTORY OF MALIGNANT NEOPLASM OF BREAST: Primary | ICD-10-CM

## 2018-09-03 ENCOUNTER — MYC REFILL (OUTPATIENT)
Dept: FAMILY MEDICINE | Facility: CLINIC | Age: 38
End: 2018-09-03

## 2018-09-03 DIAGNOSIS — Z91.030 BEE STING ALLERGY: ICD-10-CM

## 2018-09-03 DIAGNOSIS — E11.65 TYPE 2 DIABETES MELLITUS WITH HYPERGLYCEMIA, WITHOUT LONG-TERM CURRENT USE OF INSULIN (H): ICD-10-CM

## 2018-09-03 RX ORDER — METFORMIN HCL 500 MG
1000 TABLET, EXTENDED RELEASE 24 HR ORAL
Qty: 360 TABLET | Refills: 0 | COMMUNITY
Start: 2018-09-03 | End: 2018-11-26

## 2018-09-05 RX ORDER — EPINEPHRINE 0.3 MG/.3ML
0.3 INJECTION SUBCUTANEOUS
Qty: 2 ML | Refills: 0 | Status: SHIPPED | OUTPATIENT
Start: 2018-09-05 | End: 2020-05-29

## 2018-09-05 NOTE — TELEPHONE ENCOUNTER
"epipen  LRF 5/24/16  LOV 8/30/18    Pen needles  LRF 6/19/18    Test strips  LRF 6/5/18 - with prn refills, should have for a year    Requested Prescriptions   Pending Prescriptions Disp Refills     EPINEPHrine (EPIPEN/ADRENACLICK/OR ANY BX GENERIC EQUIV) 0.3 MG/0.3ML injection 2-pack       Sig: Inject 0.3 mLs (0.3 mg) into the muscle once as needed    Anaphylaxis Kits Protocol Passed    9/5/2018 12:47 PM       Passed - Recent (12 mo) or future (30 days) visit witin the authorizing provider's specialty    Patient had office visit in the last 12 months or has a visit in the next 30 days with authorizing provider or within the authorizing provider's specialty.  See \"Patient Info\" tab in inbasket, or \"Choose Columns\" in Meds & Orders section of the refill encounter.           Passed - Patient is age 5 or older        insulin pen needle (B-D U/F) 31G X 5  each 1     Sig: Use once daily as directed with Basaglar pen.    Diabetic Supplies Protocol Passed    9/5/2018 12:47 PM       Passed - Patient is 18 years of age or older       Passed - Recent (6 mo) or future (30 days) visit within the authorizing provider's specialty    Patient had office visit in the last 6 months or has a visit in the next 30 days with authorizing provider.  See \"Patient Info\" tab in inbasket, or \"Choose Columns\" in Meds & Orders section of the refill encounter.            Prescription approved per [facility] Refill Protocol for the epipen and for the pen needles - should have test strips until next June 2019    "

## 2018-09-05 NOTE — TELEPHONE ENCOUNTER
"Message from [employer]:  Original authorizing provider: Temi Marsh MD, MD Sharmaine José would like a refill of the following medications:  EPINEPHrine (EPIPEN) 0.3 MG/0.3ML injection [Temi Marsh MD, MD]    Preferred pharmacy: [facility] [address] [place] [address], [address] [place]  AT [facility] & [place]    Comment:  I am almost out of basaglar insulin pen needles. Can I get a refill? It says No refills, Dr. Christianson Required. They come in a pack of 100. They are *BD Ultra-Fine Pen Needles. Short, 8mm x 31 Gauge (5/16\" x 0.25 mm) I will be out in 7 days. I go through [facility] on [address], in [place]. The prescription number is: [id number] I am on my last thing of test strips as well. Can I get a refill on those too? RX [id number] Thanks in advance! (They aren't on my med list) Sharmaine"

## 2018-09-10 DIAGNOSIS — E11.65 TYPE 2 DIABETES MELLITUS WITH HYPERGLYCEMIA, WITHOUT LONG-TERM CURRENT USE OF INSULIN (H): ICD-10-CM

## 2018-09-11 RX ORDER — INSULIN GLARGINE 100 [IU]/ML
INJECTION, SOLUTION SUBCUTANEOUS
Qty: 3 ML | Refills: 1 | Status: SHIPPED | OUTPATIENT
Start: 2018-09-11 | End: 2018-10-05

## 2018-09-11 RX ORDER — INSULIN GLARGINE 100 [IU]/ML
INJECTION, SOLUTION SUBCUTANEOUS
Refills: 0 | OUTPATIENT
Start: 2018-09-11

## 2018-09-11 NOTE — TELEPHONE ENCOUNTER
Patient called her RX needs adjusted she is taking 21 units per day and needs 3 pins that is why she is running out    Please fill today as she is out, please call the patient and let her know once this is refilled she is very concerned

## 2018-09-11 NOTE — TELEPHONE ENCOUNTER
Pt calls back.  She is requesting another refill be sent because she says she now is taking 21 units of basagalar.  Updated amount adam-d up.  Edit as you wish.  Will forward to Dr. Marsh.

## 2018-09-11 NOTE — TELEPHONE ENCOUNTER
"Requested Prescriptions   Pending Prescriptions Disp Refills     BASAGLAR 100 UNIT/ML injection [Pharmacy Med Name: BASAGLAR 100 U/ML KWIKPEN INJ 3ML]  Last Written Prescription Date:  8/28/18  Last Fill Quantity: 3ml,  # refills: 0   Last office visit: 8/30/2018 with prescribing provider:  Temi Marsh MD   Future Office Visit:      0     Sig: INJECT 16 UNITS UNDER THE SKIN DAILY. TITRATE AS DIRECTED    Long Acting Insulin Protocol Failed    9/10/2018  7:21 PM       Failed - HgbA1C in past 3 or 6 months    If HgbA1C is 8 or greater, it needs to be on file within the past 3 months.  If less than 8, must be on file within the past 6 months.     Recent Labs   Lab Test  06/01/18   0832   A1C  10.3*            Passed - Blood pressure less than 140/90 in past 6 months    BP Readings from Last 3 Encounters:   08/30/18 118/76   08/07/18 129/82   07/20/18 122/70                Passed - LDL on file in past 12 months    Recent Labs   Lab Test  12/22/17   0942   LDL  Cannot estimate LDL when triglyceride exceeds 400 mg/dL  216*            Passed - Microalbumin on file in past 12 months    Recent Labs   Lab Test  12/22/17   0943   MICROL  5   UMALCR  7.81            Passed - Serum creatinine on file in past 12 months    Recent Labs   Lab Test  12/22/17   0942   CR  0.55            Passed - Patient is age 18 or older       Passed - Recent (6 mo) or future (30 days) visit within the authorizing provider's specialty    Patient had office visit in the last 6 months or has a visit in the next 30 days with authorizing provider or within the authorizing provider's specialty.  See \"Patient Info\" tab in inbasket, or \"Choose Columns\" in Meds & Orders section of the refill encounter.              "

## 2018-09-11 NOTE — TELEPHONE ENCOUNTER
Pt calls about refill for basaglar.  Looks like refill sent on 8/28/18.  She has a refill per Sujata at the pharmacy.  Denied this one to the pharmacy as pt has a refill.

## 2018-09-14 ENCOUNTER — TELEPHONE (OUTPATIENT)
Dept: FAMILY MEDICINE | Facility: CLINIC | Age: 38
End: 2018-09-14

## 2018-09-14 ENCOUNTER — OFFICE VISIT (OUTPATIENT)
Dept: URGENT CARE | Facility: URGENT CARE | Age: 38
End: 2018-09-14
Payer: COMMERCIAL

## 2018-09-14 VITALS
WEIGHT: 165 LBS | HEART RATE: 98 BPM | OXYGEN SATURATION: 97 % | TEMPERATURE: 97.9 F | BODY MASS INDEX: 28.32 KG/M2 | DIASTOLIC BLOOD PRESSURE: 70 MMHG | SYSTOLIC BLOOD PRESSURE: 124 MMHG
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
## 2018-09-14 PROCEDURE — 99213 OFFICE O/P EST LOW 20 MIN: CPT | Performed by: FAMILY MEDICINE

## 2018-09-14 NOTE — PROGRESS NOTES
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.  She was bitten by a stray cat about one hour ago today.   Cicumstances of bite: Patient and two veterinarians were trying to clip fur near the cat's rear end, but the cat started to bite the patient's left fifth finger. . Patient has already received prophylactic immunizations against rabies.  The stray cat received vaccinations yesterday.      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, including hip     DYPLASTIC     Cervical dysplasia      Depressive disorder, not elsewhere classified     improved with treatment of narcolepsy     Discoid lupus 9/13/2015     Endometriosis of other specified sites 2006     Fatty infiltration of liver 5/22/2016     Generalized anxiety disorder 2006    manifested by nausea; improved with treatment of narcolepsy     Herpes zoster without mention of complication      Migraine headaches 12/3/2008     Mild intermittent asthma      Narcolepsy      Proteinuria     distant past; when very physically active     Type 2 diabetes mellitus with hyperglycemia, without long-term current use of insulin (H) 6/2/2018     Urinary incontinence     improved p surgery       Current Outpatient Prescriptions:      BASAGLAR 100 UNIT/ML injection, INJECT 21 UNITS UNDER THE SKIN DAILY. TITRATE AS DIRECTED, Disp: 3 mL, Rfl: 1     cyclobenzaprine (FLEXERIL) 10 MG tablet, Take 1 tablet (10 mg) by mouth nightly as needed for muscle spasms, Disp: 30 tablet, Rfl: 0     dextroamphetamine (DEXTROSTAT) 10 MG tablet, Take 10 mg by mouth daily Takes 1-2 tablets daily, Disp: , Rfl: 0     DULoxetine (CYMBALTA) 20 MG EC capsule, TAKE 1 CAPSULE(20 MG) BY MOUTH DAILY, Disp: 90  capsule, Rfl: 0     EPINEPHrine (EPIPEN/ADRENACLICK/OR ANY BX GENERIC EQUIV) 0.3 MG/0.3ML injection 2-pack, Inject 0.3 mLs (0.3 mg) into the muscle once as needed, Disp: 2 mL, Rfl: 0     estrogens, conjugated, (PREMARIN) 1.25 MG tablet, Take 1 tablet (1.25 mg) by mouth daily, Disp: 90 tablet, Rfl: 3     fluticasone (FLONASE) 50 MCG/ACT nasal spray, Spray 1-2 sprays into both nostrils daily as needed for rhinitis or allergies, Disp: , Rfl:      insulin pen needle (B-D U/F) 31G X 5 MM, Use once daily as directed with Basaglar pen., Disp: 100 each, Rfl: 1     irbesartan (AVAPRO) 150 MG tablet, Take 1 tablet (150 mg) by mouth daily, Disp: 90 tablet, Rfl: 0     lidocaine-prilocaine (EMLA) cream, Apply topically as needed for moderate pain, Disp: 30 g, Rfl: 0     metFORMIN (GLUCOPHAGE-XR) 500 MG 24 hr tablet, Take 2 tablets (1,000 mg) by mouth daily (with dinner), Disp: 360 tablet, Rfl: 0     ranitidine (ZANTAC) 150 MG tablet, Take 150 mg by mouth 2 times daily, Disp: , Rfl:      STATIN NOT PRESCRIBED, INTENTIONAL,, Statin not prescribed intentionally due to Other drug intolerance; more than 5 years trial (This option does not exclude patient from measure), Disp: , Rfl:      albuterol (PROVENTIL HFA) 108 (90 Base) MCG/ACT Inhaler, Inhale 2 puffs into the lungs every 6 hours, Disp: 1 Inhaler, Rfl: 0  Social History   Substance Use Topics     Smoking status: Current Every Day Smoker     Packs/day: 1.00     Types: Cigarettes     Smokeless tobacco: Never Used      Comment: 1/2- 1 pk  daily     Alcohol use Yes      Comment: rarely       ROS:  Review of systems negative except as stated above.    OBJECTIVE:  /70 (BP Location: Right arm, Patient Position: Chair, Cuff Size: Adult Regular)  Pulse 98  Temp 97.9  F (36.6  C) (Tympanic)  Wt 165 lb (74.8 kg)  LMP 10/04/2006  SpO2 97%  BMI 28.32 kg/m2  GENERAL: healthy, alert no acute distress  SKIN: superficial laceration without dehiscence on the left fifth finger. Normal  ROM. No red streaks.  No edema.      ASSESSMENT:  Cat Bite    PLAN:  See orders in epic  Rx:  Augmentin   Place Warmth onto the cat bite lesion.    Go to the ER if any spreading redness/fevers appear.        Pavan Rosenberg MD

## 2018-09-14 NOTE — MR AVS SNAPSHOT
After Visit Summary   9/14/2018    Sharmaine José    MRN: [id number]           Patient Information     Date Of Birth          1980        Visit Information        Provider Department      9/14/2018 11:15 AM Pavan Rosenberg MD [facility]        Today's Diagnoses     Cat bite, initial encounter    -  1      Care Instructions    Look for spreading redness, fevers.  If these symptoms appear, go to the emergency room.      Place warmth onto the cat bite area for 10-15 minutes at a time, every 2-3 hours while awake.                Follow-ups after your visit        Who to contact     If you have questions or need follow up information about today's clinic visit or your schedule please contact [facility] directly at [phone].  Normal or non-critical lab and imaging results will be communicated to you by [employer], letter or phone within 4 business days after the clinic has received the results. If you do not hear from us within 7 days, please contact the clinic through [employer] or phone. If you have a critical or abnormal lab result, we will notify you by phone as soon as possible.  Submit refill requests through [employer] or call your pharmacy and they will forward the refill request to us. Please allow 3 business days for your refill to be completed.          Additional Information About Your Visit        MyChart Information     [employer] gives you secure access to your electronic health record. If you see a primary care provider, you can also send messages to your care team and make appointments. If you have questions, please call your primary care clinic.  If you do not have a primary care provider, please call [phone] and they will assist you.        Care EveryWhere ID     This is your Care EveryWhere ID. This could be used by other organizations to access your [place] medical records  [id number]        Your Vitals Were     Pulse Temperature Last Period Pulse  Oximetry BMI (Body Mass Index)       98 97.9  F (36.6  C) (Tympanic) 10/04/2006 97% 28.32 kg/m2        Blood Pressure from Last 3 Encounters:   09/14/18 124/70   08/30/18 118/76   08/07/18 129/82    Weight from Last 3 Encounters:   09/14/18 165 lb (74.8 kg)   08/30/18 165 lb 1.6 oz (74.9 kg)   08/07/18 171 lb 14.4 oz (78 kg)              Today, you had the following     No orders found for display         Today's Medication Changes          These changes are accurate as of 9/14/18 11:49 AM.  If you have any questions, ask your nurse or doctor.               Start taking these medicines.        Dose/Directions    amoxicillin-clavulanate 875-125 MG per tablet   Commonly known as:  AUGMENTIN   Used for:  Cat bite, initial encounter   Started by:  Pavan Rosenberg MD        Dose:  1 tablet   Take 1 tablet by mouth 2 times daily for 5 days   Quantity:  10 tablet   Refills:  0            Where to get your medicines      These medications were sent to [facility] [address] [place] [address] [address] AT [facility] [address] [place]  [address], [facility] [address]     Phone:  [phone]     amoxicillin-clavulanate 875-125 MG per tablet                Primary Care Provider Office Phone # Fax #    Temi Marsh [phone] [address]  [facility] [address]        Equal Access to Services     [facility]: Hadii aad ku hadasho Soomaali, waaxda luqadaha, qaybta kaalmada adeegyada, waxay fan morgan. So [facility] [phone].    ATENCIÓN: Si habla español, tiene a lema disposición servicios gratuitos de asistencia lingüística. Llame al [phone].    We comply with applicable federal civil rights laws and [place] laws. We do not discriminate on the basis of race, color, national origin, age, disability, sex, sexual orientation, or gender identity.            Thank you!     [facility]  [facility] your care. Our  goal is always to provide you with excellent care. Hearing back from our patients is one way we can continue to improve our services. Please take a few minutes to complete the written survey that you may receive in the mail after your visit with us. Thank you!             Your Updated Medication List - Protect others around you: Learn how to safely use, store and throw away your medicines at www.disposemymeds.org.          This list is accurate as of 9/14/18 11:49 AM.  Always use your most recent med list.                   Brand Name Dispense Instructions for use Diagnosis    albuterol 108 (90 Base) MCG/ACT inhaler    PROVENTIL HFA    1 Inhaler    Inhale 2 puffs into the lungs every 6 hours    Mild intermittent asthma without complication       amoxicillin-clavulanate 875-125 MG per tablet    AUGMENTIN    10 tablet    Take 1 tablet by mouth 2 times daily for 5 days    Cat bite, initial encounter       BASAGLAR 100 UNIT/ML injection     3 mL    INJECT 21 UNITS UNDER THE SKIN DAILY. TITRATE AS DIRECTED    Type 2 diabetes mellitus with hyperglycemia, without long-term current use of insulin (H)       cyclobenzaprine 10 MG tablet    FLEXERIL    30 tablet    Take 1 tablet (10 mg) by mouth nightly as needed for muscle spasms    Back muscle spasm       dextroamphetamine 10 MG tablet    DEXTROSTAT     Take 10 mg by mouth daily Takes 1-2 tablets daily        DULoxetine 20 MG EC capsule    CYMBALTA    90 capsule    TAKE 1 CAPSULE(20 MG) BY MOUTH DAILY    Antidepressant discontinuation syndrome, subsequent encounter       EPINEPHrine 0.3 MG/0.3ML injection 2-pack    EPIPEN/ADRENACLICK/or ANY BX GENERIC EQUIV    2 mL    Inject 0.3 mLs (0.3 mg) into the muscle once as needed    Bee sting allergy       estrogens (conjugated) 1.25 MG tablet    PREMARIN    90 tablet    Take 1 tablet (1.25 mg) by mouth daily    Menopause       FLONASE 50 MCG/ACT spray   Generic drug:  fluticasone      Spray 1-2 sprays into both nostrils daily as  needed for rhinitis or allergies        insulin pen needle 31G X 5 MM    B-D U/F    100 each    Use once daily as directed with Basaglar pen.    Type 2 diabetes mellitus with hyperglycemia, without long-term current use of insulin (H)       irbesartan 150 MG tablet    AVAPRO    90 tablet    Take 1 tablet (150 mg) by mouth daily    Hypertension goal BP (blood pressure) < 140/90       lidocaine-prilocaine cream    EMLA    30 g    Apply topically as needed for moderate pain    Pain of female genitalia       metFORMIN 500 MG 24 hr tablet    GLUCOPHAGE-XR    360 tablet    Take 2 tablets (1,000 mg) by mouth daily (with dinner)    Type 2 diabetes mellitus with hyperglycemia, without long-term current use of insulin (H)       ranitidine 150 MG tablet    ZANTAC     Take 150 mg by mouth 2 times daily        STATIN NOT PRESCRIBED (INTENTIONAL)      Statin not prescribed intentionally due to Other drug intolerance; more than 5 years trial (This option does not exclude patient from measure)    CARDIOVASCULAR SCREENING; LDL GOAL LESS THAN 160

## 2018-09-14 NOTE — PATIENT INSTRUCTIONS
Look for spreading redness, fevers.  If these symptoms appear, go to the emergency room.      Place warmth onto the cat bite area for 10-15 minutes at a time, every 2-3 hours while awake.

## 2018-09-14 NOTE — TELEPHONE ENCOUNTER
Patient states she was seen in  today after cat bite at vet clinic.  States she has had pre-prophylactic series for rabies in 2017. Was advised in  to see if she needs a booster post exposure.   She doesn't think the cat has rabies - it was a stray and she gave the cat the rabies shot today.     They still have the cat at their vet clinic.    Also, she was advised to apply heat to wound, but patient states there is so much inflammation and pain, she would like to apply cold compress instead.    Would she agree that the 5 days of Augmentin 875-125 is enough for her with her diabetes and lupus issues.     Was wondering if Dr. Marsh could advise.     Best number to call: [phone]. Can leave detailed message.     Huddled with Dr. Marsh.   -agrees with heat  -monitor how wound is healing with abx use if looking worse over weekend go to , if worse or not healing on Monday, make same-day clinic appt.  -[facility].     Called [place] [facility]: Reached after-hours call center. He took a message. Will page on-call .     Aditi Gautam, :  Confine and observe for 10 days. If alive after 10 days, they couldn't have had rabies after the bite.   If they elect to euthanize animal, they can test for rabies.   Since it's an extremity bite it is safe to wait. 10 days.     Called patient to advise. Patient stated her understanding. She actually thinks she will be adopting the cat...    Shelly RAJPUT, Triage RN

## 2018-09-16 ENCOUNTER — APPOINTMENT (OUTPATIENT)
Dept: GENERAL RADIOLOGY | Facility: CLINIC | Age: 38
End: 2018-09-16
Attending: EMERGENCY MEDICINE
Payer: COMMERCIAL

## 2018-09-16 ENCOUNTER — HOSPITAL ENCOUNTER (EMERGENCY)
Facility: CLINIC | Age: 38
Discharge: HOME | End: 2018-09-16
Attending: EMERGENCY MEDICINE | Admitting: EMERGENCY MEDICINE
Payer: COMMERCIAL

## 2018-09-16 VITALS
HEART RATE: 100 BPM | SYSTOLIC BLOOD PRESSURE: 123 MMHG | DIASTOLIC BLOOD PRESSURE: 96 MMHG | BODY MASS INDEX: 26.99 KG/M2 | HEIGHT: 65 IN | WEIGHT: 162 LBS | RESPIRATION RATE: 16 BRPM | TEMPERATURE: 97.1 F | OXYGEN SATURATION: 99 %
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
## 2018-09-16 PROCEDURE — 96365 THER/PROPH/DIAG IV INF INIT: CPT

## 2018-09-16 PROCEDURE — 73140 X-RAY EXAM OF FINGER(S): CPT | Mod: LT

## 2018-09-16 PROCEDURE — 25000128 H RX IP 250 OP 636: Performed by: EMERGENCY MEDICINE

## 2018-09-16 PROCEDURE — 29130 APPL FINGER SPLINT STATIC: CPT | Mod: F4

## 2018-09-16 PROCEDURE — 99284 EMERGENCY DEPT VISIT MOD MDM: CPT | Mod: 25

## 2018-09-16 RX ORDER — PIPERACILLIN SODIUM, TAZOBACTAM SODIUM 3; .375 G/15ML; G/15ML
3.38 INJECTION, POWDER, LYOPHILIZED, FOR SOLUTION INTRAVENOUS ONCE
Status: COMPLETED | OUTPATIENT
Start: 2018-09-16 | End: 2018-09-16

## 2018-09-16 RX ADMIN — PIPERACILLIN SODIUM,TAZOBACTAM SODIUM 3.38 G: 3; .375 INJECTION, POWDER, FOR SOLUTION INTRAVENOUS at 08:29

## 2018-09-16 ASSESSMENT — ENCOUNTER SYMPTOMS
COLOR CHANGE: 1
WOUND: 1
FEVER: 0

## 2018-09-16 NOTE — DISCHARGE INSTRUCTIONS
Cat Bite    A cat bite can cause a wound deep enough to break the skin. In such cases, the wound is cleaned and then sometimes closed. If the wound is closed it is usually not closed completely. This is so that fluid can drain if the wound becomes infected. Often the wound is left open to heal. In addition to wound care, a tetanus shot may be given, if needed.  Home care    Wash your hands well with soap and warm water before and after caring for the wound. This helps lower the risk of infection.    Care for the wound as directed. If a dressing was applied to the wound, be sure to change it as directed.    If the wound bleeds, place a clean, soft cloth on the wound. Then firmly apply pressure until the bleeding stops. This may take up to 5 minutes. Do not release the pressure and look at the wound during this time.    Always get medical attention for cat bites on the hand. They are highly likely to become infected.    Most wounds heal within 10 days. But an infection can occur even with proper treatment. So be sure to check the wound daily for signs of infection (see below).    Antibiotics may be prescribed. These help prevent or treat infection. If you re given antibiotics, take them as directed. Also be sure to complete the medicines.  Rabies prevention  Rabies is a virus that can be carried in certain animals. These can include domestic animals such as cats and dogs. Pets fully vaccinated against rabies (2 shots) are at very low risk of infection. But because human rabies is almost always fatal, any biting pet should be confined for 10 days as an extra precaution. In general, if there is a risk for rabies, the following steps may need to be taken:    If someone s pet cat has bitten you, it should be kept in a secure area for the next 10 days to watch for signs of illness. (If the pet owner won t allow this, contact your local animal control center.) If the cat becomes ill or dies during that time, contact your  local animal control center at once so the animal may be tested for rabies. If the cat stays healthy for the next 10 days, there is no danger of rabies in the animal or you.    If a stray cat bit you, contact your local animal control center. They can give information on capture, quarantine, and animal rabies testing.    If you can t find the animal that bit you in the next 2 days, and if rabies exists in your area, you may need to receive the rabies vaccine series. Call your healthcare provider right away. Or return to the emergency department promptly.    All animal bites should be reported to the local animal control center. If you were not given a form to fill out, you can report this yourself.  Follow-up care  Follow up with your healthcare provider, or as directed.  When to seek medical advice  Call your healthcare provider right away if any of these occur:    Signs of infection:  ? Spreading redness or warmth from the wound  ? Increased pain or swelling  ? Fever of 100.4 F (38 C) or higher, or as directed by your healthcare provider  ? Colored fluid or pus draining from the wound  ? Enlarged lymph nodes above the area that was bitten, such as lymph nodes in the armpit if you were bitten on the hand or arm. This may be a sign of cat-scratch disease (cat-scratch fever).    Signs of rabies infection:  ? Headache  ? Confusion  ? Strange behavior  ? Increased salivating or drooling  ? Seizure    Decreased ability to move any body part near the bite area    Bleeding that can't be stopped after 5 minutes of firm pressure  Date Last Reviewed: 3/23/2015    1528-0852 [employer]. [address] [place] [address], [place] [id number]. All rights reserved. This information is not intended as a substitute for professional medical care. Always follow your healthcare professional's instructions.

## 2018-09-16 NOTE — ED AVS SNAPSHOT
Emergency Department    [address] [facility] [address]    Phone:  [phone]    Fax:  [phone]                                       Sharmaine José   MRN: [id number]    Department:   Emergency Department   Date of Visit:  9/16/2018           Patient Information     Date Of Birth          1980        Your diagnoses for this visit were:     Cat bite, initial encounter        You were seen by Flako Sutton MD.      Follow-up Information     Follow up with Moncho Chavez MD In 1 day.    Specialty:  Orthopedics    Contact information:    [facility]  [address]  [facility] [address]  [phone]          Discharge Instructions         Cat Bite    A cat bite can cause a wound deep enough to break the skin. In such cases, the wound is cleaned and then sometimes closed. If the wound is closed it is usually not closed completely. This is so that fluid can drain if the wound becomes infected. Often the wound is left open to heal. In addition to wound care, a tetanus shot may be given, if needed.  Home care    Wash your hands well with soap and warm water before and after caring for the wound. This helps lower the risk of infection.    Care for the wound as directed. If a dressing was applied to the wound, be sure to change it as directed.    If the wound bleeds, place a clean, soft cloth on the wound. Then firmly apply pressure until the bleeding stops. This may take up to 5 minutes. Do not release the pressure and look at the wound during this time.    Always get medical attention for cat bites on the hand. They are highly likely to become infected.    Most wounds heal within 10 days. But an infection can occur even with proper treatment. So be sure to check the wound daily for signs of infection (see below).    Antibiotics may be prescribed. These help prevent or treat infection. If you re given antibiotics, take them as directed. Also be sure to complete  the medicines.  Rabies prevention  Rabies is a virus that can be carried in certain animals. These can include domestic animals such as cats and dogs. Pets fully vaccinated against rabies (2 shots) are at very low risk of infection. But because human rabies is almost always fatal, any biting pet should be confined for 10 days as an extra precaution. In general, if there is a risk for rabies, the following steps may need to be taken:    If someone s pet cat has bitten you, it should be kept in a secure area for the next 10 days to watch for signs of illness. (If the pet owner won t allow this, contact your local animal control center.) If the cat becomes ill or dies during that time, contact your local animal control center at once so the animal may be tested for rabies. If the cat stays healthy for the next 10 days, there is no danger of rabies in the animal or you.    If a stray cat bit you, contact your local animal control center. They can give information on capture, quarantine, and animal rabies testing.    If you can t find the animal that bit you in the next 2 days, and if rabies exists in your area, you may need to receive the rabies vaccine series. Call your healthcare provider right away. Or return to the emergency department promptly.    All animal bites should be reported to the local animal control center. If you were not given a form to fill out, you can report this yourself.  Follow-up care  Follow up with your healthcare provider, or as directed.  When to seek medical advice  Call your healthcare provider right away if any of these occur:    Signs of infection:  ? Spreading redness or warmth from the wound  ? Increased pain or swelling  ? Fever of 100.4 F (38 C) or higher, or as directed by your healthcare provider  ? Colored fluid or pus draining from the wound  ? Enlarged lymph nodes above the area that was bitten, such as lymph nodes in the armpit if you were bitten on the hand or arm. This may be  a sign of cat-scratch disease (cat-scratch fever).    Signs of rabies infection:  ? Headache  ? Confusion  ? Strange behavior  ? Increased salivating or drooling  ? Seizure    Decreased ability to move any body part near the bite area    Bleeding that can't be stopped after 5 minutes of firm pressure  Date Last Reviewed: 3/23/2015    0841-4818 [employer]. [address] [place] [address]. All rights reserved. This information is not intended as a substitute for professional medical care. Always follow your healthcare professional's instructions.          24 Hour Appointment Hotline       To make an appointment at any [facility], call [phone] ([phone]). If you don't have a family doctor or clinic, we will help you find one. [place] clinics are conveniently located to serve the needs of you and your family.             Review of your medicines      Our records show that you are taking the medicines listed below. If these are incorrect, please call your family doctor or clinic.        Dose / Directions Last dose taken    albuterol 108 (90 Base) MCG/ACT inhaler   Commonly known as:  PROVENTIL HFA   Dose:  2 puff   Quantity:  1 Inhaler        Inhale 2 puffs into the lungs every 6 hours   Refills:  0        amoxicillin-clavulanate 875-125 MG per tablet   Commonly known as:  AUGMENTIN   Dose:  1 tablet   Quantity:  10 tablet        Take 1 tablet by mouth 2 times daily for 5 days   Refills:  0        BASAGLAR 100 UNIT/ML injection   Quantity:  3 mL        INJECT 21 UNITS UNDER THE SKIN DAILY. TITRATE AS DIRECTED   Refills:  1        cyclobenzaprine 10 MG tablet   Commonly known as:  FLEXERIL   Dose:  10 mg   Quantity:  30 tablet        Take 1 tablet (10 mg) by mouth nightly as needed for muscle spasms   Refills:  0        dextroamphetamine 10 MG tablet   Commonly known as:  DEXTROSTAT   Dose:  10 mg        Take 10 mg by mouth daily Takes 1-2 tablets daily   Refills:  0         DULoxetine 20 MG EC capsule   Commonly known as:  CYMBALTA   Quantity:  90 capsule        TAKE 1 CAPSULE(20 MG) BY MOUTH DAILY   Refills:  0        EPINEPHrine 0.3 MG/0.3ML injection 2-pack   Commonly known as:  EPIPEN/ADRENACLICK/or ANY BX GENERIC EQUIV   Dose:  0.3 mg   Quantity:  2 mL        Inject 0.3 mLs (0.3 mg) into the muscle once as needed   Refills:  0        estrogens (conjugated) 1.25 MG tablet   Commonly known as:  PREMARIN   Dose:  1.25 mg   Quantity:  90 tablet        Take 1 tablet (1.25 mg) by mouth daily   Refills:  3        FLONASE 50 MCG/ACT spray   Dose:  1-2 spray   Generic drug:  fluticasone        Spray 1-2 sprays into both nostrils daily as needed for rhinitis or allergies   Refills:  0        insulin pen needle 31G X 5 MM   Commonly known as:  B-D U/F   Quantity:  100 each        Use once daily as directed with Basaglar pen.   Refills:  1        irbesartan 150 MG tablet   Commonly known as:  AVAPRO   Dose:  150 mg   Quantity:  90 tablet        Take 1 tablet (150 mg) by mouth daily   Refills:  0        lidocaine-prilocaine cream   Commonly known as:  EMLA   Quantity:  30 g        Apply topically as needed for moderate pain   Refills:  0        metFORMIN 500 MG 24 hr tablet   Commonly known as:  GLUCOPHAGE-XR   Dose:  1000 mg   Quantity:  360 tablet        Take 2 tablets (1,000 mg) by mouth daily (with dinner)   Refills:  0        ranitidine 150 MG tablet   Commonly known as:  ZANTAC   Dose:  150 mg        Take 150 mg by mouth 2 times daily   Refills:  0        STATIN NOT PRESCRIBED (INTENTIONAL)        Statin not prescribed intentionally due to Other drug intolerance; more than 5 years trial (This option does not exclude patient from measure)   Refills:  0                Procedures and tests performed during your visit     Fingers XR, 2-3 views, left      Orders Needing Specimen Collection     None      Pending Results     No orders found from 9/14/2018 to 9/17/2018.            Pending  Culture Results     No orders found from 9/14/2018 to 9/17/2018.            Pending Results Instructions     If you had any lab results that were not finalized at the time of your Discharge, you can call the ED Lab Result RN at [phone]. You will be contacted by this team for any positive Lab results or changes in treatment. The nurses are available 7 days a week from 10A to 6:30P.  You can leave a message 24 hours per day and they will return your call.        Test Results From Your Hospital Stay        9/16/2018  9:47 AM      Narrative     XR LEFT FINGER TWO OR MORE VIEWS   9/16/2018 9:00 AM     HISTORY: Cat bite, evaluate foreign body.    COMPARISON: None.        Impression     IMPRESSION: Negative exam. No evidence for fracture or any radiopaque  foreign body.    ISIDRO FU MD                Clinical Quality Measure: Blood Pressure Screening     Your blood pressure was checked while you were in the emergency department today. The last reading we obtained was  BP: 139/86 . Please read the guidelines below about what these numbers mean and what you should do about them.  If your systolic blood pressure (the top number) is less than 120 and your diastolic blood pressure (the bottom number) is less than 80, then your blood pressure is normal. There is nothing more that you need to do about it.  If your systolic blood pressure (the top number) is 120-139 or your diastolic blood pressure (the bottom number) is 80-89, your blood pressure may be higher than it should be. You should have your blood pressure rechecked within a year by a primary care provider.  If your systolic blood pressure (the top number) is 140 or greater or your diastolic blood pressure (the bottom number) is 90 or greater, you may have high blood pressure. High blood pressure is treatable, but if left untreated over time it can put you at risk for heart attack, stroke, or kidney failure. You should have your blood pressure rechecked by a  primary care provider within the next 4 weeks.  If your provider in the emergency department today gave you specific instructions to follow-up with your doctor or provider even sooner than that, you should follow that instruction and not wait for up to 4 weeks for your follow-up visit.        Thank you for choosing [facility]       Thank you for choosing [facility] for your care. Our goal is always to provide you with excellent care. Hearing back from our patients is one way we can continue to improve our services. Please take a few minutes to complete the written survey that you may receive in the mail after you visit with us. Thank you!        [employer] - /l?k â€¢ splik/hart Information     [employer] gives you secure access to your electronic health record. If you see a primary care provider, you can also send messages to your care team and make appointments. If you have questions, please call your primary care clinic.  If you do not have a primary care provider, please call [phone] and they will assist you.        Care EveryWhere ID     This is your Care EveryWhere ID. This could be used by other organizations to access your [facility] medical records  [id number]        Equal Access to Services     KATRINA YE : Hadalexey sanchez Sorobin, waaxda lukiaraadaha, qaybta kaalrudy banegas, kayla morgan. So [facility] [phone].    ATENCIÓN: Si habla español, tiene a lema disposición servicios gratuitos de asistencia lingüística. Llame al [phone].    We comply with applicable federal civil rights laws and [place] laws. We do not discriminate on the basis of race, color, national origin, age, disability, sex, sexual orientation, or gender identity.            After Visit Summary       This is your record. Keep this with you and show to your community pharmacist(s) and doctor(s) at your next visit.

## 2018-09-16 NOTE — ED NOTES
" New Note: [id number]  1420  Telephone call to pt by Sheryl Oviedo RN    Writer received message that Patti had called with some questions about her DC and asked that writer call her back at the # given: [phone].  Patti stated that when she arrived home her skin was irritated by the wetness of the splint placed because of her lupus. In her DC instructions Dr. Sutton stated that the patient could take off the splint occasionally.  Pt stated she did remove the splint upon arriving home to dry out the splinting material.  At that time the wound expelled a large amount of \"thick, yellow red clumpy drainage\"  Pt also stated that the swelling in that finger subsided significantly after the drainage expelled itself.  Patient was advised by writer to wash her hands and the wound area gently with soap and water, air dry, and replace the dry splinting material.  Patient verbalized understanding and will follow up with Orthopedics on 9/17/2018.   "

## 2018-09-16 NOTE — ED NOTES
Updated patient on wait for imaging results.  Patient had taken 200 mg of her own ibuprofen for the hand pain.  Dr. Sutton notified

## 2018-09-16 NOTE — ED PROVIDER NOTES
History     Chief Complaint:  Cat Bite    HPI   Sharmaine José is a Type-2 diabetic 38 year old female who presents to the emergency department today for evaluation of a cat bite.  The patient was seen in urgent care 2 days ago after being bitten by a cat on her left pinky finger. She was given a prescription for Augmentin at that time. She now has taken her 4th dose this morning, but continues to have increased redness, swelling, and pain to her left pinky finger. She cannot bend her finger, and notes a burning, stinging pain. She is unsure if the cat's teeth remained intact after the bite.     Allergies:  Bee Venom  Tramadol  Ultram [Tramadol Hcl]  Armodafinil  Cefaclor  Ceclor [Cefaclor]  Doxycycline  Suture [Suture]  Tuberculin  Tuberculin Purified Protein Derivative  Nuts    Medications:    amoxicillin-clavulanate (AUGMENTIN) 875-125 MG per tablet  albuterol (PROVENTIL HFA) 108 (90 Base) MCG/ACT Inhaler  BASAGLAR 100 UNIT/ML injection  cyclobenzaprine (FLEXERIL) 10 MG tablet  dextroamphetamine (DEXTROSTAT) 10 MG tablet  DULoxetine (CYMBALTA) 20 MG EC capsule  EPINEPHrine (EPIPEN/ADRENACLICK/OR ANY BX GENERIC EQUIV) 0.3 MG/0.3ML injection 2-pack  estrogens, conjugated, (PREMARIN) 1.25 MG tablet  fluticasone (FLONASE) 50 MCG/ACT nasal spray  insulin pen needle (B-D U/F) 31G X 5 MM  irbesartan (AVAPRO) 150 MG tablet  lidocaine-prilocaine (EMLA) cream  metFORMIN (GLUCOPHAGE-XR) 500 MG 24 hr tablet  ranitidine (ZANTAC) 150 MG tablet    Past Medical History:    Allergy, unspecified not elsewhere classified   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 No pertinent surgical history.    Family History:    History reviewed. No pertinent family history.    Social History:  The patient was accompanied to the ED by nobody.  Smoking Status: Current Everyday Smoker   Smokeless Tobacco: Never Used  Alcohol Use: Positive   Marital Status:  Single      Review of Systems   Constitutional: Negative for fever.   Musculoskeletal:        Burning pain left 5th finger.    Skin: Positive for color change and wound.   All other systems reviewed and are negative.    Physical Exam     Patient Vitals for the past 24 hrs:   BP Temp Temp src Pulse Resp SpO2 Height Weight   09/16/18 0746 139/86 97.1  F (36.2  C) Temporal 112 16 99 % 1.651 m (5' 5\") 73.5 kg (162 lb)      Physical Exam  GENERAL: well developed, pleasant  HEAD: atraumatic  EYES: pupils reactive, extraocular muscles intact, conjunctivae normal  ENT:  mucus membranes moist  NECK:  trachea midline, normal range of motion  RESPIRATORY: no tachypnea, breath sounds clear to auscultation   CVS: normal S1/S2, no murmurs, intact distal pulses  ABDOMEN: soft, nontender, nondistention  MUSCULOSKELETAL: no deformities. No fusiform swelling. Fingers held in slight flexion. Mild pain when pressing on flexor sheath with referred pain to the 5th finger.  SKIN: warm and dry, no acute rashes or ulceration. Cat bite on the left 5th finger just proximal to the PIP joint on the medial aspect.   NEURO: GCS 15, cranial nerves intact, alert and oriented x3  PSYCH:  Mood/affect normal       *Black dots were drawn by patient 3 hours prior to this picture being taken.    Emergency Department Course     Imaging:  Radiology findings were communicated with the patient who voiced understanding of the findings.    Fingers XR, 2-3 views, left  Negative exam. No evidence for fracture or any radiopaque  foreign body.  Reading per radiology    Interventions:  0829 Zosyn " 3.375 g IV    Emergency Department Course:    0808 Nursing notes and vitals reviewed.    0810 I performed an exam of the patient as documented above.     0849 The patient was sent for a XR while in the emergency department, results above.      0950 I personally reviewed the imaging results with the patient and answered all related questions prior to discharge.    Impression & Plan      Medical Decision Making:  Sharmaine José is a 38 year old female who presents to the emergency department today for evaluation of a cat bite. She has been on Augmentin for 4-5 doses. She has na area of localized erythema on the medial aspect of her 5th finger. Certainly flexor tendosynovitis is considered. She has some components of it but there is certainly no fusiform swelling but has referred pain with palpating the flexor tendon and pain with passive ROM. She was given a dose of IV antibiotics and placed in a splint. XR is negative for foreign body. Patient has seen orthopedic hand surgeon in the past in [place] and Dr. Ortiz sounds familiar and she liked his care. Will encourage close follow up tomorrow for possibility of surgical need, but hopefully with immobilization, IV antibiotics and ongoing Augmentin, need for surgery would be negated.     Diagnosis:    ICD-10-CM    1. Cat bite, initial encounter W55.01XA      Disposition:   Discharge    Scribe Disclosure:  Damaso JARAMILLO, am serving as a scribe at 8:14 AM on 9/16/2018 to document services personally performed by Flako Sutton MD based on my observations and the provider's statements to me.      EMERGENCY DEPARTMENT       Flako Sutton MD  09/20/18 8949

## 2018-09-17 ENCOUNTER — TRANSFERRED RECORDS (OUTPATIENT)
Dept: HEALTH INFORMATION MANAGEMENT | Facility: CLINIC | Age: 38
End: 2018-09-17

## 2018-09-22 DIAGNOSIS — E11.65 TYPE 2 DIABETES MELLITUS WITH HYPERGLYCEMIA, WITHOUT LONG-TERM CURRENT USE OF INSULIN (H): Primary | ICD-10-CM

## 2018-09-22 NOTE — TELEPHONE ENCOUNTER
"Requested Prescriptions   Pending Prescriptions Disp Refills     B-D U/F 31G X 8 MM insulin pen needle [Pharmacy Med Name: B-D PEN NDL SHRT [id number](5/16) TONI]  Last Written Prescription Date:  09/05/2018  Last Fill Quantity: 100 each,  # refills: 1   Last office visit: 8/30/2018 with prescribing provider:  Temi Marsh MD    Future Office Visit:     100 each 0     Sig: USE WITH BASAGLAR INJECTION DAILY    Diabetic Supplies Protocol Passed    9/22/2018  1:12 PM       Passed - Patient is 18 years of age or older       Passed - Recent (6 mo) or future (30 days) visit within the authorizing provider's specialty    Patient had office visit in the last 6 months or has a visit in the next 30 days with authorizing provider.  See \"Patient Info\" tab in inbasket, or \"Choose Columns\" in Meds & Orders section of the refill encounter.              "

## 2018-09-24 ENCOUNTER — TRANSFERRED RECORDS (OUTPATIENT)
Dept: HEALTH INFORMATION MANAGEMENT | Facility: CLINIC | Age: 38
End: 2018-09-24

## 2018-09-25 RX ORDER — PEN NEEDLE, DIABETIC 31 GX5/16"
NEEDLE, DISPOSABLE MISCELLANEOUS
Qty: 100 EACH | Refills: 11 | Status: SHIPPED | OUTPATIENT
Start: 2018-09-25 | End: 2019-09-09

## 2018-09-25 NOTE — TELEPHONE ENCOUNTER
Prescription approved per [facility] Refill Protocol.    Prachi Chandler RN  Message handled by Nurse Triage.

## 2018-10-02 DIAGNOSIS — M62.830 BACK MUSCLE SPASM: ICD-10-CM

## 2018-10-02 NOTE — TELEPHONE ENCOUNTER
Requested Prescriptions   Pending Prescriptions Disp Refills     cyclobenzaprine (FLEXERIL) 10 MG tablet [Pharmacy Med Name: CYCLOBENZAPRINE 10MG TABLETS] 30 tablet 0     Sig: TAKE 1 TABLET(10 MG) BY MOUTH EVERY NIGHT AS NEEDED FOR MUSCLE SPASMS    There is no refill protocol information for this order        Last Written Prescription Date:  8/28/18  Last Fill Quantity: 30,  # refills: 0   Last office visit: 8/30/2018 with prescribing provider:  Temi Marsh MD    Future Office Visit:

## 2018-10-03 NOTE — TELEPHONE ENCOUNTER
Routing refill request to provider for review/approval because:  Drug not on the [facility], [facility] or [facility] refill protocol or controlled substance    Shelly Rojas RN - Triage  [facility]

## 2018-10-04 RX ORDER — CYCLOBENZAPRINE HCL 10 MG
TABLET ORAL
Qty: 30 TABLET | Refills: 1 | Status: SHIPPED | OUTPATIENT
Start: 2018-10-04 | End: 2018-12-26

## 2018-10-05 ENCOUNTER — TELEPHONE (OUTPATIENT)
Dept: FAMILY MEDICINE | Facility: CLINIC | Age: 38
End: 2018-10-05

## 2018-10-05 DIAGNOSIS — E11.65 TYPE 2 DIABETES MELLITUS WITH HYPERGLYCEMIA, WITHOUT LONG-TERM CURRENT USE OF INSULIN (H): ICD-10-CM

## 2018-10-05 RX ORDER — INSULIN GLARGINE 100 [IU]/ML
INJECTION, SOLUTION SUBCUTANEOUS
Qty: 15 ML | Refills: 2 | Status: SHIPPED | OUTPATIENT
Start: 2018-10-05 | End: 2019-04-09

## 2018-10-05 NOTE — TELEPHONE ENCOUNTER
Patient calling regarding need for refill of Basaglar. Patient notes she uses 2 units every time to prime the pen and now using 22 units per day.     Needs tonight. Insurance only covers 2 pens at a time, but patient requesting a box and then pharmacy refills when out.     Last Written Prescription Date:  9/11/18  Last Fill Quantity: 3 mL,  # refills: 1   Last office visit: 8/30/2018 with prescribing provider:  TRINITY Marsh   Future Office Visit:      Huddled with Dr. Marsh. Approved change in dispense amount. Updated sig. Prescription sent to patient's pharmacy.    Shelly RAJPUT, Triage RN

## 2018-10-15 ENCOUNTER — TRANSFERRED RECORDS (OUTPATIENT)
Dept: MULTI SPECIALTY CLINIC | Facility: CLINIC | Age: 38
End: 2018-10-15

## 2018-11-07 ENCOUNTER — TELEPHONE (OUTPATIENT)
Dept: FAMILY MEDICINE | Facility: CLINIC | Age: 38
End: 2018-11-07

## 2018-11-07 ENCOUNTER — VIRTUAL VISIT (OUTPATIENT)
Dept: FAMILY MEDICINE | Facility: OTHER | Age: 38
End: 2018-11-07

## 2018-11-07 DIAGNOSIS — I10 HYPERTENSION GOAL BP (BLOOD PRESSURE) < 140/90: ICD-10-CM

## 2018-11-07 DIAGNOSIS — R20.2 TINGLING: ICD-10-CM

## 2018-11-07 DIAGNOSIS — R50.9 FEVER, UNSPECIFIED FEVER CAUSE: ICD-10-CM

## 2018-11-07 DIAGNOSIS — E11.65 TYPE 2 DIABETES MELLITUS WITH HYPERGLYCEMIA, WITHOUT LONG-TERM CURRENT USE OF INSULIN (H): Primary | ICD-10-CM

## 2018-11-07 DIAGNOSIS — E11.65 TYPE 2 DIABETES MELLITUS WITH HYPERGLYCEMIA, WITHOUT LONG-TERM CURRENT USE OF INSULIN (H): ICD-10-CM

## 2018-11-07 LAB
BASOPHILS # BLD AUTO: 0 10E9/L (ref 0–0.2)
BASOPHILS NFR BLD AUTO: 0.2 %
DIFFERENTIAL METHOD BLD: NORMAL
EOSINOPHIL # BLD AUTO: 0.3 10E9/L (ref 0–0.7)
EOSINOPHIL NFR BLD AUTO: 3.4 %
ERYTHROCYTE [DISTWIDTH] IN BLOOD BY AUTOMATED COUNT: 11.8 % (ref 10–15)
HCT VFR BLD AUTO: 39.7 % (ref 35–47)
HGB BLD-MCNC: 13.4 G/DL (ref 11.7–15.7)
LYMPHOCYTES # BLD AUTO: 3.2 10E9/L (ref 0.8–5.3)
LYMPHOCYTES NFR BLD AUTO: 32.7 %
MCH RBC QN AUTO: 32.4 PG (ref 26.5–33)
MCHC RBC AUTO-ENTMCNC: 33.8 G/DL (ref 31.5–36.5)
MCV RBC AUTO: 96 FL (ref 78–100)
MONOCYTES # BLD AUTO: 0.6 10E9/L (ref 0–1.3)
MONOCYTES NFR BLD AUTO: 6.2 %
NEUTROPHILS # BLD AUTO: 5.7 10E9/L (ref 1.6–8.3)
NEUTROPHILS NFR BLD AUTO: 57.5 %
PLATELET # BLD AUTO: 242 10E9/L (ref 150–450)
RBC # BLD AUTO: 4.14 10E12/L (ref 3.8–5.2)
VIT B12 SERPL-MCNC: 553 PG/ML (ref 193–986)
WBC # BLD AUTO: 9.8 10E9/L (ref 4–11)

## 2018-11-07 PROCEDURE — 84443 ASSAY THYROID STIM HORMONE: CPT | Performed by: FAMILY MEDICINE

## 2018-11-07 PROCEDURE — 85025 COMPLETE CBC W/AUTO DIFF WBC: CPT | Performed by: FAMILY MEDICINE

## 2018-11-07 PROCEDURE — 36415 COLL VENOUS BLD VENIPUNCTURE: CPT | Performed by: FAMILY MEDICINE

## 2018-11-07 PROCEDURE — 83036 HEMOGLOBIN GLYCOSYLATED A1C: CPT | Performed by: FAMILY MEDICINE

## 2018-11-07 PROCEDURE — 82607 VITAMIN B-12: CPT | Performed by: FAMILY MEDICINE

## 2018-11-07 PROCEDURE — 80053 COMPREHEN METABOLIC PANEL: CPT | Performed by: FAMILY MEDICINE

## 2018-11-07 NOTE — TELEPHONE ENCOUNTER
Lab:  This was added in...   Please run.    Thanks!    RM Triage:  She should either make an appointment with me or with Endocrinology. In August, she wanted to go to Endocrinology... She was to follow up with me in mid September.  I don't see any appointments...    Thanks!

## 2018-11-07 NOTE — TELEPHONE ENCOUNTER
Pt is wondering about Hgb A1C- I saw that you had ordered other labs. Is that ok to order Hgb A1C or are you not wanting that one done??  Thanks-Hollie JACOB RN

## 2018-11-07 NOTE — TELEPHONE ENCOUNTER
Reason for call:  Other   Patient called regarding (reason for call): call back  Additional comments: Patient states that blood for A1C was drawn today but an order needs to be placed.  Please place order then notify patient.     Phone number to reach patient:  Home number on file [phone] (home)    Best Time:  any    Can we leave a detailed message on this number?  YES

## 2018-11-08 LAB
ALBUMIN SERPL-MCNC: 3.8 G/DL (ref 3.4–5)
ALP SERPL-CCNC: 56 U/L (ref 40–150)
ALT SERPL W P-5'-P-CCNC: 25 U/L (ref 0–50)
ANION GAP SERPL CALCULATED.3IONS-SCNC: 9 MMOL/L (ref 3–14)
AST SERPL W P-5'-P-CCNC: 18 U/L (ref 0–45)
BILIRUB SERPL-MCNC: 0.3 MG/DL (ref 0.2–1.3)
BUN SERPL-MCNC: 14 MG/DL (ref 7–30)
CALCIUM SERPL-MCNC: 9.2 MG/DL (ref 8.5–10.1)
CHLORIDE SERPL-SCNC: 100 MMOL/L (ref 94–109)
CO2 SERPL-SCNC: 26 MMOL/L (ref 20–32)
CREAT SERPL-MCNC: 0.53 MG/DL (ref 0.52–1.04)
GFR SERPL CREATININE-BSD FRML MDRD: >90 ML/MIN/1.7M2
GLUCOSE SERPL-MCNC: 83 MG/DL (ref 70–99)
HBA1C MFR BLD: 5.5 % (ref 0–5.6)
POTASSIUM SERPL-SCNC: 3.6 MMOL/L (ref 3.4–5.3)
PROT SERPL-MCNC: 7.2 G/DL (ref 6.8–8.8)
SODIUM SERPL-SCNC: 135 MMOL/L (ref 133–144)
TSH SERPL DL<=0.005 MIU/L-ACNC: 2.56 MU/L (ref 0.4–4)

## 2018-11-08 NOTE — PROGRESS NOTES
"Date:   Clinician: Thiago Mejia  Clinician NPI: [id number]  Patient: Sharmaine José  Patient : 1980  Patient Address: [address] [place] [address], [place] [address]  Patient Phone: [phone]  Visit Protocol: General skin conditions  Patient Summary:  Sharmaine is a 38 year old ( : 1980 ) female who initiated a Visit for evaluation of an unspecified skin condition. When asked the question \"Please sign me up to receive news, health information and promotions from [employer].\", Sharmaine responded \"Yes\".    Images of her skin condition were uploaded.  Her symptoms started 1-3 days ago and affect the left side of her body. The skin condition is located on her neck and hairline. The skin condition is gray, red, and white in color.   The affected area has crusts, blisters, drainage, scabs, pimples, and sores. It feels painful, warm to touch, and tender to touch. The symptoms interfere with her sleep. Sharmaine also feels feverish.   Symptom details     Redness: The redness has not rapidly increased in size.    Blisters: Sharmaine has only a few blisters.    Drainage: The color of the drainage is clear and yellow.    Pain: The pain is moderate (between 4-6 on a 10 point pain scale).    Temperature: Her current temperature is 101.8 degrees Fahrenheit. Sharmaine has had a temperature over 100 degrees for more than 7 days.      The skin condition has changed since the symptoms started. Description of changes as reported by the patient (free text): I felt a twinge originally, but it has gotten worse everyday, hotter, more painful...   Denied symptoms include hives, dry/flaky skin, burning, numbness, and itchiness. Sharmaine She does not have a rash in the shape of a bull's-eye.   Treatments or home remedies used to relieve the symptoms as reported by the patient (free text): Washed with a dilute chlorhexadine solution immediately on day one, then hibiclense soap in the shower on day 1, &amp; put on mupirocin/bactroban " ointment on, and continued 3-4 times per day.   Precipitating events  Just before the symptoms started, Sharmaine came in contact with an other irritant. Other possible irritants as reported by the patient (free text):  I have NO idea!  I was dog sitting, and she had horrible explosive diarrhea, and a UTI.  She only goes poop in the woods.  I had to bring her out multiple times, on a leash, into the woods to poo.  Something caused pain to my ear.  I don't know what it was.  I forgot about it for a day or two.  Now, it is a problem   She spent time in a wooded area just before her symptoms started.   Sharmaine has not been in close contact with anyone that has similar symptoms. Sharmaine is not sure if she was bitten or stung by an insect.   Pertinent medical history  Sharmaine has not experienced this skin condition before.   Sharmaine has had chickenpox and has had shingles in the past.   Sharmaine has a history of asthma, eczema, and seasonal allergies or hay fever. She has ongoing medical conditions. Ongoing medical conditions as reported by the patient (free text): This will take forever!  Not enough space!  Narcolepsy, High Cholesterol/hyperlipidemia, Hypertension, Diabeties, Discoid Lupus, Bee venom allergy, etc.    Weight: 150 lbs   Sharmaine smokes or uses smokeless tobacco.   She denies pregnancy and denies breastfeeding. She does not menstruate.   Additional information as reported by the patient (free text): I got bit by something in the woods, at about 2 am.  It was not bad at the time, but has gotten worse.  It looks like two very close  punctures.  They are a bit scabby.  One is bigger than the other, and they are on the outer par of my left ear.  Red, painful, and sort of swollen.  Touching is extremely uncomfortable, and heat is torture.   MEDICATIONS: irbesartan oral, cyclobenzaprine oral, Basaglar KwikPen U-100 Insulin subcutaneous, metformin oral, Zantac oral, duloxetine oral, dextroamphetamine oral, Premarin oral,  ALLERGIES: tramadol, hornet venom, Nuvigil, Ceclor, Mycobacterium Tuberculosis (Tuberculin PPD), tuberculin, purified protein derivative  Clinician Response:  Dear Sharmaine,   It looks like you may have a secondary cellulitis. Starting antibiotics and in 2 to 3 days if you?re not improving you should be seen in the clinic. You may not be completely better until 10 days out   Diagnosis: Cellulitis of external ear, unspecified ear  Diagnosis ICD: H60.10  Prescription: azithromycin (Zithromax Z-Cornel) 250 mg oral tablet 2 tablet, 1 days supply. Take 2 tablets by mouth 1 time per day for 1 day. Refills: 0, Refill as needed: no, Allow substitutions: yes  Pharmacy: [id number] - [phone] - [address] [place] [address]  Addendum created: November 09 10:34:37, 2018 created by: Cindy Macnia body: Continue taking 500mg po daily for 4 more days and if does not get better should follow up   Addendum created: November 08 09:40:19, 2018 created by: Suresh Mayorga body: Please call in zpak as directed.  Appearance script by Roberto appears correct to me.    Suresh Mayorga [facility]  Addendum created: November 07 22:46:43, 2018 created by: Thiago Mejia body: 500mg tablets  Take two tablets  day 1  Then 1 tablet day 2-5

## 2018-11-08 NOTE — TELEPHONE ENCOUNTER
Called patient to relay Dr. Marsh's message. No answer. LVM to return call.    Shelly RAJPUT, Triage RN

## 2018-11-11 ENCOUNTER — MYC MEDICAL ADVICE (OUTPATIENT)
Dept: FAMILY MEDICINE | Facility: CLINIC | Age: 38
End: 2018-11-11

## 2018-11-12 NOTE — TELEPHONE ENCOUNTER
Per lab results note: You are due for an appointment around the Diabetes. I thought you were going to see an Endocrinologist; but you could come here as well.     Advised patient via [employer] message to make appt with either [facility] or Dr. Marsh for diabetic f/u visit.    Shelly RAJPUT, Triage RN

## 2018-11-13 DIAGNOSIS — T43.205D ANTIDEPRESSANT DISCONTINUATION SYNDROME, SUBSEQUENT ENCOUNTER: ICD-10-CM

## 2018-11-13 NOTE — TELEPHONE ENCOUNTER
"Requested Prescriptions   Pending Prescriptions Disp Refills     DULoxetine (CYMBALTA) 20 MG EC capsule [Pharmacy Med Name: DULOXETINE DR 20MG CAPSULES]  Last Written Prescription Date:  6/26/18  Last Fill Quantity: 90,  # refills: 0   Last office visit: 8/30/2018 with prescribing provider:  Temi Marsh MD    Future Office Visit:     90 capsule 0     Sig: TAKE 1 CAPSULE(20 MG) BY MOUTH DAILY    Serotonin-Norepinephrine Reuptake Inhibitors  Failed    11/13/2018 11:27 AM       Failed - Blood pressure under 140/90 in past 12 months    BP Readings from Last 3 Encounters:   09/16/18 (!) 123/96   09/14/18 124/70   08/30/18 118/76                Passed - Recent (12 mo) or future (30 days) visit within the authorizing provider's specialty    Patient had office visit in the last 12 months or has a visit in the next 30 days with authorizing provider or within the authorizing provider's specialty.  See \"Patient Info\" tab in inbasket, or \"Choose Columns\" in Meds & Orders section of the refill encounter.             Passed - Patient is age 18 or older       Passed - No active pregnancy on record       Passed - No positive pregnancy test in past 12 months          "

## 2018-11-14 RX ORDER — DULOXETIN HYDROCHLORIDE 20 MG/1
CAPSULE, DELAYED RELEASE ORAL
Qty: 90 CAPSULE | Refills: 1 | Status: SHIPPED | OUTPATIENT
Start: 2018-11-14 | End: 2019-05-29

## 2018-11-18 ENCOUNTER — MYC MEDICAL ADVICE (OUTPATIENT)
Dept: FAMILY MEDICINE | Facility: CLINIC | Age: 38
End: 2018-11-18

## 2018-11-18 DIAGNOSIS — E78.5 HYPERLIPIDEMIA LDL GOAL <160: Primary | ICD-10-CM

## 2018-11-19 NOTE — TELEPHONE ENCOUNTER
See mychart of 11/11/8 - was advised if not seeing Endo to make an appt here.  Advised in labs also.  Will advise again.  See pts mychart - also asking about fasting labs.

## 2018-11-20 NOTE — TELEPHONE ENCOUNTER
Called patient to follow up. No answer. LVM to return call.    Routing to Station A to attempt to call patient again to help patient schedule with either with Dr. Marsh or Endocrinology, per patient's choice.    Matias Pimentel RN

## 2018-11-26 DIAGNOSIS — E11.65 TYPE 2 DIABETES MELLITUS WITH HYPERGLYCEMIA, WITHOUT LONG-TERM CURRENT USE OF INSULIN (H): ICD-10-CM

## 2018-11-27 ENCOUNTER — MYC MEDICAL ADVICE (OUTPATIENT)
Dept: FAMILY MEDICINE | Facility: CLINIC | Age: 38
End: 2018-11-27

## 2018-11-27 NOTE — TELEPHONE ENCOUNTER
4th attempt, LM for pt to call back and sent [employer] message.  Needs OV for diabetes.  Lurdes Seals CMA ([facility])

## 2018-11-27 NOTE — TELEPHONE ENCOUNTER
Requested Prescriptions   Pending Prescriptions Disp Refills     metFORMIN (GLUCOPHAGE-XR) 500 MG 24 hr tablet [Pharmacy Med Name: METFORMIN ER 500MG 24HR TABS]  Last Written Prescription Date:  historical  Last Fill Quantity: 360,  # refills: 0   Last office visit: 8/30/2018 with prescribing provider:  Temi Marsh MD   Future Office Visit:     360 tablet 0     Sig: TAKE 1 TABLET BY MOUTH DAILY WITH DINNER X 2 WEEKS, 2 TABLETS DAILY X 2WEEKS, 3 DAILY X 2WEEKS, 4 TABLETS DAILY* INCREASE IF TOLERATES    Biguanide Agents Failed    11/26/2018  5:54 PM       Failed - Blood pressure less than 140/90 in past 6 months    BP Readings from Last 3 Encounters:   09/16/18 (!) 123/96   09/14/18 124/70   08/30/18 118/76                Passed - Patient has documented LDL within the past 12 mos.    Recent Labs   Lab Test  12/22/17   0942   LDL  Cannot estimate LDL when triglyceride exceeds 400 mg/dL  216*            Passed - Patient has had a Microalbumin in the past 15 mos.    Recent Labs   Lab Test  12/22/17   0943   MICROL  5   UMALCR  7.81            Passed - Patient is age 10 or older       Passed - Patient has documented A1c within the specified period of time.    If HgbA1C is 8 or greater, it needs to be on file within the past 3 months.  If less than 8, must be on file within the past 6 months.     Recent Labs   Lab Test  11/07/18   1203   A1C  5.5            Passed - Patient's CR is NOT>1.4 OR Patient's EGFR is NOT<45 within past 12 mos.    Recent Labs   Lab Test  11/07/18   1203   GFRESTIMATED  >90   GFRESTBLACK  >90       Recent Labs   Lab Test  11/07/18   1203   CR  0.53            Passed - Patient does NOT have a diagnosis of CHF.       Passed - Patient is not pregnant       Passed - Patient has not had a positive pregnancy test within the past 12 mos.        Passed - Recent (6 mo) or future (30 days) visit within the authorizing provider's specialty    Patient had office visit in the last 6 months or has a visit in  "the next 30 days with authorizing provider or within the authorizing provider's specialty.  See \"Patient Info\" tab in inbasket, or \"Choose Columns\" in Meds & Orders section of the refill encounter.              "

## 2018-11-28 NOTE — TELEPHONE ENCOUNTER
She needs to get an appointment scheduled.     Please clarify if she is taking 4 daily now of the metformin.     Will send in a refill.

## 2018-11-28 NOTE — TELEPHONE ENCOUNTER
Routing refill request to provider for review/approval because:  Medication is reported/historical    Sujata Harper RN -- [facility]

## 2018-11-29 NOTE — TELEPHONE ENCOUNTER
Patient had labs done on 11-4-2018 does she still need to be schedule for an appt with you, please advise.

## 2018-11-29 NOTE — TELEPHONE ENCOUNTER
Yes.    Either with me or Endocrinology.    Please find out if she is taking 4 now. We can do a refill if she needs.

## 2018-11-30 RX ORDER — METFORMIN HCL 500 MG
1000 TABLET, EXTENDED RELEASE 24 HR ORAL
Qty: 180 TABLET | Refills: 0 | Status: SHIPPED | OUTPATIENT
Start: 2018-11-30 | End: 2019-01-03

## 2018-11-30 NOTE — TELEPHONE ENCOUNTER
Takes 2 of metformin.  Next 5 appointments (look out 90 days)     Jan 03, 2019 10:10 AM CST   Office Visit with Temi Marsh MD   [facility]    [address] [facility] [address] [phone]                Shelly RAJPUT, Matias RN

## 2018-12-01 DIAGNOSIS — T43.205D ANTIDEPRESSANT DISCONTINUATION SYNDROME, SUBSEQUENT ENCOUNTER: ICD-10-CM

## 2018-12-01 NOTE — TELEPHONE ENCOUNTER
"Requested Prescriptions   Pending Prescriptions Disp Refills     DULoxetine (CYMBALTA) 20 MG capsule [Pharmacy Med Name: DULOXETINE DR 20MG CAPSULES]  Last Written Prescription Date:  11/14/2018  Last Fill Quantity: 90 capsule,  # refills: 1   Last office visit: 8/30/2018 with prescribing provider:  Temi Marsh MD    Future Office Visit:   Next 5 appointments (look out 90 days)     Jan 03, 2019 10:10 AM CST   Office Visit with Temi Marsh MD   [address] [address] [phone]                  90 capsule 0     Sig: TAKE 1 CAPSULE(20 MG) BY MOUTH DAILY    Serotonin-Norepinephrine Reuptake Inhibitors  Failed    12/1/2018  3:32 AM       Failed - Blood pressure under 140/90 in past 12 months    BP Readings from Last 3 Encounters:   09/16/18 (!) 123/96   09/14/18 124/70   08/30/18 118/76                Passed - Recent (12 mo) or future (30 days) visit within the authorizing provider's specialty    Patient had office visit in the last 12 months or has a visit in the next 30 days with authorizing provider or within the authorizing provider's specialty.  See \"Patient Info\" tab in inbasket, or \"Choose Columns\" in Meds & Orders section of the refill encounter.             Passed - Patient is age 18 or older       Passed - No active pregnancy on record       Passed - No positive pregnancy test in past 12 months          "

## 2018-12-02 RX ORDER — DULOXETIN HYDROCHLORIDE 20 MG/1
CAPSULE, DELAYED RELEASE ORAL
Qty: 90 CAPSULE | Refills: 0 | OUTPATIENT
Start: 2018-12-02

## 2018-12-17 DIAGNOSIS — Z78.0 MENOPAUSE: ICD-10-CM

## 2018-12-17 NOTE — TELEPHONE ENCOUNTER
"Requested Prescriptions   Pending Prescriptions Disp Refills     PREMARIN 1.25 MG tablet [Pharmacy Med Name: PREMARIN 1.25MG TABLETS]  Last Written Prescription Date:  12/22/17  Last Fill Quantity: 90,  # refills: 3   Last office visit: 8/30/2018 with prescribing provider:  Temi Marsh MD    Future Office Visit:   Next 5 appointments (look out 90 days)    Jan 03, 2019 10:10 AM CST  Office Visit with Temi Marsh MD  [address] [address] [phone]          90 tablet 0     Sig: TAKE 1 TABLET BY MOUTH EVERY DAY    Hormone Replacement Therapy Failed - 12/17/2018 12:38 PM       Failed - Blood pressure under 140/90 in past 12 months    BP Readings from Last 3 Encounters:   09/16/18 (!) 123/96   09/14/18 124/70   08/30/18 118/76                Passed - Recent (12 mo) or future (30 days) visit within the authorizing provider's specialty    Patient had office visit in the last 12 months or has a visit in the next 30 days with authorizing provider or within the authorizing provider's specialty.  See \"Patient Info\" tab in inbasket, or \"Choose Columns\" in Meds & Orders section of the refill encounter.             Passed - Patient is 18 years of age or older       Passed - No active pregnancy on record       Passed - No positive pregnancy test on record in past 12 months          "

## 2018-12-19 RX ORDER — ESTROGENS, CONJUGATED 1.25 MG
TABLET ORAL
Qty: 90 TABLET | Refills: 0 | Status: SHIPPED | OUTPATIENT
Start: 2018-12-19 | End: 2019-01-03

## 2018-12-19 NOTE — TELEPHONE ENCOUNTER
Prescription approved per [facility] Refill Protocol.    Angelica Loza RN

## 2018-12-26 DIAGNOSIS — M62.830 BACK MUSCLE SPASM: ICD-10-CM

## 2018-12-27 RX ORDER — CYCLOBENZAPRINE HCL 10 MG
TABLET ORAL
Qty: 30 TABLET | Refills: 0 | Status: SHIPPED | OUTPATIENT
Start: 2018-12-27 | End: 2019-01-28

## 2018-12-27 NOTE — TELEPHONE ENCOUNTER
Cyclobenzaprine      Last Written Prescription Date:  10/4/2018  Last Fill Quantity: 30,   # refills: 1  Last Office Visit: 8/30/2018  Future Office visit:    Next 5 appointments (look out 90 days)    Jan 03, 2019 10:10 AM CST  Office Visit with Temi Marsh MD  [facility] [address] [facility] [address]  [phone]           Routing refill request to provider for review/approval because:  Drug not on the [facility], [facility] or  Health refill protocol or controlled substance

## 2019-01-03 ENCOUNTER — OFFICE VISIT (OUTPATIENT)
Dept: FAMILY MEDICINE | Facility: CLINIC | Age: 39
End: 2019-01-03
Payer: COMMERCIAL

## 2019-01-03 VITALS
DIASTOLIC BLOOD PRESSURE: 84 MMHG | HEIGHT: 65 IN | SYSTOLIC BLOOD PRESSURE: 124 MMHG | WEIGHT: 161.4 LBS | OXYGEN SATURATION: 98 % | HEART RATE: 104 BPM | RESPIRATION RATE: 16 BRPM | BODY MASS INDEX: 26.89 KG/M2 | TEMPERATURE: 98.7 F

## 2019-01-03 DIAGNOSIS — E78.5 HYPERLIPIDEMIA LDL GOAL <100: ICD-10-CM

## 2019-01-03 DIAGNOSIS — I10 HYPERTENSION GOAL BP (BLOOD PRESSURE) < 140/90: ICD-10-CM

## 2019-01-03 DIAGNOSIS — K76.0 FATTY INFILTRATION OF LIVER: ICD-10-CM

## 2019-01-03 DIAGNOSIS — Z78.0 MENOPAUSE: ICD-10-CM

## 2019-01-03 DIAGNOSIS — E11.65 TYPE 2 DIABETES MELLITUS WITH HYPERGLYCEMIA, WITHOUT LONG-TERM CURRENT USE OF INSULIN (H): Primary | ICD-10-CM

## 2019-01-03 LAB
CHOLEST SERPL-MCNC: 286 MG/DL
HDLC SERPL-MCNC: 49 MG/DL
LDLC SERPL CALC-MCNC: 176 MG/DL
NONHDLC SERPL-MCNC: 237 MG/DL
TRIGL SERPL-MCNC: 305 MG/DL

## 2019-01-03 PROCEDURE — 82043 UR ALBUMIN QUANTITATIVE: CPT | Performed by: FAMILY MEDICINE

## 2019-01-03 PROCEDURE — 80061 LIPID PANEL: CPT | Performed by: FAMILY MEDICINE

## 2019-01-03 PROCEDURE — 99214 OFFICE O/P EST MOD 30 MIN: CPT | Performed by: FAMILY MEDICINE

## 2019-01-03 PROCEDURE — 36415 COLL VENOUS BLD VENIPUNCTURE: CPT | Performed by: FAMILY MEDICINE

## 2019-01-03 RX ORDER — IRBESARTAN 150 MG/1
TABLET ORAL
Qty: 90 TABLET | Refills: 1 | Status: SHIPPED | OUTPATIENT
Start: 2019-01-03 | End: 2019-07-09

## 2019-01-03 RX ORDER — METFORMIN HCL 500 MG
1000 TABLET, EXTENDED RELEASE 24 HR ORAL
Qty: 180 TABLET | Refills: 0 | Status: SHIPPED | OUTPATIENT
Start: 2019-01-03 | End: 2019-06-08

## 2019-01-03 ASSESSMENT — ANXIETY QUESTIONNAIRES
1. FEELING NERVOUS, ANXIOUS, OR ON EDGE: SEVERAL DAYS
6. BECOMING EASILY ANNOYED OR IRRITABLE: NOT AT ALL
2. NOT BEING ABLE TO STOP OR CONTROL WORRYING: NOT AT ALL
7. FEELING AFRAID AS IF SOMETHING AWFUL MIGHT HAPPEN: NOT AT ALL
5. BEING SO RESTLESS THAT IT IS HARD TO SIT STILL: NOT AT ALL
IF YOU CHECKED OFF ANY PROBLEMS ON THIS QUESTIONNAIRE, HOW DIFFICULT HAVE THESE PROBLEMS MADE IT FOR YOU TO DO YOUR WORK, TAKE CARE OF THINGS AT HOME, OR GET ALONG WITH OTHER PEOPLE: NOT DIFFICULT AT ALL
3. WORRYING TOO MUCH ABOUT DIFFERENT THINGS: NOT AT ALL
GAD7 TOTAL SCORE: 1

## 2019-01-03 ASSESSMENT — PATIENT HEALTH QUESTIONNAIRE - PHQ9
SUM OF ALL RESPONSES TO PHQ QUESTIONS 1-9: 0
5. POOR APPETITE OR OVEREATING: NOT AT ALL

## 2019-01-03 ASSESSMENT — MIFFLIN-ST. JEOR: SCORE: 1422.99

## 2019-01-03 NOTE — PATIENT INSTRUCTIONS
We will see where your lipids are today.  If the LDL is > 70, I anticipate sending over some atorvastatin.

## 2019-01-03 NOTE — PROGRESS NOTES
SUBJECTIVE:   Sharmaine José is a 38 year old female who presents to clinic today for the following health issues:      Diabetes Follow-up    Patient is checking blood sugars: three times daily.   Blood sugar testing frequency justification: using insulin  Results are as follows:         am -          lunchtime - 120         suppertime - 120-130's    Diabetic concerns: None     Symptoms of hypoglycemia (low blood sugar): shaky, cold sweats - not often      Paresthesias (numbness or burning in feet) or sores: No     Date of last diabetic eye exam: none    Diabetes Management Resources    Hyperlipidemia Follow-Up      Rate your low fat/cholesterol diet?: good    Taking statin?  No    Other lipid medications/supplements?:  none    Hypertension Follow-up      Outpatient blood pressures are not being checked.    Low Salt Diet: no added salt    BP Readings from Last 2 Encounters:   09/16/18 (!) 123/96   09/14/18 124/70     Hemoglobin A1C (%)   Date Value   11/07/2018 5.5   06/01/2018 10.3 (H)     LDL Cholesterol Calculated (mg/dL)   Date Value   12/22/2017     Cannot estimate LDL when triglyceride exceeds 400 mg/dL   03/10/2017     Cannot estimate LDL when triglyceride exceeds 400 mg/dL     LDL Cholesterol Direct (mg/dL)   Date Value   12/22/2017 216 (H)   03/10/2017 145 (H)       Amount of exercise or physical activity: 3-4 days per week     Problems taking medications regularly: No    Medication side effects: none    Diet: regular (no restrictions)            Problem list and histories reviewed & adjusted, as indicated.  Additional history:     See under ROS     Patient Active Problem List   Diagnosis     Allergic state     Mild intermittent asthma     Generalized hyperhidrosis     Tobacco use disorder     Interstitial cystitis     Surgical menopause     Narcolepsy     Cervical dysplasia     Chronic pain     Migraine     Lumbago     Hyperlipidemia LDL goal <160     H/O: hysterectomy     Health Care Home      Discoid lupus     Generalized anxiety disorder     Fatty infiltration of liver     Primary narcolepsy without cataplexy     Hypertension goal BP (blood pressure) < 140/90     elevated bmi (H)     Hypertriglyceridemia     Type 2 diabetes mellitus with hyperglycemia, without long-term current use of insulin (H)       Current Outpatient Medications   Medication Sig Dispense Refill     albuterol (PROVENTIL HFA) 108 (90 Base) MCG/ACT Inhaler Inhale 2 puffs into the lungs every 6 hours 1 Inhaler 0     B-D U/F 31G X 8 MM insulin pen needle USE WITH BASAGLAR INJECTION DAILY 100 each 11     BASAGLAR 100 UNIT/ML injection INJECT 22 UNITS UNDER THE SKIN DAILY. Patient uses 2 units to prime pen before each injection. 15 mL 2     cyclobenzaprine (FLEXERIL) 10 MG tablet TAKE 1 TABLET(10 MG) BY MOUTH EVERY NIGHT AS NEEDED FOR MUSCLE SPASMS 30 tablet 0     dextroamphetamine (DEXTROSTAT) 10 MG tablet Take 10 mg by mouth daily Takes 1-2 tablets daily  0     DULoxetine (CYMBALTA) 20 MG EC capsule TAKE 1 CAPSULE(20 MG) BY MOUTH DAILY 90 capsule 1     EPINEPHrine (EPIPEN/ADRENACLICK/OR ANY BX GENERIC EQUIV) 0.3 MG/0.3ML injection 2-pack Inject 0.3 mLs (0.3 mg) into the muscle once as needed 2 mL 0     fluticasone (FLONASE) 50 MCG/ACT nasal spray Spray 1-2 sprays into both nostrils daily as needed for rhinitis or allergies       insulin pen needle (B-D U/F) 31G X 5 MM Use once daily as directed with Basaglar pen. 100 each 1     irbesartan (AVAPRO) 150 MG tablet TAKE 1 TABLET(150 MG) BY MOUTH DAILY 90 tablet 0     metFORMIN (GLUCOPHAGE-XR) 500 MG 24 hr tablet Take 2 tablets (1,000 mg) by mouth daily (with dinner) 180 tablet 0     PREMARIN 1.25 MG tablet TAKE 1 TABLET BY MOUTH EVERY DAY 90 tablet 0     ranitidine (ZANTAC) 150 MG tablet Take 150 mg by mouth 2 times daily       STATIN NOT PRESCRIBED, INTENTIONAL, Statin not prescribed intentionally due to Other drug intolerance; more than 5 years trial (This option does not exclude patient  "from measure)         Reviewed and updated as needed this visit by clinical staff       Reviewed and updated as needed this visit by Provider         ROS:  CONSTITUTIONAL:NEGATIVE for fever, chills, change in weight x some weight loss with change in eating habits.  RESP:NEGATIVE for significant cough or SOB  CV: NEGATIVE for chest pain, palpitations or peripheral edema  PSYCHIATRIC: NEGATIVE for changes in mood or affect      Sugars have been really good.   Occasional high one in the 280-290 range.  Not really able to attribute a reason.    Did have one low one ~ 60. (accidently had used more Basaglar than she is supposed to).  Can feel if things get ~ 80 and will respond. This is infrequent.  Usually in the am. Will happen if sleeping in in the am and not getting up to eat something.    Has tried to decrease Basaglar and sugars high during the day.  Fasting .    Occasional diarrhea; believes due to metformin. ? Use of  Immodium. Not often.   Was unable to do 3 of the metformin.    OBJECTIVE:     /84 (BP Location: Right arm, Cuff Size: Adult Regular)   Pulse 104   Temp 98.7  F (37.1  C) (Oral)   Resp 16   Ht 1.651 m (5' 5\")   Wt 73.2 kg (161 lb 6.4 oz)   LMP 10/04/2006   SpO2 98%   BMI 26.86 kg/m    Body mass index is 26.86 kg/m .  GENERAL APPEARANCE: alert and no distress  RESP: lungs clear to auscultation - no rales, rhonchi or wheezes  CV: regular rates and rhythm  MS: no edema.   PSYCH: mentation appears normal and affect normal/bright    Lab Results   Component Value Date    A1C 5.5 11/07/2018    A1C 10.3 06/01/2018    A1C 7.4 12/22/2017    A1C 5.8 05/24/2016    A1C 5.5 02/05/2014       Recent Labs   Lab Test 12/22/17  0942 03/10/17  0903  02/05/14  1358 11/15/11  1032   CHOL 316* 237*   < > 300* 275*   HDL 32* 29*   < > 68 47*   LDL Cannot estimate LDL when triglyceride exceeds 400 mg/dL  216* Cannot estimate LDL when triglyceride exceeds 400 mg/dL  145*   < > 153* Cannot estimate LDL " when triglyceride exceeds 400 mg/dL  172*   TRIG 586* 493*   < > 397* 454*   CHOLHDLRATIO  --   --   --  4.4 5.9*    < > = values in this interval not displayed.     LDL Cholesterol Calculated   Date Value Ref Range Status   12/22/2017  <100 mg/dL Final    Cannot estimate LDL when triglyceride exceeds 400 mg/dL     LDL Cholesterol Direct   Date Value Ref Range Status   12/22/2017 216 (H) <100 mg/dL Final     Comment:     Above desirable:  100-129 mg/dl  Borderline High:  130-159 mg/dL  High:             160-189 mg/dL  Very high:       >189 mg/dl               ASSESSMENT/PLAN:     1. Type 2 diabetes mellitus with hyperglycemia, without long-term current use of insulin (H)  Controlled. Actually her HgbA1C is on the low end. Discussed some concern with hypoglycemia. At this time, will decrease the Basaglar slightly. Recommend to not sleep in too late without getting something to eat.   - Albumin Random Urine Quantitative with Creat Ratio  - **A1C FUTURE anytime; Future  - Comprehensive metabolic panel; Future  - metFORMIN (GLUCOPHAGE-XR) 500 MG 24 hr tablet; Take 2 tablets (1,000 mg) by mouth daily (with dinner)  Dispense: 180 tablet; Refill: 0    2. Hyperlipidemia LDL goal <100  Rechecking lipids, but doubt she will be < 70; which would be where we don't use them for DM.  Discussed potential side effects; including liver abnormalities, gallstones, muscle pain, rhabdomyolysis (rare but very serious).  - Lipid panel reflex to direct LDL Non-fasting  - Lipid panel reflex to direct LDL Non-fasting; Future  - Comprehensive metabolic panel; Future  - atorvastatin (LIPITOR) 10 MG tablet; Take 1 tablet (10 mg) by mouth daily  Dispense: 90 tablet; Refill: 1    3. Hypertension goal BP (blood pressure) < 140/90  Controlled; though ideally would see diastolic < 80. Close.   - irbesartan (AVAPRO) 150 MG tablet; TAKE 1 TABLET(150 MG) BY MOUTH DAILY  Dispense: 90 tablet; Refill: 1  - Albumin Random Urine Quantitative with Creat  Ratio  - Lipid panel reflex to direct LDL Non-fasting; Future  - Comprehensive metabolic panel; Future    4. Fatty infiltration of liver  Weight loss and control of metabolic parameters are typically helpful.  - Comprehensive metabolic panel; Future    5. Menopause - surgical  Requesting refill.  - estrogen conj (PREMARIN) 1.25 MG tablet; Take 1 tablet (1.25 mg) by mouth daily  Dispense: 90 tablet; Refill: 3    Did get a flu shot.      Patient Instructions       We will see where your lipids are today.  If the LDL is > 70, I anticipate sending over some atorvastatin.               Temi Marsh MD, MD  [facility]

## 2019-01-04 LAB
CREAT UR-MCNC: 44 MG/DL
MICROALBUMIN UR-MCNC: <5 MG/L
MICROALBUMIN/CREAT UR: NORMAL MG/G CR (ref 0–25)

## 2019-01-04 RX ORDER — ATORVASTATIN CALCIUM 10 MG/1
10 TABLET, FILM COATED ORAL DAILY
Qty: 90 TABLET | Refills: 1 | Status: SHIPPED | OUTPATIENT
Start: 2019-01-04 | End: 2019-03-25

## 2019-01-04 ASSESSMENT — ASTHMA QUESTIONNAIRES: ACT_TOTALSCORE: 25

## 2019-01-04 ASSESSMENT — ANXIETY QUESTIONNAIRES: GAD7 TOTAL SCORE: 1

## 2019-01-09 ENCOUNTER — TELEPHONE (OUTPATIENT)
Dept: FAMILY MEDICINE | Facility: CLINIC | Age: 39
End: 2019-01-09

## 2019-01-09 NOTE — TELEPHONE ENCOUNTER
Pt had minimal bruising/swelling in right foot at time of injury. Then better for approx 1 week. Now pt is having swelling, pain w/ walking, tender if pressure applied, and it itches. NO redness, no warmth, no streaking, no fevers. Tried to offer pt a couple of options- monitoring foot- symptoms or setting up appt.   Offered appt tomorrow w/ Dr. Marsh- she could not do 1/10/19. Adv there was one appt on Fri- she could not do that. Offered to have pt monitor foot and she thought it was getting worse and maybe somebody should look at it. Suggested UC- she didn't really want to go there. She asked about seeing Dr. Marsh on Monday- adv she was off- (she really only wants to see Dr. Marsh) she will call if symptoms worsen.     Hollie JACOB RN

## 2019-01-09 NOTE — TELEPHONE ENCOUNTER
Reason for call:  Symptom   Symptom or request: toe pain and swelling    Duration (how long have symptoms been present): a few days  Have you been treated for this before? No    Additional comments: Patient tripped and hit her 4th and 5th right toes on a metal banister a few days ago.  She initially had mild swelling and bruising.  Now the area is very swollen and bruised. She adds that she has developed pain and tenderness to the touch along the toes.  Patient denies warmth, redness, bleeding, abrasions.  She has concerns due to new diagnosis with diabetes.  Patient is going to send photos via Rachio.  She would like to know if a visit may be necessary or if she should watch out for any symptoms.     Phone number to reach patient:  Home number on file [phone] (home)    Best Time:  any    Can we leave a detailed message on this number?  YES

## 2019-01-14 DIAGNOSIS — I10 HYPERTENSION GOAL BP (BLOOD PRESSURE) < 140/90: ICD-10-CM

## 2019-01-14 NOTE — TELEPHONE ENCOUNTER
"Requested Prescriptions   Pending Prescriptions Disp Refills     irbesartan (AVAPRO) 150 MG tablet [Pharmacy Med Name: IRBESARTAN 150MG TABLETS]  Last Written Prescription Date:  1/3/19  Last Fill Quantity: 90,  # refills: 1   Last office visit: 1/3/2019 with prescribing provider:  Temi Marsh MD    Future Office Visit:     90 tablet 0     Sig: TAKE 1 TABLET(150 MG) BY MOUTH DAILY    Angiotensin-II Receptors Passed - 1/14/2019  3:31 AM       Passed - Blood pressure under 140/90 in past 12 months    BP Readings from Last 3 Encounters:   01/03/19 124/84   09/16/18 (!) 123/96   09/14/18 124/70                Passed - Recent (12 mo) or future (30 days) visit within the authorizing provider's specialty    Patient had office visit in the last 12 months or has a visit in the next 30 days with authorizing provider or within the authorizing provider's specialty.  See \"Patient Info\" tab in inbasket, or \"Choose Columns\" in Meds & Orders section of the refill encounter.             Passed - Medication is active on med list       Passed - Patient is age 18 or older       Passed - No active pregnancy on record       Passed - Normal serum creatinine on file in past 12 months    Recent Labs   Lab Test 11/07/18  1203   CR 0.53            Passed - Normal serum potassium on file in past 12 months    Recent Labs   Lab Test 11/07/18  1203   POTASSIUM 3.6                   Passed - No positive pregnancy test in past 12 months          "

## 2019-01-16 RX ORDER — IRBESARTAN 150 MG/1
TABLET ORAL
Qty: 90 TABLET | Refills: 0
Start: 2019-01-16

## 2019-01-28 ENCOUNTER — TELEPHONE (OUTPATIENT)
Dept: FAMILY MEDICINE | Facility: CLINIC | Age: 39
End: 2019-01-28

## 2019-01-28 DIAGNOSIS — M62.830 BACK MUSCLE SPASM: ICD-10-CM

## 2019-01-28 RX ORDER — CYCLOBENZAPRINE HCL 10 MG
TABLET ORAL
Qty: 30 TABLET | Refills: 0 | Status: SHIPPED | OUTPATIENT
Start: 2019-01-28 | End: 2019-03-07

## 2019-01-28 NOTE — TELEPHONE ENCOUNTER
Reason for call:  Other   Patient called regarding (reason for call): call back  Additional comments: Patient calling for a prior authorization to be done for her Premarin?  Say's time is running out and she needs this done so she doesn't have to pay out of pocket.  Has to be done once a year.    Phone number to reach patient:  Home number on file [phone] (home)    Best Time:  any    Can we leave a detailed message on this number?  YES

## 2019-01-28 NOTE — TELEPHONE ENCOUNTER
See mychart of 2/28/18.  Prior auth good through 3/14/19.      Called the pt to discuss.  She said it is not going to be a prior auth, it is going to be an appeal situation.      Advised I will forward to the PA team as she does not want this to wait.      Can you please help the pt or let her know when this can be done?  Thanks!

## 2019-01-28 NOTE — TELEPHONE ENCOUNTER
Pt calls.  She is looking for her flexeril.      Flexeril  LRF 12/27/18, dispense 30  LOV 1/3/19    Routing refill request to provider for review/approval because:  Drug not on the [facility] refill protocol

## 2019-02-01 NOTE — TELEPHONE ENCOUNTER
Central Prior Authorization Team   Phone: [phone]      PA Initiation    Medication: premarin  Insurance Company: [employer] - Phone [phone] Fax [phone]  Pharmacy Filling the Rx: [facility] [address] [place] [address] [address] AT [facility] [address]  Filling Pharmacy Phone: [phone]  Filling Pharmacy Fax:    Start Date: 2/1/2019

## 2019-03-07 DIAGNOSIS — M62.830 BACK MUSCLE SPASM: ICD-10-CM

## 2019-03-07 NOTE — TELEPHONE ENCOUNTER
Requested Prescriptions   Pending Prescriptions Disp Refills     cyclobenzaprine (FLEXERIL) 10 MG tablet [Pharmacy Med Name: CYCLOBENZAPRINE 10MG TABLETS] 30 tablet 0     Sig: TAKE 1 TABLET(10 MG) BY MOUTH EVERY NIGHT AS NEEDED FOR MUSCLE SPASMS    There is no refill protocol information for this order        Last Written Prescription Date:  1/28/19  Last Fill Quantity: 30,  # refills: 0   Last office visit: 1/3/2019 with prescribing provider:  Temi Marsh MD   Future Office Visit:

## 2019-03-08 RX ORDER — CYCLOBENZAPRINE HCL 10 MG
TABLET ORAL
Qty: 30 TABLET | Refills: 0 | Status: SHIPPED | OUTPATIENT
Start: 2019-03-08 | End: 2019-04-09

## 2019-03-25 ENCOUNTER — TELEPHONE (OUTPATIENT)
Dept: FAMILY MEDICINE | Facility: CLINIC | Age: 39
End: 2019-03-25

## 2019-03-25 DIAGNOSIS — E78.5 HYPERLIPIDEMIA LDL GOAL <100: ICD-10-CM

## 2019-03-25 RX ORDER — ATORVASTATIN CALCIUM 10 MG/1
5 TABLET, FILM COATED ORAL DAILY
Qty: 90 TABLET | Refills: 1 | COMMUNITY
Start: 2019-03-25 | End: 2019-09-05 | Stop reason: SINTOL

## 2019-03-25 NOTE — TELEPHONE ENCOUNTER
[facility] asking if we had records of her MMR vaccine. I gave patient the message that the Rn gave her but patient states Dr. Marsh in the past had fill out forms she had these done. Went to the media tab and back on 6/17/2003 Dr. Marsh signed a form stating patient had MMR vaccines on dates 8/27/1983 and 6/20/1994, on form she put something patient having documentation of this and she reviewed the old records. Please reprint this form and see if it's ok to add the following dates to patient's vaccine record.    Keysha Olivarez

## 2019-03-25 NOTE — TELEPHONE ENCOUNTER
Pt calls.    [facility] closed.  She is required to get an immunization record for other schools.  She can't find her MMR date.  Not seeing it in her chart or on [facility].    Advised she could call the clinics that she was seen when she was a child.  Or Advised she could make an appt for titres.  She is going to check with her other colleges.  She said she was adopted and does not have all dates.      ASHISHI - Dr. Marsh - She also wanted us to know she was taking atorvastatin.  She got some muscle pain.  Could you decrease the dose?  Advised she could do an e-visit for this or discuss other options.  She wanted us to know she stopped taking it.

## 2019-03-26 NOTE — TELEPHONE ENCOUNTER
She can try taking 1/2 of the lipitor daily for now.   I will put this into chart...     Looks like you have handled the immunization question?

## 2019-04-19 ENCOUNTER — NURSE TRIAGE (OUTPATIENT)
Dept: NURSING | Facility: CLINIC | Age: 39
End: 2019-04-19

## 2019-04-19 NOTE — TELEPHONE ENCOUNTER
"Tried to refill Premarin.[facility] said insurance denied. Unsure why. [facility] will call insurance on  for more info. Insurance now closed for weekend. Pt asks if PA for Premarin has ..Informed PA valid until 20 (see  TE). Pt voiced understanding. Pt states \"it's probably just a little too early to refill\". 5 days of pills left. Says insurance very strict w/ refill. [facility] will try again in 2-3 days. Pt will call back if in need of further assistance.  Galina Auguste RN/FNA      Additional Information    Negative: Drug overdose and nurse unable to answer question    Negative: Caller requesting information not related to medicine    Negative: Caller requesting a prescription for Strep throat and has a positive culture result    Negative: Rash while taking a medication or within 3 days of stopping it    Negative: Immunization reaction suspected    Negative: [1] Asthma and [2] having symptoms of asthma (cough, wheezing, etc)    Negative: MORE THAN A DOUBLE DOSE of a prescription or over-the-counter (OTC) drug    Negative: [1] DOUBLE DOSE (an extra dose or lesser amount) of over-the-counter (OTC) drug AND [2] any symptoms (e.g., dizziness, nausea, pain, sleepiness)    Negative: [1] DOUBLE DOSE (an extra dose or lesser amount) of prescription drug AND [2] any symptoms (e.g., dizziness, nausea, pain, sleepiness)    Negative: Took another person's prescription drug    Negative: [1] DOUBLE DOSE (an extra dose or lesser amount) of prescription drug AND [2] NO symptoms (Exception: a double dose of antibiotics)    Negative: Diabetes drug error or overdose (e.g., insulin or extra dose)    Negative: [1] Request for URGENT new prescription or refill of \"essential\" medication (i.e., likelihood of harm to patient if not taken) AND [2] triager unable to fill per unit policy    Negative: [1] Prescription not at pharmacy AND [2] was prescribed today by PCP    Negative: Pharmacy calling with prescription " questions and triager unable to answer question    Negative: Caller has URGENT medication question about med that PCP prescribed and triager unable to answer question    Negative: Caller has NON-URGENT medication question about med that PCP prescribed and triager unable to answer question    Negative: Caller requesting a NON-URGENT new prescription or refill and triager unable to refill per unit policy    Negative: Caller has medication question about med not prescribed by PCP and triager unable to answer question (e.g., compatibility with other med, storage)    Negative: [1] DOUBLE DOSE (an extra dose or lesser amount) of over-the-counter (OTC) drug AND [2] NO symptoms (all triage questions negative)    Negative: [1] DOUBLE DOSE (an extra dose or lesser amount) of antibiotic drug AND [2] NO symptoms (all triage questions negative)    Caller has medication question only, adult not sick, and triager answers question    Protocols used: [facility]

## 2019-05-14 ENCOUNTER — TRANSFERRED RECORDS (OUTPATIENT)
Dept: HEALTH INFORMATION MANAGEMENT | Facility: CLINIC | Age: 39
End: 2019-05-14

## 2019-05-14 ENCOUNTER — MYC MEDICAL ADVICE (OUTPATIENT)
Dept: FAMILY MEDICINE | Facility: CLINIC | Age: 39
End: 2019-05-14

## 2019-05-14 DIAGNOSIS — E11.65 TYPE 2 DIABETES MELLITUS WITH HYPERGLYCEMIA, WITHOUT LONG-TERM CURRENT USE OF INSULIN (H): ICD-10-CM

## 2019-05-14 DIAGNOSIS — T30.0 BURN: Primary | ICD-10-CM

## 2019-05-14 DIAGNOSIS — N94.9 PAIN OF FEMALE GENITALIA: ICD-10-CM

## 2019-05-29 DIAGNOSIS — T43.205D ANTIDEPRESSANT DISCONTINUATION SYNDROME, SUBSEQUENT ENCOUNTER: ICD-10-CM

## 2019-05-29 NOTE — TELEPHONE ENCOUNTER
"Requested Prescriptions   Pending Prescriptions Disp Refills     DULoxetine (CYMBALTA) 20 MG capsule [Pharmacy Med Name: DULOXETINE DR 20MG CAPSULES] 90 capsule 0     Sig: TAKE 1 CAPSULE(20 MG) BY MOUTH DAILY  Last Written Prescription Date:  11/14/2018  Last Fill Quantity: 90 capsule,  # refills: 1    Last office visit: 1/3/2019 with prescribing provider:  Temi Marsh MD        Future Office Visit:           Serotonin-Norepinephrine Reuptake Inhibitors  Passed - 5/29/2019  3:31 AM        Passed - Blood pressure under 140/90 in past 12 months     BP Readings from Last 3 Encounters:   01/03/19 124/84   09/16/18 (!) 123/96   09/14/18 124/70           Passed - Recent (12 mo) or future (30 days) visit within the authorizing provider's specialty     Patient had office visit in the last 12 months or has a visit in the next 30 days with authorizing provider or within the authorizing provider's specialty.  See \"Patient Info\" tab in inbasket, or \"Choose Columns\" in Meds & Orders section of the refill encounter.              Passed - Medication is active on med list        Passed - Patient is age 18 or older        Passed - No active pregnancy on record        Passed - No positive pregnancy test in past 12 months      Prescription approved per [facility]ill Protocol.  Brandi Lopez RN    "

## 2019-05-30 DIAGNOSIS — N94.9 PAIN OF FEMALE GENITALIA: ICD-10-CM

## 2019-05-30 RX ORDER — LANCETS
EACH MISCELLANEOUS
Qty: 100 EACH | Refills: 6 | Status: SHIPPED | OUTPATIENT
Start: 2019-05-30 | End: 2019-09-09

## 2019-05-30 RX ORDER — LIDOCAINE/PRILOCAINE 2.5 %-2.5%
CREAM (GRAM) TOPICAL PRN
Qty: 30 G | Refills: 0 | Status: SHIPPED | OUTPATIENT
Start: 2019-05-30 | End: 2020-10-07

## 2019-05-30 RX ORDER — DULOXETIN HYDROCHLORIDE 20 MG/1
CAPSULE, DELAYED RELEASE ORAL
Qty: 90 CAPSULE | Refills: 1 | Status: SHIPPED | OUTPATIENT
Start: 2019-05-30 | End: 2019-09-05

## 2019-05-30 RX ORDER — GLUCOSAMINE HCL/CHONDROITIN SU 500-400 MG
CAPSULE ORAL
Qty: 100 EACH | Refills: 3 | Status: SHIPPED | OUTPATIENT
Start: 2019-05-30 | End: 2022-10-06

## 2019-05-30 RX ORDER — LIDOCAINE/PRILOCAINE 2.5 %-2.5%
CREAM (GRAM) TOPICAL PRN
Qty: 30 G | Refills: 0 | OUTPATIENT
Start: 2019-05-30

## 2019-05-30 NOTE — TELEPHONE ENCOUNTER
Routing MyC message to dr. Marsh for further advice.    Two issues  1. Refill on topical pain med  2. Formulary issue with insulin and diabetic supplies    Sent patient a MyC message asking if a particular brand of meter/supplies is needed to state which one. And would check Dr. Marsh's inbasket for any communication from pharmacy/insurance. Patient has also already been advised Dr. Marsh may need a visit for the topical medication.  Also advised patient she is overdue for diabetic f/u appt.    Shelly RAJPUT, Triage RN

## 2019-05-31 ENCOUNTER — TELEPHONE (OUTPATIENT)
Dept: FAMILY MEDICINE | Facility: CLINIC | Age: 39
End: 2019-05-31

## 2019-05-31 NOTE — TELEPHONE ENCOUNTER
Prior Authorization Specialty Medication Request    Medication/Dose:   ICD code (if different than what is on RX):  Type 2 diabetes mellitus with hyperglycemia, without long-term current use of insulin (H) [E11.65]   Previously Tried and Failed:  none    Important Lab Values: none   Rationale:     Insurance Name:[facility]  Insurance ID: [id number]  Insurance Phone Number: [phone]    Pharmacy Information (if different than what is on RX)  Name:  [facility]  Phone:  [phone]

## 2019-06-04 NOTE — TELEPHONE ENCOUNTER
PA Initiation    Medication: lantus solostar pen  Insurance Company: [facility] - Phone [phone] Fax [phone]  Ph[facility]illing the Rx[facility] [address] [place] [address] [address] AT [facility] [address]  Filling Pharmacy Phone: [phone]  Filling Pharmacy Fax:    Start Date: 6/4/2019    Central Prior Authorization Team   Phone: [phone]        Awaiting additional questions via CMM

## 2019-06-06 NOTE — TELEPHONE ENCOUNTER
Medication: lantus solostar pen  Insurance Company: [facility] - Phone [phone] Fax [phone]  Pharmacy Filling the Rx: [facility] [address] [place] [address] [address] AT [facility] OF [facility] [address]  Pharmacy Notified: Yes Called [facility] pharmacy at [phone] to maek sure that this is going through patient's insurance fine, per pharmacist this is actually rejecting stating that basaglar kwikpen is preferred. Called [facility] at [phone] and per rep this is non-formulary so initiated PA over the phone. Request is pending but we should have a determination within 72 hours.

## 2019-06-07 NOTE — TELEPHONE ENCOUNTER
Tried calling #[phone] twice but both times the line goes dead in the middle of the call.     Hollie JACOB RN

## 2019-06-07 NOTE — TELEPHONE ENCOUNTER
PRIOR AUTHORIZATION DENIED    Medication: lantus solostar pen    Denial Date: 6/7/2019    Denial Rational: Patient must have a history of trial & failure to the formulary alternative(s) or have a contraindication or intolerance to the formulary alternatives: will fernandez        Appeal Information:

## 2019-06-07 NOTE — TELEPHONE ENCOUNTER
I don't understand...  She was on Basaglar and I had a mailing that said that would not be covered and this was an alternative.   I am OK with either one.    Is this something that is going to change, but has not yet?     Please check with how she is doing on her supply.     Should I send in a prescription for the Basaglar now?     Also, she is due for appointment...    Lab Results   Component Value Date    A1C 5.5 11/07/2018    A1C 10.3 06/01/2018    A1C 7.4 12/22/2017    A1C 5.8 05/24/2016    A1C 5.5 02/05/2014

## 2019-06-08 DIAGNOSIS — E11.65 TYPE 2 DIABETES MELLITUS WITH HYPERGLYCEMIA, WITHOUT LONG-TERM CURRENT USE OF INSULIN (H): ICD-10-CM

## 2019-06-09 NOTE — TELEPHONE ENCOUNTER
Requested Prescriptions   Pending Prescriptions Disp Refills     metFORMIN (GLUCOPHAGE-XR) 500 MG 24 hr tablet [Pharmacy Med Name: METFORMIN ER 500MG 24HR TABS] 180 tablet 0     Sig: TAKE 2 TABLETS(1000 MG) BY MOUTH DAILY WITH DINNER  Last Written Prescription Date:  01/03/2019  Last Fill Quantity: 180 tablet,  # refills: 0    Last office visit: 1/3/2019 with prescribing provider:  Temi Marsh MD        Future Office Visit:           Biguanide Agents Failed - 6/8/2019 10:43 AM        Failed - Patient has documented A1c within the specified period of time.     If HgbA1C is 8 or greater, it needs to be on file within the past 3 months.  If less than 8, must be on file within the past 6 months.     Recent Labs   Lab Test 11/07/18  1203   A1C 5.5             Passed - Blood pressure less than 140/90 in past 6 months     BP Readings from Last 3 Encounters:   01/03/19 124/84   09/16/18 (!) 123/96   09/14/18 124/70                 Passed - Patient has documented LDL within the past 12 mos.     Recent Labs   Lab Test 01/03/19  0954   *             Passed - Patient has had a Microalbumin in the past 15 mos.     Recent Labs   Lab Test 01/03/19  1130   MICROL <5   UMALCR Unable to calculate due to low value             Passed - Patient is age 10 or older        Passed - Patient's CR is NOT>1.4 OR Patient's EGFR is NOT<45 within past 12 mos.     Recent Labs   Lab Test 11/07/18  1203   GFRESTIMATED >90   GFRESTBLACK >90       Recent Labs   Lab Test 11/07/18  1203   CR 0.53             Passed - Patient does NOT have a diagnosis of CHF.        Passed - Medication is active on med list        Passed - Patient is not pregnant        Passed - Patient has not had a positive pregnancy test within the past 12 mos.         Passed - Recent (6 mo) or future (30 days) visit within the authorizing provider's specialty     Patient had office visit in the last 6 months or has a visit in the next 30 days with authorizing provider or  "within the authorizing provider's specialty.  See \"Patient Info\" tab in inbasket, or \"Choose Columns\" in Meds & Orders section of the refill encounter.              "

## 2019-06-11 ENCOUNTER — MYC MEDICAL ADVICE (OUTPATIENT)
Dept: NURSING | Facility: CLINIC | Age: 39
End: 2019-06-11

## 2019-06-11 DIAGNOSIS — M62.830 BACK MUSCLE SPASM: ICD-10-CM

## 2019-06-11 RX ORDER — METFORMIN HCL 500 MG
TABLET, EXTENDED RELEASE 24 HR ORAL
Qty: 60 TABLET | Refills: 0 | Status: SHIPPED | OUTPATIENT
Start: 2019-06-11 | End: 2019-06-12

## 2019-06-11 NOTE — TELEPHONE ENCOUNTER
Routing refill request to provider for review/approval because:  Patient needs to be seen because:  Was to be seen for a d/m check April 2019, bp elevated and ldl elevated    Mycharted the pt to advise.

## 2019-06-11 NOTE — TELEPHONE ENCOUNTER
Attempt #3 to call patient. No answer. Unable to leave message as line disconnects in middle of greeting message.     Sent patient a MyC message.    Shelly RAJPUT, Triage RN

## 2019-06-12 RX ORDER — CYCLOBENZAPRINE HCL 10 MG
TABLET ORAL
Qty: 30 TABLET | Refills: 1 | Status: SHIPPED | OUTPATIENT
Start: 2019-06-12 | End: 2019-09-11

## 2019-06-12 RX ORDER — METFORMIN HCL 500 MG
TABLET, EXTENDED RELEASE 24 HR ORAL
Qty: 120 TABLET | Refills: 0 | Status: SHIPPED | OUTPATIENT
Start: 2019-06-12 | End: 2019-09-05

## 2019-06-12 NOTE — TELEPHONE ENCOUNTER
Requested Prescriptions   Pending Prescriptions Disp Refills     cyclobenzaprine (FLEXERIL) 10 MG tablet 30 tablet 1     Sig: TAKE 1 TABLET(10 MG) BY MOUTH EVERY NIGHT AS NEEDED FOR MUSCLE SPASMS       There is no refill protocol information for this order        Last Written Prescription Date:  04//10/2019  Last Fill Quantity: 30 tablet,  # refills: 1    Last office visit: 1/3/2019 with prescribing provider:  Temi Marsh MD        Future Office Visit:   Next 5 appointments (look out 90 days)    Aug 01, 2019 10:10 AM CDT  Office Visit with Temi Marsh MD  [facility] ([address] [address] [phone]           Routing refill request to provider for review/approval because:  Drug not on the [facility], [facility] or [facility] refill protocol or controlled substance

## 2019-06-23 DIAGNOSIS — E11.65 TYPE 2 DIABETES MELLITUS WITH HYPERGLYCEMIA, WITHOUT LONG-TERM CURRENT USE OF INSULIN (H): ICD-10-CM

## 2019-06-24 NOTE — TELEPHONE ENCOUNTER
"Requested Prescriptions   Pending Prescriptions Disp Refills     blood glucose (ONETOUCH ULTRA) test strip [Pharmacy Med Name: ONE TOUCH ULTRA BLUE TESTST(NEW)100]  Last Written Prescription Date:  5/30/19 (local print)  Last Fill Quantity: 100,  # refills: 6   Last office visit: 1/3/2019 with prescribing provider:  Temi Marsh MD   Future Office Visit:   Next 5 appointments (look out 90 days)    Aug 01, 2019 10:10 AM CDT  Office Visit with Temi Marsh MD  [address] [address]  [phone]          100 strip 0     Sig: USE TO TEST BLOOD SUGAR UP TO FOUR TIMES DAILY AS NEEDED       Diabetic Supplies Protocol Passed - 6/23/2019  3:31 AM        Passed - Medication is active on med list        Passed - Patient is 18 years of age or older        Passed - Recent (6 mo) or future (30 days) visit within the authorizing provider's specialty     Patient had office visit in the last 6 months or has a visit in the next 30 days with authorizing provider.  See \"Patient Info\" tab in inbasket, or \"Choose Columns\" in Meds & Orders section of the refill encounter.              "

## 2019-06-24 NOTE — TELEPHONE ENCOUNTER
Prescription approved per [facility] Refill Protocol.  Tyree Howell, RN, BSN

## 2019-07-02 ENCOUNTER — TELEPHONE (OUTPATIENT)
Dept: FAMILY MEDICINE | Facility: CLINIC | Age: 39
End: 2019-07-02

## 2019-07-02 NOTE — LETTER
July 2, 2019      Sharmaine José  [address] [facility] [address]        Dear Ms. Sharmaine José,    We are contacting you today to reschedule an upcoming appointment on 08/01/19 with your provider.  Your medical provider will be out of the office due to an unforseen leave of absence and is due to return in September.  Please call the clinic at [phone]  or use [facility] to reschedule this appointment.  We can reschedule with another provider in the clinic or for the next available appointment with your primary care provider.     We apologize for any inconvenience.       Your [facility] Team

## 2019-07-05 ENCOUNTER — TELEPHONE (OUTPATIENT)
Dept: FAMILY MEDICINE | Facility: CLINIC | Age: 39
End: 2019-07-05

## 2019-07-05 NOTE — TELEPHONE ENCOUNTER
Medication not covered: Alternatives; enalapril maleate, lisinopril, losartan potassium, losartan hydroch, ramipril, valsartan     Disp Refills Start End MEMO   irbesartan (AVAPRO) 150 MG tablet 90 tablet 1 1/3/2019  No   Sig: TAKE 1 TABLET(150 MG) BY MOUTH DAILY

## 2019-07-08 DIAGNOSIS — I10 HYPERTENSION GOAL BP (BLOOD PRESSURE) < 140/90: ICD-10-CM

## 2019-07-08 NOTE — TELEPHONE ENCOUNTER
"Requested Prescriptions   Pending Prescriptions Disp Refills     irbesartan (AVAPRO) 150 MG tablet  Last Written Prescription Date:  1/3/19  Last Fill Quantity: 90,  # refills: 1   Last office visit: 1/3/2019 with prescribing provider:  Temi Marsh MD   Future Office Visit:   Next 5 appointments (look out 90 days)    Aug 01, 2019 10:10 AM CDT  Office Visit with Temi Marsh MD  [address] [address] [phone]          90 tablet 1     Sig: TAKE 1 TABLET(150 MG) BY MOUTH DAILY       Angiotensin-II Receptors Passed - 7/8/2019  9:52 AM        Passed - Blood pressure under 140/90 in past 12 months     BP Readings from Last 3 Encounters:   01/03/19 124/84   09/16/18 (!) 123/96   09/14/18 124/70                 Passed - Recent (12 mo) or future (30 days) visit within the authorizing provider's specialty     Patient had office visit in the last 12 months or has a visit in the next 30 days with authorizing provider or within the authorizing provider's specialty.  See \"Patient Info\" tab in inbasket, or \"Choose Columns\" in Meds & Orders section of the refill encounter.              Passed - Medication is active on med list        Passed - Patient is age 18 or older        Passed - No active pregnancy on record        Passed - Normal serum creatinine on file in past 12 months     Recent Labs   Lab Test 11/07/18  1203   CR 0.53             Passed - Normal serum potassium on file in past 12 months     Recent Labs   Lab Test 11/07/18  1203   POTASSIUM 3.6                    Passed - No positive pregnancy test in past 12 months          "

## 2019-07-09 RX ORDER — IRBESARTAN 150 MG/1
TABLET ORAL
Qty: 90 TABLET | Refills: 0 | Status: SHIPPED | OUTPATIENT
Start: 2019-07-09 | End: 2019-09-05 | Stop reason: ALTCHOICE

## 2019-07-15 ENCOUNTER — MYC MEDICAL ADVICE (OUTPATIENT)
Dept: NURSING | Facility: CLINIC | Age: 39
End: 2019-07-15

## 2019-07-31 ENCOUNTER — TELEPHONE (OUTPATIENT)
Dept: FAMILY MEDICINE | Facility: CLINIC | Age: 39
End: 2019-07-31

## 2019-07-31 NOTE — TELEPHONE ENCOUNTER
Reason for call:  Form   Our goal is to have forms completed within 72 hours, however some forms may require a visit or additional information.     Who is the form from? Patient  Where did the form come from? Patient or family brought in     What clinic location was the form placed at? [facility]  Where was the form placed? [facility]CK[facility] Box/Folder  What number is listed as a contact on the form? [phone]    Phone call message - patient request for a letter, form or note:     Date needed: as soon as possible  Please fax to [phone]  Has the patient signed a consent form for release of information? Not Applicable    Additional comments: NONE    Type of letter, form or note: medical    Phone number to reach patient:  Home number on file [phone] (home)    Best Time:  ANY    Can we leave a detailed message on this number?  YES

## 2019-08-01 NOTE — TELEPHONE ENCOUNTER
Completed. Repeat form in one year given incident of low (even though just once) since initiation of basal insulin. In my OB and ready for

## 2019-08-01 NOTE — TELEPHONE ENCOUNTER
Form has been faxed to [phone] per pt request  and copy placed in abstraction.   Cesia Almodovar, CMA

## 2019-08-12 DIAGNOSIS — I10 HYPERTENSION GOAL BP (BLOOD PRESSURE) < 140/90: ICD-10-CM

## 2019-08-12 RX ORDER — VALSARTAN 320 MG/1
TABLET ORAL
Qty: 30 TABLET | Refills: 0 | Status: SHIPPED | OUTPATIENT
Start: 2019-08-12 | End: 2019-09-05

## 2019-08-12 NOTE — TELEPHONE ENCOUNTER
"Requested Prescriptions   Pending Prescriptions Disp Refills     valsartan (DIOVAN) 320 MG tablet [Pharmacy Med Name: VALSARTAN 320MG TABLETS] 30 tablet 0     Sig: TAKE 1 TABLET(320 MG) BY MOUTH DAILY   Last Written Prescription Date:  7/5/19  Last Fill Quantity: 30,  # refills: 0   Last office visit: 1/3/2019 with prescribing provider:  Temi Marsh MD   Future Office Visit:   Next 5 appointments (look out 90 days)    Sep 05, 2019 11:10 AM CDT  Office Visit with Temi Marsh MD  [facility] [address] [facility] [address] [phone]             Angiotensin-II Receptors Passed - 8/12/2019  9:49 AM        Passed - Last blood pressure under 140/90 in past 12 months     BP Readings from Last 3 Encounters:   01/03/19 124/84   09/16/18 (!) 123/96   09/14/18 124/70                 Passed - Recent (12 mo) or future (30 days) visit within the authorizing provider's specialty     Patient had office visit in the last 12 months or has a visit in the next 30 days with authorizing provider or within the authorizing provider's specialty.  See \"Patient Info\" tab in inbasket, or \"Choose Columns\" in Meds & Orders section of the refill encounter.              Passed - Medication is active on med list        Passed - Patient is age 18 or older        Passed - No active pregnancy on record        Passed - Normal serum creatinine on file in past 12 months     Recent Labs   Lab Test 11/07/18  1203   CR 0.53             Passed - Normal serum potassium on file in past 12 months     Recent Labs   Lab Test 11/07/18  1203   POTASSIUM 3.6                    Passed - No positive pregnancy test in past 12 months          "

## 2019-08-22 DIAGNOSIS — E11.65 TYPE 2 DIABETES MELLITUS WITH HYPERGLYCEMIA, WITHOUT LONG-TERM CURRENT USE OF INSULIN (H): ICD-10-CM

## 2019-08-22 NOTE — TELEPHONE ENCOUNTER
"Requested Prescriptions   Pending Prescriptions Disp Refills     LANTUS SOLOSTAR 100 UNIT/ML soln [Pharmacy Med Name: LANTUS SOLOSTAR PEN INJ 3ML] 9 mL 0     Sig: ADMINISTER 22 UNITS UNDER THE SKIN AT BEDTIME   Last Written Prescription Date:  5/31/19  Last Fill Quantity: 10 ml,  # refills: 0   Last office visit: 1/3/2019 with prescribing provider:  Temi Marsh MD   Future Office Visit:   Next 5 appointments (look out 90 days)    Sep 05, 2019 11:10 AM CDT  Office Visit with Temi Marsh MD  [facility] ([facility]) [address] [facility] [address] [phone]             Long Acting Insulin Protocol Failed - 8/22/2019  3:31 AM        Failed - Blood pressure less than 140/90 in past 6 months     BP Readings from Last 3 Encounters:   01/03/19 124/84   09/16/18 (!) 123/96   09/14/18 124/70                 Failed - HgbA1C in past 3 or 6 months     If HgbA1C is 8 or greater, it needs to be on file within the past 3 months.  If less than 8, must be on file within the past 6 months.     Recent Labs   Lab Test 11/07/18  1203   A1C 5.5             Passed - LDL on file in past 12 months     Recent Labs   Lab Test 01/03/19  0954   *             Passed - Microalbumin on file in past 12 months     Recent Labs   Lab Test 01/03/19  1130   MICROL <5   UMALCR Unable to calculate due to low value             Passed - Serum creatinine on file in past 12 months     Recent Labs   Lab Test 11/07/18  1203   CR 0.53             Passed - Medication is active on med list        Passed - Patient is age 18 or older        Passed - Recent (6 mo) or future (30 days) visit within the authorizing provider's specialty     Patient had office visit in the last 6 months or has a visit in the next 30 days with authorizing provider or within the authorizing provider's specialty.  See \"Patient Info\" tab in inbasket, or \"Choose Columns\" in Meds & Orders section of the refill encounter.        "

## 2019-08-23 RX ORDER — INSULIN GLARGINE 100 [IU]/ML
INJECTION, SOLUTION SUBCUTANEOUS
Qty: 9 ML | Refills: 0 | Status: SHIPPED | OUTPATIENT
Start: 2019-08-23 | End: 2019-09-05

## 2019-08-23 NOTE — TELEPHONE ENCOUNTER
Medication is being filled for 1 time refill only due to:  Patient needs to be seen because due for diabetic visit and labs..     Patient has appt with pcp.    Yolie Pascal RN

## 2019-09-03 NOTE — PROGRESS NOTES
Subjective     Sharmaine José is a 39 year old female who presents to clinic today for the following health issues:    HPI   Diabetes Follow-up      How often are you checking your blood sugar? 4 or more times     What time of day are you checking your blood sugars (select all that apply)?  Before and after meals    Have you had any blood sugars above 200?  Yes 2 times     Have you had any blood sugars below 70?  No    What symptoms do you notice when your blood sugar is low?  Shaky and Other: lightheaded, clammy, weak     What concerns do you have today about your diabetes? None     Do you have any of these symptoms? (Select all that apply)  No numbness or tingling in feet.  No redness, sores or blisters on feet.  No complaints of excessive thirst.  No reports of blurry vision.  No significant changes to weight.   Have you had a diabetic eye exam in the last 12 months? No   Diabetes Management Resources    Hyperlipidemia Follow-Up      Are you having any of the following symptoms? (Select all that apply)  No complaints of shortness of breath, chest pain or pressure.  No increased sweating or nausea with activity.  No left-sided neck or arm pain.  No complaints of pain in calves when walking 1-2 blocks.    Are you regularly taking any medication or supplement to lower your cholesterol?   Yes- stopped taking atorvastatin due to side effects    Are you having muscle aches or other side effects that you think could be caused by your cholesterol lowering medication?  Yes- muscle aches    Hypertension Follow-up      Do you check your blood pressure regularly outside of the clinic? No     Are you following a low salt diet? Yes    Are your blood pressures ever more than 140 on the top number (systolic) OR more   than 90 on the bottom number (diastolic), for example 140/90? No    BP Readings from Last 2 Encounters:   01/03/19 124/84   09/16/18 (!) 123/96     Hemoglobin A1C (%)   Date Value   11/07/2018 5.5   06/01/2018  10.3 (H)     LDL Cholesterol Calculated (mg/dL)   Date Value   01/03/2019 176 (H)   12/22/2017     Cannot estimate LDL when triglyceride exceeds 400 mg/dL     LDL Cholesterol Direct (mg/dL)   Date Value   12/22/2017 216 (H)         How many servings of fruits and vegetables do you eat daily?  2-3    On average, how many sweetened beverages do you drink each day (soda, juice, sweet tea, etc)?   1    How many days per week do you miss taking your medication? 0            See under ROS    Patient Active Problem List   Diagnosis     Allergic state     Mild intermittent asthma     Generalized hyperhidrosis     Tobacco use disorder     Interstitial cystitis     Surgical menopause     Narcolepsy     Cervical dysplasia     Chronic pain     Migraine     Lumbago     Hyperlipidemia LDL goal <100     H/O: hysterectomy     Health Care Home     Discoid lupus     Generalized anxiety disorder     Fatty infiltration of liver     Primary narcolepsy without cataplexy     Hypertension goal BP (blood pressure) < 140/90     elevated bmi (H)     Hypertriglyceridemia     Type 2 diabetes mellitus with hyperglycemia, without long-term current use of insulin (H)       Current Outpatient Medications   Medication Sig Dispense Refill     albuterol (PROVENTIL HFA) 108 (90 Base) MCG/ACT Inhaler Inhale 2 puffs into the lungs every 6 hours 1 Inhaler 0     alcohol swab prep pads Use to swab area of injection/robb as directed. 100 each 3     B-D U/F 31G X 8 MM insulin pen needle USE WITH BASAGLAR INJECTION DAILY 100 each 11     blood glucose (NO BRAND SPECIFIED) test strip Use to test blood sugar 1 times daily or as directed. To accompany: Blood Glucose Monitor Brands: per insurance. 100 strip 6     blood glucose calibration (NO BRAND SPECIFIED) solution To accompany: Blood Glucose Monitor Brands: per insurance. 1 Bottle 3     blood glucose monitoring (NO BRAND SPECIFIED) meter device kit Use to test blood sugar 1 times daily or as directed.  Preferred blood glucose meter OR supplies to accompany: Blood Glucose Monitor Brands: per insurance. 1 kit 0     cyclobenzaprine (FLEXERIL) 10 MG tablet TAKE 1 TABLET(10 MG) BY MOUTH EVERY NIGHT AS NEEDED FOR MUSCLE SPASMS 30 tablet 1     dextroamphetamine (DEXTROSTAT) 10 MG tablet Take 10 mg by mouth daily Takes 1-3tablets daily  0     DULoxetine (CYMBALTA) 20 MG capsule TAKE 1 CAPSULE(20 MG) BY MOUTH DAILY 90 capsule 1     EPINEPHrine (EPIPEN/ADRENACLICK/OR ANY BX GENERIC EQUIV) 0.3 MG/0.3ML injection 2-pack Inject 0.3 mLs (0.3 mg) into the muscle once as needed 2 mL 0     estrogen conj (PREMARIN) 1.25 MG tablet Take 1 tablet (1.25 mg) by mouth daily 90 tablet 3     fluticasone (FLONASE) 50 MCG/ACT nasal spray Spray 1-2 sprays into both nostrils daily as needed for rhinitis or allergies       irbesartan (AVAPRO) 150 MG tablet TAKE 1 TABLET(150 MG) BY MOUTH DAILY 90 tablet 0     LANTUS SOLOSTAR 100 UNIT/ML soln ADMINISTER 22 UNITS UNDER THE SKIN AT BEDTIME 9 mL 0     lidocaine-prilocaine (EMLA) 2.5-2.5 % external cream Apply topically as needed for moderate pain 30 g 0     metFORMIN (GLUCOPHAGE-XR) 500 MG 24 hr tablet TAKE 2 TABLETS(1000 MG) BY MOUTH DAILY WITH DINNER 120 tablet 0     ranitidine (ZANTAC) 150 MG tablet Take 150 mg by mouth 2 times daily       STATIN NOT PRESCRIBED, INTENTIONAL, Statin not prescribed intentionally due to Other drug intolerance; more than 5 years trial (This option does not exclude patient from measure)       thin (NO BRAND SPECIFIED) lancets Use with lanceting device. To accompany: Blood Glucose Monitor Brands: per insurance. 100 each 6     valsartan (DIOVAN) 320 MG tablet TAKE 1 TABLET(320 MG) BY MOUTH DAILY 30 tablet 0           Reviewed and updated as needed this visit by Provider         Review of Systems   ROS COMP: CONSTITUTIONAL:NEGATIVE for fever, chills, change in weight x gained some weight back.   RESP:NEGATIVE for significant cough or SOB  CV: NEGATIVE for chest pain,  "palpitations or peripheral edema  GI: no N/V or change in bm  PSYCHIATRIC: NEGATIVE for changes in mood or affect    Completed school.    Will see discoid doctor.  Will do cbc and liver tests.   Anticipates getting on chloroquine. Will see that doctor in 2 1/2 weeks. Anticipates lab.       Has not taken atorvastatin. Side effects; did try 1/2 and did not work. Muscle aches, but felt like may come down with flu; run down.  Similar thing with simvastatin.     Sugars doing well.  Switched to lantus; has increased to 27.  Had been on 22 prior to that.  Following sugars. 100 when awakes; 160-180 p meals currently.  Lowest sugar in the low 70's and had not eaten and walked a lot prior to that.   Did feel it.      Last eye exam October/Nov; will go again this Fall.    Objective    /72 (BP Location: Right arm, Cuff Size: Adult Regular)   Pulse 115   Temp 98.9  F (37.2  C) (Oral)   Resp 16   Ht 1.651 m (5' 5\")   Wt 78.6 kg (173 lb 3.2 oz)   LMP 10/04/2006   SpO2 98%   BMI 28.82 kg/m    Body mass index is 28.82 kg/m .  Physical Exam   GENERAL APPEARANCE: alert and no distress  RESP: lungs clear to auscultation - no rales, rhonchi or wheezes  CV: regular rates and rhythm  MS: extremities normal- no gross deformities noted  Skin: multiple scabs on arms; some on legs.  PSYCH: mentation appears normal and affect normal/bright    DP pulse present bilaterally.  No sores or ulcerations.  Little callous.  Monofilament exam normal.    Lab Results   Component Value Date    A1C 5.4 09/05/2019    A1C 5.5 11/07/2018    A1C 10.3 06/01/2018    A1C 7.4 12/22/2017    A1C 5.8 05/24/2016       Recent Labs   Lab Test 01/03/19  0954 12/22/17  0942  02/05/14  1358 11/15/11  1032   CHOL 286* 316*   < > 300* 275*   HDL 49* 32*   < > 68 47*   * Cannot estimate LDL when triglyceride exceeds 400 mg/dL  216*   < > 153* Cannot estimate LDL when triglyceride exceeds 400 mg/dL  172*   TRIG 305* 586*   < > 397* 454*   CHOLHDLRATIO  -- " "  --   --  4.4 5.9*    < > = values in this interval not displayed.       PHQ-9 SCORE 3/19/2018 6/1/2018 1/3/2019   PHQ-9 Total Score - - -   PHQ-9 Total Score MyChart 3 (Minimal depression) - -   PHQ-9 Total Score 3 2 0       KIRBY-7 SCORE 3/19/2018 6/1/2018 1/3/2019   Total Score - - -   Total Score 1 (minimal anxiety) - -   Total Score 1 0 1                 Assessment & Plan     1. Type 2 diabetes mellitus without complication, with long-term current use of insulin (H)  Controlled.   She has increased lantus; discussed basing this on only fasting glucose. Discussed dangerous for lows. Suspect she needed increase related to weight gain as opposed to switch to this brand.   If lows, would want to decrease lantus.     2. Hyperlipidemia LDL goal <100  Did not tolerate atorvastatin; recalls similar things with simvastatin.  Will try this. To get fasting lab in 3 months.   - pravastatin (PRAVACHOL) 10 MG tablet; Take 1 tablet (10 mg) by mouth daily  Dispense: 90 tablet; Refill: 0    3. Hypertension goal BP (blood pressure) < 140/90  Controlled. Refilling.   - valsartan (DIOVAN) 320 MG tablet; TAKE 1 TABLET(320 MG) BY MOUTH DAILY  Dispense: 90 tablet; Refill: 1    4. Antidepressant discontinuation syndrome, subsequent encounter  Doing well.  - DULoxetine (CYMBALTA) 20 MG capsule; Take 1 capsule (20 mg) by mouth daily  Dispense: 90 capsule; Refill: 1    5. Discoid lupus  Discussed lab work. She notes that doctor needs within a few day window of the medication; does not want to duplicate. She will send to us.   Consider adding in other lab orders if not getting electrolytes, etc.          BMI:   Estimated body mass index is 28.82 kg/m  as calculated from the following:    Height as of this encounter: 1.651 m (5' 5\").    Weight as of this encounter: 78.6 kg (173 lb 3.2 oz).   Weight management plan: Discussed healthy diet and exercise guidelines          Return in about 3 months (around 12/5/2019) for Lab Work - fasting; see " you in 6 months..    Temi Marsh MD, MD  [facility]

## 2019-09-05 ENCOUNTER — OFFICE VISIT (OUTPATIENT)
Dept: FAMILY MEDICINE | Facility: CLINIC | Age: 39
End: 2019-09-05
Payer: COMMERCIAL

## 2019-09-05 VITALS
BODY MASS INDEX: 28.86 KG/M2 | HEIGHT: 65 IN | RESPIRATION RATE: 16 BRPM | SYSTOLIC BLOOD PRESSURE: 126 MMHG | HEART RATE: 115 BPM | WEIGHT: 173.2 LBS | DIASTOLIC BLOOD PRESSURE: 72 MMHG | TEMPERATURE: 98.9 F | OXYGEN SATURATION: 98 %

## 2019-09-05 DIAGNOSIS — E78.5 HYPERLIPIDEMIA LDL GOAL <100: ICD-10-CM

## 2019-09-05 DIAGNOSIS — I10 HYPERTENSION GOAL BP (BLOOD PRESSURE) < 140/90: ICD-10-CM

## 2019-09-05 DIAGNOSIS — L93.0 DISCOID LUPUS: ICD-10-CM

## 2019-09-05 DIAGNOSIS — Z79.4 TYPE 2 DIABETES MELLITUS WITHOUT COMPLICATION, WITH LONG-TERM CURRENT USE OF INSULIN (H): Primary | ICD-10-CM

## 2019-09-05 DIAGNOSIS — T43.205D ANTIDEPRESSANT DISCONTINUATION SYNDROME, SUBSEQUENT ENCOUNTER: ICD-10-CM

## 2019-09-05 DIAGNOSIS — E11.9 TYPE 2 DIABETES MELLITUS WITHOUT COMPLICATION, WITH LONG-TERM CURRENT USE OF INSULIN (H): Primary | ICD-10-CM

## 2019-09-05 LAB — HBA1C MFR BLD: 5.4 % (ref 0–5.6)

## 2019-09-05 PROCEDURE — 99207 C FOOT EXAM  NO CHARGE: CPT | Performed by: FAMILY MEDICINE

## 2019-09-05 PROCEDURE — 36415 COLL VENOUS BLD VENIPUNCTURE: CPT | Performed by: FAMILY MEDICINE

## 2019-09-05 PROCEDURE — 83036 HEMOGLOBIN GLYCOSYLATED A1C: CPT | Performed by: FAMILY MEDICINE

## 2019-09-05 PROCEDURE — 99214 OFFICE O/P EST MOD 30 MIN: CPT | Performed by: FAMILY MEDICINE

## 2019-09-05 RX ORDER — PRAVASTATIN SODIUM 10 MG
10 TABLET ORAL DAILY
Qty: 90 TABLET | Refills: 0 | Status: SHIPPED | OUTPATIENT
Start: 2019-09-05 | End: 2019-12-11

## 2019-09-05 RX ORDER — DULOXETIN HYDROCHLORIDE 20 MG/1
20 CAPSULE, DELAYED RELEASE ORAL DAILY
Qty: 90 CAPSULE | Refills: 1 | Status: SHIPPED | OUTPATIENT
Start: 2019-09-05 | End: 2020-05-26

## 2019-09-05 RX ORDER — METFORMIN HCL 500 MG
TABLET, EXTENDED RELEASE 24 HR ORAL
Qty: 120 TABLET | Refills: 0 | Status: SHIPPED | OUTPATIENT
Start: 2019-09-05 | End: 2019-11-14

## 2019-09-05 RX ORDER — VALSARTAN 320 MG/1
TABLET ORAL
Qty: 90 TABLET | Refills: 1 | Status: SHIPPED | OUTPATIENT
Start: 2019-09-05 | End: 2020-02-19

## 2019-09-05 ASSESSMENT — ANXIETY QUESTIONNAIRES
1. FEELING NERVOUS, ANXIOUS, OR ON EDGE: SEVERAL DAYS
6. BECOMING EASILY ANNOYED OR IRRITABLE: NOT AT ALL
7. FEELING AFRAID AS IF SOMETHING AWFUL MIGHT HAPPEN: NOT AT ALL
2. NOT BEING ABLE TO STOP OR CONTROL WORRYING: SEVERAL DAYS
GAD7 TOTAL SCORE: 2
IF YOU CHECKED OFF ANY PROBLEMS ON THIS QUESTIONNAIRE, HOW DIFFICULT HAVE THESE PROBLEMS MADE IT FOR YOU TO DO YOUR WORK, TAKE CARE OF THINGS AT HOME, OR GET ALONG WITH OTHER PEOPLE: NOT DIFFICULT AT ALL
5. BEING SO RESTLESS THAT IT IS HARD TO SIT STILL: NOT AT ALL
3. WORRYING TOO MUCH ABOUT DIFFERENT THINGS: NOT AT ALL

## 2019-09-05 ASSESSMENT — PATIENT HEALTH QUESTIONNAIRE - PHQ9
5. POOR APPETITE OR OVEREATING: NOT AT ALL
SUM OF ALL RESPONSES TO PHQ QUESTIONS 1-9: 0

## 2019-09-05 ASSESSMENT — MIFFLIN-ST. JEOR: SCORE: 1471.51

## 2019-09-06 ASSESSMENT — ANXIETY QUESTIONNAIRES: GAD7 TOTAL SCORE: 2

## 2019-09-06 ASSESSMENT — ASTHMA QUESTIONNAIRES: ACT_TOTALSCORE: 25

## 2019-09-11 ENCOUNTER — MYC REFILL (OUTPATIENT)
Dept: FAMILY MEDICINE | Facility: CLINIC | Age: 39
End: 2019-09-11

## 2019-09-11 DIAGNOSIS — M62.830 BACK MUSCLE SPASM: ICD-10-CM

## 2019-09-13 NOTE — TELEPHONE ENCOUNTER
Flexeril 10 mg    Last Written Prescription Date:  6/12/2019  Last Fill Quantity: 30,  # refills: 1   Last office visit: 9/5/2019 with prescribing provider:    Future Office Visit:      Routing refill request to provider for review/approval because:  Drug not on the [facility] refill protocol     Yolie Pascal RN

## 2019-09-14 RX ORDER — CYCLOBENZAPRINE HCL 10 MG
TABLET ORAL
Qty: 30 TABLET | Refills: 1 | Status: SHIPPED | OUTPATIENT
Start: 2019-09-14 | End: 2019-11-14

## 2019-09-28 ENCOUNTER — VIRTUAL VISIT (OUTPATIENT)
Dept: FAMILY MEDICINE | Facility: OTHER | Age: 39
End: 2019-09-28

## 2019-09-29 ENCOUNTER — HEALTH MAINTENANCE LETTER (OUTPATIENT)
Age: 39
End: 2019-09-29

## 2019-09-29 NOTE — PROGRESS NOTES
"Date:   Clinician: Thiago Mejia  Clinician NPI: [id number]  Patient: Sharmaine José  Patient : 1980  Patient Address: [address] [place] [address], [place] [address]  Patient Phone: [phone]  Visit Protocol: UTI  Patient Summary:  Sharmaine is a 39 year old ( : 1980 ) female who initiated a Visit for a presumed bladder infection. When asked the question \"Please sign me up to receive news, health information and promotions from [employer].\", Sharmaine responded \"No\".   Her symptoms started today and consist of urinary frequency and feeling as if the bladder is never empty. Sharmaine also feels feverish.   Symptom details     Urine color: The color of her urine is orange.     Temperature: Her current temperature is 99.7 degrees Fahrenheit.      Denied symptoms include chills, vaginal discharge, urinary urgency, urinary incontinence, vomiting, vaginal itching, dysuria, foul-smelling urine, nausea, flank pain, and abdominal pain.   Sharmaine has not used any over-the-counter medications or home remedies to relieve her current symptoms.  Precipitating events  Sharmaine denies having a sexually transmitted disease.  Pertinent medical history  Sharmaine has had a bladder infection before but has not had any in the past 12 months. Her current symptoms are similar to her previous bladder infection symptoms.   Sulfamethoxazole-trimethoprim (Bactrim DS) has been effective in treating her past bladder infections.   Sharmaine typically gets a yeast infection when she takes antibiotics. She has used fluconazole (Diflucan) to treat previous yeast infections. She is not sure if she has needed 1 or 2 doses of fluconazole (Diflucan) for symptoms to resolve in the past.   She has a history of kidney stones. Her last episode was more than 6 months ago.   Sharmaine has not been prescribed antibiotics to prevent frequent or repeated bladder infections in the past. She has not experienced problems or side effects with any of the common " antibiotics used to treat bladder infections.   She has not used a catheter or been a patient in a hospital or nursing home in the past 2 weeks.   Sharmaine smokes or uses smokeless tobacco.   She denies pregnancy and denies breastfeeding. She does not menstruate.   Additional information as reported by the patient (free text): I have a hx of interstitial cystitis.  I was unsure.  It didn't feel quite the same.  I don't have the burning when urinating, but know once I get that, it goes pretty quick.  I bought one of the AZO UTI test strip kits from the [employer].  It showed 125/+++ leukocytes.  Negative for nitrites.  (I am an RN &amp;, .  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  It gives me severe nausea.  I can treat any yeast infection with OTC.  (I have a couple boxes already on hand.)     MEDICATIONS: pravastatin oral, valsartan oral, Lantus U-100 Insulin subcutaneous, Premarin oral, cyclobenzaprine oral, duloxetine oral, metformin oral, Zantac oral, dextroamphetamine oral, ALLERGIES: tramadol, hornet venom, Nuvigil, Ceclor, Mycobacterium Tuberculosis (Tuberculin PPD), tuberculin, purified protein derivative  Clinician Response:  Dear Sharmaine,  Based on the information you have provided, you likely have an acute urinary tract infection, also called a bladder infection. Bladder infections occur when bacteria from the outside of the body enters the urinary tract. Any part of the urinary system can be infected, but the bladder is the most common.  Medication information  I am prescribing:     Nitrofurantoin monohyd/m-cryst (Macrobid) 100 mg oral capsule. Take 1 capsule by mouth every 12 hours for 5 days. Take this medication with food. There are no refills with this prescription.   The medication I prescribed for your bladder infection is an antibiotic. Continue taking the medication until it is gone even if you feel better.   Yeast infections can be a common side effect of antibiotics. The  most common symptom of a yeast infection is itchiness in and around the vagina. Other signs and symptoms include burning, redness, or a thick, white vaginal discharge that looks like cottage cheese and does not have a bad smell.  Self care  Urination helps to flush bacteria from the urinary tract. For this reason, drinking water and urinating often helps relieve some urinary symptoms and can decrease your risk of getting bladder infections in the future.  Other steps you can take to prevent future bladder infections include:     Wipe front to back after using the bathroom    Urinate after sexual intercourse    Avoid using deodorant sprays, douches, or powders in the vaginal area     Becoming tobacco-free is one of the best things you can do to improve your health. For help with quitting tobacco, you can call 9-925FunifiQUITFunifiNOW (1-497.595.3822) or visit [employer].  When to seek care  Please make an appointment to be seen in a clinic or urgent care if any of the following occur:     You develop new symptoms or your symptoms become worse    You have medication side effects that make it difficult to take them as prescribed    Your symptoms do not improve within 1-2 days of starting treatment    You have symptoms of a bladder infection that return shortly after completing treatment     It is possible to have an allergic reaction to an antibiotic even if you have not had one in the past. If you notice a new rash, significant swelling, or difficulty breathing, stop taking this medication immediately and go to a clinic or urgent care.   Diagnosis: Acute uncomplicated bladder infection  Diagnosis ICD: N39.0  Prescription: nitrofurantoin monohyd/m-cryst (Macrobid) 100 mg oral capsule 10 capsule, 5 days supply. Take 1 capsule by mouth every 12 hours for 5 days. Refills: 0, Refill as needed: no, Allow substitutions: yes  Pharmacy: [id number] - [phone] - [address] [place] [address]

## 2019-10-05 DIAGNOSIS — E11.65 TYPE 2 DIABETES MELLITUS WITH HYPERGLYCEMIA, WITHOUT LONG-TERM CURRENT USE OF INSULIN (H): ICD-10-CM

## 2019-10-07 RX ORDER — INSULIN GLARGINE 100 [IU]/ML
INJECTION, SOLUTION SUBCUTANEOUS
Qty: 9 ML | Refills: 0 | OUTPATIENT
Start: 2019-10-07

## 2019-10-07 NOTE — TELEPHONE ENCOUNTER
"Requested Prescriptions   Pending Prescriptions Disp Refills     LANTUS SOLOSTAR 100 UNIT/ML soln [Pharmacy Med Name: LANTUS SOLOSTAR PEN INJ 3ML] 9 mL 0     Sig: ADMINISTER 22 UNITS UNDER THE SKIN AT BEDTIME   Last Written Prescription Date:  9/5/19  Last Fill Quantity: 27ml,  # refills: 1   Last office visit: 9/5/2019 with prescribing provider:  Temi Marsh MD   Future Office Visit:        Long Acting Insulin Protocol Passed - 10/5/2019  3:31 AM        Passed - Blood pressure less than 140/90 in past 6 months     BP Readings from Last 3 Encounters:   09/05/19 126/72   01/03/19 124/84   09/16/18 (!) 123/96                 Passed - LDL on file in past 12 months     Recent Labs   Lab Test 01/03/19  0954   *             Passed - Microalbumin on file in past 12 months     Recent Labs   Lab Test 01/03/19  1130   MICROL <5   UMALCR Unable to calculate due to low value             Passed - Serum creatinine on file in past 12 months     Recent Labs   Lab Test 11/07/18  1203   CR 0.53             Passed - HgbA1C in past 3 or 6 months     If HgbA1C is 8 or greater, it needs to be on file within the past 3 months.  If less than 8, must be on file within the past 6 months.     Recent Labs   Lab Test 09/05/19  1126   A1C 5.4             Passed - Medication is active on med list        Passed - Patient is age 18 or older        Passed - Recent (6 mo) or future (30 days) visit within the authorizing provider's specialty     Patient had office visit in the last 6 months or has a visit in the next 30 days with authorizing provider or within the authorizing provider's specialty.  See \"Patient Info\" tab in inbasket, or \"Choose Columns\" in Meds & Orders section of the refill encounter.             "

## 2019-10-07 NOTE — TELEPHONE ENCOUNTER
Refills were sent 9/5/19, disp 27 ml with 1 refill to the same pharmacy, pt should still have refills.  Spoke to Horace to verify this.  Will deny to the pharmacy with that note.

## 2019-11-14 DIAGNOSIS — E11.9 TYPE 2 DIABETES MELLITUS WITHOUT COMPLICATION, WITH LONG-TERM CURRENT USE OF INSULIN (H): Primary | ICD-10-CM

## 2019-11-14 DIAGNOSIS — Z79.4 TYPE 2 DIABETES MELLITUS WITHOUT COMPLICATION, WITH LONG-TERM CURRENT USE OF INSULIN (H): Primary | ICD-10-CM

## 2019-11-14 DIAGNOSIS — M62.830 BACK MUSCLE SPASM: ICD-10-CM

## 2019-11-15 NOTE — TELEPHONE ENCOUNTER
Requested Prescriptions   Pending Prescriptions Disp Refills     metFORMIN (GLUCOPHAGE-XR) 500 MG 24 hr tablet [Pharmacy Med Name: METFORMIN ER 500MG 24HR TABS] 120 tablet 0     Sig: TAKE 2 TABLETS(1000 MG) BY MOUTH DAILY WITH DINNER   Last Written Prescription Date:  9/5/19  Last Fill Quantity: 120,  # refills: 0   Last office visit: 9/5/2019 with prescribing provider:     Temi Marsh MD        Future Office Visit:        Biguanide Agents Failed - 11/14/2019  6:58 PM        Failed - Patient's CR is NOT>1.4 OR Patient's EGFR is NOT<45 within past 12 mos.     Recent Labs   Lab Test 11/07/18  1203   GFRESTIMATED >90   GFRESTBLACK >90       Recent Labs   Lab Test 11/07/18  1203   CR 0.53             Passed - Blood pressure less than 140/90 in past 6 months     BP Readings from Last 3 Encounters:   09/05/19 126/72   01/03/19 124/84   09/16/18 (!) 123/96                 Passed - Patient has documented LDL within the past 12 mos.     Recent Labs   Lab Test 01/03/19  0954   *             Passed - Patient has had a Microalbumin in the past 15 mos.     Recent Labs   Lab Test 01/03/19  1130   MICROL <5   UMALCR Unable to calculate due to low value             Passed - Patient is age 10 or older        Passed - Patient has documented A1c within the specified period of time.     If HgbA1C is 8 or greater, it needs to be on file within the past 3 months.  If less than 8, must be on file within the past 6 months.     Recent Labs   Lab Test 09/05/19  1126   A1C 5.4             Passed - Patient does NOT have a diagnosis of CHF.        Passed - Medication is active on med list        Passed - Patient is not pregnant        Passed - Patient has not had a positive pregnancy test within the past 12 mos.         Passed - Recent (6 mo) or future (30 days) visit within the authorizing provider's specialty     Patient had office visit in the last 6 months or has a visit in the next 30 days with authorizing provider or within the  "authorizing provider's specialty.  See \"Patient Info\" tab in inbasket, or \"Choose Columns\" in Meds & Orders section of the refill encounter.            cyclobenzaprine (FLEXERIL) 10 MG tablet [Pharmacy Med Name: CYCLOBENZAPRINE 10MG TABLETS] 30 tablet 0     Sig: TAKE 1 TABLET(10 MG) BY MOUTH EVERY NIGHT AS NEEDED FOR MUSCLE SPASMS   Last Written Prescription Date:  9/14/19  Last Fill Quantity: 30,  # refills: 1   Last office visit: 9/5/2019 with prescribing provider:  Temi Marsh MD   Future Office Visit:        There is no refill protocol information for this order         "

## 2019-11-19 RX ORDER — METFORMIN HCL 500 MG
TABLET, EXTENDED RELEASE 24 HR ORAL
Qty: 120 TABLET | Refills: 0 | Status: SHIPPED | OUTPATIENT
Start: 2019-11-19 | End: 2020-01-20

## 2019-11-19 RX ORDER — CYCLOBENZAPRINE HCL 10 MG
TABLET ORAL
Qty: 30 TABLET | Refills: 0 | Status: SHIPPED | OUTPATIENT
Start: 2019-11-19 | End: 2019-12-18

## 2019-11-19 NOTE — TELEPHONE ENCOUNTER
Lab Results   Component Value Date    A1C 5.4 09/05/2019    A1C 5.5 11/07/2018    A1C 10.3 06/01/2018    A1C 7.4 12/22/2017    A1C 5.8 05/24/2016

## 2019-11-19 NOTE — TELEPHONE ENCOUNTER
Routing refill request to provider for review/approval because:  Cyclobenzaprine: Drug not on the [facility] refill protocol     Last Written Prescription Date:  9/14/19  Last Fill Quantity: 30 # refills: 1  Last office visit: 9/5/2019 with prescribing provider:    Future Office Visit:      Routing refill request to provider for review/approval because:  Metformin: Labs out of range    Last Written Prescription Date:  9/5/19  Last Fill Quantity: 120 # refills:   Last office visit: 9/5/2019 with prescribing provider:    Future Office Visit:      Per last office 9/5/19:  Return in about 3 months (around 12/5/2019) for Lab Work - fasting; see you in 6 months..    Kopo Kopo message sent to remind her that Lab visit is due next month.     Asmita Ramirez RN Flex

## 2019-12-11 DIAGNOSIS — E78.5 HYPERLIPIDEMIA LDL GOAL <100: ICD-10-CM

## 2019-12-12 RX ORDER — PRAVASTATIN SODIUM 10 MG
TABLET ORAL
Qty: 90 TABLET | Refills: 0 | Status: SHIPPED | OUTPATIENT
Start: 2019-12-12 | End: 2020-05-25

## 2019-12-12 NOTE — TELEPHONE ENCOUNTER
"Requested Prescriptions   Pending Prescriptions Disp Refills     pravastatin (PRAVACHOL) 10 MG tablet [Pharmacy Med Name: PRAVASTATIN 10MG TABLETS]  Last Written Prescription Date:  9/5/2019  Last Fill Quantity: 90 tab,  # refills: 0   Last Office Visit: 9/5/2019 Salma  Future Office Visit:      90 tablet 0     Sig: TAKE 1 TABLET(10 MG) BY MOUTH DAILY       Statins Protocol Passed - 12/12/2019 10:02 AM        Passed - LDL on file in past 12 months     Recent Labs   Lab Test 01/03/19  0954   *             Passed - No abnormal creatine kinase in past 12 months     No lab results found.             Passed - Recent (12 mo) or future (30 days) visit within the authorizing provider's specialty     Patient has had an office visit with the authorizing provider or a provider within the authorizing providers department within the previous 12 mos or has a future within next 30 days. See \"Patient Info\" tab in inbasket, or \"Choose Columns\" in Meds & Orders section of the refill encounter.              Passed - Medication is active on med list        Passed - Patient is age 18 or older        Passed - No active pregnancy on record        Passed - No positive pregnancy test in past 12 months        "

## 2019-12-17 DIAGNOSIS — M62.830 BACK MUSCLE SPASM: ICD-10-CM

## 2019-12-17 NOTE — TELEPHONE ENCOUNTER
Requested Prescriptions   Pending Prescriptions Disp Refills     cyclobenzaprine (FLEXERIL) 10 MG tablet [Pharmacy Med Name: CYCLOBENZAPRINE 10MG TABLETS] 30 tablet 0     Sig: TAKE 1 TABLET(10 MG) BY MOUTH EVERY NIGHT AS NEEDED FOR MUSCLE SPASMS   Last Written Prescription Date:  11/19/19  Last Fill Quantity: 30,  # refills: 0   Last office visit: 9/5/2019 with prescribing provider:  Temi Marsh MD    Future Office Visit:        There is no refill protocol information for this order

## 2019-12-18 RX ORDER — CYCLOBENZAPRINE HCL 10 MG
TABLET ORAL
Qty: 30 TABLET | Refills: 0 | Status: SHIPPED | OUTPATIENT
Start: 2019-12-18 | End: 2020-01-20

## 2019-12-18 NOTE — TELEPHONE ENCOUNTER
Routing refill request to provider for review/approval because:  Drug not on the [facility] refill protocol   Charis Brito RN

## 2020-01-12 DIAGNOSIS — Z78.0 MENOPAUSE: ICD-10-CM

## 2020-01-13 RX ORDER — ESTROGENS, CONJUGATED 1.25 MG
TABLET ORAL
Qty: 90 TABLET | Refills: 2 | Status: SHIPPED | OUTPATIENT
Start: 2020-01-13 | End: 2020-06-29

## 2020-01-13 NOTE — TELEPHONE ENCOUNTER
Premarin  Prescription approved per [facility] Refill Protocol.    Fatuma Hall RN

## 2020-01-13 NOTE — TELEPHONE ENCOUNTER
"Requested Prescriptions   Pending Prescriptions Disp Refills     PREMARIN 1.25 MG tablet [Pharmacy Med Name: PREMARIN 1.25MG TABLETS] 90 tablet 3     Sig: TAKE 1 TABLET BY MOUTH DAILY   Last Written Prescription Date:  1/3/19  Last Fill Quantity: 90,  # refills: 3   Last office visit: 9/5/2019 with prescribing provider:  Temi Marsh MD   Future Office Visit:        Hormone Replacement Therapy Passed - 1/12/2020  3:31 AM        Passed - Blood pressure under 140/90 in past 12 months     BP Readings from Last 3 Encounters:   09/05/19 126/72   01/03/19 124/84   09/16/18 (!) 123/96                 Passed - Recent (12 mo) or future (30 days) visit within the authorizing provider's specialty     Patient has had an office visit with the authorizing provider or a provider within the authorizing providers department within the previous 12 mos or has a future within next 30 days. See \"Patient Info\" tab in inbasket, or \"Choose Columns\" in Meds & Orders section of the refill encounter.              Passed - Medication is active on med list        Passed - Patient is 18 years of age or older        Passed - No active pregnancy on record        Passed - No positive pregnancy test on record in past 12 months         "

## 2020-01-15 ENCOUNTER — TRANSFERRED RECORDS (OUTPATIENT)
Dept: HEALTH INFORMATION MANAGEMENT | Facility: CLINIC | Age: 40
End: 2020-01-15

## 2020-01-20 DIAGNOSIS — Z79.4 TYPE 2 DIABETES MELLITUS WITHOUT COMPLICATION, WITH LONG-TERM CURRENT USE OF INSULIN (H): ICD-10-CM

## 2020-01-20 DIAGNOSIS — M62.830 BACK MUSCLE SPASM: ICD-10-CM

## 2020-01-20 DIAGNOSIS — E11.9 TYPE 2 DIABETES MELLITUS WITHOUT COMPLICATION, WITH LONG-TERM CURRENT USE OF INSULIN (H): ICD-10-CM

## 2020-01-20 RX ORDER — METFORMIN HCL 500 MG
TABLET, EXTENDED RELEASE 24 HR ORAL
Qty: 180 TABLET | Refills: 0 | Status: SHIPPED | OUTPATIENT
Start: 2020-01-20 | End: 2020-05-29

## 2020-01-20 RX ORDER — CYCLOBENZAPRINE HCL 10 MG
TABLET ORAL
Qty: 90 TABLET | Refills: 0 | Status: SHIPPED | OUTPATIENT
Start: 2020-01-20 | End: 2020-04-09

## 2020-01-20 NOTE — TELEPHONE ENCOUNTER
"Flexeril  LRF 12/18/19, disp 30 with no refills  LOV 9/5/19    Metformin  LRF 11/19/19  LOV 9/5/19    Requested Prescriptions   Pending Prescriptions Disp Refills     metFORMIN (GLUCOPHAGE-XR) 500 MG 24 hr tablet 120 tablet 0       Biguanide Agents Failed - 1/20/2020  2:54 PM        Failed - Patient has documented LDL within the past 12 mos.     Recent Labs   Lab Test 01/03/19  0954   *             Failed - Patient's CR is NOT>1.4 OR Patient's EGFR is NOT<45 within past 12 mos.     Recent Labs   Lab Test 11/07/18  1203   GFRESTIMATED >90   GFRESTBLACK >90       Recent Labs   Lab Test 11/07/18  1203   CR 0.53             Passed - Blood pressure less than 140/90 in past 6 months     BP Readings from Last 3 Encounters:   09/05/19 126/72   01/03/19 124/84   09/16/18 (!) 123/96                 Passed - Patient has had a Microalbumin in the past 15 mos.     Recent Labs   Lab Test 01/03/19  1130   MICROL <5   UMALCR Unable to calculate due to low value             Passed - Patient is age 10 or older        Passed - Patient has documented A1c within the specified period of time.     If HgbA1C is 8 or greater, it needs to be on file within the past 3 months.  If less than 8, must be on file within the past 6 months.     Recent Labs   Lab Test 09/05/19  1126   A1C 5.4             Passed - Patient does NOT have a diagnosis of CHF.        Passed - Medication is active on med list        Passed - Patient is not pregnant        Passed - Patient has not had a positive pregnancy test within the past 12 mos.         Passed - Recent (6 mo) or future (30 days) visit within the authorizing provider's specialty     Patient had office visit in the last 6 months or has a visit in the next 30 days with authorizing provider or within the authorizing provider's specialty.  See \"Patient Info\" tab in inbasket, or \"Choose Columns\" in Meds & Orders section of the refill encounter.            cyclobenzaprine (FLEXERIL) 10 MG tablet 30 " tablet 0       There is no refill protocol information for this order        Routing refill request to provider for review/approval because:  Labs out of range:  ldl elevated, due for creatinine and bp elevated

## 2020-01-20 NOTE — TELEPHONE ENCOUNTER
Requested Prescriptions   Pending Prescriptions Disp Refills     metFORMIN (GLUCOPHAGE-XR) 500 MG 24 hr tablet [Pharmacy Med Name: METFORMIN ER 500MG 24HR TABS] 120 tablet 0     Sig: TAKE 2 TABLETS(1000 MG) BY MOUTH DAILY WITH DINNER   Last Written Prescription Date:  11/19/19  Last Fill Quantity: 120,  # refills: 0   Last office visit: 9/5/2019 with prescribing provider:  Temi Marsh MD    Future Office Visit:        Biguanide Agents Failed - 1/20/2020  2:31 PM        Failed - Patient has documented LDL within the past 12 mos.     Recent Labs   Lab Test 01/03/19  0954   *             Failed - Patient's CR is NOT>1.4 OR Patient's EGFR is NOT<45 within past 12 mos.     Recent Labs   Lab Test 11/07/18  1203   GFRESTIMATED >90   GFRESTBLACK >90       Recent Labs   Lab Test 11/07/18  1203   CR 0.53             Passed - Blood pressure less than 140/90 in past 6 months     BP Readings from Last 3 Encounters:   09/05/19 126/72   01/03/19 124/84   09/16/18 (!) 123/96                 Passed - Patient has had a Microalbumin in the past 15 mos.     Recent Labs   Lab Test 01/03/19  1130   MICROL <5   UMALCR Unable to calculate due to low value             Passed - Patient is age 10 or older        Passed - Patient has documented A1c within the specified period of time.     If HgbA1C is 8 or greater, it needs to be on file within the past 3 months.  If less than 8, must be on file within the past 6 months.     Recent Labs   Lab Test 09/05/19  1126   A1C 5.4             Passed - Patient does NOT have a diagnosis of CHF.        Passed - Medication is active on med list        Passed - Patient is not pregnant        Passed - Patient has not had a positive pregnancy test within the past 12 mos.         Passed - Recent (6 mo) or future (30 days) visit within the authorizing provider's specialty     Patient had office visit in the last 6 months or has a visit in the next 30 days with authorizing provider or within the  "authorizing provider's specialty.  See \"Patient Info\" tab in inbasket, or \"Choose Columns\" in Meds & Orders section of the refill encounter.            cyclobenzaprine (FLEXERIL) 10 MG tablet [Pharmacy Med Name: CYCLOBENZAPRINE 10MG TABLETS] 30 tablet 0     Sig: TAKE 1 TABLET(10 MG) BY MOUTH EVERY NIGHT AS NEEDED FOR MUSCLE SPASMS   Last Written Prescription Date:  12/18/19  Last Fill Quantity: 30,  # refills: 0   Last office visit: 9/5/2019 with prescribing provider:  Temi Marsh MD    Future Office Visit:          There is no refill protocol information for this order         "

## 2020-01-20 NOTE — TELEPHONE ENCOUNTER
Reason for Call:  Other  prescription    Detailed comments: Pt is requesting a refill on the following prescriptions:     1.cyclobenzaprine (FLEXERIL) 10 MG tablet [5573]    2. metFORMIN (GLUCOPHAGE-XR) 500 MG 24 hr tablet [86643]       Pt will be out of metformin tomorrow.  Pt is also starting new job, so insurance is changing soon and would like refills asap.    Phone Number Patient can be reached at: Cell number on file:    Telephone Information:   Mobile [phone]   Mobile Not on file.       Best Time: any    Can we leave a detailed message on this number? YES     Call taken on 1/20/2020 at 2:48 PM by Nicol Delgadillo

## 2020-01-21 RX ORDER — METFORMIN HCL 500 MG
TABLET, EXTENDED RELEASE 24 HR ORAL
Qty: 120 TABLET | Refills: 0 | OUTPATIENT
Start: 2020-01-21

## 2020-01-21 RX ORDER — CYCLOBENZAPRINE HCL 10 MG
TABLET ORAL
Qty: 30 TABLET | Refills: 0 | OUTPATIENT
Start: 2020-01-21

## 2020-01-24 ENCOUNTER — TRANSFERRED RECORDS (OUTPATIENT)
Dept: HEALTH INFORMATION MANAGEMENT | Facility: CLINIC | Age: 40
End: 2020-01-24

## 2020-02-18 ENCOUNTER — TELEPHONE (OUTPATIENT)
Dept: FAMILY MEDICINE | Facility: CLINIC | Age: 40
End: 2020-02-18

## 2020-02-18 DIAGNOSIS — I10 HYPERTENSION GOAL BP (BLOOD PRESSURE) < 140/90: ICD-10-CM

## 2020-02-18 NOTE — TELEPHONE ENCOUNTER
"Requested Prescriptions   Pending Prescriptions Disp Refills     valsartan 320 MG PO tablet 90 tablet 1     Sig: TAKE 1 TABLET(320 MG) BY MOUTH DAILY   Last Written Prescription Date:  9/5/19  Last Fill Quantity: 90,  # refills: 1   Last office visit: 9/5/2019 with prescribing provider:  Temi Marsh MD   Future Office Visit:        Angiotensin-II Receptors Failed - 2/18/2020  4:35 PM        Failed - Normal serum creatinine on file in past 12 months     Recent Labs   Lab Test 11/07/18  1203   CR 0.53             Failed - Normal serum potassium on file in past 12 months     Recent Labs   Lab Test 11/07/18  1203   POTASSIUM 3.6                    Passed - Last blood pressure under 140/90 in past 12 months     BP Readings from Last 3 Encounters:   09/05/19 126/72   01/03/19 124/84   09/16/18 (!) 123/96                 Passed - Recent (12 mo) or future (30 days) visit within the authorizing provider's specialty     Patient has had an office visit with the authorizing provider or a provider within the authorizing providers department within the previous 12 mos or has a future within next 30 days. See \"Patient Info\" tab in inbasket, or \"Choose Columns\" in Meds & Orders section of the refill encounter.              Passed - Medication is active on med list        Passed - Patient is age 18 or older        Passed - No active pregnancy on record        Passed - No positive pregnancy test in past 12 months         "

## 2020-02-18 NOTE — TELEPHONE ENCOUNTER
Prior Authorization Retail Medication Request    Medication/Dose: Premarin 1.25mg  ICD code (if different than what is on RX):Menopause - surgical [Z78.0]   Previously Tried and Failed:  Estrace 1 mg  Rationale:  effective    Ins[facility] Name:  [facility]   Insurance ID:  [id number]      Pharmacy Information (if different than what is on RX)  Name:  [facility]  Phone:  [phone]

## 2020-02-19 RX ORDER — VALSARTAN 320 MG/1
TABLET ORAL
Qty: 90 TABLET | Refills: 0 | Status: SHIPPED | OUTPATIENT
Start: 2020-02-19 | End: 2020-02-24

## 2020-02-19 NOTE — TELEPHONE ENCOUNTER
Routing refill request to provider for review/approval because:  Protocol failed: LONNIE JACOB RN

## 2020-02-20 ENCOUNTER — TELEPHONE (OUTPATIENT)
Dept: FAMILY MEDICINE | Facility: CLINIC | Age: 40
End: 2020-02-20

## 2020-02-20 DIAGNOSIS — I10 HYPERTENSION GOAL BP (BLOOD PRESSURE) < 140/90: Primary | ICD-10-CM

## 2020-02-20 NOTE — TELEPHONE ENCOUNTER
Called [facility] pharmacy- they are also out of the medications due to recent recall. Would you like an alternative?    Marianne Reese RN on 2/20/2020 at 3:41 PM

## 2020-02-20 NOTE — TELEPHONE ENCOUNTER
See if she needs this now.    I am a little confused because in a MyChart she had said she had recently got some.    I will otherwise do an alternative if she is needing now.

## 2020-02-20 NOTE — TELEPHONE ENCOUNTER
Prior Authorization Approval    Authorization Effective Date: 1/21/2020  Authorization Expiration Date: 2/19/2021  Medication: Premarin 1.25  Approved Dose/Quantity:    Reference #:     Insurance Company: [employer] - Phone [phone] Fax [phone]  Expected CoPay:       CoPay Card Available:      Foundation Assistance Needed:    Which Pharmacy is filling the prescription (Not needed for infusion/clinic administered): [id number] [address] AT [facility] [address]  Pharmacy Notified: Yes  Patient Notified: Yes **Instructed pharmacy to notify patient when script is ready to /ship.**

## 2020-02-20 NOTE — TELEPHONE ENCOUNTER
Central Prior Authorization Team   Phone: [phone]      PA Initiation    Medication: Premarin 1.25  Insurance Company: [employer] - Phone [phone] Fax [phone]  Pharmacy Filling the Rx: [id number] [place], [place] - [facility] AT [facility] [address]  Filling Pharmacy Phone: [phone]  Filling Pharmacy Fax:    Start Date: 2/20/2020

## 2020-02-20 NOTE — TELEPHONE ENCOUNTER
Medication is on backorder, switch to another ARB?    Disp Refills Start End MEMO    valsartan 320 MG PO tablet 90 tablet 0 2/19/2020  No   Sig: TAKE 1 TABLET(320 MG) BY MOUTH DAILY

## 2020-02-24 RX ORDER — IRBESARTAN 300 MG/1
300 TABLET ORAL AT BEDTIME
Qty: 90 TABLET | Refills: 0 | Status: SHIPPED | OUTPATIENT
Start: 2020-02-24 | End: 2020-05-29

## 2020-02-24 NOTE — TELEPHONE ENCOUNTER
Calling pharmacy patient has not picked this up   Up and this medication is on back order and is needing an alternative.     Marianne Reese RN on 2/24/2020 at 3:21 PM

## 2020-03-15 ENCOUNTER — HEALTH MAINTENANCE LETTER (OUTPATIENT)
Age: 40
End: 2020-03-15

## 2020-03-16 ENCOUNTER — VIRTUAL VISIT (OUTPATIENT)
Dept: FAMILY MEDICINE | Facility: OTHER | Age: 40
End: 2020-03-16

## 2020-03-17 NOTE — PROGRESS NOTES
"Date: 2020 18:46:48  Clinician: Branden Morris  Clinician NPI: [id number]  Patient: Sharmaine José  Patient : 1980  Patient Address: [address] [place] [address]  Patient Phone: [phone]  Visit Protocol: Tinea  Patient Summary:  Sharmaine is a 39 year old ( : 1980 ) female who initiated a Visit for evaluation of Tinea. When asked the question \"Please sign me up to receive news, health information and promotions. \", Sharmaine responded \"No\".    Images of her skin condition were uploaded.   Her symptoms started 1-3 days ago. The rash is located on her neck. The rash is red in color.   The affected area has dry, flaky skin. It feels itchy and burning.   Symptom details   Itching: Sharmaine has mild itching.    Denied symptoms include tender to touch, crusts, pimples, blisters, and pain. Sharmaine also denied raised, scaly patches on elbows and the front or back of the knees. Sharmaine does not feel feverish. She does not have a rash in the shape of a bull's-eye. There are no red streaks extending from the rash.    Precipitating events  Just before the symptoms started, Sharmaine did not come in contact with plants such as poison ivy or poison oak. She came in contact with new soaps, lotions, or detergents.   Pertinent medical history  Treatments or home remedies used to relieve the symptoms as reported by the patient (free text): Tried my steroid cream yesterday afternoon, slightly helpful.  Tried mupirocin oint this morning.  Made it itch.  Bought otc ringworm cream this afternoon.  Itchy to put on, then burns a bit.  No change since this afternoon.  Might be more red.   Sharmaine needs a return to work/school note.   Weight: 170 lbs    Sharmaine smokes or uses smokeless tobacco.   She denies pregnancy and denies breastfeeding. She does not menstruate.   Additional information as reported by the patient (free text): I am an extremely sweaty individual (generalized hyper hydrosis)  I also have lupus that " "affects mostly my skin.  I work as A [employer]/.  Last week a cat was nuzzling my neck.  I have had ringworm before, and this seems a bit \"off\" for what I have had in the past.  I am unsure if this is due to being really sweaty at work, if it is skin lupus starting, if it is because I started using Olay Retinal 24 on my face and neck (I have no symptoms on my face) or if this is odd ringworm          lidocaine-prilocaine-lidocaine HCl topical    Ventolin HFA inhalation    EpiPen injection    hydrocortisone valerate topical    Imodium Multi-Symptom Relief oral    esomeprazole magnesium oral    pravastatin oral    valsartan oral    Lantus U-100 Insulin subcutaneous    Premarin oral    cyclobenzaprine oral    duloxetine oral    metformin oral    dextroamphetamine oral     Weight: 170 lbs    MEDICATIONS: lidocaine-prilocaine-lidocaine HCl topical, Ventolin HFA inhalation, EpiPen injection, hydrocortisone valerate topical, Imodium Multi-Symptom Relief oral, esomeprazole magnesium oral, pravastatin oral, valsartan oral, Lantus U-100 Insulin subcutaneous, Premarin oral, cyclobenzaprine oral, duloxetine oral, metformin oral, dextroamphetamine oral, ALLERGIES: tramadol, hornet venom, Nuvigil, Ceclor, Mycobacterium Tuberculosis (Tuberculin PPD), tuberculin, purified protein derivative  Clinician Response:  Dear Sharmaine,    Sorry to hear about your rash. I do not think it appears to be ring worm. It could be secondary to a number of things and certainly excess sweating can add. Keeping this area as dry as possible will help. Im sure you know that though, with your history. I think you were on the right track but simply need to increase the regularity of use. At this point, i would first try doing a thin layer of the antifungal cream over the areas covered with a thicker layer of steroid. This will give you cover for both possible fungal aspect (because of the sweating) and any inflammatory aspect to the skin. This " should be done 2-3 times daily and can take nearly 1 week for symptoms to really start moving in the right direction. If not, please send in updated photos.    Diagnosis: Rash and other nonspecific skin eruption  Diagnosis ICD: R21

## 2020-03-22 ENCOUNTER — VIRTUAL VISIT (OUTPATIENT)
Dept: FAMILY MEDICINE | Facility: OTHER | Age: 40
End: 2020-03-22

## 2020-03-22 NOTE — PROGRESS NOTES
"Date: 2020 11:34:35  Clinician: JACKIE Pascual  Clinician NPI: [id number]  Patient: Sharmaine José  Patient : 1980  Patient Address: [address] [place] [address]  Patient Phone: [phone]  Visit Protocol: URI  Patient Summary:  Sharmaine is a 39 year old ( : 1980 ) female who initiated a Visit for COVID-19 (Coronavirus) evaluation and screening. When asked the question \"Please sign me up to receive news, health information and promotions. \", Sharmaine responded \"Yes\".    Sharmaine states her symptoms started gradually 3-6 days ago. After her symptoms started, they improved and then got worse again.   Her symptoms consist of rhinitis, enlarged lymph nodes, facial pain or pressure, a sore throat, a cough, nasal congestion, malaise, tooth pain, and a headache. She is experiencing difficulty breathing due to nasal congestion but she is not short of breath.   Symptom details     Nasal secretions: The color of her mucus is green, blood-tinged, and white.    Cough: Sharmaine coughs a few times an hour and her cough is not more bothersome at night. Phlegm comes into her throat when she coughs. She believes her cough is caused by post-nasal drip. The color of the phlegm is clear.     Sore throat: Sharmaine reports having mild throat pain (1-3 on a 10 point pain scale), does not have exudate on her tonsils, and can swallow liquids. The lymph nodes in her neck are enlarged. A rash has not appeared on the skin since the sore throat started.     Facial pain or pressure: The facial pain or pressure feels worse when bending over or leaning forward.     Headache: She states the headache is moderate (4-6 on a 10 point pain scale).     Tooth pain: The tooth pain is not caused by a cavity, recent dental work, or other mouth problems.      Sharmaine denies having ear pain, myalgias, wheezing, chills, and fever. She also denies having a sinus infection within the past year, taking antibiotic medication for the " symptoms, and having recent facial or sinus surgery in the past 60 days.   Precipitating events  Sharmaine is not sure if she has been exposed to someone with strep throat. She has not recently been exposed to someone with influenza. Sharmaine has been in close contact with the following high risk individuals: adults 65 or older, pregnant women, and people with asthma, heart disease or diabetes.   Pertinent COVID-19 (Coronavirus) information  Sharmaine has not traveled internationally or to the areas where COVID-19 (Coronavirus) is widespread, including cruise ship travel in the last 14 days before the start of her symptoms.   Sharmaine has had a close contact with a laboratory-confirmed COVID-19 patient within 14 days of symptom onset. She has not had a close contact with a suspected COVID-19 patient within 14 days of symptom onset. Additional information about contact with COVID-19 (Coronavirus) patient as reported by the patient (free text): I got my cat groomed on March 6th.  I received an email last week they were closing due to positive Covid-19 employees.  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  (I have not been to work since last wed, when I got a fever and sinus symptoms that evening.)    I would like to go back to work tomorrow, however, want to make sure it is safe to do so.   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 She does not provide direct patient care.   Pertinent medical history  Sharmaine typically gets a yeast infection when she takes antibiotics. She has not used fluconazole (Diflucan) to treat previous yeast infections.   Sharmaine needs a return to work/school note.   Weight: 174 lbs   Sharmaine smokes or uses smokeless tobacco.   She denies pregnancy and denies breastfeeding. She does not menstruate.   Additional information as reported by the patient (free text): Symptoms started as fever, swollen neck glands, sinus infection symptoms,  "and feeling run down.  I called in sick to work.  The sinus stuff got worse, but the fever got a bit better.  Coughing/throat clearing started, very minimal, and mostly \"make myself\" to get the snot running down the back of my throat up.  I still feel pretty run down, but fever is now low grade, which can be very normal for me.  I do have a headache, but shower curtain dee fell on my head, and have a bump there.   Weight: 174 lbs    MEDICATIONS: lidocaine-prilocaine-lidocaine HCl topical, Ventolin HFA inhalation, EpiPen injection, hydrocortisone valerate topical, Imodium Multi-Symptom Relief oral, esomeprazole magnesium oral, pravastatin oral, valsartan oral, Lantus U-100 Insulin subcutaneous, Premarin oral, cyclobenzaprine oral, duloxetine oral, metformin oral, dextroamphetamine oral, ALLERGIES: tramadol, hornet venom, Nuvigil, Ceclor, Mycobacterium Tuberculosis (Tuberculin PPD), tuberculin, purified protein derivative  Clinician Response:  Deapatrick Schmid,   Based on the information you have provided, you do have symptoms that are consistent with Coronavirus (COVID-19).  The coronavirus causes mild to severe respiratory illness with the most common symptoms including fever, cough and difficulty breathing. Unfortunately, many viruses cause similar symptoms and it can be difficult to distinguish between viruses, especially in mild cases, so we are presuming that anyone with cough or fever has coronavirus at this time.  Coronavirus/COVID-19 has reached the point of community spread in [place], meaning that we are finding the virus in people with no known exposure risk for den the virus. Given the increasing commonness of coronavirus in the community we are no longer testing patients who are not critically ill.  If you are a health care worker, you should refer to your employee health office for instructions about testing and returning to work.  For everyone else who has cough or fever, you should assume you are " infected with coronavirus. Since you will not be tested but have symptoms that may be consistent with coronavirus, the CDC recommends you stay in self-isolation until these three things have happened:    You have had no fever for at least 72 hours (that is three full days of no fever without the use of medicine that reduces fevers)    AND   Other symptoms have improved (for example, when your cough or shortness of breath have improved)   AND   At least 7 days have passed since your symptoms first appeared.   How to Isolate:   Isolate yourself at home.  Do Not allow any visitors  Do Not go to work or school  Do Not go to Religion,  centers, shopping, or other public places.  Do Not shake hands.  Avoid close contact with others (hugging, kissing).   Protect Others:   Cover Your Mouth and Nose with a mask, disposable tissue or wash cloth to avoid spreading germs to others.  Wash your hands and face frequently with soap and water.   We know it can be scary to hear that you might have COVID-19. Our team can help track your symptoms and make sure you are doing ok over the next two weeks using a program called [employer] to keep in touch. When you receive an email from [employer], please consider enrolling in our monitoring program. There is no cost to you for monitoring. Here is a URL where you can learn more: [email or web address]  Managing Symptoms:   At this time, we primarily recommend Tylenol (Acetaminophen) for fever or pain. If you have liver or kidney problems, contact your primary care provider for instructions on use of tylenol. Adults can take 650 mg (two 325 mg pills) by mouth every 4-6 hours as needed OR 1,000 mg (two 500 mg pills) every 8 hours as needed. MAXIMUM DAILY DOSE: 3,000mg. For children, refer to dosing on bottle based on age or weight.   If you develop significant shortness of breath that prevents you from doing normal activities, please call 911 or proceed to the nearest  emergency room and alert them immediately that you have been in self-isolation for possible coronavirus.  For more information about COVID19 and options for caring for yourself at home, please visit the CDC website at https://www.cdc.gov/coronavirus/2019-ncov/about/steps-when-sick.htmlFor more options for care at [facility], please visit our website at [email or web address]    Diagnosis: Cough  Diagnosis ICD: R05  Prescription: Ayr Saline Nasal Neti Rinse sinus irrigation packet with rinse device 1 40 packet kit, 0 days supply. Take 1 packet with rinse device 2 times per day as needed for nasal congestion. Refills: 0, Refill as needed: no, Allow substitutions: yes  Prescription: fluticasone propionate (Flonase Allergy Relief) 50 mcg/actuation nasal spray,suspension 1 60 spray aerosol with adapter, 0 days supply. Inhale 1 spray in each nostril intranasally 1 time per day as needed. Refills: 0, Refill as needed: no, Allow substitutions: yes  Prescription: acetaminophen (Tylenol Extra Strength) 500 mg oral tablet 60 tablet, 0 days supply. Take 2 tablets by mouth every 8 hours as needed. Refills: 0, Refill as needed: no, Allow substitutions: yes

## 2020-04-01 ENCOUNTER — E-VISIT (OUTPATIENT)
Dept: FAMILY MEDICINE | Facility: CLINIC | Age: 40
End: 2020-04-01
Payer: COMMERCIAL

## 2020-04-01 ENCOUNTER — TELEPHONE (OUTPATIENT)
Dept: FAMILY MEDICINE | Facility: CLINIC | Age: 40
End: 2020-04-01

## 2020-04-01 DIAGNOSIS — J01.00 ACUTE MAXILLARY SINUSITIS, RECURRENCE NOT SPECIFIED: Primary | ICD-10-CM

## 2020-04-01 PROCEDURE — 99207 ZZC NO BILLABLE SERVICE THIS VISIT: CPT | Performed by: FAMILY MEDICINE

## 2020-04-01 NOTE — TELEPHONE ENCOUNTER
Pt calls.      She recently did [email or web address].  She has sinus stuff going on, she used a saline spray.  Now it is worse.  She can't talk. Her ears feel like they are really full.  It started with seasonal allergies.  She potentially had been exposed to covid-19 which she states they knew when she did her [employer].  She feels like she has a lot of pressure in her head.  She has no voice.  Advised to do voice rest, drink lots of fluids and not to whisper.      Advised to do an e-visit.

## 2020-04-04 DIAGNOSIS — Z78.0 MENOPAUSE: ICD-10-CM

## 2020-04-06 ENCOUNTER — E-VISIT (OUTPATIENT)
Dept: FAMILY MEDICINE | Facility: CLINIC | Age: 40
End: 2020-04-06
Payer: COMMERCIAL

## 2020-04-06 DIAGNOSIS — J01.90 ACUTE SINUSITIS WITH SYMPTOMS > 10 DAYS: ICD-10-CM

## 2020-04-06 DIAGNOSIS — R19.7 DIARRHEA, UNSPECIFIED TYPE: Primary | ICD-10-CM

## 2020-04-06 PROCEDURE — 99421 OL DIG E/M SVC 5-10 MIN: CPT | Performed by: FAMILY MEDICINE

## 2020-04-06 RX ORDER — AMOXICILLIN 500 MG/1
500 CAPSULE ORAL 3 TIMES DAILY
Qty: 15 CAPSULE | Refills: 0 | Status: SHIPPED | OUTPATIENT
Start: 2020-04-06 | End: 2020-05-29

## 2020-04-07 DIAGNOSIS — M62.830 BACK MUSCLE SPASM: ICD-10-CM

## 2020-04-07 RX ORDER — ESTROGENS, CONJUGATED 1.25 MG
TABLET ORAL
Qty: 90 TABLET | Refills: 2 | OUTPATIENT
Start: 2020-04-07

## 2020-04-09 RX ORDER — CYCLOBENZAPRINE HCL 10 MG
TABLET ORAL
Qty: 90 TABLET | Refills: 0 | Status: SHIPPED | OUTPATIENT
Start: 2020-04-09 | End: 2020-07-27

## 2020-04-09 NOTE — TELEPHONE ENCOUNTER
Last Written Prescription Date:  1/20/20  Last Fill Quantity: 90,  # refills: 0   Last office visit: 9/5/2019 with prescribing provider:     Future Office Visit:    Requested Prescriptions   Pending Prescriptions Disp Refills     cyclobenzaprine (FLEXERIL) 10 MG tablet [Pharmacy Med Name: CYCLOBENZAPRINE 10MG TABLETS] 90 tablet 0     Sig: TAKE 1 TABLET(10 MG) BY MOUTH EVERY NIGHT AS NEEDED FOR MUSCLE SPASMS       There is no refill protocol information for this order

## 2020-04-10 ENCOUNTER — VIRTUAL VISIT (OUTPATIENT)
Dept: FAMILY MEDICINE | Facility: OTHER | Age: 40
End: 2020-04-10

## 2020-04-11 NOTE — PROGRESS NOTES
"Date: 04/10/2020 20:49:53  Clinician: Lynn Ayala  Clinician NPI: [id number]  Patient: Sharmaine José  Patient : 1980  Patient Address: [address] [place] [address]  Patient Phone: [phone]  Visit Protocol: Yeast infection  Patient Summary:  Sharmaine is a 39 year old ( : 1980 ) female who initiated a Visit for a presumed vaginal yeast infection. When asked the question \"Please sign me up to receive news, health information and promotions. \", Sharmaine responded \"No\".    Sharmaine began noticing vaginal burning, vaginal irritation, vaginal discharge, and vaginal pruritus 1-3 days ago.   Symptom details   Vaginal discharge: She has a more than normal amount of white, chunky (like cottage cheese) discharge. The discharge does not have a fishy smell.    She denies having abdominal pain, blisters, and open sores. She also denies feeling feverish.   She has attempted to treat her symptoms with anti-fungal cream for yeast infections and anti-fungal suppositories for yeast infections. Anti-fungal medication used to treat symptoms as reported by the patient (free text): Monistat Ovules with external cream that comes in the pack.  Started 2 days ago.  Vaginal yeast infection is getting better.    Precipitating events  Sharmaine denies having a sexually transmitted disease.   Pertinent medical history  Sharmaine has a history of vaginal yeast infections. She has had two (2) occurrences in the past year and the current symptoms are similar to previous yeast infections. She has used fluconazole (Diflucan) to treat previous yeast infections. She sure is not if she has needed 1 or 2 doses of fluconazole (Diflucan) for symptoms to resolve in the past.    She prefers suppositories. She is currently taking or has taken antibiotics in the past 2 weeks.   She smokes or uses smokeless tobacco.  She denies pregnancy and denies breastfeeding. She does not menstruate.   Additional information as reported by the patient " (free text): My problem isn't the vaginal yeast infection itself, I get them every time I take antibx.  It seems to have spread to my rectal area, and I don't know how to deal with that.  Originally, I was on Augmentin, it caused severe diarrhea, switched to plain Amox.  I am still taking it.  Monistat is clearing the vaginal, but the rectal area is still bad.  I CANNOT take diflucan.  (Severe N &amp; V.)  I am taking a probiotic.  What else can I do?  I have tried prep-H and the monistat cream.  Nothing helps      MEDICATIONS: amoxicillin oral, lidocaine-prilocaine-lidocaine HCl topical, Ventolin HFA inhalation, EpiPen injection, hydrocortisone valerate topical, Imodium Multi-Symptom Relief oral, esomeprazole magnesium oral, pravastatin oral, valsartan oral, Lantus U-100 Insulin subcutaneous, Premarin oral, cyclobenzaprine oral, duloxetine oral, metformin oral, dextroamphetamine oral, ALLERGIES: tramadol, hornet venom, Nuvigil, Ceclor, Mycobacterium Tuberculosis (Tuberculin PPD), tuberculin, purified protein derivative, Diflucan  Clinician Response:  Deapatrick Schmid,   Take medication as ordered    Diagnosis: Candidiasis, unspecified  Diagnosis ICD: B37.9  Prescription: fluconazole (Diflucan) 150 mg oral tablet 1 tablet, 1 days supply. Take 1 tablet by mouth every 24 hours for 1 day. Refills: 0, Refill as needed: no, Allow substitutions: yes

## 2020-04-12 ENCOUNTER — E-VISIT (OUTPATIENT)
Dept: FAMILY MEDICINE | Facility: CLINIC | Age: 40
End: 2020-04-12
Payer: COMMERCIAL

## 2020-04-12 ENCOUNTER — MYC MEDICAL ADVICE (OUTPATIENT)
Dept: FAMILY MEDICINE | Facility: CLINIC | Age: 40
End: 2020-04-12

## 2020-04-12 DIAGNOSIS — K62.89 RECTAL IRRITATION: ICD-10-CM

## 2020-04-12 DIAGNOSIS — L29.0 ANAL ITCHING: Primary | ICD-10-CM

## 2020-04-12 PROCEDURE — 99421 OL DIG E/M SVC 5-10 MIN: CPT | Mod: 95 | Performed by: FAMILY MEDICINE

## 2020-04-12 RX ORDER — HYDROCORTISONE 25 MG/G
CREAM TOPICAL 2 TIMES DAILY PRN
Qty: 30 G | Refills: 0 | Status: SHIPPED | OUTPATIENT
Start: 2020-04-12 | End: 2020-10-05

## 2020-04-13 NOTE — TELEPHONE ENCOUNTER
Will forward to the TC's to try to help the pt schedule an appt for a video visit - see mycharts.  Thanks!

## 2020-04-21 ENCOUNTER — MYC MEDICAL ADVICE (OUTPATIENT)
Dept: FAMILY MEDICINE | Facility: CLINIC | Age: 40
End: 2020-04-21

## 2020-04-21 NOTE — TELEPHONE ENCOUNTER
See MyC message. Attempted to call patient to help schedule a visit but patient did not answer, so left a message to check MyC message.    Shelly RAJPUT, Triage RN

## 2020-04-24 NOTE — TELEPHONE ENCOUNTER
2nd attempt - Sent Box[employer] message to schedule video visit.  -Marta Oneil     [facility]  PRIMARY CARE PROVIDER AND CLINIC:  [facility], 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      Previous PCP in community: Dr. Cat Lora.     Physician through [facility] since [facility] admission - notes written on paper in chart.    Admitted to this facility for  rehab, medical management and nursing care.    HPI:    HPI information obtained from: facility chart records, facility staff, patient report, [facility] chart review, family/first contact facundo Crowe) report and paper chart notes.     Chronic health issues include acquired hemophilia (factor VIII inhibitor) originally diagnosed in 2012 initially in remission with steroids, but relapsed in May 2019.   Multiple acute care encounters in [facility] over last year, Hospitalized 5/17/19 with sudden onset left sided weakness and fall secondary to acute intracranial hemorrhage. Per hematology this was thought due to factor VIII inhibitor, as her aPTT was elevated on admission; she was transferred to [facility].  Factor VIII level was checked and was 11%.  She was treated for the acute hemorrhage with Novoseven; inhibitor suppression regimen of methylprednisolone and weekly rituximab (4 planned doses) was started. Discharged on 5/25/19 with plan to taper methylprednisolone, but she presented to ER 5/26/19 with fall and  head strike, CT showed no change in prior ICH. Hospitalized again 6/10/19 with dizziness and mental status in setting of hyponatremia. Sodium improved with fluid restriction. Discharged to [facility] with follow-up care through [facility].  Again did not follow-up in hematology clinic. To ER 8/31/19 with mechanical fall, fell out of bed when attempting to tie her shoes with head trauma, CT head/c-spine negative, chronically low sodium. Last visit with hematology 10/03/2019, between June and October tapered from 50 mg Prednisone daily to 15 mg Prednisone daily. Completed taper off Prednisone on 10/17. Plan for repeat labs this week and follow-up with hematology 12/01.     Other health issues include hypertension managed with atenolol and lisinopril. Hyperlipidemia which is managed with Zocor. Osteoarthritis scheduled Tylenol. Gait abnormality with weakness and repeated falls, at wheelchair level for mobiltiy. Cognitive impairment, 17/30 on SLUMS in June 2019 and 14/15 on BIMS on in Sept 2019, take Melatonin for sleep. She is also on hydroxyzine at , unclear indication. GERD is managed with pantoprazole and ranitidine. Lifelong smoker up until stroke in May 2019.     Updates on Status Since Skilled nursing Admission:   Follow-up today to establish care, switching provider teams. Patient reports she fell yesterday, trying to get into bed in the afternoon from wheelchair. On floor for a period because she couldn't reach call bell. Some mild pain to middle back. Does not think she hit her head. Granddaughter reports resident had abdominal pain the other day, but resident does not recall this. Having regular BMs per bowel record.     CODE STATUS/ADVANCE DIRECTIVES DISCUSSION:   DNR / DNI    Patient's living condition: lives in a skilled nursing facility     ALLERGIES: Atorvastatin calcium; Calcium channel blockers; and Sulfa drugs     PAST MEDICAL HISTORY:  has a past medical history of Back ache, Hemophilia  (H), Hemophilia A (H) (5/17/2019), Hyperlipidemia, Hypertension, Menopausal disorder, Osteoarthritis, Pneumonia (6-11), Tremor, and Vertigo.     PAST SURGICAL HISTORY:   has a past surgical history that includes cataract iol, rt/lt; hysterectomy due to bleeding, pap no longer indicated; and NONSPECIFIC PROCEDURE.     FAMILY HISTORY: family history includes C.A.D. in her brother and brother; Cancer in her father; Diabetes in her brother, brother, mother, sister, and sister; Lipids in her brother and daughter.     SOCIAL HISTORY:   reports that she quit smoking about 5 months ago. Her smoking use included cigarettes. She has a 46.00 pack-year smoking history. She has never used smokeless tobacco. She reports that she does not drink alcohol or use drugs.   Granddaughter (Socorro) is primary contact.   Lived in [place] in an apartment prior to hospitalization in May.   Daughter - Sigrid, not involved in care.  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   Completed 11th grade.  Grew up in [place].   No ETOH  Smoked - started in 30s and quit in May 2019     Most Recent Immunizations   Administered Date(s) Administered     DTaP, Unspecified 09/17/2012     Influenza (IIV3) PF 09/09/2014     Pneumo Conj 13-V (2010&after) 09/24/2015     Pneumococcal 23 valent 12/31/2010     TD (ADULT, 7+) 03/23/2009     Zoster vaccine, live 09/17/2012     Post Discharge Medication Reconciliation Status: discharge medications reconciled, continue medications without change    Current Outpatient Medications   Medication Sig Dispense Refill     acetaminophen (TYLENOL) 325 MG tablet Take 325 mg by mouth 3 times daily       atenolol (TENORMIN) 50 MG tablet Take 1 tablet (50 mg) by mouth daily 90 tablet 0     calcium carbonate-vitamin D (CALCIUM + D) 600-200 MG-UNIT TABS Take 1 tablet by mouth 2 times daily 180 tablet 3     HYDROXYZINE HCL PO Take 10 mg by mouth At Bedtime And 10 mg three times daily as needed       lisinopril (PRINIVIL/ZESTRIL) 10 MG  "tablet Take 1 tablet (10 mg) by mouth 2 times daily 90 tablet      MELATONIN PO Take 3 mg by mouth At Bedtime       pantoprazole (PROTONIX) 40 MG EC tablet Take 1 tablet (40 mg) by mouth every morning (before breakfast)       raNITIdine HCl (ZANTAC PO) Take 150 mg by mouth daily       simvastatin (ZOCOR) 20 MG tablet Take 1 tablet (20 mg) by mouth At Bedtime 30 tablet 3     vitamin D3 (CHOLECALCIFEROL) 1000 units (25 mcg) tablet Take 1 tablet (1,000 Units) by mouth daily 60 tablet 6     Recent weights and vitals reviewed in Epic:  Wt Readings from Last 5 Encounters:   10/31/19 43 kg (94 lb 11.2 oz)   10/03/19 43.1 kg (95 lb)   08/31/19 44 kg (97 lb 1.6 oz)   06/18/19 44.9 kg (99 lb)   06/10/19 44.3 kg (97 lb 9.6 oz)     Weight 8/24/18: 101#    BP Readings from Last 3 Encounters:   10/31/19 (!) 146/76   10/03/19 (!) 140/85   08/31/19 (!) 143/88     Pulse Readings from Last 4 Encounters:   10/31/19 64   10/03/19 79   08/31/19 72   06/18/19 77       ROS:  Constitutional - No fever/chills. \"I'm cold all the time.\" Fair appetite, lost weight post-stroke, weight is down 6# over last year. Sleeps okay at night.   HEENT - No HA, No double/blurry vision. Change to vision post-stroke, harder to read, L worse than right. Some difficulty hearing, no hearing aids. No difficulty with or pain on swallowing. Rhinitis with eating.   Pulmonary - Occasional SOB. No SOB at this time. Chronic cough.  CV- No CP, no palpitations.   PV - No leg swelling, but some foot swelling.   GI - No abd pain. Sometimes nausea. No vomiting.   -  Now incontinent and urinating more. UA/UC negative 8/8/19.  Neuro - No focal deficit. No dizziness or seizure. History of tremor with eating.   MS - wheelchair for mobility, pain in muscles or joints,\"middle of back\". Fell yesterday on to back.   Psychiatric - Some days \"very depressed\" - watches TV to distract herself. Has taken anti-depressant in the past. Memory \"bad\" - \"Oct 4th\", not oriented to place or " "time, to person only. \"I don't like being old!\"      Vitals:  BP (!) 146/76   Pulse 64   Temp 97.5  F (36.4  C)   Resp 18   Ht 1.651 m (5' 5\")   Wt 43 kg (94 lb 11.2 oz)   SpO2 93%   BMI 15.76 kg/m    Exam:  GENERAL APPEARANCE:  Alert, chronically ill appearing, in no distress.  EYES:  Sclera clear and conjunctiva normal, no discharge   ENT:  Mouth normal, moist mucous membranes, nose without drainage or crusting, external ears without lesions, hearing acuity slightly diminished to normal voice.  NECK:  Nontender, supple, symmetrical; no cervical adenopathy, masses or thyromegaly, trachea midline  RESP:  Non-labored breathing, palpation of chest normal, no chest wall tenderness, no respiratory distress, Lung sounds clear, patient is on room air.  CV:  Palpation - no murmur/non-displaced PMI, Auscultation - rate and rhythm regular, no murmur, no rub or gallop.  VASCULAR: No edema bilateral lower extremities.  ABDOMEN:  normal bowel sounds, soft, nontender, no grimacing or guarding with palpation. No hepatosplenomegaly. No appreciable masses. Well healed lower abdominal incision.  M/S:   Gait and station wheelchair bound. Digits without clubbing, cyanosis, no swelling or tenderness, good  BL. No tenderness over back. Unable to sit unassisted at edge of bed, flops back.  SKIN:  Inspection - no visible rashes/lesions/uclerations. No ecchymosis or abrasions to back. Palpation- no increased warmth, skin is dry and non-tender.  NEURO: Cranial nerves II-XII grossly intact except hearing and vision, no facial asymmetry, follows simple commands, moves all extremities symmetrically, normal tone, no tremor  PSYCH: awake and alert, speech fluent,  insight and judgement impaired, oriented to person, memory impaired, flat affect, cooperative.     Lab/Diagnostic data:  Recent labs in [facility] reviewed by me today.   CBC RESULTS:   Recent Labs   Lab Test 08/31/19  1002 08/28/19  0728   WBC 11.1* 8.4   RBC 4.85 4.17   HGB 15.5 " "13.5   HCT 47.3* 39.5   MCV 98 95   MCH 32.0 32.4   MCHC 32.8 34.2   RDW 14.2 14.0    160       Last Basic Metabolic Panel:  Recent Labs   Lab Test 09/03/19  0726 08/31/19  1002  07/24/19  0633   * 127*   < > 134   POTASSIUM  --  4.9  --  4.2   CHLORIDE  --  94  --  96   MICK  --  9.0  --  9.1   CO2  --  29  --  32   BUN  --  15  --  23   CR  --  0.54  --  0.71   GLC  --  84  --  81    < > = values in this interval not displayed.       Liver Function Studies -   Recent Labs   Lab Test 08/31/19  1002 06/26/19  0450   PROTTOTAL 7.1 5.7*   ALBUMIN 3.4 2.8*   BILITOTAL 0.9 0.6   ALKPHOS 78 65   AST 33 12   ALT 37 22     TSH   Date Value Ref Range Status   05/16/2019 2.21 0.40 - 4.00 mU/L Final   06/18/2011 1.08 0.4 - 5.0 mU/L Final     Lab Results   Component Value Date    A1C 6.2 05/18/2019         ASSESSMENT/PLAN:  (D68.311) Acquired hemophilia A (H)  (primary encounter diagnosis)  Comment: Hemophilia secondary to factor VIII inhibitor antibody followed by [facility]. Diagnosed in 2012 with initial response to steroids. Relapsed in May 2019 with ICH. Completed prednisone taper 10/17.   Plan:   Per per hematology --  - Repeat Factor VIII, Greer assay and aPTT this week at [facility]   - Hematology follow-up 12/1/2019 with labs 2-3 days prior to this appointment.  - \"If any signs of significant bleeding, check aPTT and go to the Emergency Department.  Also call [facility] IMMEDIATELY at [phone].\"    (Z86.73) History of stroke  Comment: In May 2019 present to ER with left sided weakness secondary to R thalamic hemorrhagic CVA. Unable to return to baseline functional status, now resides in LTC facility. SLUMS 17/30.   Plan:   - Continues to benefit from supportive care in LTC setting for meals, medications, safety, and ADL support.    (R13.10) Dysphagia, unspecified type  Comment: Resident is no aware of any swallowing " trouble, on DD2 diet in the past. Now on regular diet.  Plan:   - Monitor clinically     (I10) Essential hypertension, benign  Comment: Most SBPs 120s to 140s   Plan:   - Continue lisinopril 10 mg BID and atenolol 50 mg daily  - BMP 11/11    (E78.5) Hyperlipidemia, unspecified hyperlipidemia type  Comment: Last LDL 96 in August 2018   Plan:   - Continue Zocor 20 mg daily   - Check lipid panel     (K21.9) Gastroesophageal reflux disease, esophagitis presence not specified  Comment: Hx of reflux, denies symptoms today   Plan:  - Continue Protonix and Zantac for now   - Monitor for recurrent abd pain     (E22.2) SIADH (syndrome of inappropriate ADH production) (H)  Comment: Last sodium 129 in September 2019  Plan:   - Check BMP  - May need fluid restriction     (J98.8) Congestion of upper airway  Comment: Chronic with rhinitis, noted for several years per chart review. Granddaughter notes chronic rhinitis. Long smoking history.   Plan:   - Monitor clinically, consider Flonase     (M19.90) Osteoarthritis, unspecified osteoarthritis type, unspecified site  Comment: Previously on hydrocodone, stopped in setting of acute CVA. No significant pain today.  Plan:   - Continue low dose Tylenol     (G47.00) Insomonia  Comment: Report she is sleeping okay now  Plan:   - Continue Melatonin  - Consider stopping hydroxyzine if no complaints at next visit    (Z71.89) Advance care planning  Comment: Reviewed POLST with patient and granddaughter, confirm DNR/DNI   Plan:   - No change to POLST completed by Dr. Mejia     Orders written by provider at facility.     Labs and follow-up with Dr. Mulligan 11/11.    Electronically signed by:  AAYUSH Lanza CNP

## 2020-05-15 NOTE — TELEPHONE ENCOUNTER
Order 6 month supply of right soft contact lenses  Follow up in 3 months Call back with progress with new prescription       See patient message.    Yolie Pascal RN

## 2020-05-18 DIAGNOSIS — E11.9 TYPE 2 DIABETES MELLITUS WITHOUT COMPLICATION, WITH LONG-TERM CURRENT USE OF INSULIN (H): ICD-10-CM

## 2020-05-18 DIAGNOSIS — E78.1 HYPERTRIGLYCERIDEMIA: Primary | ICD-10-CM

## 2020-05-18 DIAGNOSIS — Z79.4 TYPE 2 DIABETES MELLITUS WITHOUT COMPLICATION, WITH LONG-TERM CURRENT USE OF INSULIN (H): ICD-10-CM

## 2020-05-18 NOTE — TELEPHONE ENCOUNTER
Patient is taking 44 units daily, please update order and send to selected pharmacy, patient will run out today.  -Marta Oneil

## 2020-05-18 NOTE — TELEPHONE ENCOUNTER
Routing refill request to provider for review/approval because:  Patient needs to be seen because:  Due for d/m check and lab work overdue - adam'd up for 44 units - edit as you wish    Will forward to the station - pt is due for fasting labs and a d/m appt.  Please try and help her schedule this as soon as possible.  Thanks!

## 2020-05-18 NOTE — TELEPHONE ENCOUNTER
I approved... I know she had delayed coming in due to mother's injury, but I do think she should be able to get something scheduled now!  Thanks!

## 2020-05-25 ENCOUNTER — MYC REFILL (OUTPATIENT)
Dept: FAMILY MEDICINE | Facility: CLINIC | Age: 40
End: 2020-05-25

## 2020-05-25 DIAGNOSIS — T43.205D ANTIDEPRESSANT DISCONTINUATION SYNDROME, SUBSEQUENT ENCOUNTER: ICD-10-CM

## 2020-05-25 DIAGNOSIS — E78.5 HYPERLIPIDEMIA LDL GOAL <100: ICD-10-CM

## 2020-05-25 RX ORDER — DULOXETIN HYDROCHLORIDE 20 MG/1
20 CAPSULE, DELAYED RELEASE ORAL DAILY
Qty: 90 CAPSULE | Refills: 1 | Status: CANCELLED | OUTPATIENT
Start: 2020-05-25

## 2020-05-27 RX ORDER — PRAVASTATIN SODIUM 10 MG
10 TABLET ORAL DAILY
Qty: 90 TABLET | Refills: 0 | Status: SHIPPED | OUTPATIENT
Start: 2020-05-27 | End: 2020-09-29 | Stop reason: DRUGHIGH

## 2020-05-27 NOTE — TELEPHONE ENCOUNTER
She does have an upcoming appointment; but also due for fasting labs... see if you can help her get these scheduled.    Thanks!

## 2020-05-27 NOTE — TELEPHONE ENCOUNTER
Routing refill request to provider for review/approval because:  Labs not current:      Hollie JACOB RN

## 2020-05-29 ENCOUNTER — VIRTUAL VISIT (OUTPATIENT)
Dept: FAMILY MEDICINE | Facility: CLINIC | Age: 40
End: 2020-05-29
Payer: COMMERCIAL

## 2020-05-29 DIAGNOSIS — Z79.4 TYPE 2 DIABETES MELLITUS WITHOUT COMPLICATION, WITH LONG-TERM CURRENT USE OF INSULIN (H): Primary | ICD-10-CM

## 2020-05-29 DIAGNOSIS — E11.9 TYPE 2 DIABETES MELLITUS WITHOUT COMPLICATION, WITH LONG-TERM CURRENT USE OF INSULIN (H): Primary | ICD-10-CM

## 2020-05-29 DIAGNOSIS — Z91.030 BEE STING ALLERGY: ICD-10-CM

## 2020-05-29 DIAGNOSIS — G47.419 PRIMARY NARCOLEPSY WITHOUT CATAPLEXY: ICD-10-CM

## 2020-05-29 DIAGNOSIS — J45.20 MILD INTERMITTENT ASTHMA WITHOUT COMPLICATION: ICD-10-CM

## 2020-05-29 DIAGNOSIS — E78.5 HYPERLIPIDEMIA LDL GOAL <100: ICD-10-CM

## 2020-05-29 DIAGNOSIS — I10 HYPERTENSION GOAL BP (BLOOD PRESSURE) < 140/90: ICD-10-CM

## 2020-05-29 DIAGNOSIS — F41.1 GENERALIZED ANXIETY DISORDER: ICD-10-CM

## 2020-05-29 DIAGNOSIS — K76.0 FATTY INFILTRATION OF LIVER: ICD-10-CM

## 2020-05-29 DIAGNOSIS — K21.9 GASTROESOPHAGEAL REFLUX DISEASE, ESOPHAGITIS PRESENCE NOT SPECIFIED: ICD-10-CM

## 2020-05-29 PROCEDURE — 99214 OFFICE O/P EST MOD 30 MIN: CPT | Mod: GT | Performed by: FAMILY MEDICINE

## 2020-05-29 RX ORDER — METFORMIN HCL 500 MG
TABLET, EXTENDED RELEASE 24 HR ORAL
Qty: 180 TABLET | Refills: 0 | Status: SHIPPED | OUTPATIENT
Start: 2020-05-29 | End: 2020-09-25

## 2020-05-29 RX ORDER — ALBUTEROL SULFATE 90 UG/1
2 AEROSOL, METERED RESPIRATORY (INHALATION) EVERY 6 HOURS
Qty: 1 INHALER | Refills: 0 | Status: SHIPPED | OUTPATIENT
Start: 2020-05-29 | End: 2021-03-16

## 2020-05-29 RX ORDER — FAMOTIDINE 20 MG/1
20 TABLET, FILM COATED ORAL 2 TIMES DAILY
Qty: 180 TABLET | Refills: 1 | Status: SHIPPED | OUTPATIENT
Start: 2020-05-29 | End: 2022-01-13

## 2020-05-29 RX ORDER — EPINEPHRINE 0.3 MG/.3ML
0.3 INJECTION SUBCUTANEOUS
Qty: 2 ML | Refills: 0 | Status: SHIPPED | OUTPATIENT
Start: 2020-05-29 | End: 2021-03-16

## 2020-05-29 RX ORDER — FLASH GLUCOSE SCANNING READER
EACH MISCELLANEOUS
Qty: 1 EACH | Refills: 0 | Status: SHIPPED | OUTPATIENT
Start: 2020-05-29 | End: 2022-07-17

## 2020-05-29 RX ORDER — FLASH GLUCOSE SENSOR
KIT MISCELLANEOUS
Qty: 2 EACH | Refills: 11 | Status: SHIPPED | OUTPATIENT
Start: 2020-05-29 | End: 2021-04-02

## 2020-05-29 RX ORDER — IRBESARTAN 300 MG/1
300 TABLET ORAL AT BEDTIME
Qty: 90 TABLET | Refills: 0 | Status: SHIPPED | OUTPATIENT
Start: 2020-05-29 | End: 2020-09-29

## 2020-05-29 NOTE — LETTER
My Asthma Action Plan    Name: Sharmaine José   YOB: 1980  Date: 6/1/2020   My doctor: Temi Marsh MD, MD   My clinic: [facility]        My Rescue Medicine:   Albuterol inhaler (Proair/Ventolin/Proventil HFA)  2-4 puffs EVERY 4 HOURS as needed. Use a spacer if recommended by your provider.   My Asthma Severity:   Intermittent / Exercise Induced  Know your asthma triggers: dust mites and humidity             GREEN ZONE   Good Control    I feel good    No cough or wheeze    Can work, sleep and play without asthma symptoms       Take your asthma control medicine every day.     1. If exercise triggers your asthma, take your rescue medication    15 minutes before exercise or sports, and    During exercise if you have asthma symptoms  2. Spacer to use with inhaler: If you have a spacer, make sure to use it with your inhaler             YELLOW ZONE Getting Worse  I have ANY of these:    I do not feel good    Cough or wheeze    Chest feels tight    Wake up at night   1. Keep taking your Green Zone medications  2. Start taking your rescue medicine:    every 20 minutes for up to 1 hour. Then every 4 hours for 24-48 hours.  3. If you stay in the Yellow Zone for more than 12-24 hours, contact your doctor.  4. If you do not return to the Green Zone in 12-24 hours or you get worse, start taking your oral steroid medicine if prescribed by your provider.           RED ZONE Medical Alert - Get Help  I have ANY of these:    I feel awful    Medicine is not helping    Breathing getting harder    Trouble walking or talking    Nose opens wide to breathe       1. Take your rescue medicine NOW  2. If your provider has prescribed an oral steroid medicine, start taking it NOW  3. Call your doctor NOW  4. If you are still in the Red Zone after 20 minutes and you have not reached your doctor:    Take your rescue medicine again and    Call 911 or go to the emergency room right away    See your regular doctor  within 2 weeks of an Emergency Room or Urgent Care visit for follow-up treatment.          Annual Reminders:  Meet with Asthma Educator,  Flu Shot in the Fall, consider Pneumonia Vaccination for patients with asthma (aged 19 and older).    Pharmacy:    [id number] - [place], [place] - [address] AT [facility] [address]  [id number] - [place], [place] - [address] AT [facility] [address]    Electronically signed by Temi Marsh MD, MD   Date: 06/01/20                    Asthma Triggers  How To Control Things That Make Your Asthma Worse    Triggers are things that make your asthma worse.  Look at the list below to help you find your triggers and   what you can do about them. You can help prevent asthma flare-ups by staying away from your triggers.      Trigger                                                          What you can do   Cigarette Smoke  Tobacco smoke can make asthma worse. Do not allow smoking in your home, car or around you.  Be sure no one smokes at a child s day care or school.  If you smoke, ask your health care provider for ways to help you quit.  Ask family members to quit too.  Ask your health care provider for a referral to Quit Plan to help you quit smoking, or call 1-486-719-PLAN.     Colds, Flu, Bronchitis  These are common triggers of asthma. Wash your hands often.  Don t touch your eyes, nose or mouth.  Get a flu shot every year.     Dust Mites  These are tiny bugs that live in cloth or carpet. They are too small to see. Wash sheets and blankets in hot water every week.   Encase pillows and mattress in dust mite proof covers.  Avoid having carpet if you can. If you have carpet, vacuum weekly.   Use a dust mask and HEPA vacuum.   Pollen and Outdoor Mold  Some people are allergic to trees, grass, or weed pollen, or molds. Try to keep your windows closed.  Limit time out doors when pollen count is high.   Ask you health care provider  about taking medicine during allergy season.     Animal Dander  Some people are allergic to skin flakes, urine or saliva from pets with fur or feathers. Keep pets with fur or feathers out of your home.    If you can t keep the pet outdoors, then keep the pet out of your bedroom.  Keep the bedroom door closed.  Keep pets off cloth furniture and away from stuffed toys.     Mice, Rats, and Cockroaches  Some people are allergic to the waste from these pests.   Cover food and garbage.  Clean up spills and food crumbs.  Store grease in the refrigerator.   Keep food out of the bedroom.   Indoor Mold  This can be a trigger if your home has high moisture. Fix leaking faucets, pipes, or other sources of water.   Clean moldy surfaces.  Dehumidify basement if it is damp and smelly.   Smoke, Strong Odors, and Sprays  These can reduce air quality. Stay away from strong odors and sprays, such as perfume, powder, hair spray, paints, smoke incense, paint, cleaning products, candles and new carpet.   Exercise or Sports  Some people with asthma have this trigger. Be active!  Ask your doctor about taking medicine before sports or exercise to prevent symptoms.    Warm up for 5-10 minutes before and after sports or exercise.     Other Triggers of Asthma  Cold air:  Cover your nose and mouth with a scarf.  Sometimes laughing or crying can be a trigger.  Some medicines and food can trigger asthma.

## 2020-05-29 NOTE — PROGRESS NOTES
"Sharmaine José is a 40 year old female who is being evaluated via a billable video visit.      [phone]    The patient has been notified of following:     \"This video visit will be conducted via a call between you and your physician/provider. We have found that certain health care needs can be provided without the need for an in-person physical exam.  This service lets us provide the care you need with a video conversation.  If a prescription is necessary we can send it directly to your pharmacy.  If lab work is needed we can place an order for that and you can then stop by our lab to have the test done at a later time.    Video visits are billed at different rates depending on your insurance coverage.  Please reach out to your insurance provider with any questions.    If during the course of the call the physician/provider feels a video visit is not appropriate, you will not be charged for this service.\"    Patient has given verbal consent for Video visit? Yes    How would you like to obtain your AVS? [facility]    Patient would like the video invitation sent by: Text to cell phone: [phone]    Will anyone else be joining your video visit? No    Subjective     Sharmaine José is a 40 year old female who presents today via video visit for the following health issues:    HPI  Diabetes Follow-up    How often are you checking your blood sugar? Three times daily  Blood sugar testing frequency justification:  instructed to do so   What time of day are you checking your blood sugars (select all that apply)?  Before and after meals  Have you had any blood sugars above 200?  No  Have you had any blood sugars below 70?  No    What symptoms do you notice when your blood sugar is low?  Shaky and cold/sweaty    What concerns do you have today about your diabetes? None     Do you have any of these symptoms? (Select all that apply)  No numbness or tingling in feet.  No redness, sores or blisters on feet.  No complaints of " excessive thirst.  No reports of blurry vision.  No significant changes to weight.        Hyperlipidemia Follow-Up      Are you regularly taking any medication or supplement to lower your cholesterol?   Yes- pravastatin    Are you having muscle aches or other side effects that you think could be caused by your cholesterol lowering medication?  No    Hypertension Follow-up      Do you check your blood pressure regularly outside of the clinic? Yes     Are you following a low salt diet? Yes    Are your blood pressures ever more than 140 on the top number (systolic) OR more   than 90 on the bottom number (diastolic), for example 140/90? No    BP Readings from Last 2 Encounters:   09/05/19 126/72   01/03/19 124/84     Hemoglobin A1C (%)   Date Value   09/05/2019 5.4   11/07/2018 5.5     LDL Cholesterol Calculated (mg/dL)   Date Value   01/03/2019 176 (H)   12/22/2017     Cannot estimate LDL when triglyceride exceeds 400 mg/dL     LDL Cholesterol Direct (mg/dL)   Date Value   12/22/2017 216 (H)         How many servings of fruits and vegetables do you eat daily?  2-3    On average, how many sweetened beverages do you drink each day (Examples: soda, juice, sweet tea, etc.  Do NOT count diet or artificially sweetened beverages)?   0    How many days per week do you exercise enough to make your heart beat faster? 4    How many minutes a day do you exercise enough to make your heart beat faster? 20 - 29    How many days per week do you miss taking your medication? 0    See under ROS    Patient Active Problem List   Diagnosis     Allergic state     Mild intermittent asthma     Generalized hyperhidrosis     Tobacco use disorder     Interstitial cystitis     Surgical menopause     Narcolepsy     Cervical dysplasia     Chronic pain     Migraine     Lumbago     Hyperlipidemia LDL goal <100     H/O: hysterectomy     Health Care Home     Discoid lupus     Generalized anxiety disorder     Fatty infiltration of liver     Primary  narcolepsy without cataplexy     Hypertension goal BP (blood pressure) < 140/90     elevated bmi (H)     Hypertriglyceridemia     Type 2 diabetes mellitus without complication, with long-term current use of insulin (H)       Current Outpatient Medications   Medication Sig Dispense Refill     albuterol (PROVENTIL HFA) 108 (90 Base) MCG/ACT Inhaler Inhale 2 puffs into the lungs every 6 hours 1 Inhaler 0     alcohol swab prep pads Use to swab area of injection/robb as directed. 100 each 3     blood glucose (NO BRAND SPECIFIED) test strip Use to test blood sugar 4 times daily or as directed. To accompany: Blood Glucose Monitor Brands: per insurance. 400 strip 6     blood glucose calibration (NO BRAND SPECIFIED) solution To accompany: Blood Glucose Monitor Brands: per insurance. 1 Bottle 3     blood glucose monitoring (NO BRAND SPECIFIED) meter device kit Use to test blood sugar 1 times daily or as directed. Preferred blood glucose meter OR supplies to accompany: Blood Glucose Monitor Brands: per insurance. 1 kit 0     cyclobenzaprine (FLEXERIL) 10 MG tablet TAKE 1 TABLET(10 MG) BY MOUTH EVERY NIGHT AS NEEDED FOR MUSCLE SPASMS 90 tablet 0     dextroamphetamine (DEXTROSTAT) 10 MG tablet Take 10 mg by mouth daily Takes 1-3tablets daily  0     DULoxetine (CYMBALTA) 20 MG capsule TAKE 1 CAPSULE(20 MG) BY MOUTH DAILY 90 capsule 1     EPINEPHrine (EPIPEN/ADRENACLICK/OR ANY BX GENERIC EQUIV) 0.3 MG/0.3ML injection 2-pack Inject 0.3 mLs (0.3 mg) into the muscle once as needed 2 mL 0     fluticasone (FLONASE) 50 MCG/ACT nasal spray Spray 1-2 sprays into both nostrils daily as needed for rhinitis or allergies       hydrocortisone, Perianal, (ANUSOL-HC) 2.5 % cream Place rectally 2 times daily as needed for hemorrhoids 30 g 0     insulin glargine (LANTUS SOLOSTAR) 100 UNIT/ML pen Inject 44 Units Subcutaneous At Bedtime 12 mL 0     insulin pen needle (B-D U/F) 31G X 8 MM miscellaneous USE WITH Lantus solostar INJECTION DAILY or  "whatever insulin insurance is requesting. 100 each 11     irbesartan (AVAPRO) 300 MG tablet Take 1 tablet (300 mg) by mouth At Bedtime 90 tablet 0     lidocaine-prilocaine (EMLA) 2.5-2.5 % external cream Apply topically as needed for moderate pain 30 g 0     metFORMIN (GLUCOPHAGE-XR) 500 MG 24 hr tablet TAKE 2 TABLETS(1000 MG) BY MOUTH DAILY WITH DINNER 180 tablet 0     pravastatin (PRAVACHOL) 10 MG tablet Take 1 tablet (10 mg) by mouth daily 90 tablet 0     PREMARIN 1.25 MG tablet TAKE 1 TABLET BY MOUTH DAILY 90 tablet 2     ranitidine (ZANTAC) 150 MG tablet Take 150 mg by mouth 2 times daily       thin (NO BRAND SPECIFIED) lancets Use with lanceting device. To accompany: Blood Glucose Monitor Brands: per insurance. 400 each 6          Video Start Time: 3:09 PM        Reviewed and updated as needed this visit by Provider         Review of Systems   CONSTITUTIONAL:NEGATIVE for fever, chills, change in weight  RESP:NEGATIVE for significant cough or SOB  CV: NEGATIVE for chest pain, palpitations or peripheral edema  MUSCULOSKELETAL: NEGATIVE for significant arthralgias or myalgia x some back pain lifting; will see chirotpractor.   PSYCHIATRIC: NEGATIVE for changes in mood or affect    During the time of Covid pandemic. Doing visit as a video visit.    Done with school.   Works at [employer]. They are practicing things safely.    Sugars have been good.   120-150; in am usually ~ 120.   Will drink some sugar in the middle of the night.    Taking nexium now that the H2 blocker is not available.    She notes her bp is 120-130/70-78  Sleep trista checked bp       Objective    LMP 10/04/2006   Estimated body mass index is 28.82 kg/m  as calculated from the following:    Height as of 9/5/19: 1.651 m (5' 5\").    Weight as of 9/5/19: 78.6 kg (173 lb 3.2 oz).  Physical Exam     GENERAL: alert and no distress  EYES: Eyes grossly normal to inspection.  No discharge or erythema, or obvious scleral/conjunctival " abnormalities.  RESP: No audible wheeze, cough, or visible cyanosis.  No visible retractions or increased work of breathing.    SKIN: Visible skin clear. No significant rash, abnormal pigmentation or lesions.  NEURO: Cranial nerves grossly intact.  Mentation and speech appropriate for age.  PSYCH: Mentation appears normal, affect normal/bright, judgement and insight intact, normal speech and appearance well-groomed.        Lab Results   Component Value Date    A1C 5.4 09/05/2019    A1C 5.5 11/07/2018    A1C 10.3 06/01/2018    A1C 7.4 12/22/2017    A1C 5.8 05/24/2016     Recent Labs   Lab Test 01/03/19  0954 12/22/17  0942  02/05/14  1358   CHOL 286* 316*   < > 300*   HDL 49* 32*   < > 68   * Cannot estimate LDL when triglyceride exceeds 400 mg/dL  216*   < > 153*   TRIG 305* 586*   < > 397*   CHOLHDLRATIO  --   --   --  4.4    < > = values in this interval not displayed.     ACT Total Scores 5/29/2020   ACT TOTAL SCORE -   ASTHMA ER VISITS -   ASTHMA HOSPITALIZATIONS -   ACT TOTAL SCORE (Goal Greater than or Equal to 20) 25   In the past 12 months, how many times did you visit the emergency room for your asthma without being admitted to the hospital? 0   In the past 12 months, how many times were you hospitalized overnight because of your asthma? 0         GFR Estimate   Date Value Ref Range Status   11/07/2018 >90 >60 mL/min/1.7m2 Final     Comment:     Non  GFR Calc   12/22/2017 >90 >60 mL/min/1.7m2 Final     Comment:     Non  GFR Calc   09/22/2017 >90 >60 mL/min/1.7m2 Final     Comment:     Non  GFR Calc     Lab Results   Component Value Date    MICROL <5 01/03/2019     No results found for: MICROALBUMIN              Assessment & Plan     1. Type 2 diabetes mellitus without complication, with long-term current use of insulin (H)  Sounds like things are controlled with her monitoring. It has been quite some time since an HgbA1C. I am recommending lab in the  near future.  - metFORMIN (GLUCOPHAGE-XR) 500 MG 24 hr tablet; TAKE 2 TABLETS(1000 MG) BY MOUTH DAILY WITH DINNER  Dispense: 180 tablet; Refill: 0  - Continuous Blood Gluc Sensor (FREESTYLE BRYANT 14 DAY SENSOR) MISC; Change every 14 days.  Dispense: 2 each; Refill: 11  - Continuous Blood Gluc  (FREESTYLE BRYANT 14 DAY READER) MONIQUE; Use to read blood sugars as per 's instructions.  Dispense: 1 each; Refill: 0    2. Gastroesophageal reflux disease, esophagitis presence not specified  Ranitidine is unavailable; but did work. At this time, recommend alternate H2 blocker as opposed to PPI. Discussed some of the long term effects of PPI.   - famotidine (PEPCID) 20 MG tablet; Take 1 tablet (20 mg) by mouth 2 times daily  Dispense: 180 tablet; Refill: 1    3. Hypertension goal BP (blood pressure) < 140/90  Has been controlled. Recommend checking when she comes for lab.   - irbesartan (AVAPRO) 300 MG tablet; Take 1 tablet (300 mg) by mouth At Bedtime  Dispense: 90 tablet; Refill: 0    4. Hyperlipidemia LDL goal <100  Due for fasting lab. She is taking statin.    5. Fatty infiltration of liver  Encourage lifestyle; weight loss would be beneficial.  Also control of glucose and lipids.     6. Generalized anxiety disorder  She notes she is doing well.     7. Mild intermittent asthma without complication  Stable. Doing well. Will have inhaler available.   - albuterol (PROVENTIL HFA) 108 (90 Base) MCG/ACT inhaler; Inhale 2 puffs into the lungs every 6 hours  Dispense: 1 Inhaler; Refill: 0    8. Bee sting allergy  Refilling; her previous one .   - EPINEPHrine (ANY BX GENERIC EQUIV) 0.3 MG/0.3ML injection 2-pack; Inject 0.3 mLs (0.3 mg) into the muscle once as needed  Dispense: 2 mL; Refill: 0    9. Primary narcolepsy without cataplexy  She does have a sleep provider.          Return today (on 2020) for BP Recheck, Lab Work.    Temi Marsh MD, MD  [facility]      Video-Visit  Details    Type of service:  Video Visit    Video End Time:3:22 PM    Originating Location (pt. Location): Home    Distant Location (provider location):  [facility]     Platform used for Video Visit: Doximity    Return today (on 5/29/2020) for BP Recheck, Lab Work.     If lab ok; visit in 6 months.      Temi Marsh MD, MD

## 2020-05-30 ASSESSMENT — ASTHMA QUESTIONNAIRES: ACT_TOTALSCORE: 25

## 2020-06-10 DIAGNOSIS — E11.9 TYPE 2 DIABETES MELLITUS WITHOUT COMPLICATION, WITH LONG-TERM CURRENT USE OF INSULIN (H): ICD-10-CM

## 2020-06-10 DIAGNOSIS — Z79.4 TYPE 2 DIABETES MELLITUS WITHOUT COMPLICATION, WITH LONG-TERM CURRENT USE OF INSULIN (H): ICD-10-CM

## 2020-06-10 RX ORDER — INSULIN GLARGINE 100 [IU]/ML
INJECTION, SOLUTION SUBCUTANEOUS
Qty: 12 ML | Refills: 0 | Status: SHIPPED | OUTPATIENT
Start: 2020-06-10 | End: 2020-07-07

## 2020-06-10 NOTE — TELEPHONE ENCOUNTER
Routing refill request to provider for review/approval because:  Labs not current:  Hgb A1c    Hollie JACOB RN

## 2020-07-01 DIAGNOSIS — E11.9 TYPE 2 DIABETES MELLITUS WITHOUT COMPLICATION, WITH LONG-TERM CURRENT USE OF INSULIN (H): ICD-10-CM

## 2020-07-01 DIAGNOSIS — Z79.4 TYPE 2 DIABETES MELLITUS WITHOUT COMPLICATION, WITH LONG-TERM CURRENT USE OF INSULIN (H): ICD-10-CM

## 2020-07-01 RX ORDER — METFORMIN HCL 500 MG
TABLET, EXTENDED RELEASE 24 HR ORAL
Qty: 180 TABLET | Refills: 0
Start: 2020-07-01

## 2020-07-01 NOTE — TELEPHONE ENCOUNTER
Denied refill at this time.     Patient has medication available at pharmacy.   Metformin   180 tabs sent in on 5/29/2020- 3 month supply.     Asmita Ramirez RN Flex

## 2020-07-07 DIAGNOSIS — E11.9 TYPE 2 DIABETES MELLITUS WITHOUT COMPLICATION, WITH LONG-TERM CURRENT USE OF INSULIN (H): ICD-10-CM

## 2020-07-07 DIAGNOSIS — Z79.4 TYPE 2 DIABETES MELLITUS WITHOUT COMPLICATION, WITH LONG-TERM CURRENT USE OF INSULIN (H): ICD-10-CM

## 2020-07-07 RX ORDER — INSULIN GLARGINE 100 [IU]/ML
INJECTION, SOLUTION SUBCUTANEOUS
Qty: 12 ML | Refills: 0 | Status: SHIPPED | OUTPATIENT
Start: 2020-07-07 | End: 2020-08-11

## 2020-07-07 NOTE — TELEPHONE ENCOUNTER
Routing refill request to provider for review/approval because:  Labs out of range  Labs not current

## 2020-07-07 NOTE — LETTER
July 21, 2020      Sharmaine José  [id number] [facility] [id number]        Dear ,    We are contacting you today to notify you that you are due for a fasting lab only appoitnment and medication follow up with your provider for further refills. Please call [phone] to schedule an appointment.       Sincerely,      Your [facility] Team

## 2020-07-07 NOTE — TELEPHONE ENCOUNTER
Please help her schedule fasting lab and appointment.             Lab Results   Component Value Date    A1C 5.4 09/05/2019    A1C 5.5 11/07/2018    A1C 10.3 06/01/2018    A1C 7.4 12/22/2017    A1C 5.8 05/24/2016

## 2020-08-11 DIAGNOSIS — E11.9 TYPE 2 DIABETES MELLITUS WITHOUT COMPLICATION, WITH LONG-TERM CURRENT USE OF INSULIN (H): ICD-10-CM

## 2020-08-11 DIAGNOSIS — Z79.4 TYPE 2 DIABETES MELLITUS WITHOUT COMPLICATION, WITH LONG-TERM CURRENT USE OF INSULIN (H): ICD-10-CM

## 2020-08-11 RX ORDER — INSULIN GLARGINE 100 [IU]/ML
INJECTION, SOLUTION SUBCUTANEOUS
Qty: 12 ML | Refills: 0 | Status: SHIPPED | OUTPATIENT
Start: 2020-08-11 | End: 2020-09-09

## 2020-08-11 NOTE — TELEPHONE ENCOUNTER
Patient called stating she is completely  out of her medication, and needs it filled ASAP.  Patient had a med check appointment 05/29/20.  Patient knows she needs to schedule a lab appointment, and will contact us to schedule a future appointment.  Patient states she has been working and taking care of her parents who both have surgery.   Patient can be contacted via work number if urgent ([phone]) or [employer] if questions are non-urgent.    Nicol Delgadillo

## 2020-08-11 NOTE — TELEPHONE ENCOUNTER
LANTUS SOLOSTAR 100 UNIT/ML soln   Last Written Prescription Date:  7/7/20  Last Fill Quantity: 12 mL,   # refills: 0  Last Office Visit: 5/29/20  Future Office visit:       Routing refill request to provider for review/approval because:  Failed labs     Marianne Reese RN on 8/11/2020 at 5:12 PM

## 2020-09-08 DIAGNOSIS — Z79.4 TYPE 2 DIABETES MELLITUS WITHOUT COMPLICATION, WITH LONG-TERM CURRENT USE OF INSULIN (H): ICD-10-CM

## 2020-09-08 DIAGNOSIS — E11.9 TYPE 2 DIABETES MELLITUS WITHOUT COMPLICATION, WITH LONG-TERM CURRENT USE OF INSULIN (H): ICD-10-CM

## 2020-09-09 RX ORDER — INSULIN GLARGINE 100 [IU]/ML
INJECTION, SOLUTION SUBCUTANEOUS
Qty: 12 ML | Refills: 0 | Status: SHIPPED | OUTPATIENT
Start: 2020-09-09 | End: 2020-10-05

## 2020-09-09 NOTE — TELEPHONE ENCOUNTER
Medication is being filled for 1 time refill only due to:  Patient needs labs has lab appt 2 weeks..     Yolie Pascal RN

## 2020-09-26 DIAGNOSIS — I10 HYPERTENSION GOAL BP (BLOOD PRESSURE) < 140/90: ICD-10-CM

## 2020-09-26 DIAGNOSIS — E78.5 HYPERLIPIDEMIA LDL GOAL <100: ICD-10-CM

## 2020-09-28 DIAGNOSIS — I10 HYPERTENSION GOAL BP (BLOOD PRESSURE) < 140/90: ICD-10-CM

## 2020-09-28 DIAGNOSIS — Z79.4 TYPE 2 DIABETES MELLITUS WITHOUT COMPLICATION, WITH LONG-TERM CURRENT USE OF INSULIN (H): ICD-10-CM

## 2020-09-28 DIAGNOSIS — E11.9 TYPE 2 DIABETES MELLITUS WITHOUT COMPLICATION, WITH LONG-TERM CURRENT USE OF INSULIN (H): ICD-10-CM

## 2020-09-28 DIAGNOSIS — E78.5 HYPERLIPIDEMIA LDL GOAL <100: ICD-10-CM

## 2020-09-28 LAB
ALBUMIN SERPL-MCNC: 3.4 G/DL (ref 3.4–5)
ALP SERPL-CCNC: 74 U/L (ref 40–150)
ALT SERPL W P-5'-P-CCNC: 33 U/L (ref 0–50)
ANION GAP SERPL CALCULATED.3IONS-SCNC: 4 MMOL/L (ref 3–14)
AST SERPL W P-5'-P-CCNC: 25 U/L (ref 0–45)
BILIRUB SERPL-MCNC: 0.2 MG/DL (ref 0.2–1.3)
BUN SERPL-MCNC: 14 MG/DL (ref 7–30)
CALCIUM SERPL-MCNC: 9.6 MG/DL (ref 8.5–10.1)
CHLORIDE SERPL-SCNC: 105 MMOL/L (ref 94–109)
CHOLEST SERPL-MCNC: 246 MG/DL
CO2 SERPL-SCNC: 26 MMOL/L (ref 20–32)
CREAT SERPL-MCNC: 0.56 MG/DL (ref 0.52–1.04)
CREAT UR-MCNC: 28 MG/DL
GFR SERPL CREATININE-BSD FRML MDRD: >90 ML/MIN/{1.73_M2}
GLUCOSE SERPL-MCNC: 111 MG/DL (ref 70–99)
HBA1C MFR BLD: 5.9 % (ref 0–5.6)
HDLC SERPL-MCNC: 36 MG/DL
LDLC SERPL CALC-MCNC: ABNORMAL MG/DL
LDLC SERPL DIRECT ASSAY-MCNC: 140 MG/DL
MICROALBUMIN UR-MCNC: 6 MG/L
MICROALBUMIN/CREAT UR: 21.09 MG/G CR (ref 0–25)
NONHDLC SERPL-MCNC: 210 MG/DL
POTASSIUM SERPL-SCNC: 4.1 MMOL/L (ref 3.4–5.3)
PROT SERPL-MCNC: 6.9 G/DL (ref 6.8–8.8)
SODIUM SERPL-SCNC: 135 MMOL/L (ref 133–144)
TRIGL SERPL-MCNC: 501 MG/DL

## 2020-09-28 PROCEDURE — 80053 COMPREHEN METABOLIC PANEL: CPT | Performed by: INTERNAL MEDICINE

## 2020-09-28 PROCEDURE — 83036 HEMOGLOBIN GLYCOSYLATED A1C: CPT | Performed by: INTERNAL MEDICINE

## 2020-09-28 PROCEDURE — 86769 SARS-COV-2 COVID-19 ANTIBODY: CPT | Performed by: INTERNAL MEDICINE

## 2020-09-28 PROCEDURE — 82043 UR ALBUMIN QUANTITATIVE: CPT | Performed by: FAMILY MEDICINE

## 2020-09-28 PROCEDURE — 80061 LIPID PANEL: CPT | Performed by: INTERNAL MEDICINE

## 2020-09-28 PROCEDURE — 83721 ASSAY OF BLOOD LIPOPROTEIN: CPT | Mod: 59 | Performed by: INTERNAL MEDICINE

## 2020-09-28 PROCEDURE — 36415 COLL VENOUS BLD VENIPUNCTURE: CPT | Performed by: INTERNAL MEDICINE

## 2020-09-29 LAB
COVID-19 ANTIBODY IGG: NEGATIVE
LAB TEST METHOD: NORMAL

## 2020-09-29 RX ORDER — IRBESARTAN 300 MG/1
TABLET ORAL
Qty: 90 TABLET | Refills: 0 | Status: SHIPPED | OUTPATIENT
Start: 2020-09-29 | End: 2020-12-28

## 2020-09-29 RX ORDER — PRAVASTATIN SODIUM 20 MG
20 TABLET ORAL DAILY
Qty: 90 TABLET | Refills: 0 | Status: SHIPPED | OUTPATIENT
Start: 2020-09-29 | End: 2020-12-28

## 2020-09-29 RX ORDER — PRAVASTATIN SODIUM 10 MG
TABLET ORAL
Qty: 90 TABLET | Refills: 0 | OUTPATIENT
Start: 2020-09-29

## 2020-09-29 NOTE — TELEPHONE ENCOUNTER
I would like to increase pravastatin to 20 mg and check fasting lab in 3 months.   I would like to see her at that time as well.    Please let her know. Thanks!

## 2020-09-29 NOTE — TELEPHONE ENCOUNTER
Routing refill request to provider for review/approval because:  New labs not sure if PCP wants changes in medications or dosing.     Hollie JACOB RN

## 2020-09-29 NOTE — TELEPHONE ENCOUNTER
Left detailed message with message below. Call to schedule appt for 3 months with fasting labs.     Call w/ questions/concerns.     Hollie JACOB RN

## 2020-10-04 ENCOUNTER — MYC MEDICAL ADVICE (OUTPATIENT)
Dept: FAMILY MEDICINE | Facility: CLINIC | Age: 40
End: 2020-10-04

## 2020-10-05 NOTE — TELEPHONE ENCOUNTER
Agree with what was said.    Fish oil can be helpful.   I don't know how much she is taking nor the breakdown; it might be good to look at that.   We could refer to dietician if she would like.     I do want her to come in for appointment in 3 months. Would be good if she does lab prior to coming in.    ------            Recent Labs   Lab Test 09/28/20  1043 01/03/19  0954 02/05/14  1358 02/05/14  1358   CHOL 246* 286*   < > 300*   HDL 36* 49*   < > 68   LDL Cannot estimate LDL when triglyceride exceeds 400 mg/dL  140* 176*   < > 153*   TRIG 501* 305*   < > 397*   CHOLHDLRATIO  --   --   --  4.4    < > = values in this interval not displayed.

## 2020-10-06 ENCOUNTER — E-VISIT (OUTPATIENT)
Dept: FAMILY MEDICINE | Facility: CLINIC | Age: 40
End: 2020-10-06
Payer: COMMERCIAL

## 2020-10-06 DIAGNOSIS — L93.0 DISCOID LUPUS: Primary | ICD-10-CM

## 2020-10-06 DIAGNOSIS — Z71.85 VACCINE COUNSELING: ICD-10-CM

## 2020-10-06 DIAGNOSIS — L98.9 SKIN LESION: ICD-10-CM

## 2020-10-06 DIAGNOSIS — Z22.322 MRSA NASAL COLONIZATION: ICD-10-CM

## 2020-10-06 DIAGNOSIS — T30.0 BURN: ICD-10-CM

## 2020-10-06 DIAGNOSIS — K21.9 GASTROESOPHAGEAL REFLUX DISEASE, UNSPECIFIED WHETHER ESOPHAGITIS PRESENT: ICD-10-CM

## 2020-10-06 DIAGNOSIS — E78.1 HYPERTRIGLYCERIDEMIA: ICD-10-CM

## 2020-10-06 PROCEDURE — 99423 OL DIG E/M SVC 21+ MIN: CPT | Performed by: FAMILY MEDICINE

## 2020-10-07 RX ORDER — MUPIROCIN 20 MG/G
OINTMENT TOPICAL 3 TIMES DAILY
Qty: 22 G | Refills: 0 | Status: SHIPPED | OUTPATIENT
Start: 2020-10-07 | End: 2021-12-14

## 2020-10-07 RX ORDER — HYDROCORTISONE VALERATE CREAM 2 MG/G
CREAM TOPICAL 2 TIMES DAILY
Qty: 30 G | Refills: 0 | Status: SHIPPED | OUTPATIENT
Start: 2020-10-07 | End: 2021-03-16

## 2020-10-07 RX ORDER — LIDOCAINE/PRILOCAINE 2.5 %-2.5%
CREAM (GRAM) TOPICAL PRN
Qty: 30 G | Refills: 0 | Status: SHIPPED | OUTPATIENT
Start: 2020-10-07 | End: 2021-03-16

## 2020-10-07 NOTE — TELEPHONE ENCOUNTER
Provider E-Visit time total (minutes): 31                  74 mg epa + DHA    GERD; fish oil    Current Outpatient Medications   Medication Sig Dispense Refill     hydrocortisone (WESTCORT) 0.2 % external cream Apply topically 2 times daily 30 g 0     lidocaine-prilocaine (EMLA) 2.5-2.5 % external cream Apply topically as needed for moderate pain 30 g 0     mupirocin (BACTROBAN) 2 % external ointment Apply topically 3 times daily 22 g 0     albuterol (PROVENTIL HFA) 108 (90 Base) MCG/ACT inhaler Inhale 2 puffs into the lungs every 6 hours 1 Inhaler 0     alcohol swab prep pads Use to swab area of injection/robb as directed. 100 each 3     blood glucose (NO BRAND SPECIFIED) test strip Use to test blood sugar 4 times daily or as directed. To accompany: Blood Glucose Monitor Brands: per insurance. 400 strip 6     blood glucose calibration (NO BRAND SPECIFIED) solution To accompany: Blood Glucose Monitor Brands: per insurance. 1 Bottle 3     blood glucose monitoring (NO BRAND SPECIFIED) meter device kit Use to test blood sugar 1 times daily or as directed. Preferred blood glucose meter OR supplies to accompany: Blood Glucose Monitor Brands: per insurance. 1 kit 0     Continuous Blood Gluc  (FREESTYLE BRYANT 14 DAY READER) MONIQUE Use to read blood sugars as per 's instructions. 1 each 0     Continuous Blood Gluc Sensor (FREESTYLE BRYANT 14 DAY SENSOR) MISC Change every 14 days. 2 each 11     cyclobenzaprine (FLEXERIL) 10 MG tablet TAKE 1 TABLET(10 MG) BY MOUTH EVERY NIGHT AS NEEDED FOR MUSCLE SPASMS 90 tablet 0     dextroamphetamine (DEXTROSTAT) 10 MG tablet Take 10 mg by mouth daily Takes 1-3tablets daily  0     DULoxetine (CYMBALTA) 20 MG capsule TAKE 1 CAPSULE(20 MG) BY MOUTH DAILY 90 capsule 1     EPINEPHrine (ANY BX GENERIC EQUIV) 0.3 MG/0.3ML injection 2-pack Inject 0.3 mLs (0.3 mg) into the muscle once as needed 2 mL 0     estrogen conj (PREMARIN) 1.25 MG tablet Take 1 tablet (1.25 mg) by mouth  daily 90 tablet 0     famotidine (PEPCID) 20 MG tablet Take 1 tablet (20 mg) by mouth 2 times daily 180 tablet 1     fluticasone (FLONASE) 50 MCG/ACT nasal spray Spray 1-2 sprays into both nostrils daily as needed for rhinitis or allergies       insulin pen needle (B-D U/F) 31G X 8 MM miscellaneous USE WITH LANTUS 100 each 0     irbesartan (AVAPRO) 300 MG tablet TAKE 1 TABLET(300 MG) BY MOUTH AT BEDTIME 90 tablet 0     LANTUS SOLOSTAR 100 UNIT/ML soln ADMINISTER 44 UNITS UNDER THE SKIN AT BEDTIME 12 mL 0     metFORMIN (GLUCOPHAGE-XR) 500 MG 24 hr tablet TAKE 2 TABLETS(1000 MG) BY MOUTH DAILY WITH DINNER 180 tablet 0     pravastatin (PRAVACHOL) 20 MG tablet Take 1 tablet (20 mg) by mouth daily 90 tablet 0     thin (NO BRAND SPECIFIED) lancets Use with lanceting device. To accompany: Blood Glucose Monitor Brands: per insurance. 400 each 6

## 2020-10-20 ENCOUNTER — MYC REFILL (OUTPATIENT)
Dept: FAMILY MEDICINE | Facility: CLINIC | Age: 40
End: 2020-10-20

## 2020-10-20 DIAGNOSIS — E11.65 TYPE 2 DIABETES MELLITUS WITH HYPERGLYCEMIA, WITHOUT LONG-TERM CURRENT USE OF INSULIN (H): ICD-10-CM

## 2020-10-20 DIAGNOSIS — Z78.0 MENOPAUSE: ICD-10-CM

## 2020-10-20 RX ORDER — PEN NEEDLE, DIABETIC 31 GX5/16"
NEEDLE, DISPOSABLE MISCELLANEOUS
Qty: 100 EACH | Refills: 1 | Status: SHIPPED | OUTPATIENT
Start: 2020-10-20

## 2020-10-21 NOTE — TELEPHONE ENCOUNTER
Routing refill request to provider for review/approval because:  Failed protocol for estrogen - has not had a bp check in over a year - will anahart to see if she can get her bp checked at a  pharmacy or have a nurse only

## 2020-10-28 DIAGNOSIS — M62.830 BACK MUSCLE SPASM: ICD-10-CM

## 2020-10-28 RX ORDER — CYCLOBENZAPRINE HCL 10 MG
TABLET ORAL
Qty: 90 TABLET | Refills: 0 | Status: SHIPPED | OUTPATIENT
Start: 2020-10-28 | End: 2021-02-11

## 2020-10-28 NOTE — TELEPHONE ENCOUNTER
Cyclobenzaprine 10 mg  [facility] 7/27/2020, disp 90 with no refills  E-visit 10/6/2020    Routing refill request to provider for review/approval because:  Drug not on the [facility] refill protocol

## 2020-11-20 ENCOUNTER — OFFICE VISIT (OUTPATIENT)
Dept: URGENT CARE | Facility: URGENT CARE | Age: 40
End: 2020-11-20
Payer: OTHER MISCELLANEOUS

## 2020-11-20 VITALS
HEART RATE: 105 BPM | BODY MASS INDEX: 30.82 KG/M2 | TEMPERATURE: 99.1 F | WEIGHT: 185.2 LBS | RESPIRATION RATE: 16 BRPM | DIASTOLIC BLOOD PRESSURE: 74 MMHG | OXYGEN SATURATION: 98 % | SYSTOLIC BLOOD PRESSURE: 128 MMHG

## 2020-11-20 DIAGNOSIS — M79.89 SWELLING OF FINGER, LEFT: ICD-10-CM
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
## 2020-11-20 PROCEDURE — 90714 TD VACC NO PRESV 7 YRS+ IM: CPT | Performed by: PHYSICIAN ASSISTANT

## 2020-11-20 PROCEDURE — 99214 OFFICE O/P EST MOD 30 MIN: CPT | Mod: 25 | Performed by: PHYSICIAN ASSISTANT

## 2020-11-20 PROCEDURE — 90471 IMMUNIZATION ADMIN: CPT | Performed by: PHYSICIAN ASSISTANT

## 2020-11-20 NOTE — PROGRESS NOTES
"SUBJECTIVE:  Sharmaine José is a 40 year old female who presents with a chief complaint of an animal bite on the fingers.  She was bitten by a cat yesterday.   Cicumstances of bite:patient states she was restraining the cat so that the  could give a IM injection.   Severity: bite with skin break.     Associated symptoms: redness noted, increasing pain   Cat was up to date on vaccinations.   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, including hip     DYPLASTIC     Cervical dysplasia      Depressive disorder, not elsewhere classified     improved with treatment of narcolepsy     Discoid lupus 9/13/2015     Endometriosis of other specified sites 2006     Fatty infiltration of liver 5/22/2016     Generalized anxiety disorder 2006    manifested by nausea; improved with treatment of narcolepsy     Herpes zoster without mention of complication      Migraine headaches 12/3/2008     Mild intermittent asthma      Narcolepsy      Proteinuria     distant past; when very physically active     Type 2 diabetes mellitus with hyperglycemia, without long-term current use of insulin (H) 6/2/2018     Urinary incontinence     improved p surgery     Allergies   Allergen Reactions     Bee Venom Anaphylaxis     Tramadol Anaphylaxis     Ultram [Tramadol Hcl] Anaphylaxis     Armodafinil Rash     Pt reports skin reaction-- blisters and ulcerations     Cefaclor Rash     Ceclor [Cefaclor]      ceclor     Doxycycline Nausea and Vomiting     Suture [Suture]      She notes did ok with vicryl.  Had hives and extrusion of \"monocryl\"     Tuberculin      Other reaction(s): *Unknown  Whole arm swells dramatically.      Tuberculin Purified Protein Derivative      Nuts Rash     Monocryl Suture     Social History     Tobacco Use     Smoking status: Current Every Day Smoker     Packs/day: 1.00     Types: " Cigarettes     Smokeless tobacco: Never Used     Tobacco comment: 1/2- 1 pk  daily   Substance Use Topics     Alcohol use: Yes     Comment: rarely     Family History   Adopted: Yes   Problem Relation Age of Onset     Alcohol/Drug Maternal Grandmother         alcohol ?     Cancer Mother         stomach cancer ?     Unknown/Adopted Mother      Unknown/Adopted Father        ROS:  CONSTITUTIONAL:NEGATIVE for fever, chills, change in weight  INTEGUMENTARY/SKIN: POSITIVE for finger swelling, erythema  MUSCULOSKELETAL: POSITIVE for finger tenderness around bite  VASC: NEGATIVE for cool extremity  LYMPH: NEGATIVE for lymph node swelling  NEURO: NEGATIVE for weakness, dizziness or paresthesias    OBJECTIVE:  /74   Pulse 105   Temp 99.1  F (37.3  C) (Tympanic)   Resp 16   Wt 84 kg (185 lb 3.2 oz)   LMP 10/04/2006   SpO2 98%   BMI 30.82 kg/m    GENERAL: healthy, alert no acute distress  SKIN: puncture wound, erythema and swelling of finger  MS:extremities normal- no gross deformities noted,  FROM noted in all extremities  NEURO: Normal strength and tone, sensory exam grossly normal,  normal speech and mentation  LYMPHATICS: negative for lymphatic swelling  FINGER: Positive for tenderness and erythema around bites    ASSESSMENT/PLAN      ICD-10-CM    1. Cat bite, initial encounter  W55.01XA amoxicillin-clavulanate (AUGMENTIN) 875-125 MG tablet     TD PRESERV FREE, IM (7+ YRS)   2. Swelling of finger, left  M79.89 amoxicillin-clavulanate (AUGMENTIN) 875-125 MG tablet       Orders Placed This Encounter     TD PRESERV FREE, IM (7+ YRS)     amoxicillin-clavulanate (AUGMENTIN) 875-125 MG tablet       Augmentin for infection  Td updated  Warm finger soaks, motrin  Follow up with PCP as needed

## 2020-11-20 NOTE — PATIENT INSTRUCTIONS
Patient Education     Cat Bite    A cat bite can cause a wound deep enough to break the skin. In such cases, the wound is cleaned and then sometimes closed. If the wound is closed it is usually not closed completely. This is so that fluid can drain if the wound becomes infected. Often the wound is left open to heal. In addition to wound care, a tetanus shot may be given, if needed.  Home care    Wash your hands well with soap and warm water before and after caring for the wound. This helps lower the risk of infection.    Care for the wound as directed. If a dressing was applied to the wound, be sure to change it as directed.    If the wound bleeds, place a clean, soft cloth on the wound. Then firmly apply pressure until the bleeding stops. This may take up to 5 minutes. Don't release the pressure and look at the wound during this time.    Always get medical attention for cat bites on the hand. They are highly likely to become infected.    Most wounds heal within 10 days. But an infection can occur even with proper treatment. So be sure to check the wound daily for signs of infection (see below).    Antibiotics may be prescribed. These help prevent or treat infection. If you re given antibiotics, take them as directed. Also be sure to complete the medicines.  Rabies prevention  Rabies is a virus that can be carried in certain animals. These can include domestic animals such as cats and dogs. Pets fully vaccinated against rabies (2 shots) are at very low risk of infection. But because human rabies is almost always fatal, any biting pet should be confined for 10 days as an extra precaution. In general, if there is a risk for rabies, the following steps may need to be taken:    If someone s pet cat has bitten you, it should be kept in a secure area for the next 10 days to watch for signs of illness. If the pet owner won t allow this, contact your local animal control center. If the cat becomes ill or dies during that  time, contact your local animal control center at once so the animal may be tested for rabies. If the cat stays healthy for the next 10 days, there is no danger of rabies in the animal or you.    If a stray cat bit you, contact your local animal control center. They can give information on capture, quarantine, and animal rabies testing.    If you can t find the animal that bit you in the next 2 days, and if rabies exists in your area, you may need to receive the rabies vaccine series. Call your healthcare provider right away. Or return to the emergency department promptly.    All animal bites should be reported to the local animal control center. If you were not given a form to fill out, you can report this yourself.  Follow-up care  Follow up with your healthcare provider, or as directed.  When to seek medical advice  Call your healthcare provider right away if any of these occur:    Signs of infection:  ? Spreading redness or warmth from the wound  ? Increased pain or swelling  ? Fever of 100.4 F (38 C) or higher, or as directed by your healthcare provider  ? Colored fluid or pus draining from the wound  ? Enlarged lymph nodes above the area that was bitten, such as lymph nodes in the armpit if you were bitten on the hand or arm. This may be a sign of cat-scratch disease (cat-scratch fever).    Signs of rabies infection:  ? Headache  ? Confusion  ? Strange behavior  ? Increased salivating or drooling  ? Seizure    Decreased ability to move any body part near the bite area    Bleeding that can't be stopped after 5 minutes of firm pressure  Brooke last reviewed this educational content on 5/1/2018 2000-2020 [employer]. [address] [place] [address], [place] [address]. All rights reserved. This information is not intended as a substitute for professional medical care. Always follow your healthcare professional's instructions.

## 2020-11-20 NOTE — LETTER
[facility] [address] [address]  [phone]        2020    REPORT OF WORK ABILITY    PATIENT DATA  Employee Name: Sharmaine José        : 1980   [id number]  Work related injury: YES  Today's date: 2020  Date of injury: 2020     PROVIDER EVALUATION: Please fill in as needed.  Please give copy to employee for employer.  1. Diagnosis: cat bite  2. Treatment: augmentin, Td  3. Medication: Augmentin  NOTE: When ordering a medication, [place] Rules require Work Comp or WC on prescriptions.  4. Return to work date: May return today with no restrictions.      RESTRICTIONS: Unlimited unless listed.  Restrictions apply to home and leisure also.  If work within restrictions is not available, the employee is totally disabled.    Medical Examiner: Suresh Mayorga PA-C          Next appointment: as needed    CC: Employer, Managed Care Plan/Payor, Patient

## 2020-11-21 ENCOUNTER — TELEPHONE (OUTPATIENT)
Dept: URGENT CARE | Facility: URGENT CARE | Age: 40
End: 2020-11-21

## 2020-11-22 NOTE — TELEPHONE ENCOUNTER
Pt states the cat was vaccinated. She will need that new letter sent to my chart stating she was restraining the cat so that the  could give a IM injection.

## 2020-11-22 NOTE — TELEPHONE ENCOUNTER
Attempted calling patient - no answer and no voicemail. Will try again later.    JOSE Mckinnon, Medical Assistant

## 2020-11-22 NOTE — TELEPHONE ENCOUNTER
Patient seen yesterday for cat bite - work comp visit.  Patient states the notes she received is incorrect. Reports she was not playing/teasing animal, but was restraining cat so it could get injection/meds because he was being neutered at visit. Pt request note change so work comp. Insurance will pay. Pt also states she was not asked if animal was up to date on vaccination. Pt is reporting that she is not sure if the cat was up to date on vaccination at the time she was bitten. Patient can be reached at [phone].    JOSE Mckinnon, Medical Assistant

## 2020-11-22 NOTE — TELEPHONE ENCOUNTER
I can change this in the chart.  Patient then needs to find out if the cat at her work is covered by insurance.  This is assumed under work comp that she has figured this out with her employer.  If she does not know cat status then she needs to contact the cats owner and find this out.  If they do not know then she needs to inform animal control.    Thank you  LIZ Alvares PA-C    .

## 2020-11-25 DIAGNOSIS — Z79.4 TYPE 2 DIABETES MELLITUS WITHOUT COMPLICATION, WITH LONG-TERM CURRENT USE OF INSULIN (H): ICD-10-CM

## 2020-11-25 DIAGNOSIS — E11.9 TYPE 2 DIABETES MELLITUS WITHOUT COMPLICATION, WITH LONG-TERM CURRENT USE OF INSULIN (H): ICD-10-CM

## 2020-11-25 RX ORDER — INSULIN GLARGINE 100 [IU]/ML
INJECTION, SOLUTION SUBCUTANEOUS
Qty: 15 ML | Refills: 0 | Status: SHIPPED | OUTPATIENT
Start: 2020-11-25 | End: 2021-01-27

## 2020-11-25 NOTE — TELEPHONE ENCOUNTER
Prescription approved per [facility] [place] Refill Protocol.    Yolie Pascal RN

## 2020-12-24 DIAGNOSIS — Z79.4 TYPE 2 DIABETES MELLITUS WITHOUT COMPLICATION, WITH LONG-TERM CURRENT USE OF INSULIN (H): ICD-10-CM

## 2020-12-24 DIAGNOSIS — E11.9 TYPE 2 DIABETES MELLITUS WITHOUT COMPLICATION, WITH LONG-TERM CURRENT USE OF INSULIN (H): ICD-10-CM

## 2020-12-24 RX ORDER — METFORMIN HCL 500 MG
TABLET, EXTENDED RELEASE 24 HR ORAL
Qty: 180 TABLET | Refills: 0 | Status: SHIPPED | OUTPATIENT
Start: 2020-12-24 | End: 2021-03-16

## 2020-12-24 NOTE — TELEPHONE ENCOUNTER
Calling Patient is due for an appt. Patient unable to schedule right now she is driving. Will call back to schedule.     metFORMIN (GLUCOPHAGE-XR) 500 MG 24 hr tablet  Last Written Prescription Date:  9/25/20  Last Fill Quantity: 180,   # refills: 0  Last Office Visit: 10/6/20  Future Office visit:       Routing refill request to provider for review/approval because:  Recent (6 mo) or future (30 days) visit within the authorizing provider's specialty    Marianne Reese RN on 12/24/2020 at 8:41 AM

## 2020-12-24 NOTE — TELEPHONE ENCOUNTER
Lab Results   Component Value Date    A1C 5.9 09/28/2020    A1C 5.4 09/05/2019    A1C 5.5 11/07/2018    A1C 10.3 06/01/2018    A1C 7.4 12/22/2017

## 2020-12-26 DIAGNOSIS — E78.5 HYPERLIPIDEMIA LDL GOAL <100: ICD-10-CM

## 2020-12-26 NOTE — LETTER
December 31, 2020      Sharmaine José  [address] [facility] [address]        Dear Ms. Sharmaine José,    We are contacting you today to notify you that you are due for a Diabetic Check for further refills for your Pravastatin.    This appointment can be in clinic or virtual by either telephone, video.    Please call [phone] to schedule an appointment.     [facility]      Sincerely,     Temi Marhs MD

## 2020-12-27 DIAGNOSIS — I10 HYPERTENSION GOAL BP (BLOOD PRESSURE) < 140/90: ICD-10-CM

## 2020-12-28 RX ORDER — IRBESARTAN 300 MG/1
TABLET ORAL
Qty: 90 TABLET | Refills: 1 | Status: SHIPPED | OUTPATIENT
Start: 2020-12-28 | End: 2021-06-18

## 2020-12-28 RX ORDER — PRAVASTATIN SODIUM 20 MG
TABLET ORAL
Qty: 90 TABLET | Refills: 0 | Status: SHIPPED | OUTPATIENT
Start: 2020-12-28 | End: 2021-03-16

## 2020-12-28 NOTE — TELEPHONE ENCOUNTER
Routing refill request to provider for review/approval because:  Failed protocol for pravastatin - unsure of ldl due to high triglycerides and pt due for a d/m appt.    Will forward to the station, please try to help the pt schedule an appt for d/m f/u.  Thanks!

## 2021-01-14 ENCOUNTER — HEALTH MAINTENANCE LETTER (OUTPATIENT)
Age: 41
End: 2021-01-14

## 2021-01-19 DIAGNOSIS — Z78.0 MENOPAUSE: ICD-10-CM

## 2021-01-20 RX ORDER — ESTROGENS, CONJUGATED 1.25 MG
TABLET ORAL
Qty: 90 TABLET | Refills: 0 | Status: SHIPPED | OUTPATIENT
Start: 2021-01-20 | End: 2021-03-16

## 2021-01-26 DIAGNOSIS — E11.9 TYPE 2 DIABETES MELLITUS WITHOUT COMPLICATION, WITH LONG-TERM CURRENT USE OF INSULIN (H): ICD-10-CM

## 2021-01-26 DIAGNOSIS — Z79.4 TYPE 2 DIABETES MELLITUS WITHOUT COMPLICATION, WITH LONG-TERM CURRENT USE OF INSULIN (H): ICD-10-CM

## 2021-01-27 RX ORDER — INSULIN GLARGINE 100 [IU]/ML
INJECTION, SOLUTION SUBCUTANEOUS
Qty: 30 ML | Refills: 0 | Status: SHIPPED | OUTPATIENT
Start: 2021-01-27 | End: 2021-03-16

## 2021-01-27 NOTE — TELEPHONE ENCOUNTER
Medication is being filled for 1 time refill only due to:  to get to scheduled appt   Next 5 appointments (look out 90 days)    Mar 16, 2021  1:20 PM  Callie Pitts with Temi Marsh MD  [facility] ([facility]) [id number] [place] [address] [phone]        Hollie JACOB RN

## 2021-01-28 ENCOUNTER — VIRTUAL VISIT (OUTPATIENT)
Dept: FAMILY MEDICINE | Facility: OTHER | Age: 41
End: 2021-01-28

## 2021-01-28 NOTE — PROGRESS NOTES
"Date: 2021 10:58:00  Clinician: Suresh Mayorga  Clinician NPI: [id number]  Patient: Sharmaine José  Patient : 1980  Patient Address: [address] [place] [id number]  Patient Phone: [phone]  Visit Protocol: URI  Patient Summary:  Sharmaine is a 40 year old ( : 1980 ) female who initiated a [facility] Visit for cold, sinus infection, or influenza. When asked the question \"Please sign me up to receive news, health information and promotions. \", Sharmaine responded \"Yes\".    Sharmaine states her symptoms started 1-2 days ago.   Her symptoms consist of malaise, nasal congestion, facial pain or pressure, and a sore throat. Sharmaine also feels feverish but was unable to measure her temperature.   Symptom details     Nasal secretions: The color of her mucus is green.    Sore throat: Sharmaine reports having moderate throat pain (4-6 on a 10 point pain scale), has exudate on her tonsils, and can swallow liquids. She is not sure if the lymph nodes in her neck are enlarged. A rash has not appeared on the skin since the sore throat started.     Facial pain or pressure: The facial pain or pressure feels worse when bending over or leaning forward.      Sharmaine denies having ear pain, headache, chills, vomiting, myalgias, rhinitis, cough, nausea, teeth pain, ageusia, diarrhea, wheezing, and anosmia. She also denies having recent facial or sinus surgery in the past 60 days and taking antibiotic medication in the past month. She is not experiencing dyspnea.   Precipitating events  Within the past week, Sharmaine has been exposed to someone with strep throat.   Pertinent COVID-19 (Coronavirus) information  Sharmaine does not work or volunteer as healthcare worker or a . In the past 14 days, Sharmaine has not worked or volunteered at a healthcare facility or group living setting.   In the past 14 days, she also has not lived in a congregate living setting.   Sharmaine has not had a close contact with a " laboratory-confirmed COVID-19 patient within 14 days of symptom onset.    Sharmaine has not been tested for COVID-19.   Sharmaine has not received a COVID-19 vaccine.   Pertinent medical history  Sharmaine has diabetes. She has been told by her provider that her diabetes is in control.   Sharmaine has asthma. She uses quick-relief inhaler less than two times per week. She refills her quick-relief inhaler less than two times per year. She wakes up at night with asthma symptoms less than two times per month.   Sharmaine had 3 sinus infections within the past year.   She has not been told by her provider to avoid NSAIDs.   Sharmaine does not get yeast infections when she takes antibiotics.   She denies having immunosuppressive conditions (e.g., chemotherapy, HIV, organ transplant, long-term use of steroids or other immunosuppressive medications, splenectomy). She denies having congestive heart failure and severe COPD.   Sharmaine needs a return to work/school note.   Sharmaine smokes or uses smokeless tobacco.   She denies pregnancy and denies breastfeeding. She does not menstruate.   Weight: 174 lbs    MEDICATIONS: irbesartan oral, pravastatin oral, dextroamphetamine oral, metformin oral, duloxetine oral, cyclobenzaprine oral, Premarin oral, Lantus U-100 Insulin subcutaneous, esomeprazole magnesium oral, lidocaine-prilocaine-lidocaine HCl topical, EpiPen injection, hydrocortisone valerate topical, Imodium Multi-Symptom Relief oral, Ventolin HFA inhalation, ALLERGIES: Diflucan, tuberculin, purified protein derivative, Mycobacterium Tuberculosis (Tuberculin PPD), Ceclor, Nuvigil, hornet venom, tramadol  Clinician Response:  Dear Sharmaine,   Your symptoms show that you may have coronavirus (COVID-19). This illness can cause fever, cough and trouble breathing. Many people get a mild case and get better on their own. Some people can get very sick.  What should I do?  We would like to test you for this virus.   1. Please call [phone] to schedule your  "visit. Explain that [facility]. Be ready to share your [facility] visit ID number.  * If you need to schedule in [facility] please call [phone] or for [employer] employees please call [phone].  * If you need to schedule in the [place] area please call [phone]. [facility] employees call [phone].  The following will serve as your written order for this COVID Test, ordered by me, for the indication of suspected COVID [Z20.828]: The test will be ordered in [employer], our electronic health record, after you are scheduled. It will show as ordered and authorized by Baltazar Ferrera MD.  Order: COVID-19 (Coronavirus) PCR for SYMPTOMATIC testing from [facility].   2. When it's time for your COVID test:  Stay at least 6 feet away from others. (If someone will drive you to your test, stay in the backseat, as far away from the  as you can.)   Cover your mouth and nose with a mask, tissue or washcloth.  Go straight to the testing site. Don't make any stops on the way there or back.      3.Starting now: Stay home and away from others (self-isolate) until:   You've had no fever---and no medicine that reduces fever---for one full day (24 hours). And...   Your other symptoms have gotten better. For example, your cough or breathing has improved. And...   At least 10 days have passed since your symptoms started.       During this time, don't leave the house except for testing or medical care.   Stay in your own room, even for meals. Use your own bathroom if you can.   Stay away from others in your home. No hugging, kissing or shaking hands. No visitors.  Don't go to work, school or anywhere else.    Clean \"high touch\" surfaces often (doorknobs, counters, handles, etc.). Use a household cleaning spray or wipes. You'll find a full list of  on the EPA website: www.epa.gov/pesticide-registration/list-n-disinfectants-use-against-sars-cov-2.   Cover your mouth and nose with a mask, " tissue or washcloth to avoid spreading germs.  Wash your hands and face often. Use soap and water.  Caregivers in these groups are at risk for severe illness due to COVID-19:  o People 65 years and older  o People who live in a nursing home or long-term care facility  o People with chronic disease (lung, heart, cancer, diabetes, kidney, liver, immunologic)  o People who have a weakened immune system, including those who:   Are in cancer treatment  Take medicine that weakens the immune system, such as corticosteroids  Had a bone marrow or organ transplant  Have an immune deficiency  Have poorly controlled HIV or AIDS  Are obese (body mass index of 40 or higher)  Smoke regularly   o Caregivers should wear gloves while washing dishes, handling laundry and cleaning bedrooms and bathrooms.  o Use caution when washing and drying laundry: Don't shake dirty laundry, and use the warmest water setting that you can.  o For more tips, go to www.cdc.gov/coronavirus/2019-ncov/downloads/10Things.pdf.    4.Sign up for [employer]. We know it's scary to hear that you might have COVID-19. We want to track your symptoms to make sure you're okay over the next 2 weeks. Please look for an email from [employer]---this is a free, online program that we'll use to keep in touch. To sign up, follow the link in the email. Learn more at [email or web address]  How can I take care of myself?   Get lots of rest. Drink extra fluids (unless a doctor has told you not to).   Take Tylenol (acetaminophen) for fever or pain. If you have liver or kidney problems, ask your family doctor if it's okay to take Tylenol.   Adults can take either:    650 mg (two 325 mg pills) every 4 to 6 hours, or...   1,000 mg (two 500 mg pills) every 8 hours as needed.    Note: Don't take more than 3,000 mg in one day. Acetaminophen is found in many medicines (both prescribed and over-the-counter medicines). Read all labels to be sure you don't take too much.    For children, check the Tylenol bottle for the right dose. The dose is based on the child's age or weight.    If you have other health problems (like cancer, heart failure, an organ transplant or severe kidney disease): Call your specialty clinic if you don't feel better in the next 2 days.       Know when to call 911. Emergency warning signs include:    Trouble breathing or shortness of breath Pain or pressure in the chest that doesn't go away Feeling confused like you haven't felt before, or not being able to wake up Bluish-colored lips or face.  Where can I get more information?   [facility] -- About COVID-19: [email or web address]   CDC -- What to Do If You're Sick: www.cdc.gov/coronavirus/2019-ncov/about/steps-when-sick.html   [facility] [place] -- Ending Home Isolation: www.cdc.gov/coronavirus/2019-ncov/hcp/disposition-in-home-patients.html   [facility] [place] -- Caring for Someone: www.cdc.gov/coronavirus/2019-ncov/if-you-are-sick/care-for-someone.html   [facility] -- Interim Guidance for Hospital Discharge to Home: www.[facility].[place]/diseases/coronavirus/hcp/hospdischarge.pdf   [facility] clinical trials (COVID-19 research studies): clinicalaffairs.[facility]/n-clinical-trials    Below are the COVID-19 hotlines at the [facility] of Health ([facility]). Interpreters are available.    For health questions: Call 708-446-6847 or 1-251.741.1822 (7 a.m. to 7 p.m.) For questions about schools and childcare: Call 855-855-0164 or 1-563.915.3723 (7 a.m. to 7 p.m.)    Diagnosis: Contact with and (suspected) exposure to other viral communicable diseases  Diagnosis ICD: Z20.828

## 2021-01-30 ENCOUNTER — VIRTUAL VISIT (OUTPATIENT)
Dept: FAMILY MEDICINE | Facility: OTHER | Age: 41
End: 2021-01-30

## 2021-01-30 DIAGNOSIS — Z20.822 SUSPECTED COVID-19 VIRUS INFECTION: ICD-10-CM

## 2021-01-30 DIAGNOSIS — Z20.822 SUSPECTED COVID-19 VIRUS INFECTION: Primary | ICD-10-CM

## 2021-01-30 LAB
SARS-COV-2 RNA RESP QL NAA+PROBE: NORMAL
SPECIMEN SOURCE: NORMAL

## 2021-01-30 PROCEDURE — U0005 INFEC AGEN DETEC AMPLI PROBE: HCPCS | Performed by: PHYSICIAN ASSISTANT

## 2021-01-30 PROCEDURE — U0003 INFECTIOUS AGENT DETECTION BY NUCLEIC ACID (DNA OR RNA); SEVERE ACUTE RESPIRATORY SYNDROME CORONAVIRUS 2 (SARS-COV-2) (CORONAVIRUS DISEASE [COVID-19]), AMPLIFIED PROBE TECHNIQUE, MAKING USE OF HIGH THROUGHPUT TECHNOLOGIES AS DESCRIBED BY CMS-2020-01-R: HCPCS | Performed by: PHYSICIAN ASSISTANT

## 2021-01-31 LAB
LABORATORY COMMENT REPORT: NORMAL
SARS-COV-2 RNA RESP QL NAA+PROBE: NEGATIVE
SPECIMEN SOURCE: NORMAL

## 2021-01-31 NOTE — PROGRESS NOTES
"Date: 2021 18:13:21  Clinician: Larua Pineda  Clinician NPI: [id number]  Patient: Sharmaine José  Patient : 1980  Patient Address: [address] [place] [address]  Patient Phone: [phone]  Visit Protocol: URI  Patient Summary:  Sharmaine is a 40 year old ( : 1980 ) female who initiated a [facility] Visit for cold, sinus infection, or influenza. When asked the question \"Please sign me up to receive news, health information and promotions. \", Sharmaine responded \"Yes\".    Sharmaine states her symptoms started gradually 3-4 days ago.   Her symptoms consist of a headache, rhinitis, malaise, enlarged lymph nodes, nasal congestion, tooth pain, facial pain or pressure, and a sore throat. She is experiencing difficulty breathing due to nasal congestion but she is not short of breath.   Symptom details     Nasal secretions: The color of her mucus is green, blood-tinged, clear, white, and yellow.    Sore throat: Sharmaine reports having mild throat pain (1-3 on a 10 point pain scale), does not have exudate on her tonsils, and can swallow liquids. The lymph nodes in her neck are enlarged. A rash has not appeared on the skin since the sore throat started.     Facial pain or pressure: The facial pain or pressure feels worse when bending over or leaning forward.     Headache: She states the headache is moderate (4-6 on a 10 point pain scale).     Tooth pain: The tooth pain is not caused by a cavity, recent dental work, or other mouth problems.      Sharmaine denies having ear pain, chills, vomiting, myalgias, fever, cough, nausea, ageusia, diarrhea, wheezing, and anosmia. She also denies having recent facial or sinus surgery in the past 60 days, taking antibiotic medication in the past month, and double sickening (worsening symptoms after initial improvement).   Precipitating events  Within the past week, Sharmaine has not been exposed to someone with strep throat.   Pertinent COVID-19 (Coronavirus) information  " Sharmaine does not work or volunteer as healthcare worker or a . In the past 14 days, Sharmaine has not worked or volunteered at a healthcare facility or group living setting.   In the past 14 days, she also has not lived in a congregate living setting.   Sharmaine has not had a close contact with a laboratory-confirmed COVID-19 patient within 14 days of symptom onset.    Sharmaine has been tested for COVID-19.      Date(s) of her COVID-19 test as reported by the patient (free text): 01/30/2021       Result of COVID-19 test as reported by the patient (free text): Tested today.  No results yet       Type of test as reported by the patient (free text): Nasal        Sharmaine has not received a COVID-19 vaccine.   Pertinent medical history  Sharmaine has diabetes. She has been told by her provider that her diabetes is in control.   Sharmaine has asthma. She uses quick-relief inhaler less than two times per week. She refills her quick-relief inhaler less than two times per year. She wakes up at night with asthma symptoms less than two times per month.   Sharmaine had 3 sinus infections within the past year.   Sharmaine typically gets a yeast infection when she takes antibiotics. She has not used fluconazole (Diflucan) to treat previous yeast infections.   She has not been told by her provider to avoid NSAIDs.   She denies having immunosuppressive conditions (e.g., chemotherapy, HIV, organ transplant, long-term use of steroids or other immunosuppressive medications, splenectomy). She denies having congestive heart failure and severe COPD.   Sharmaine needs a return to work/school note.   Sharmaine smokes or uses smokeless tobacco.   She denies pregnancy and denies breastfeeding. She does not menstruate.   Additional information as reported by the patient (free text): [facility] visit from Thurs.  Friend and her son ended up having negative strep tests &amp; their covid tests neg.  I was tested for covid today.  No results yet.  But, I have a sinus  infect.  Puffy cheeks, eyes slightly swollen.  Facial pain with sinus headache, post nasal drip throat pain.  Can I get something for this before getting my covid results?  Even if the result is positive, I still need something for this sinus infection.  I can't neti pot/navage every 20 minutes!  Thank you in advance!   Weight: 174 lbs    MEDICATIONS: irbesartan oral, pravastatin oral, dextroamphetamine oral, metformin oral, duloxetine oral, cyclobenzaprine oral, Premarin oral, Lantus U-100 Insulin subcutaneous, esomeprazole magnesium oral, lidocaine-prilocaine-lidocaine HCl topical, EpiPen injection, hydrocortisone valerate topical, Imodium Multi-Symptom Relief oral, Ventolin HFA inhalation, ALLERGIES: Diflucan, tuberculin, purified protein derivative, Mycobacterium Tuberculosis (Tuberculin PPD), Ceclor, Nuvigil, hornet venom, tramadol  Clinician Response:  Dear Sharmaine,  Based on the information provided, you have acute bacterial sinusitis, also known as a sinus infection. Sinus infections are caused by bacteria or a virus and symptoms are almost always identical. The difference between the 2 types of infections is timing.  Sinus infections start as viral infections and symptoms improve on their own in about 7 days. If symptoms have not improved after 7 days or have even worsened, a bacterial infection may have developed.  Medication information  I am prescribing:       Fluticasone 50 mcg/actuation nasal spray. Inhale 2 sprays in each nostril 1 time per day; after 1 week, may adjust to 1 - 2 sprays in each nostril 1 time per day. This medication takes several days to start working, so keep taking it even if it doesn't help right away. There are no refills with this prescription.      Azithromycin (Zithromax Z-Cornel) 250 mg oral tablet. Take 2 tablets by mouth on day 1, then 1 tablet by mouth on days 2-5. There are no refills with this prescription.     Yeast infections can be a common side effect of antibiotics. The  most common symptom of a yeast infection is itchiness in and around the vagina. Other signs and symptoms include burning, redness, or a thick, white vaginal discharge that looks like cottage cheese and does not have a bad smell.  If you become pregnant during this course of treatment, stop taking the medication and contact your primary care provider.  Self care  Steps you can take to be as comfortable as possible:     Rest.    Drink plenty of fluids.    Take a warm shower to loosen congestion    Use a cool-mist humidifier.    Use throat lozenges.    Suck on frozen items such as popsicles.    Drink hot tea with lemon and honey.    Gargle with warm salt water (1/4 teaspoon of salt per 8 ounce glass of water).     Also, as your provider, I need you to know that becoming tobacco-free is the most important thing you can do to protect your current and future health.  When to seek care  Please be seen in a clinic or urgent care if any of the following occur:     New symptoms develop, or symptoms become worse    Symptoms do not start to improve after 3 days of treatment     Call ahead before going to the clinic or urgent care.  It is possible to have an allergic reaction to an antibiotic even if you have not had one in the past. If you notice a new rash, significant swelling, or difficulty breathing, stop taking this medication immediately and go to a clinic or urgent care.  Call 911 or go to the emergency room if you feel that your throat is closing off, you suddenly develop a rash, you are unable to swallow fluids, you are drooling, or you are having difficulty breathing.  For the latest updates on COVID-19 (Coronavirus), please visit the Centers for Disease Control and Prevention (CDC).   Diagnosis: Acute bacterial sinusitis  Diagnosis ICD: J01.90  Prescription: azithromycin (Zithromax Z-Cornel) 250 mg oral tablet 6 tablet, 5 days supply. Take 2 tablets by mouth on day 1, then 1 tablet by mouth on days 2-5. Refills: 0,  Refill as needed: no, Allow substitutions: yes  Prescription: fluticasone 50 mcg/actuation nasal spray,suspension 1 120 spray aerosol with adapter (grams), 30 days supply. Inhale 2 sprays in each nostril 1 time per day; after 1 week, may adjust to 1 - 2 sprays in each nostril 1 time per day.. Refills: 0, Refill as needed: no, Allow substitutions: yes  Pharmacy: [id number] - [phone] - [address] [place] [address]

## 2021-02-11 DIAGNOSIS — M62.830 BACK MUSCLE SPASM: ICD-10-CM

## 2021-02-11 RX ORDER — CYCLOBENZAPRINE HCL 10 MG
TABLET ORAL
Qty: 90 TABLET | Refills: 0 | Status: SHIPPED | OUTPATIENT
Start: 2021-02-11 | End: 2021-03-16

## 2021-02-11 NOTE — TELEPHONE ENCOUNTER
cyclobenzaprine (FLEXERIL) 10 MG tablet      Last Written Prescription Date:  10/28/2020  Last Fill Quantity: 90,   # refills: 0  Last Office Visit: 5/29/2020  (VIRTUAL VISIT)  Future Office visit:    Next 5 appointments (look out 90 days)    Mar 16, 2021  1:20 PM  Callie Pitts with Temi Marsh MD  [facility] ([facility] ) [address] [place] [id number] [phone]           Routing refill request to provider for review/approval because:  Drug not on the [facility], [facility] or [facility] refill protocol or controlled substance.     checked 2/11/2021:        Allie Null RN

## 2021-03-14 ENCOUNTER — HEALTH MAINTENANCE LETTER (OUTPATIENT)
Age: 41
End: 2021-03-14

## 2021-03-15 DIAGNOSIS — Z79.4 TYPE 2 DIABETES MELLITUS WITHOUT COMPLICATION, WITH LONG-TERM CURRENT USE OF INSULIN (H): ICD-10-CM

## 2021-03-15 DIAGNOSIS — K76.0 FATTY INFILTRATION OF LIVER: ICD-10-CM

## 2021-03-15 DIAGNOSIS — E11.9 TYPE 2 DIABETES MELLITUS WITHOUT COMPLICATION, WITH LONG-TERM CURRENT USE OF INSULIN (H): ICD-10-CM

## 2021-03-15 DIAGNOSIS — E78.5 HYPERLIPIDEMIA LDL GOAL <100: Primary | ICD-10-CM

## 2021-03-16 ENCOUNTER — OFFICE VISIT (OUTPATIENT)
Dept: FAMILY MEDICINE | Facility: CLINIC | Age: 41
End: 2021-03-16
Payer: COMMERCIAL

## 2021-03-16 VITALS
DIASTOLIC BLOOD PRESSURE: 82 MMHG | WEIGHT: 178 LBS | RESPIRATION RATE: 16 BRPM | HEIGHT: 65 IN | OXYGEN SATURATION: 97 % | HEART RATE: 98 BPM | TEMPERATURE: 99.4 F | BODY MASS INDEX: 29.66 KG/M2 | SYSTOLIC BLOOD PRESSURE: 124 MMHG

## 2021-03-16 DIAGNOSIS — Z71.85 VACCINE COUNSELING: ICD-10-CM

## 2021-03-16 DIAGNOSIS — Z79.4 TYPE 2 DIABETES MELLITUS WITHOUT COMPLICATION, WITH LONG-TERM CURRENT USE OF INSULIN (H): Primary | ICD-10-CM

## 2021-03-16 DIAGNOSIS — F41.1 GENERALIZED ANXIETY DISORDER: ICD-10-CM

## 2021-03-16 DIAGNOSIS — I10 HYPERTENSION GOAL BP (BLOOD PRESSURE) < 140/90: ICD-10-CM

## 2021-03-16 DIAGNOSIS — E78.5 HYPERLIPIDEMIA LDL GOAL <100: ICD-10-CM

## 2021-03-16 DIAGNOSIS — J45.20 MILD INTERMITTENT ASTHMA WITHOUT COMPLICATION: ICD-10-CM

## 2021-03-16 DIAGNOSIS — E78.1 HYPERTRIGLYCERIDEMIA: ICD-10-CM

## 2021-03-16 DIAGNOSIS — Z91.030 BEE STING ALLERGY: ICD-10-CM

## 2021-03-16 DIAGNOSIS — M62.830 BACK MUSCLE SPASM: ICD-10-CM

## 2021-03-16 DIAGNOSIS — L93.0 DISCOID LUPUS: ICD-10-CM

## 2021-03-16 DIAGNOSIS — Z78.0 MENOPAUSE: ICD-10-CM

## 2021-03-16 DIAGNOSIS — Z22.322 MRSA NASAL COLONIZATION: ICD-10-CM

## 2021-03-16 DIAGNOSIS — E11.9 TYPE 2 DIABETES MELLITUS WITHOUT COMPLICATION, WITH LONG-TERM CURRENT USE OF INSULIN (H): Primary | ICD-10-CM

## 2021-03-16 DIAGNOSIS — K76.0 FATTY INFILTRATION OF LIVER: ICD-10-CM

## 2021-03-16 LAB — HBA1C MFR BLD: 6.1 % (ref 0–5.6)

## 2021-03-16 PROCEDURE — 80061 LIPID PANEL: CPT | Performed by: FAMILY MEDICINE

## 2021-03-16 PROCEDURE — 99000 SPECIMEN HANDLING OFFICE-LAB: CPT | Performed by: FAMILY MEDICINE

## 2021-03-16 PROCEDURE — 86382 NEUTRALIZATION TEST VIRAL: CPT | Mod: 90 | Performed by: FAMILY MEDICINE

## 2021-03-16 PROCEDURE — 82043 UR ALBUMIN QUANTITATIVE: CPT | Performed by: FAMILY MEDICINE

## 2021-03-16 PROCEDURE — 80053 COMPREHEN METABOLIC PANEL: CPT | Performed by: FAMILY MEDICINE

## 2021-03-16 PROCEDURE — 99214 OFFICE O/P EST MOD 30 MIN: CPT | Performed by: FAMILY MEDICINE

## 2021-03-16 PROCEDURE — 83036 HEMOGLOBIN GLYCOSYLATED A1C: CPT | Performed by: FAMILY MEDICINE

## 2021-03-16 PROCEDURE — 83721 ASSAY OF BLOOD LIPOPROTEIN: CPT | Performed by: FAMILY MEDICINE

## 2021-03-16 PROCEDURE — 36415 COLL VENOUS BLD VENIPUNCTURE: CPT | Performed by: FAMILY MEDICINE

## 2021-03-16 PROCEDURE — 96127 BRIEF EMOTIONAL/BEHAV ASSMT: CPT | Performed by: FAMILY MEDICINE

## 2021-03-16 RX ORDER — ALBUTEROL SULFATE 90 UG/1
2 AEROSOL, METERED RESPIRATORY (INHALATION) EVERY 6 HOURS
Qty: 1 INHALER | Refills: 0 | Status: SHIPPED | OUTPATIENT
Start: 2021-03-16

## 2021-03-16 RX ORDER — HYDROCORTISONE VALERATE CREAM 2 MG/G
CREAM TOPICAL 2 TIMES DAILY
Qty: 30 G | Refills: 0 | Status: SHIPPED | OUTPATIENT
Start: 2021-03-16

## 2021-03-16 RX ORDER — LIDOCAINE/PRILOCAINE 2.5 %-2.5%
CREAM (GRAM) TOPICAL PRN
Qty: 30 G | Refills: 0 | Status: SHIPPED | OUTPATIENT
Start: 2021-03-16

## 2021-03-16 RX ORDER — METFORMIN HCL 500 MG
TABLET, EXTENDED RELEASE 24 HR ORAL
Qty: 180 TABLET | Refills: 1 | Status: SHIPPED | OUTPATIENT
Start: 2021-03-16 | End: 2021-10-26

## 2021-03-16 RX ORDER — MUPIROCIN 20 MG/G
OINTMENT TOPICAL 3 TIMES DAILY
Qty: 22 G | Refills: 0 | Status: CANCELLED | OUTPATIENT
Start: 2021-03-16

## 2021-03-16 RX ORDER — PRAVASTATIN SODIUM 20 MG
20 TABLET ORAL DAILY
Qty: 90 TABLET | Refills: 1 | Status: SHIPPED | OUTPATIENT
Start: 2021-03-16 | End: 2021-03-19 | Stop reason: DRUGHIGH

## 2021-03-16 RX ORDER — DULOXETIN HYDROCHLORIDE 20 MG/1
20 CAPSULE, DELAYED RELEASE ORAL DAILY
Qty: 90 CAPSULE | Refills: 1 | Status: SHIPPED | OUTPATIENT
Start: 2021-03-16 | End: 2021-10-26

## 2021-03-16 RX ORDER — DEXTROAMPHETAMINE SULFATE 10 MG/1
10 TABLET ORAL DAILY
Refills: 0 | Status: CANCELLED | OUTPATIENT
Start: 2021-03-16

## 2021-03-16 RX ORDER — CYCLOBENZAPRINE HCL 10 MG
TABLET ORAL
Qty: 90 TABLET | Refills: 0 | Status: SHIPPED | OUTPATIENT
Start: 2021-03-16 | End: 2021-05-24

## 2021-03-16 RX ORDER — EPINEPHRINE 0.3 MG/.3ML
0.3 INJECTION SUBCUTANEOUS
Qty: 2 ML | Refills: 0 | Status: SHIPPED | OUTPATIENT
Start: 2021-03-16 | End: 2021-12-16

## 2021-03-16 ASSESSMENT — ANXIETY QUESTIONNAIRES
7. FEELING AFRAID AS IF SOMETHING AWFUL MIGHT HAPPEN: NOT AT ALL
3. WORRYING TOO MUCH ABOUT DIFFERENT THINGS: NOT AT ALL
2. NOT BEING ABLE TO STOP OR CONTROL WORRYING: NOT AT ALL
1. FEELING NERVOUS, ANXIOUS, OR ON EDGE: NOT AT ALL
GAD7 TOTAL SCORE: 2
5. BEING SO RESTLESS THAT IT IS HARD TO SIT STILL: NOT AT ALL
IF YOU CHECKED OFF ANY PROBLEMS ON THIS QUESTIONNAIRE, HOW DIFFICULT HAVE THESE PROBLEMS MADE IT FOR YOU TO DO YOUR WORK, TAKE CARE OF THINGS AT HOME, OR GET ALONG WITH OTHER PEOPLE: NOT DIFFICULT AT ALL
6. BECOMING EASILY ANNOYED OR IRRITABLE: SEVERAL DAYS

## 2021-03-16 ASSESSMENT — PATIENT HEALTH QUESTIONNAIRE - PHQ9
SUM OF ALL RESPONSES TO PHQ QUESTIONS 1-9: 2
5. POOR APPETITE OR OVEREATING: SEVERAL DAYS

## 2021-03-16 ASSESSMENT — MIFFLIN-ST. JEOR: SCORE: 1488.28

## 2021-03-16 NOTE — PROGRESS NOTES
Assessment & Plan     Type 2 diabetes mellitus without complication, with long-term current use of insulin (H)  Satisfactory HgbA1C.  Discussed importance of avoiding lows. This is one of the reasons we like to see diabetic goals more in the 6's as opposed to normal.  Continue to follow.   - Hemoglobin A1c  - Albumin Random Urine Quantitative with Creat Ratio  - Comprehensive metabolic panel  - insulin glargine (LANTUS SOLOSTAR) 100 UNIT/ML pen; ADMINISTER 30 UNITS UNDER THE SKIN AT BEDTIME  - metFORMIN (GLUCOPHAGE-XR) 500 MG 24 hr tablet; TAKE 2 TABLETS(1000 MG) BY MOUTH DAILY WITH DINNER  - LDL cholesterol direct    Hyperlipidemia LDL goal <100  She is fasting.   - Comprehensive metabolic panel  - Lipid panel reflex to direct LDL Fasting    Hypertriglyceridemia  Suspecting genetic component to this. Continuing to follow.     Hypertension goal BP (blood pressure) < 140/90  Meeting goal.    Fatty infiltration of liver  She is successfully losing weight which is helpful.  Also working on controlling metabolic factors of DM and lipids.   Continue to monitor.  - Comprehensive metabolic panel    Mild intermittent asthma without complication  Doing well. Refilling.   - albuterol (PROVENTIL HFA) 108 (90 Base) MCG/ACT inhaler; Inhale 2 puffs into the lungs every 6 hours    Back muscle spasm  Requesting refill.   - cyclobenzaprine (FLEXERIL) 10 MG tablet; TAKE 1 TABLET(10 MG) BY MOUTH EVERY NIGHT AS NEEDED FOR MUSCLE SPASMS    Generalized anxiety disorder  Reviewed scores. She is doing well. Continue current medication.   - DULoxetine (CYMBALTA) 20 MG capsule; Take 1 capsule (20 mg) by mouth daily    Menopause - surgical  Refilling.   - estrogen conj (PREMARIN) 1.25 MG tablet; TAKE 1 TABLET(1.25 MG) BY MOUTH DAILY    Bee sting allergy  Refilling.   - EPINEPHrine (ANY BX GENERIC EQUIV) 0.3 MG/0.3ML injection 2-pack; Inject 0.3 mLs (0.3 mg) into the muscle once as needed    Discoid lupus  Refilling.   - hydrocortisone  "(WESTCORT) 0.2 % external cream; Apply topically 2 times daily Apply topically 2 times daily  - lidocaine-prilocaine (EMLA) 2.5-2.5 % external cream; Apply topically as needed for moderate pain Keep on file    MRSA nasal colonization  Refilling.     Vaccine counseling  As per her work. She is at a vet clinic.  - Rabies Antibody Screen Humans             Tobacco Cessation:   reports that she has been smoking cigarettes. She has been smoking about 1.00 pack per day. She has never used smokeless tobacco.      BMI:   Estimated body mass index is 29.62 kg/m  as calculated from the following:    Height as of this encounter: 1.651 m (5' 5\").    Weight as of this encounter: 80.7 kg (178 lb).       Return in about 6 months (around 9/16/2021) for Diabetes Recheck, Face to Face visit preferred, Physical Exam with pap.    Temi Marsh MD, MD  [facility] KYLEE Schmid is a 40 year old who presents for the following health issues     HPI     Diabetes Follow-up    How often are you checking your blood sugar? Four or more times daily  Blood sugar testing frequency justification:  Uncontrolled diabetes  What time of day are you checking your blood sugars (select all that apply)?  Before and after meals  Have you had any blood sugars above 200?  Yes   Have you had any blood sugars below 70?  Yes     What symptoms do you notice when your blood sugar is low?  Shaky, Weak and Lethargy    What concerns do you have today about your diabetes? Blood sugar is often over 200     Do you have any of these symptoms? (Select all that apply)  No numbness or tingling in feet.  No redness, sores or blisters on feet.  No complaints of excessive thirst.  No reports of blurry vision.  No significant changes to weight.    Have you had a diabetic eye exam in the last 12 months? No                Hyperlipidemia Follow-Up      Are you regularly taking any medication or supplement to lower your cholesterol?   Yes- .    Are " "you having muscle aches or other side effects that you think could be caused by your cholesterol lowering medication?  No    Hypertension Follow-up      Do you check your blood pressure regularly outside of the clinic? No     Are you following a low salt diet? Yes    Are your blood pressures ever more than 140 on the top number (systolic) OR more   than 90 on the bottom number (diastolic), for example 140/90? No    BP Readings from Last 2 Encounters:   03/16/21 124/82   11/20/20 128/74     Hemoglobin A1C (%)   Date Value   03/16/2021 6.1 (H)   09/28/2020 5.9 (H)     LDL Cholesterol Calculated (mg/dL)   Date Value   09/28/2020     Cannot estimate LDL when triglyceride exceeds 400 mg/dL   01/03/2019 176 (H)     LDL Cholesterol Direct (mg/dL)   Date Value   09/28/2020 140 (H)         How many servings of fruits and vegetables do you eat daily?  0-1    On average, how many sweetened beverages do you drink each day (Examples: soda, juice, sweet tea, etc.  Do NOT count diet or artificially sweetened beverages)?   0    How many days per week do you exercise enough to make your heart beat faster? 3 or less    How many minutes a day do you exercise enough to make your heart beat faster? 20 - 29    How many days per week do you miss taking your medication? 0    Per patient request when sending to med refills pharmacy \"please, do not fill right away.\"    See under ROS     Patient Active Problem List   Diagnosis     Allergic state     Mild intermittent asthma     Generalized hyperhidrosis     Tobacco use disorder     Interstitial cystitis     Surgical menopause     Narcolepsy     Cervical dysplasia     Chronic pain     Migraine     Lumbago     Hyperlipidemia LDL goal <100     H/O: hysterectomy     Health Care Home     Discoid lupus     Generalized anxiety disorder     Fatty infiltration of liver     Primary narcolepsy without cataplexy     Hypertension goal BP (blood pressure) < 140/90     elevated bmi (H)     " Hypertriglyceridemia     Type 2 diabetes mellitus without complication, with long-term current use of insulin (H)     Gastroesophageal reflux disease, esophagitis presence not specified       Current Outpatient Medications   Medication Sig Dispense Refill     albuterol (PROVENTIL HFA) 108 (90 Base) MCG/ACT inhaler Inhale 2 puffs into the lungs every 6 hours 1 Inhaler 0     cyclobenzaprine (FLEXERIL) 10 MG tablet TAKE 1 TABLET(10 MG) BY MOUTH EVERY NIGHT AS NEEDED FOR MUSCLE SPASMS 90 tablet 0     dextroamphetamine (DEXTROSTAT) 10 MG tablet Take 10 mg by mouth daily Takes 1-3tablets daily  0     DULoxetine (CYMBALTA) 20 MG capsule TAKE 1 CAPSULE(20 MG) BY MOUTH DAILY 90 capsule 0     EPINEPHrine (ANY BX GENERIC EQUIV) 0.3 MG/0.3ML injection 2-pack Inject 0.3 mLs (0.3 mg) into the muscle once as needed 2 mL 0     fluticasone (FLONASE) 50 MCG/ACT nasal spray Spray 1-2 sprays into both nostrils daily as needed for rhinitis or allergies       hydrocortisone (WESTCORT) 0.2 % external cream Apply topically 2 times daily 30 g 0     irbesartan (AVAPRO) 300 MG tablet TAKE 1 TABLET(300 MG) BY MOUTH AT BEDTIME 90 tablet 1     LANTUS SOLOSTAR 100 UNIT/ML soln ADMINISTER 44 UNITS UNDER THE SKIN AT BEDTIME 30 mL 0     lidocaine-prilocaine (EMLA) 2.5-2.5 % external cream Apply topically as needed for moderate pain 30 g 0     metFORMIN (GLUCOPHAGE-XR) 500 MG 24 hr tablet TAKE 2 TABLETS(1000 MG) BY MOUTH DAILY WITH DINNER 180 tablet 0     mupirocin (BACTROBAN) 2 % external ointment Apply topically 3 times daily 22 g 0     pravastatin (PRAVACHOL) 20 MG tablet TAKE 1 TABLET(20 MG) BY MOUTH DAILY 90 tablet 0     PREMARIN 1.25 MG tablet TAKE 1 TABLET(1.25 MG) BY MOUTH DAILY 90 tablet 0     alcohol swab prep pads Use to swab area of injection/robb as directed. 100 each 3     blood glucose (NO BRAND SPECIFIED) test strip Use to test blood sugar 4 times daily or as directed. To accompany: Blood Glucose Monitor Brands: per insurance. 400  "strip 6     blood glucose calibration (NO BRAND SPECIFIED) solution To accompany: Blood Glucose Monitor Brands: per insurance. 1 Bottle 3     blood glucose monitoring (NO BRAND SPECIFIED) meter device kit Use to test blood sugar 1 times daily or as directed. Preferred blood glucose meter OR supplies to accompany: Blood Glucose Monitor Brands: per insurance. 1 kit 0     Continuous Blood Gluc  (FREESTYLE BRYANT 14 DAY READER) MONIQUE Use to read blood sugars as per 's instructions. 1 each 0     Continuous Blood Gluc Sensor (FREESTYLE BRYANT 14 DAY SENSOR) MISC Change every 14 days. 2 each 11     famotidine (PEPCID) 20 MG tablet Take 1 tablet (20 mg) by mouth 2 times daily 180 tablet 1     insulin pen needle (B-D U/F) 31G X 8 MM miscellaneous USE WITH LANTUS 100 each 1     thin (NO BRAND SPECIFIED) lancets Use with lanceting device. To accompany: Blood Glucose Monitor Brands: per insurance. 400 each 6         Review of Systems   CONSTITUTIONAL:. Lost intentionally. Had gained earlier.   ENT/MOUTH: NEGATIVE for ear, mouth and throat problems x congestion at times.  RESP:NEGATIVE for significant cough or SOB  CV: NEGATIVE for chest pain, palpitations or peripheral edema  PSYCHIATRIC: NEGATIVE for changes in mood or affect    Did get first covid shot.    Was at 44 units Lantus. Now taking 28-30. Did this as she was getting low glucoses at night.   No lows currently. She does note her glucose will go up at times after eating.         Objective    /82 (BP Location: Left arm, Patient Position: Sitting, Cuff Size: Adult Regular)   Pulse 98   Temp 99.4  F (37.4  C) (Oral)   Resp 16   Ht 1.651 m (5' 5\")   Wt 80.7 kg (178 lb)   LMP 10/04/2006   SpO2 97%   BMI 29.62 kg/m    Body mass index is 29.62 kg/m .  Physical Exam   GENERAL APPEARANCE: healthy, alert and no distress  RESP: lungs clear to auscultation - no rales, rhonchi or wheezes  CV: regular rates and rhythm  MS: no edema.   PSYCH: mentation " appears normal and affect normal/bright    Lab Results   Component Value Date    A1C 6.1 03/16/2021    A1C 5.9 09/28/2020    A1C 5.4 09/05/2019    A1C 5.5 11/07/2018    A1C 10.3 06/01/2018       Recent Labs   Lab Test 09/28/20  1043 01/03/19  0954 02/05/14  1358 02/05/14  1358   CHOL 246* 286*   < > 300*   HDL 36* 49*   < > 68   LDL Cannot estimate LDL when triglyceride exceeds 400 mg/dL  140* 176*   < > 153*   TRIG 501* 305*   < > 397*   CHOLHDLRATIO  --   --   --  4.4    < > = values in this interval not displayed.     LDL Cholesterol Calculated   Date Value Ref Range Status   09/28/2020  <100 mg/dL Final    Cannot estimate LDL when triglyceride exceeds 400 mg/dL     LDL Cholesterol Direct   Date Value Ref Range Status   09/28/2020 140 (H) <100 mg/dL Final     Comment:     Above desirable:  100-129 mg/dl  Borderline High:  130-159 mg/dL  High:             160-189 mg/dL  Very high:       >189 mg/dl       PHQ-9 SCORE 1/3/2019 9/5/2019 3/16/2021   PHQ-9 Total Score - - -   PHQ-9 Total Score MyChart - - -   PHQ-9 Total Score 0 0 2       KIRBY-7 SCORE 1/3/2019 9/5/2019 3/16/2021   Total Score - - -   Total Score - - -   Total Score 1 2 2       ACT Total Scores 3/16/2021   ACT TOTAL SCORE -   ASTHMA ER VISITS -   ASTHMA HOSPITALIZATIONS -   ACT TOTAL SCORE (Goal Greater than or Equal to 20) 25   In the past 12 months, how many times did you visit the emergency room for your asthma without being admitted to the hospital? 0   In the past 12 months, how many times were you hospitalized overnight because of your asthma? 0

## 2021-03-17 LAB
ALBUMIN SERPL-MCNC: 3.9 G/DL (ref 3.4–5)
ALP SERPL-CCNC: 72 U/L (ref 40–150)
ALT SERPL W P-5'-P-CCNC: 29 U/L (ref 0–50)
ANION GAP SERPL CALCULATED.3IONS-SCNC: 5 MMOL/L (ref 3–14)
AST SERPL W P-5'-P-CCNC: 20 U/L (ref 0–45)
BILIRUB SERPL-MCNC: 0.4 MG/DL (ref 0.2–1.3)
BUN SERPL-MCNC: 15 MG/DL (ref 7–30)
CALCIUM SERPL-MCNC: 9.2 MG/DL (ref 8.5–10.1)
CHLORIDE SERPL-SCNC: 101 MMOL/L (ref 94–109)
CHOLEST SERPL-MCNC: 229 MG/DL
CO2 SERPL-SCNC: 27 MMOL/L (ref 20–32)
CREAT SERPL-MCNC: 0.55 MG/DL (ref 0.52–1.04)
CREAT UR-MCNC: 87 MG/DL
GFR SERPL CREATININE-BSD FRML MDRD: >90 ML/MIN/{1.73_M2}
GLUCOSE SERPL-MCNC: 112 MG/DL (ref 70–99)
HDLC SERPL-MCNC: 41 MG/DL
LDLC SERPL CALC-MCNC: ABNORMAL MG/DL
LDLC SERPL DIRECT ASSAY-MCNC: 130 MG/DL
MICROALBUMIN UR-MCNC: 9 MG/L
MICROALBUMIN/CREAT UR: 9.9 MG/G CR (ref 0–25)
NONHDLC SERPL-MCNC: 188 MG/DL
POTASSIUM SERPL-SCNC: 3.9 MMOL/L (ref 3.4–5.3)
PROT SERPL-MCNC: 7.3 G/DL (ref 6.8–8.8)
SODIUM SERPL-SCNC: 133 MMOL/L (ref 133–144)
TRIGL SERPL-MCNC: 423 MG/DL

## 2021-03-17 ASSESSMENT — ANXIETY QUESTIONNAIRES: GAD7 TOTAL SCORE: 2

## 2021-03-17 ASSESSMENT — ASTHMA QUESTIONNAIRES: ACT_TOTALSCORE: 25

## 2021-03-30 ENCOUNTER — TRANSFERRED RECORDS (OUTPATIENT)
Dept: HEALTH INFORMATION MANAGEMENT | Facility: CLINIC | Age: 41
End: 2021-03-30

## 2021-03-31 DIAGNOSIS — Z79.4 TYPE 2 DIABETES MELLITUS WITHOUT COMPLICATION, WITH LONG-TERM CURRENT USE OF INSULIN (H): ICD-10-CM

## 2021-03-31 DIAGNOSIS — E11.9 TYPE 2 DIABETES MELLITUS WITHOUT COMPLICATION, WITH LONG-TERM CURRENT USE OF INSULIN (H): ICD-10-CM

## 2021-04-02 RX ORDER — FLASH GLUCOSE SENSOR
KIT MISCELLANEOUS
Qty: 2 EACH | Refills: 5 | Status: SHIPPED | OUTPATIENT
Start: 2021-04-02 | End: 2021-08-31

## 2021-04-02 NOTE — TELEPHONE ENCOUNTER
Routing refill request to provider for review/approval because:  Drug not on the [facility] refill protocol     Sujata Harper RN   [facility] -- Triage Nurse

## 2021-04-08 LAB — RABV NAB SPEC NT-ACNC: NORMAL

## 2021-04-15 ENCOUNTER — MYC MEDICAL ADVICE (OUTPATIENT)
Dept: FAMILY MEDICINE | Facility: CLINIC | Age: 41
End: 2021-04-15

## 2021-04-15 DIAGNOSIS — Z23 NEED FOR PROPHYLACTIC VACCINATION AGAINST RABIES: Primary | ICD-10-CM

## 2021-04-16 NOTE — TELEPHONE ENCOUNTER
LM to ask if patient has received 2nd dose of pfizer vaccine, need to make sure it's been at least 14 days since last vaccine.  -Marta Oneil

## 2021-04-16 NOTE — TELEPHONE ENCOUNTER
I just ordered a rabies booster...   Can you please see if this can be done at [facility] and what they need to do to get this in and notify her.    Thanks!!!    From her message:  If at all possible, can [facility] in [place] get the Pre-exposure Rabies booster in and let me know when they get it?  (I don't think they stock it, as the pre-exposure series had to be ordered for me originally.)  They weren't sure about it when I needed it for school, and had to call the CDC or somewhere to ask about it, timing, etc.

## 2021-04-24 DIAGNOSIS — Z78.0 MENOPAUSE: ICD-10-CM

## 2021-05-06 ENCOUNTER — MYC MEDICAL ADVICE (OUTPATIENT)
Dept: FAMILY MEDICINE | Facility: CLINIC | Age: 41
End: 2021-05-06

## 2021-05-06 ENCOUNTER — E-VISIT (OUTPATIENT)
Dept: FAMILY MEDICINE | Facility: CLINIC | Age: 41
End: 2021-05-06
Payer: COMMERCIAL

## 2021-05-06 DIAGNOSIS — J01.90 ACUTE SINUSITIS WITH SYMPTOMS > 10 DAYS: Primary | ICD-10-CM

## 2021-05-06 PROCEDURE — 99421 OL DIG E/M SVC 5-10 MIN: CPT | Performed by: FAMILY MEDICINE

## 2021-05-06 RX ORDER — AZITHROMYCIN 250 MG/1
TABLET, FILM COATED ORAL
Qty: 6 TABLET | Refills: 0 | Status: SHIPPED | OUTPATIENT
Start: 2021-05-06 | End: 2021-07-02

## 2021-05-06 RX ORDER — FLUTICASONE PROPIONATE 50 MCG
1 SPRAY, SUSPENSION (ML) NASAL DAILY
Qty: 16 G | Refills: 1 | Status: SHIPPED | OUTPATIENT
Start: 2021-05-06 | End: 2021-07-21

## 2021-05-06 NOTE — PATIENT INSTRUCTIONS
Dear Sharmaine José    After reviewing your responses, I've been able to diagnose you with?a sinus infection caused by bacteria.?     Based on your responses and diagnosis, I have prescribed a z pack to treat your symptoms. I have sent this to your pharmacy.?     It is also important to stay well hydrated, get lots of rest and take over-the-counter decongestants,?tylenol?or ibuprofen if you?are able to?take those medications per your primary care provider to help relieve discomfort.?     It is important that you take?all of?your prescribed medication even if your symptoms are improving after a few doses.? Taking?all of?your medicine helps prevent the symptoms from returning.?     If your symptoms worsen, you develop severe headache, vomiting, high fever (>102), or are not improving in 7 days, please contact your primary care provider for an appointment or visit any of our convenient Walk-in Care or Urgent Care Centers to be seen which can be found on our website?here.?     Thanks again for choosing?us?as your health care partner,?   ?  Temi Marsh MD, MD?

## 2021-05-23 DIAGNOSIS — M62.830 BACK MUSCLE SPASM: ICD-10-CM

## 2021-05-24 RX ORDER — CYCLOBENZAPRINE HCL 10 MG
TABLET ORAL
Qty: 90 TABLET | Refills: 0 | Status: SHIPPED | OUTPATIENT
Start: 2021-05-24

## 2021-05-24 NOTE — TELEPHONE ENCOUNTER
cyclobenzaprine (FLEXERIL) 10 MG tablet      Last Written Prescription Date:  3/16/2021  Last Fill Quantity: 90,   # refills: 0  Last Office Visit: 3/16/2021  Future Office visit:    Next 5 appointments (look out 90 days)    Jul 02, 2021  8:20 AM  Office Visit with Temi Marsh MD  [facility] ([facility] ) [address] [place] [id number] [phone]           Routing refill request to provider for review/approval because:  Drug not on the [facility], [facility] or [facility] refill protocol or controlled substance.    Allie Null RN

## 2021-06-18 DIAGNOSIS — I10 HYPERTENSION GOAL BP (BLOOD PRESSURE) < 140/90: ICD-10-CM

## 2021-06-21 RX ORDER — IRBESARTAN 300 MG/1
TABLET ORAL
Qty: 90 TABLET | Refills: 1 | Status: SHIPPED | OUTPATIENT
Start: 2021-06-21 | End: 2022-01-13

## 2021-07-02 ENCOUNTER — OFFICE VISIT (OUTPATIENT)
Dept: FAMILY MEDICINE | Facility: CLINIC | Age: 41
End: 2021-07-02
Payer: COMMERCIAL

## 2021-07-02 VITALS
SYSTOLIC BLOOD PRESSURE: 120 MMHG | RESPIRATION RATE: 18 BRPM | WEIGHT: 167 LBS | BODY MASS INDEX: 27.79 KG/M2 | DIASTOLIC BLOOD PRESSURE: 70 MMHG | HEART RATE: 115 BPM | OXYGEN SATURATION: 100 % | TEMPERATURE: 98.7 F

## 2021-07-02 DIAGNOSIS — Z87.410 HISTORY OF CERVICAL DYSPLASIA: ICD-10-CM

## 2021-07-02 DIAGNOSIS — E78.5 HYPERLIPIDEMIA LDL GOAL <100: ICD-10-CM

## 2021-07-02 DIAGNOSIS — Z12.4 SCREENING FOR MALIGNANT NEOPLASM OF CERVIX: ICD-10-CM

## 2021-07-02 DIAGNOSIS — E11.9 TYPE 2 DIABETES MELLITUS WITHOUT COMPLICATION, WITH LONG-TERM CURRENT USE OF INSULIN (H): Primary | ICD-10-CM

## 2021-07-02 DIAGNOSIS — Z79.4 TYPE 2 DIABETES MELLITUS WITHOUT COMPLICATION, WITH LONG-TERM CURRENT USE OF INSULIN (H): Primary | ICD-10-CM

## 2021-07-02 DIAGNOSIS — L98.9 SKIN LESION: ICD-10-CM

## 2021-07-02 PROCEDURE — 87624 HPV HI-RISK TYP POOLED RSLT: CPT | Performed by: FAMILY MEDICINE

## 2021-07-02 PROCEDURE — G0145 SCR C/V CYTO,THINLAYER,RESCR: HCPCS | Performed by: FAMILY MEDICINE

## 2021-07-02 PROCEDURE — 99214 OFFICE O/P EST MOD 30 MIN: CPT | Performed by: FAMILY MEDICINE

## 2021-07-02 PROCEDURE — 99207 PR FOOT EXAM NO CHARGE: CPT | Mod: 25 | Performed by: FAMILY MEDICINE

## 2021-07-02 ASSESSMENT — PAIN SCALES - GENERAL: PAINLEVEL: NO PAIN (0)

## 2021-07-02 NOTE — PROGRESS NOTES
"    {PROVIDER CHARTING PREFERENCE:088179}    Marlon Schmid is a 41 year old who presents for the following health issues {ACCOMPANIED BY STATEMENT (Optional):605304}    HPI  Pap    Diabetes Follow-up      How often are you checking your blood sugar? { :140556}    What concerns do you have today about your diabetes? { :594812::\"None\"}     Do you have any of these symptoms? (Select all that apply)  { :979694}    Have you had a diabetic eye exam in the last 12 months? { :779559}        {Reference  Diabetes Management Resources :363988}    {Reference  Diabetes Log - 7 days :916105}    Hyperlipidemia Follow-Up      Are you regularly taking any medication or supplement to lower your cholesterol?   { :528355}    Are you having muscle aches or other side effects that you think could be caused by your cholesterol lowering medication?  { :934993}    Hypertension Follow-up      Do you check your blood pressure regularly outside of the clinic? { :603969}     Are you following a low salt diet? { :636086}    Are your blood pressures ever more than 140 on the top number (systolic) OR more   than 90 on the bottom number (diastolic), for example 140/90? { :605906}    BP Readings from Last 2 Encounters:   03/16/21 124/82   11/20/20 128/74     Hemoglobin A1C (%)   Date Value   03/16/2021 6.1 (H)   09/28/2020 5.9 (H)     LDL Cholesterol Calculated (mg/dL)   Date Value   03/16/2021     Cannot estimate LDL when triglyceride exceeds 400 mg/dL   09/28/2020     Cannot estimate LDL when triglyceride exceeds 400 mg/dL     LDL Cholesterol Direct (mg/dL)   Date Value   03/16/2021 130 (H)   09/28/2020 140 (H)         How many servings of fruits and vegetables do you eat daily?  { :740096}    On average, how many sweetened beverages do you drink each day (Examples: soda, juice, sweet tea, etc.  Do NOT count diet or artificially sweetened beverages)?   { 1-11:912935}    How many days per week do you exercise enough to make your heart beat " faster? { :243920}    How many minutes a day do you exercise enough to make your heart beat faster? { :439288}    How many days per week do you miss taking your medication? {0-7 :490344}    {additonal problems for provider to add (Optional):996697}    Review of Systems   {ROS COMP (Optional):540705}      Objective    LMP 10/04/2006   There is no height or weight on file to calculate BMI.  Physical Exam   {Exam List (Optional):991294}    {Diagnostic Test Results (Optional):994294}    {AMBULATORY ATTESTATION (Optional):115176}

## 2021-07-02 NOTE — LETTER
My Asthma Action Plan    Name: Sharmaine José   YOB: 1980  Date: 7/2/2021   My doctor: Temi Marsh MD, MD   My clinic: [facility]        My Rescue Medicine:   Albuterol inhaler (Proair/Ventolin/Proventil HFA)  2-4 puffs EVERY 4 HOURS as needed. Use a spacer if recommended by your provider.   My Asthma Severity:   Intermittent / Exercise Induced  Know your asthma triggers: humidity and Fall allergens             GREEN ZONE   Good Control    I feel good    No cough or wheeze    Can work, sleep and play without asthma symptoms       Take your asthma control medicine every day.     1. If exercise triggers your asthma, take your rescue medication    15 minutes before exercise or sports, and    During exercise if you have asthma symptoms  2. Spacer to use with inhaler: If you have a spacer, make sure to use it with your inhaler             YELLOW ZONE Getting Worse  I have ANY of these:    I do not feel good    Cough or wheeze    Chest feels tight    Wake up at night   1. Keep taking your Green Zone medications  2. Start taking your rescue medicine:    every 20 minutes for up to 1 hour. Then every 4 hours for 24-48 hours.  3. If you stay in the Yellow Zone for more than 12-24 hours, contact your doctor.  4. If you do not return to the Green Zone in 12-24 hours or you get worse, start taking your oral steroid medicine if prescribed by your provider.           RED ZONE Medical Alert - Get Help  I have ANY of these:    I feel awful    Medicine is not helping    Breathing getting harder    Trouble walking or talking    Nose opens wide to breathe       1. Take your rescue medicine NOW  2. If your provider has prescribed an oral steroid medicine, start taking it NOW  3. Call your doctor NOW  4. If you are still in the Red Zone after 20 minutes and you have not reached your doctor:    Take your rescue medicine again and    Call 911 or go to the emergency room right away    See your  regular doctor within 2 weeks of an Emergency Room or Urgent Care visit for follow-up treatment.          Annual Reminders:  Meet with Asthma Educator,  Flu Shot in the Fall, consider Pneumonia Vaccination for patients with asthma (aged 19 and older).    Pharmacy:    [id number] - [place], [place] - [address] AT [facility] [address]  [id number] - [place], [place] - [address] AT [facility] [address]    Electronically signed by Temi Marsh MD, MD   Date: 07/02/21                    Asthma Triggers  How To Control Things That Make Your Asthma Worse    Triggers are things that make your asthma worse.  Look at the list below to help you find your triggers and   what you can do about them. You can help prevent asthma flare-ups by staying away from your triggers.      Trigger                                                          What you can do   Cigarette Smoke  Tobacco smoke can make asthma worse. Do not allow smoking in your home, car or around you.  Be sure no one smokes at a child s day care or school.  If you smoke, ask your health care provider for ways to help you quit.  Ask family members to quit too.  Ask your health care provider for a referral to [facility] to help you quit smoking, or call 8-837-505-PLAN.     Colds, Flu, Bronchitis  These are common triggers of asthma. Wash your hands often.  Don t touch your eyes, nose or mouth.  Get a flu shot every year.     Dust Mites  These are tiny bugs that live in cloth or carpet. They are too small to see. Wash sheets and blankets in hot water every week.   Encase pillows and mattress in dust mite proof covers.  Avoid having carpet if you can. If you have carpet, vacuum weekly.   Use a dust mask and HEPA vacuum.   Pollen and Outdoor Mold  Some people are allergic to trees, grass, or weed pollen, or molds. Try to keep your windows closed.  Limit time out doors when pollen count is high.   Ask you health  care provider about taking medicine during allergy season.     Animal Dander  Some people are allergic to skin flakes, urine or saliva from pets with fur or feathers. Keep pets with fur or feathers out of your home.    If you can t keep the pet outdoors, then keep the pet out of your bedroom.  Keep the bedroom door closed.  Keep pets off cloth furniture and away from stuffed toys.     Mice, Rats, and Cockroaches  Some people are allergic to the waste from these pests.   Cover food and garbage.  Clean up spills and food crumbs.  Store grease in the refrigerator.   Keep food out of the bedroom.   Indoor Mold  This can be a trigger if your home has high moisture. Fix leaking faucets, pipes, or other sources of water.   Clean moldy surfaces.  Dehumidify basement if it is damp and smelly.   Smoke, Strong Odors, and Sprays  These can reduce air quality. Stay away from strong odors and sprays, such as perfume, powder, hair spray, paints, smoke incense, paint, cleaning products, candles and new carpet.   Exercise or Sports  Some people with asthma have this trigger. Be active!  Ask your doctor about taking medicine before sports or exercise to prevent symptoms.    Warm up for 5-10 minutes before and after sports or exercise.     Other Triggers of Asthma  Cold air:  Cover your nose and mouth with a scarf.  Sometimes laughing or crying can be a trigger.  Some medicines and food can trigger asthma.

## 2021-07-02 NOTE — PROGRESS NOTES
"    Assessment & Plan     Type 2 diabetes mellitus without complication, with long-term current use of insulin (H)  Doing well. Continue to follow. She has been improving with lifestyle choices. Encourage her to continue this.   - FOOT EXAM  - insulin glargine (LANTUS SOLOSTAR) 100 UNIT/ML pen; ADMINISTER 9 UNITS UNDER THE SKIN AT BEDTIME    Screening for malignant neoplasm of cervix    - HPV High Risk Types DNA Cervical  - Pap imaged thin layer screen with HPV - recommended age 30 - 65 years (select HPV order below)    History of cervical dysplasia    - HPV High Risk Types DNA Cervical  - Pap imaged thin layer screen with HPV - recommended age 30 - 65 years (select HPV order below)      Hyperlipidemia LDL goal <100  On statin. Will get monitoring labs next visit; had increased pravastatin.   - Lipid panel reflex to direct LDL Fasting; Future    Skin lesion  Uncertain etiology. 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 She will be seeing Dermatology.  Move up the appointment if rapid change. Encourage avoiding shaving this off; it can make identification more difficult as well.                BMI:   Estimated body mass index is 27.79 kg/m  as calculated from the following:    Height as of 3/16/21: 1.651 m (5' 5\").    Weight as of this encounter: 75.8 kg (167 lb).   Weight management plan: Discussed healthy diet and exercise guidelines      Return in about 1 week (around 7/9/2021) for Establish care (has appointment).    Temi Marsh MD, MD  [facility] KYLEE Schmid is a 41 year old who presents for the following health issues     HPI Pap    See under ROS     Patient Active Problem List   Diagnosis     Allergic state     Mild intermittent asthma     Generalized hyperhidrosis     Tobacco use disorder     Interstitial cystitis     Surgical menopause     Narcolepsy     Cervical dysplasia     Chronic pain     Migraine     Lumbago     Hyperlipidemia LDL goal <100     H/O: hysterectomy     Health Care " Home     Discoid lupus     Generalized anxiety disorder     Fatty infiltration of liver     Primary narcolepsy without cataplexy     Hypertension goal BP (blood pressure) < 140/90     elevated bmi (H)     Hypertriglyceridemia     Type 2 diabetes mellitus without complication, with long-term current use of insulin (H)     Gastroesophageal reflux disease, esophagitis presence not specified       Current Outpatient Medications   Medication Sig Dispense Refill     albuterol (PROVENTIL HFA) 108 (90 Base) MCG/ACT inhaler Inhale 2 puffs into the lungs every 6 hours 1 Inhaler 0     alcohol swab prep pads Use to swab area of injection/robb as directed. 100 each 3     blood glucose (NO BRAND SPECIFIED) test strip Use to test blood sugar 4 times daily or as directed. To accompany: Blood Glucose Monitor Brands: per insurance. 400 strip 6     blood glucose calibration (NO BRAND SPECIFIED) solution To accompany: Blood Glucose Monitor Brands: per insurance. 1 Bottle 3     blood glucose monitoring (NO BRAND SPECIFIED) meter device kit Use to test blood sugar 1 times daily or as directed. Preferred blood glucose meter OR supplies to accompany: Blood Glucose Monitor Brands: per insurance. 1 kit 0     Continuous Blood Gluc  (FREESTYLE BRYANT 14 DAY READER) MONIQUE Use to read blood sugars as per 's instructions. 1 each 0     Continuous Blood Gluc Sensor (FREESTYLE BRYANT 14 DAY SENSOR) MISC CHANGE EVERY 14 DAYS 2 each 5     cyclobenzaprine (FLEXERIL) 10 MG tablet TAKE 1 TABLET(10 MG) BY MOUTH EVERY NIGHT AS NEEDED FOR MUSCLE SPASMS 90 tablet 0     dextroamphetamine (DEXTROSTAT) 10 MG tablet Take 10 mg by mouth daily Takes 1-3tablets daily  0     DULoxetine (CYMBALTA) 20 MG capsule Take 1 capsule (20 mg) by mouth daily 90 capsule 1     EPINEPHrine (ANY BX GENERIC EQUIV) 0.3 MG/0.3ML injection 2-pack Inject 0.3 mLs (0.3 mg) into the muscle once as needed 2 mL 0     estrogen conj (PREMARIN) 1.25 MG tablet TAKE 1  TABLET(1.25 MG) BY MOUTH DAILY 90 tablet 3     famotidine (PEPCID) 20 MG tablet Take 1 tablet (20 mg) by mouth 2 times daily 180 tablet 1     fluticasone (FLONASE) 50 MCG/ACT nasal spray Spray 1 spray into both nostrils daily 16 g 1     fluticasone (FLONASE) 50 MCG/ACT nasal spray Spray 1-2 sprays into both nostrils daily as needed for rhinitis or allergies       hydrocortisone (WESTCORT) 0.2 % external cream Apply topically 2 times daily Apply topically 2 times daily 30 g 0     insulin glargine (LANTUS SOLOSTAR) 100 UNIT/ML pen ADMINISTER 30 UNITS UNDER THE SKIN AT BEDTIME 30 mL 0     insulin pen needle (B-D U/F) 31G X 8 MM miscellaneous USE WITH LANTUS 100 each 1     irbesartan (AVAPRO) 300 MG tablet TAKE 1 TABLET(300 MG) BY MOUTH AT BEDTIME 90 tablet 1     lidocaine-prilocaine (EMLA) 2.5-2.5 % external cream Apply topically as needed for moderate pain Keep on file 30 g 0     metFORMIN (GLUCOPHAGE-XR) 500 MG 24 hr tablet TAKE 2 TABLETS(1000 MG) BY MOUTH DAILY WITH DINNER 180 tablet 1     mupirocin (BACTROBAN) 2 % external ointment Apply topically 3 times daily 22 g 0     pravastatin (PRAVACHOL) 40 MG tablet TAKE 1 TABLET(40 MG) BY MOUTH DAILY 90 tablet 0     thin (NO BRAND SPECIFIED) lancets Use with lanceting device. To accompany: Blood Glucose Monitor Brands: per insurance. 400 each 6         Review of Systems   CONSTITUTIONAL:NEGATIVE for fever, chills, change in weight x intentional weight loss.  ENT/MOUTH: NEGATIVE for ear, mouth and throat problems  RESP:NEGATIVE for significant cough or SOB  CV: NEGATIVE for chest pain, palpitations or peripheral edema  PSYCHIATRIC: NEGATIVE for changes in mood or affect    Has lost weight. More exercise.   Watching what she eats; less Fast food.   Has been able to decrease insulin; now at 9 units.       Objective    /70   Pulse 115   Temp 98.7  F (37.1  C) (Oral)   Resp 18   Wt 75.8 kg (167 lb)   LMP 10/04/2006   SpO2 100%   BMI 27.79 kg/m    Body mass index  is 27.79 kg/m .  Physical Exam   GENERAL APPEARANCE: healthy, alert and no distress  RESP: lungs clear to auscultation - no rales, rhonchi or wheezes  CV: regular rates and rhythm  ABD: BS present.  Soft, nontender, no mass or HSM.  : External genitalia normal.  Vaginal mucosa normal. Cuff intact.  Bimanual exam without mass or tenderness.   MS: no edema.   Skin: Right leg, above ankle laterally near calf, is a lesion that appears to have resolving scab and a nodular feel to palpation; ~ 3/4 cm diameter. There is some darker pigmentation.   PSYCH: mentation appears normal and affect normal/bright    DP pulse present bilaterally.  No sores or ulcerations.  Little callous.  Monofilament exam normal.          Lab Results   Component Value Date    A1C 6.1 03/16/2021    A1C 5.9 09/28/2020    A1C 5.4 09/05/2019    A1C 5.5 11/07/2018    A1C 10.3 06/01/2018     Recent Labs   Lab Test 03/16/21  1320 09/28/20  1043 02/05/14  1358 02/05/14  1358   CHOL 229* 246*   < > 300*   HDL 41* 36*   < > 68   LDL Cannot estimate LDL when triglyceride exceeds 400 mg/dL  130* Cannot estimate LDL when triglyceride exceeds 400 mg/dL  140*   < > 153*   TRIG 423* 501*   < > 397*   CHOLHDLRATIO  --   --   --  4.4    < > = values in this interval not displayed.     Component      Latest Ref Rng & Units 3/16/2021   LDL Cholesterol Direct      <100 mg/dL 130 (H)         ACT Total Scores 7/2/2021   ACT TOTAL SCORE -   ASTHMA ER VISITS -   ASTHMA HOSPITALIZATIONS -   ACT TOTAL SCORE (Goal Greater than or Equal to 20) 25   In the past 12 months, how many times did you visit the emergency room for your asthma without being admitted to the hospital? 0   In the past 12 months, how many times were you hospitalized overnight because of your asthma? 0

## 2021-07-03 ASSESSMENT — ASTHMA QUESTIONNAIRES: ACT_TOTALSCORE: 25

## 2021-07-07 LAB
COPATH REPORT: NORMAL
PAP: NORMAL

## 2021-07-08 ENCOUNTER — OFFICE VISIT (OUTPATIENT)
Dept: INTERNAL MEDICINE | Facility: CLINIC | Age: 41
End: 2021-07-08
Payer: COMMERCIAL

## 2021-07-08 VITALS
BODY MASS INDEX: 27.79 KG/M2 | HEART RATE: 90 BPM | DIASTOLIC BLOOD PRESSURE: 78 MMHG | TEMPERATURE: 99.3 F | WEIGHT: 167 LBS | SYSTOLIC BLOOD PRESSURE: 112 MMHG | OXYGEN SATURATION: 100 %

## 2021-07-08 DIAGNOSIS — Z23 NEED FOR IMMUNIZATION AGAINST RABIES: ICD-10-CM

## 2021-07-08 DIAGNOSIS — L93.0 DISCOID LUPUS: ICD-10-CM

## 2021-07-08 DIAGNOSIS — Z79.4 TYPE 2 DIABETES MELLITUS WITHOUT COMPLICATION, WITH LONG-TERM CURRENT USE OF INSULIN (H): Primary | ICD-10-CM

## 2021-07-08 DIAGNOSIS — E11.9 TYPE 2 DIABETES MELLITUS WITHOUT COMPLICATION, WITH LONG-TERM CURRENT USE OF INSULIN (H): Primary | ICD-10-CM

## 2021-07-08 PROCEDURE — 99213 OFFICE O/P EST LOW 20 MIN: CPT | Mod: 25 | Performed by: INTERNAL MEDICINE

## 2021-07-08 PROCEDURE — 90471 IMMUNIZATION ADMIN: CPT | Performed by: INTERNAL MEDICINE

## 2021-07-08 PROCEDURE — 90675 RABIES VACCINE IM: CPT | Performed by: INTERNAL MEDICINE

## 2021-07-08 NOTE — PROGRESS NOTES
Assessment & Plan     Type 2 diabetes mellitus without complication, with long-term current use of insulin (H)      Good control per her history.                Check labs in a few months.  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          Return in about 3 months (around 10/8/2021) for lab only visit; fasting.    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              We reviewed her health history.          She had labs in March, and there are future lab orders in place for 6 months later.                   She is a , and had a recent very low rabies antibody titer.  She needs an updated rabies vaccine.                        She uses the [employer] system, and reports good diabetes control.    Review of Systems         Current Outpatient Medications   Medication Sig Dispense Refill     albuterol (PROVENTIL HFA) 108 (90 Base) MCG/ACT inhaler Inhale 2 puffs into the lungs every 6 hours 1 Inhaler 0     alcohol swab prep pads Use to swab area of injection/robb as directed. 100 each 3     blood glucose (NO BRAND SPECIFIED) test strip Use to test blood sugar 4 times daily or as directed. To accompany: Blood Glucose Monitor Brands: per insurance. 400 strip 6     blood glucose calibration (NO BRAND SPECIFIED) solution To accompany: Blood Glucose Monitor Brands: per insurance. 1 Bottle 3     blood glucose monitoring (NO BRAND SPECIFIED) meter device kit Use to test blood sugar 1 times daily or as directed. Preferred blood glucose meter  OR supplies to accompany: Blood Glucose Monitor Brands: per insurance. 1 kit 0     Continuous Blood Gluc  (FREESTYLE BRYANT 14 DAY READER) MONIQUE Use to read blood sugars as per 's instructions. 1 each 0     Continuous Blood Gluc Sensor (FREESTYLE BRYANT 14 DAY SENSOR) MISC CHANGE EVERY 14 DAYS 2 each 5     cyclobenzaprine (FLEXERIL) 10 MG tablet TAKE 1 TABLET(10 MG) BY MOUTH EVERY NIGHT AS NEEDED FOR MUSCLE SPASMS 90 tablet 0     dextroamphetamine (DEXTROSTAT) 10 MG tablet Take 10 mg by mouth daily Takes 1-3tablets daily  0     DULoxetine (CYMBALTA) 20 MG capsule Take 1 capsule (20 mg) by mouth daily 90 capsule 1     EPINEPHrine (ANY BX GENERIC EQUIV) 0.3 MG/0.3ML injection 2-pack Inject 0.3 mLs (0.3 mg) into the muscle once as needed 2 mL 0     estrogen conj (PREMARIN) 1.25 MG tablet TAKE 1 TABLET(1.25 MG) BY MOUTH DAILY 90 tablet 3     famotidine (PEPCID) 20 MG tablet Take 1 tablet (20 mg) by mouth 2 times daily 180 tablet 1     fluticasone (FLONASE) 50 MCG/ACT nasal spray Spray 1 spray into both nostrils daily 16 g 1     hydrocortisone (WESTCORT) 0.2 % external cream Apply topically 2 times daily Apply topically 2 times daily 30 g 0     insulin glargine (LANTUS SOLOSTAR) 100 UNIT/ML pen ADMINISTER 9 UNITS UNDER THE SKIN AT BEDTIME 30 mL 0     insulin pen needle (B-D U/F) 31G X 8 MM miscellaneous USE WITH LANTUS 100 each 1     irbesartan (AVAPRO) 300 MG tablet TAKE 1 TABLET(300 MG) BY MOUTH AT BEDTIME 90 tablet 1     lidocaine-prilocaine (EMLA) 2.5-2.5 % external cream Apply topically as needed for moderate pain Keep on file 30 g 0     metFORMIN (GLUCOPHAGE-XR) 500 MG 24 hr tablet TAKE 2 TABLETS(1000 MG) BY MOUTH DAILY WITH DINNER 180 tablet 1     mupirocin (BACTROBAN) 2 % external ointment Apply topically 3 times daily 22 g 0     pravastatin (PRAVACHOL) 40 MG tablet TAKE 1 TABLET(40 MG) BY MOUTH DAILY 90 tablet 0     thin (NO BRAND SPECIFIED) lancets Use with lanceting device. To accompany: Blood  Glucose Monitor Brands: per insurance. 400 each 6     Wt Readings from Last 4 Encounters:   07/08/21 75.8 kg (167 lb)   07/02/21 75.8 kg (167 lb)   03/16/21 80.7 kg (178 lb)   11/20/20 84 kg (185 lb 3.2 oz)       Objective    /78   Pulse 90   Temp 99.3  F (37.4  C) (Oral)   Wt 75.8 kg (167 lb)   LMP 10/04/2006   SpO2 100%   BMI 27.79 kg/m    There is no height or weight on file to calculate BMI.  Physical Exam   GENERAL APPEARANCE: alert, no distress and skin shows multiple red papules which she explains is due to her discoid lupus.

## 2021-07-09 LAB
FINAL DIAGNOSIS: NORMAL
HPV HR 12 DNA CVX QL NAA+PROBE: NEGATIVE
HPV16 DNA SPEC QL NAA+PROBE: NEGATIVE
HPV18 DNA SPEC QL NAA+PROBE: NEGATIVE
SPECIMEN DESCRIPTION: NORMAL
SPECIMEN SOURCE CVX/VAG CYTO: NORMAL

## 2021-07-20 DIAGNOSIS — J01.90 ACUTE SINUSITIS WITH SYMPTOMS > 10 DAYS: ICD-10-CM

## 2021-07-21 RX ORDER — FLUTICASONE PROPIONATE 50 MCG
SPRAY, SUSPENSION (ML) NASAL
Qty: 16 G | Refills: 1 | Status: SHIPPED | OUTPATIENT
Start: 2021-07-21 | End: 2021-12-14

## 2021-07-21 NOTE — TELEPHONE ENCOUNTER
Prescription approved per [facility] Refill Protocol.  Allie Null RN

## 2021-08-12 ENCOUNTER — APPOINTMENT (OUTPATIENT)
Dept: URGENT CARE | Facility: CLINIC | Age: 41
End: 2021-08-12
Payer: COMMERCIAL

## 2021-09-06 ENCOUNTER — E-VISIT (OUTPATIENT)
Dept: URGENT CARE | Facility: CLINIC | Age: 41
End: 2021-09-06
Payer: COMMERCIAL

## 2021-09-06 DIAGNOSIS — N39.0 ACUTE UTI (URINARY TRACT INFECTION): Primary | ICD-10-CM

## 2021-09-06 PROCEDURE — 99207 PR NON-BILLABLE SERV PER CHARTING: CPT

## 2021-09-07 ENCOUNTER — TELEPHONE (OUTPATIENT)
Dept: INTERNAL MEDICINE | Facility: CLINIC | Age: 41
End: 2021-09-07

## 2021-09-07 NOTE — PATIENT INSTRUCTIONS
Dear Sharmaine José    After reviewing your responses, I've been able to diagnose you with a urinary tract infection, which is a common infection of the bladder with bacteria.  This is not a sexually transmitted infection, though urinating immediately after intercourse can help prevent infections.  Drinking lots of fluids is also helpful to clear your current infection and prevent the next one.      I have sent a prescription for antibiotics to your pharmacy to treat this infection.    It is important that you take all of your prescribed medication even if your symptoms are improving after a few doses.  Taking all of your medicine helps prevent the symptoms from returning.     If your symptoms worsen, you develop pain in your back or stomach, develop fevers, or are not improving in 5 days, please contact your primary care provider for an appointment or visit any of our convenient Walk-in or Urgent Care Centers to be seen, which can be found on our website here.    Thanks again for choosing us as your health care partner,    Horace Mazariegos MD

## 2021-09-07 NOTE — TELEPHONE ENCOUNTER
"I tried to call her this AM at 9:20, since she has not read my reply to her E-visit request yet.       I got an answering machine, with a \"full mailbox\"; unable to take any messages.                  [employer] staff:             Please keep trying to call her, and read to her or ask her to read the My Chart message that I sent to her yesterday.    "

## 2021-09-22 DIAGNOSIS — E78.5 HYPERLIPIDEMIA LDL GOAL <100: ICD-10-CM

## 2021-09-23 RX ORDER — PRAVASTATIN SODIUM 40 MG
TABLET ORAL
Qty: 90 TABLET | Refills: 0 | Status: SHIPPED | OUTPATIENT
Start: 2021-09-23 | End: 2021-12-24

## 2021-10-23 ENCOUNTER — HEALTH MAINTENANCE LETTER (OUTPATIENT)
Age: 41
End: 2021-10-23

## 2021-10-23 DIAGNOSIS — F41.1 GENERALIZED ANXIETY DISORDER: ICD-10-CM

## 2021-10-23 DIAGNOSIS — Z79.4 TYPE 2 DIABETES MELLITUS WITHOUT COMPLICATION, WITH LONG-TERM CURRENT USE OF INSULIN (H): ICD-10-CM

## 2021-10-23 DIAGNOSIS — E11.9 TYPE 2 DIABETES MELLITUS WITHOUT COMPLICATION, WITH LONG-TERM CURRENT USE OF INSULIN (H): ICD-10-CM

## 2021-10-26 RX ORDER — METFORMIN HCL 500 MG
TABLET, EXTENDED RELEASE 24 HR ORAL
Qty: 180 TABLET | Refills: 1 | Status: SHIPPED | OUTPATIENT
Start: 2021-10-26 | End: 2022-05-11

## 2021-10-26 RX ORDER — DULOXETIN HYDROCHLORIDE 20 MG/1
CAPSULE, DELAYED RELEASE ORAL
Qty: 90 CAPSULE | Refills: 1 | Status: SHIPPED | OUTPATIENT
Start: 2021-10-26 | End: 2022-05-18

## 2021-10-26 NOTE — TELEPHONE ENCOUNTER
Routing refill request to provider for review/approval because:  Biguanide Agents [id number]/26/2021 12:14 PM   Patient has documented A1c within the specified period of time.     Geena Song RN

## 2021-12-14 DIAGNOSIS — L98.9 SKIN LESION: ICD-10-CM

## 2021-12-14 DIAGNOSIS — L93.0 DISCOID LUPUS: ICD-10-CM

## 2021-12-14 DIAGNOSIS — J01.90 ACUTE SINUSITIS WITH SYMPTOMS > 10 DAYS: ICD-10-CM

## 2021-12-14 DIAGNOSIS — Z91.030 BEE STING ALLERGY: ICD-10-CM

## 2021-12-14 DIAGNOSIS — Z22.322 MRSA NASAL COLONIZATION: ICD-10-CM

## 2021-12-14 RX ORDER — MUPIROCIN 20 MG/G
OINTMENT TOPICAL
Qty: 22 G | Refills: 0 | Status: SHIPPED | OUTPATIENT
Start: 2021-12-14

## 2021-12-14 RX ORDER — FLUTICASONE PROPIONATE 50 MCG
SPRAY, SUSPENSION (ML) NASAL
Qty: 16 G | Refills: 1 | Status: SHIPPED | OUTPATIENT
Start: 2021-12-14 | End: 2022-02-09

## 2021-12-16 RX ORDER — EPINEPHRINE 0.3 MG/.3ML
INJECTION SUBCUTANEOUS
Qty: 2 EACH | Refills: 3 | Status: SHIPPED | OUTPATIENT
Start: 2021-12-16

## 2021-12-16 RX ORDER — FLUTICASONE PROPIONATE 50 MCG
SPRAY, SUSPENSION (ML) NASAL
Qty: 16 G | Refills: 1
Start: 2021-12-16

## 2021-12-16 RX ORDER — MUPIROCIN 20 MG/G
OINTMENT TOPICAL
Qty: 22 G | Refills: 0
Start: 2021-12-16

## 2021-12-24 DIAGNOSIS — E78.5 HYPERLIPIDEMIA LDL GOAL <100: ICD-10-CM

## 2021-12-24 RX ORDER — PRAVASTATIN SODIUM 40 MG
TABLET ORAL
Qty: 90 TABLET | Refills: 0 | Status: SHIPPED | OUTPATIENT
Start: 2021-12-24 | End: 2022-05-11 | Stop reason: ALTCHOICE

## 2021-12-24 NOTE — TELEPHONE ENCOUNTER
Routing refill request to provider for review/approval because:  Labs out of range:  LDL    LDL Cholesterol Calculated   Date Value Ref Range Status   03/16/2021  <100 mg/dL Final    Cannot estimate LDL when triglyceride exceeds 400 mg/dL     LDL Cholesterol Direct   Date Value Ref Range Status   03/16/2021 130 (H) <100 mg/dL Final     Comment:     Above desirable:  100-129 mg/dl  Borderline High:  130-159 mg/dL  High:             160-189 mg/dL  Very high:       >189 mg/dl       Allie Null RN

## 2022-01-13 ENCOUNTER — OFFICE VISIT (OUTPATIENT)
Dept: INTERNAL MEDICINE | Facility: CLINIC | Age: 42
End: 2022-01-13
Payer: COMMERCIAL

## 2022-01-13 VITALS
WEIGHT: 154 LBS | HEART RATE: 98 BPM | DIASTOLIC BLOOD PRESSURE: 78 MMHG | TEMPERATURE: 97.3 F | BODY MASS INDEX: 26.29 KG/M2 | RESPIRATION RATE: 16 BRPM | SYSTOLIC BLOOD PRESSURE: 116 MMHG | OXYGEN SATURATION: 96 % | HEIGHT: 64 IN

## 2022-01-13 DIAGNOSIS — E78.5 HYPERLIPIDEMIA LDL GOAL <100: ICD-10-CM

## 2022-01-13 DIAGNOSIS — J06.9 UPPER RESPIRATORY TRACT INFECTION, UNSPECIFIED TYPE: Primary | ICD-10-CM

## 2022-01-13 DIAGNOSIS — I10 HYPERTENSION GOAL BP (BLOOD PRESSURE) < 140/90: ICD-10-CM

## 2022-01-13 DIAGNOSIS — Z79.4 TYPE 2 DIABETES MELLITUS WITHOUT COMPLICATION, WITH LONG-TERM CURRENT USE OF INSULIN (H): ICD-10-CM

## 2022-01-13 DIAGNOSIS — F17.200 TOBACCO USE DISORDER: ICD-10-CM

## 2022-01-13 DIAGNOSIS — E11.9 TYPE 2 DIABETES MELLITUS WITHOUT COMPLICATION, WITH LONG-TERM CURRENT USE OF INSULIN (H): ICD-10-CM

## 2022-01-13 PROCEDURE — 99214 OFFICE O/P EST MOD 30 MIN: CPT | Performed by: INTERNAL MEDICINE

## 2022-01-13 RX ORDER — IRBESARTAN 300 MG/1
TABLET ORAL
Qty: 90 TABLET | Refills: 3 | Status: SHIPPED | OUTPATIENT
Start: 2022-01-13 | End: 2023-03-27

## 2022-01-13 RX ORDER — INSULIN GLARGINE 100 [IU]/ML
INJECTION, SOLUTION SUBCUTANEOUS
Qty: 15 ML | Refills: 3 | Status: SHIPPED | OUTPATIENT
Start: 2022-01-13 | End: 2022-05-11

## 2022-01-13 RX ORDER — AZITHROMYCIN 250 MG/1
TABLET, FILM COATED ORAL
Qty: 6 TABLET | Refills: 0 | Status: SHIPPED | OUTPATIENT
Start: 2022-01-13 | End: 2022-01-18

## 2022-01-13 ASSESSMENT — ASTHMA QUESTIONNAIRES
QUESTION_4 LAST FOUR WEEKS HOW OFTEN HAVE YOU USED YOUR RESCUE INHALER OR NEBULIZER MEDICATION (SUCH AS ALBUTEROL): ONCE A WEEK OR LESS
QUESTION_1 LAST FOUR WEEKS HOW MUCH OF THE TIME DID YOUR ASTHMA KEEP YOU FROM GETTING AS MUCH DONE AT WORK, SCHOOL OR AT HOME: NONE OF THE TIME
QUESTION_5 LAST FOUR WEEKS HOW WOULD YOU RATE YOUR ASTHMA CONTROL: WELL CONTROLLED
ACT_TOTALSCORE: 23
QUESTION_2 LAST FOUR WEEKS HOW OFTEN HAVE YOU HAD SHORTNESS OF BREATH: NOT AT ALL
QUESTION_3 LAST FOUR WEEKS HOW OFTEN DID YOUR ASTHMA SYMPTOMS (WHEEZING, COUGHING, SHORTNESS OF BREATH, CHEST TIGHTNESS OR PAIN) WAKE YOU UP AT NIGHT OR EARLIER THAN USUAL IN THE MORNING: NOT AT ALL

## 2022-01-13 ASSESSMENT — MIFFLIN-ST. JEOR: SCORE: 1358.54

## 2022-01-13 NOTE — PATIENT INSTRUCTIONS
Azithromycin 2 tab today, then 1 tab daily for 4 days (antibiotic)   Saline nasal spray as needed during the day   Move the Flonase to bedtime  After Azithromycin dose, then may try reducing Duloxetine to 1/2 capsule daily (10mg)  Continue other meds.  Call  [phone] or use [employer] to schedule a future lab appointment  fasting in 1-2 weeks (blood and urine).   For fasting labs, please refrain from eating for 8 hours or more.   Drink 2 glasses of water before your lab appointment. It is fine to take your  oral medications on the morning of the lab test as usual  I encourage you to stop all smoking.  If  willing to quit in the future  contact  [facility] [facility] toll-free number (8-546-QUIT-NOW) or through the internet  ([email or web address]) for free nicotine supplementation treatment and/or counseling   When upper respiratory symptoms resolve, then get flu vaccine

## 2022-01-13 NOTE — PROGRESS NOTES
ASSESSMENT:   1. Upper respiratory tract infection, unspecified type  Symptoms present for about a month.  Patient states she has had negative COVID testing early with symptoms started and just a few days ago also.  Testing was done at home.  Possible bacterial sinus component given chronicity of symptoms.  Will treat with azithromycin.  Saline nasal spray as needed  - azithromycin (ZITHROMAX) 250 MG tablet; Take 2 tablets (500 mg) by mouth daily for 1 day, THEN 1 tablet (250 mg) daily for 4 days.  Dispense: 6 tablet; Refill: 0  - CBC with platelets; Future    2. Type 2 diabetes mellitus without complication, with long-term current use of insulin (H)  Previously well controlled.  Continue current medication.  Recheck labs in the next couple weeks  - Comprehensive metabolic panel; Future   - Albumin Random Urine Quantitative with Creat Ratio; Future  - Hemoglobin A1c; Future  - TSH with free T4 reflex; Future    3. Hyperlipidemia LDL goal <100  Previously uncontrolled with pravastatin.  Recheck fasting labs in the next couple weeks.  If LDL remains above goal, will change to rosuvastatin  - Comprehensive metabolic panel; Future  - Lipid panel reflex to direct LDL Fasting; Future    4. Tobacco use disorder  Contributing to #1 and increasing risk for vascular and other lung disease, especially with diabetic history.  Counseled regarding smoking cessation as below      PLAN:  Azithromycin 2 tab today, then 1 tab daily for 4 days (antibiotic)   Saline nasal spray as needed during the day   Move the Flonase to bedtime  After Azithromycin dose, then may try reducing Duloxetine to 1/2 capsule daily (10mg)  Continue other meds.  Call  [phone] or use [employer] to schedule a future lab appointment  fasting in 1-2 weeks (blood and urine).   For fasting labs, please refrain from eating for 8 hours or more.   Drink 2 glasses of water before your lab appointment. It is fine to take your  oral medications on the morning of the  lab test as usual  I encourage you to stop all smoking.  If  willing to quit in the future  contact  [employer] toll-free number (3-048-QUIT-NOW) or through the internet  (quitpartnermn.com) for free nicotine supplementation treatment and/or counseling   When upper respiratory symptoms resolve, then get flu vaccine        (Chart documentation was completed, in part, with [employer] voice-recognition software. Even though reviewed, some grammatical, spelling, and word errors may remain.)    Gómez Moore MD  Internal Medicine Department  [facility]      Marlon Schmid is a 41 year old who presents for the following health issues     HPI     RESPIRATORY SYMPTOMS      Duration: 1 month    Description  nasal congestion, rhinorrhea, facial pain/pressure, cough, wheezing, ear pain clog feeling, headache, fatigue/malaise, diarrhea and nausea    Severity: moderate    Accompanying signs and symptoms: None    History (predisposing factors):  none    Precipitating or alleviating factors: None    Therapies tried and outcome:  nasal spray/wash - Flonase, Thera-Flu, Benadryl, Immodium        Component      Latest Ref Rng & Units 9/28/2020   Sodium      133 - 144 mmol/L 135   Potassium      3.4 - 5.3 mmol/L 4.1   Chloride      94 - 109 mmol/L 105   Carbon Dioxide      20 - 32 mmol/L 26   Anion Gap      3 - 14 mmol/L 4   Glucose      70 - 99 mg/dL 111 (H)   Urea Nitrogen      7 - 30 mg/dL 14   Creatinine      0.52 - 1.04 mg/dL 0.56   GFR Estimate      >60 mL/min/1.73:m2 >90   GFR Estimate If Black      >60 mL/min/1.73:m2 >90   Calcium      8.5 - 10.1 mg/dL 9.6   Bilirubin Total      0.2 - 1.3 mg/dL 0.2   Albumin      3.4 - 5.0 g/dL 3.4   Protein Total      6.8 - 8.8 g/dL 6.9   Alkaline Phosphatase      40 - 150 U/L 74   ALT      0 - 50 U/L 33   AST      0 - 45 U/L 25   Cholesterol      <200 mg/dL 246 (H)   Triglycerides      <150 mg/dL 501 (H)   HDL Cholesterol      >49 mg/dL 36 (L)   LDL  Cholesterol Calculated      <100 mg/dL Cannot estimate LDL when triglyceride exceeds 400 mg/dL   Non HDL Cholesterol      <130 mg/dL 210 (H)   Creatinine Urine      mg/dL 28   Albumin Urine mg/L      mg/L 6   Albumin Urine mg/g Cr      0 - 25 mg/g Cr 21.09   Hemoglobin A1C POCT      0 - 5.6 % 5.9 (H)   LDL Cholesterol Direct      <100 mg/dL 140 (H)     Component      Latest Ref Rng & Units 3/16/2021   Sodium      133 - 144 mmol/L 133   Potassium      3.4 - 5.3 mmol/L 3.9   Chloride      94 - 109 mmol/L 101   Carbon Dioxide      20 - 32 mmol/L 27   Anion Gap      3 - 14 mmol/L 5   Glucose      70 - 99 mg/dL 112 (H)   Urea Nitrogen      7 - 30 mg/dL 15   Creatinine      0.52 - 1.04 mg/dL 0.55   GFR Estimate      >60 mL/min/1.73:m2 >90   GFR Estimate If Black      >60 mL/min/1.73:m2 >90   Calcium      8.5 - 10.1 mg/dL 9.2   Bilirubin Total      0.2 - 1.3 mg/dL 0.4   Albumin      3.4 - 5.0 g/dL 3.9   Protein Total      6.8 - 8.8 g/dL 7.3   Alkaline Phosphatase      40 - 150 U/L 72   ALT      0 - 50 U/L 29   AST      0 - 45 U/L 20   Cholesterol      <200 mg/dL 229 (H)   Triglycerides      <150 mg/dL 423 (H)   HDL Cholesterol      >49 mg/dL 41 (L)   LDL Cholesterol Calculated      <100 mg/dL Cannot estimate LDL when triglyceride exceeds 400 mg/dL   Non HDL Cholesterol      <130 mg/dL 188 (H)   Creatinine Urine      mg/dL 87   Albumin Urine mg/L      mg/L 9   Albumin Urine mg/g Cr      0 - 25 mg/g Cr 9.90   Hemoglobin A1C POCT      0 - 5.6 % 6.1 (H)   LDL Cholesterol Direct      <100 mg/dL 130 (H)     Meeting pt for first time today  Weight down 30 pounds in 13 mos  Neg home covid test 1 month ago and also a couple days ago   No flu vaccine this year yet   Prior N/V. Last 3 weeks ago. Eating Ok and BMs OK now  Slight plugged ar feeling like plane.   White green nasal discharge. No sore throat.    Occ cough in AM in upper chest. Not later part of the day. No shortness of breath    Slight soreness upper sternal area only. No  "to general exertional. Just with deeper breath  Tried with reduced sleep due to  Cough  Still working with animal nursing as CVT  Smoking half a pack of cigarettes per day currently  On duloxetine for pain history.  Has weaned down to 20 mg daily would like to wean down further.  Has tried stopping medication completely but then gets some jitteriness side effects.  Denies taking medication for depression.  PHQ 9 = 2     Sugars with CGM: 28 day average for the 4 times a day =  123,119,134,122      Additional ROS:   Constitutional, HEENT, Cardiovascular, Pulmonary, GI and , Neuro, MSK and Psych review of systems/symptoms are otherwise negative or unchanged from previous, except as noted above.      OBJECTIVE:  /78   Pulse 98   Temp 97.3  F (36.3  C) (Temporal)   Resp 16   Ht 1.626 m (5' 4\")   Wt 69.9 kg (154 lb)   LMP 10/04/2006   SpO2 96%   BMI 26.43 kg/m     Estimated body mass index is 26.43 kg/m  as calculated from the following:    Height as of this encounter: 1.626 m (5' 4\").    Weight as of this encounter: 69.9 kg (154 lb).     HENT: ear canals and TM's normal and nose and mouth without ulcers or lesions.   Nasal mucosa mildly edematous.  Minimal tenderness to palpation maxillary sinuses.  No oropharyngeal exudate  Neck: no adenopathy. Thyroid normal to palpation. No bruits  Pulm: Lungs clear to auscultation   CV: Regular rates and rhythm  GI: Soft, nontender, Normal active bowel sounds, No hepatosplenomegaly or masses palpable  Ext: Peripheral pulses intact. No edema.  Neuro: Normal strength and tone, sensory exam grossly normal                "

## 2022-01-14 ASSESSMENT — ASTHMA QUESTIONNAIRES: ACT_TOTALSCORE: 23

## 2022-02-07 DIAGNOSIS — J01.90 ACUTE SINUSITIS WITH SYMPTOMS > 10 DAYS: ICD-10-CM

## 2022-02-09 RX ORDER — FLUTICASONE PROPIONATE 50 MCG
SPRAY, SUSPENSION (ML) NASAL
Qty: 48 G | Refills: 3 | Status: SHIPPED | OUTPATIENT
Start: 2022-02-09 | End: 2023-02-23

## 2022-02-09 NOTE — TELEPHONE ENCOUNTER
Routing refill request to provider for review/approval because:  Medication last filled by Dr. Mazariegos.   Pt seen 1/13/22 with Dr. Moore.   Audelia Klein RN

## 2022-02-12 ENCOUNTER — HEALTH MAINTENANCE LETTER (OUTPATIENT)
Age: 42
End: 2022-02-12

## 2022-02-17 PROBLEM — R73.09 ELEVATED HEMOGLOBIN A1C: Status: RESOLVED | Noted: 2017-12-26 | Resolved: 2018-08-30

## 2022-02-17 PROBLEM — K21.9 GASTROESOPHAGEAL REFLUX DISEASE: Status: ACTIVE | Noted: 2020-05-29

## 2022-02-26 ENCOUNTER — MYC MEDICAL ADVICE (OUTPATIENT)
Dept: INTERNAL MEDICINE | Facility: CLINIC | Age: 42
End: 2022-02-26
Payer: COMMERCIAL

## 2022-02-26 DIAGNOSIS — Z78.0 MENOPAUSE: ICD-10-CM

## 2022-03-21 DIAGNOSIS — Z78.0 MENOPAUSE: ICD-10-CM

## 2022-03-24 ENCOUNTER — TRANSFERRED RECORDS (OUTPATIENT)
Dept: HEALTH INFORMATION MANAGEMENT | Facility: CLINIC | Age: 42
End: 2022-03-24
Payer: COMMERCIAL

## 2022-03-31 ENCOUNTER — LAB (OUTPATIENT)
Dept: LAB | Facility: CLINIC | Age: 42
End: 2022-03-31
Payer: COMMERCIAL

## 2022-03-31 DIAGNOSIS — J06.9 UPPER RESPIRATORY TRACT INFECTION, UNSPECIFIED TYPE: ICD-10-CM

## 2022-03-31 DIAGNOSIS — Z79.4 TYPE 2 DIABETES MELLITUS WITHOUT COMPLICATION, WITH LONG-TERM CURRENT USE OF INSULIN (H): ICD-10-CM

## 2022-03-31 DIAGNOSIS — E78.5 HYPERLIPIDEMIA LDL GOAL <100: ICD-10-CM

## 2022-03-31 DIAGNOSIS — E11.9 TYPE 2 DIABETES MELLITUS WITHOUT COMPLICATION, WITH LONG-TERM CURRENT USE OF INSULIN (H): ICD-10-CM

## 2022-03-31 LAB
CREAT UR-MCNC: 266 MG/DL
ERYTHROCYTE [DISTWIDTH] IN BLOOD BY AUTOMATED COUNT: 12.2 % (ref 10–15)
HBA1C MFR BLD: 5.3 % (ref 0–5.6)
HCT VFR BLD AUTO: 41.1 % (ref 35–47)
HGB BLD-MCNC: 13.9 G/DL (ref 11.7–15.7)
MCH RBC QN AUTO: 32.5 PG (ref 26.5–33)
MCHC RBC AUTO-ENTMCNC: 33.8 G/DL (ref 31.5–36.5)
MCV RBC AUTO: 96 FL (ref 78–100)
MICROALBUMIN UR-MCNC: 40 MG/L
MICROALBUMIN/CREAT UR: 15.04 MG/G CR (ref 0–25)
PLATELET # BLD AUTO: 243 10E3/UL (ref 150–450)
RBC # BLD AUTO: 4.28 10E6/UL (ref 3.8–5.2)
WBC # BLD AUTO: 6.4 10E3/UL (ref 4–11)

## 2022-03-31 PROCEDURE — 36415 COLL VENOUS BLD VENIPUNCTURE: CPT

## 2022-03-31 PROCEDURE — 85027 COMPLETE CBC AUTOMATED: CPT

## 2022-03-31 PROCEDURE — 84443 ASSAY THYROID STIM HORMONE: CPT

## 2022-03-31 PROCEDURE — 80053 COMPREHEN METABOLIC PANEL: CPT

## 2022-03-31 PROCEDURE — 83036 HEMOGLOBIN GLYCOSYLATED A1C: CPT

## 2022-03-31 PROCEDURE — 82043 UR ALBUMIN QUANTITATIVE: CPT

## 2022-03-31 PROCEDURE — 80061 LIPID PANEL: CPT

## 2022-04-01 ENCOUNTER — MYC MEDICAL ADVICE (OUTPATIENT)
Dept: INTERNAL MEDICINE | Facility: CLINIC | Age: 42
End: 2022-04-01
Payer: COMMERCIAL

## 2022-04-01 DIAGNOSIS — E78.5 HYPERLIPIDEMIA LDL GOAL <100: Primary | ICD-10-CM

## 2022-04-01 DIAGNOSIS — Z79.4 TYPE 2 DIABETES MELLITUS WITHOUT COMPLICATION, WITH LONG-TERM CURRENT USE OF INSULIN (H): ICD-10-CM

## 2022-04-01 DIAGNOSIS — E11.9 TYPE 2 DIABETES MELLITUS WITHOUT COMPLICATION, WITH LONG-TERM CURRENT USE OF INSULIN (H): ICD-10-CM

## 2022-04-01 LAB
ALBUMIN SERPL-MCNC: 3.8 G/DL (ref 3.4–5)
ALP SERPL-CCNC: 57 U/L (ref 40–150)
ALT SERPL W P-5'-P-CCNC: 31 U/L (ref 0–50)
ANION GAP SERPL CALCULATED.3IONS-SCNC: 7 MMOL/L (ref 3–14)
AST SERPL W P-5'-P-CCNC: 27 U/L (ref 0–45)
BILIRUB SERPL-MCNC: 0.5 MG/DL (ref 0.2–1.3)
BUN SERPL-MCNC: 9 MG/DL (ref 7–30)
CALCIUM SERPL-MCNC: 9.4 MG/DL (ref 8.5–10.1)
CHLORIDE BLD-SCNC: 102 MMOL/L (ref 94–109)
CHOLEST SERPL-MCNC: 202 MG/DL
CO2 SERPL-SCNC: 28 MMOL/L (ref 20–32)
CREAT SERPL-MCNC: 0.48 MG/DL (ref 0.52–1.04)
FASTING STATUS PATIENT QL REPORTED: YES
GFR SERPL CREATININE-BSD FRML MDRD: >90 ML/MIN/1.73M2
GLUCOSE BLD-MCNC: 114 MG/DL (ref 70–99)
HDLC SERPL-MCNC: 50 MG/DL
LDLC SERPL CALC-MCNC: 112 MG/DL
NONHDLC SERPL-MCNC: 152 MG/DL
POTASSIUM BLD-SCNC: 4.2 MMOL/L (ref 3.4–5.3)
PROT SERPL-MCNC: 7.2 G/DL (ref 6.8–8.8)
SODIUM SERPL-SCNC: 137 MMOL/L (ref 133–144)
TRIGL SERPL-MCNC: 199 MG/DL
TSH SERPL DL<=0.005 MIU/L-ACNC: 1.31 MU/L (ref 0.4–4)

## 2022-04-07 NOTE — TELEPHONE ENCOUNTER
Please see mychart from patient and advise on appropriate course of action.       PCP to review lipid panel from 3/31- would you like pt to switch to rosuvastatin from pravastatin as discussed at OV on 1/13/22?    Also would you like pt to repeat albumin random urine? Repeat ordered for the future, or would you like a 24 hour collection    Corrie Portillo RN    [facility] Triage Nurse

## 2022-04-10 RX ORDER — ROSUVASTATIN CALCIUM 20 MG/1
20 TABLET, COATED ORAL DAILY
Qty: 30 TABLET | Refills: 11 | Status: SHIPPED | OUTPATIENT
Start: 2022-04-10 | End: 2022-06-30 | Stop reason: SINTOL

## 2022-05-09 ENCOUNTER — MYC MEDICAL ADVICE (OUTPATIENT)
Dept: INTERNAL MEDICINE | Facility: CLINIC | Age: 42
End: 2022-05-09
Payer: COMMERCIAL

## 2022-05-09 DIAGNOSIS — Z79.4 TYPE 2 DIABETES MELLITUS WITHOUT COMPLICATION, WITH LONG-TERM CURRENT USE OF INSULIN (H): ICD-10-CM

## 2022-05-09 DIAGNOSIS — E11.9 TYPE 2 DIABETES MELLITUS WITHOUT COMPLICATION, WITH LONG-TERM CURRENT USE OF INSULIN (H): ICD-10-CM

## 2022-05-11 RX ORDER — METFORMIN HCL 500 MG
1500 TABLET, EXTENDED RELEASE 24 HR ORAL
Qty: 270 TABLET | Refills: 3 | Status: SHIPPED | OUTPATIENT
Start: 2022-05-11 | End: 2023-06-13

## 2022-05-11 RX ORDER — INSULIN GLARGINE 100 [IU]/ML
INJECTION, SOLUTION SUBCUTANEOUS
Qty: 15 ML | Refills: 3
Start: 2022-05-11 | End: 2022-08-04

## 2022-05-11 NOTE — TELEPHONE ENCOUNTER
Routing refill request to provider for review/approval because:    Previously prescribed by Dr. Mazariegos. Also per patient dose increase to 3 tablets, med pended for 3 tablets daily.

## 2022-05-11 NOTE — TELEPHONE ENCOUNTER
Just received refill request routing to me 3 hours ago.  Tried to call patient to inform her of prescription refill but no answer.  Left message that metformin has been refilled and schedule fasting lab appointment the next couple weeks for follow-up lipids and nonfasting A1c in mid October

## 2022-05-13 DIAGNOSIS — F41.1 GENERALIZED ANXIETY DISORDER: ICD-10-CM

## 2022-05-16 ENCOUNTER — MYC MEDICAL ADVICE (OUTPATIENT)
Dept: INTERNAL MEDICINE | Facility: CLINIC | Age: 42
End: 2022-05-16
Payer: COMMERCIAL

## 2022-05-16 DIAGNOSIS — F41.1 GENERALIZED ANXIETY DISORDER: ICD-10-CM

## 2022-05-17 RX ORDER — DULOXETIN HYDROCHLORIDE 20 MG/1
20 CAPSULE, DELAYED RELEASE ORAL DAILY
Qty: 90 CAPSULE | Refills: 2 | Status: CANCELLED | OUTPATIENT
Start: 2022-05-17

## 2022-05-17 NOTE — TELEPHONE ENCOUNTER
Please call patient and get PHQ and KIRBY questionnaires completed and then route back to MD to review.  Patient was seen only once previously by me and mental health issues not addressed at that time

## 2022-05-17 NOTE — TELEPHONE ENCOUNTER
[email or web address] message sent to the patient with KIRBY and PHQ-9 attached. Will postpone refill request to check back on response tomorrow (5/18/22).    Audelia Klein RN

## 2022-05-17 NOTE — TELEPHONE ENCOUNTER
Last OV 1/13/22 - Dr Moore     No PCP currently listed (formerly Dr Mazariegos)     Routing to provider who most recently saw pt to advise on refills (last Rx from Dr Mazariegos)     Matias Lopez RN  [facility]

## 2022-05-18 RX ORDER — DULOXETIN HYDROCHLORIDE 20 MG/1
20 CAPSULE, DELAYED RELEASE ORAL DAILY
Qty: 90 CAPSULE | Refills: 1 | Status: SHIPPED | OUTPATIENT
Start: 2022-05-18 | End: 2022-11-15

## 2022-05-18 NOTE — TELEPHONE ENCOUNTER
Questionnaires completed (PHQ-9 and KIRBY 7).    PHQ-9 score:    PHQ 5/18/2022   PHQ-9 Total Score 2   Q9: Thoughts of better off dead/self-harm past 2 weeks Not at all     KIRBY-7 SCORE 9/5/2019 3/16/2021 5/18/2022   Total Score - - -   Total Score - - 0 (minimal anxiety)   Total Score 2 2 0

## 2022-05-18 NOTE — TELEPHONE ENCOUNTER
Routing refill request to provider for review/approval because:  PHQ-9 score:    PHQ 5/18/2022   PHQ-9 Total Score 2   Q9: Thoughts of better off dead/self-harm past 2 weeks Not at all     KIRBY-7 SCORE 9/5/2019 3/16/2021 5/18/2022   Total Score - - -   Total Score - - 0 (minimal anxiety)   Total Score 2 2 0          Ramírez Lovett RN  [facility] Triage Nurse

## 2022-06-29 ENCOUNTER — MYC MEDICAL ADVICE (OUTPATIENT)
Dept: INTERNAL MEDICINE | Facility: CLINIC | Age: 42
End: 2022-06-29

## 2022-06-29 DIAGNOSIS — E78.5 HYPERLIPIDEMIA LDL GOAL <100: ICD-10-CM

## 2022-06-29 DIAGNOSIS — Z79.4 TYPE 2 DIABETES MELLITUS WITHOUT COMPLICATION, WITH LONG-TERM CURRENT USE OF INSULIN (H): ICD-10-CM

## 2022-06-29 DIAGNOSIS — E11.9 TYPE 2 DIABETES MELLITUS WITHOUT COMPLICATION, WITH LONG-TERM CURRENT USE OF INSULIN (H): ICD-10-CM

## 2022-06-29 DIAGNOSIS — Z78.0 MENOPAUSE: ICD-10-CM

## 2022-06-30 RX ORDER — PRAVASTATIN SODIUM 40 MG
40 TABLET ORAL DAILY
Qty: 90 TABLET | Refills: 3 | Status: SHIPPED | OUTPATIENT
Start: 2022-06-30 | End: 2023-07-19

## 2022-06-30 RX ORDER — FLASH GLUCOSE SENSOR
KIT MISCELLANEOUS
Qty: 6 EACH | Refills: 3 | Status: SHIPPED | OUTPATIENT
Start: 2022-06-30 | End: 2022-07-17

## 2022-06-30 RX ORDER — EZETIMIBE 10 MG/1
10 TABLET ORAL DAILY
Qty: 90 TABLET | Refills: 3 | Status: SHIPPED | OUTPATIENT
Start: 2022-06-30 | End: 2022-08-04

## 2022-06-30 NOTE — TELEPHONE ENCOUNTER
Routing refill request to provider for review/approval because:  Drug not on the [facility] refill protocol - [employer]  Drug not active on patient's medication list - Pravastatin    Please see attached MC for Rosuvastatin side effects, pt requesting to switch back to pravastatin  Geena Song RN

## 2022-06-30 NOTE — TELEPHONE ENCOUNTER
Prescription approved per [facility] Refill Protocol.  Geena Song RN

## 2022-07-13 ENCOUNTER — MYC MEDICAL ADVICE (OUTPATIENT)
Dept: INTERNAL MEDICINE | Facility: CLINIC | Age: 42
End: 2022-07-13

## 2022-07-13 DIAGNOSIS — E11.9 TYPE 2 DIABETES MELLITUS WITHOUT COMPLICATION, WITH LONG-TERM CURRENT USE OF INSULIN (H): Primary | ICD-10-CM

## 2022-07-13 DIAGNOSIS — Z79.4 TYPE 2 DIABETES MELLITUS WITHOUT COMPLICATION, WITH LONG-TERM CURRENT USE OF INSULIN (H): Primary | ICD-10-CM

## 2022-08-03 PROBLEM — Z79.4 TYPE 2 DIABETES MELLITUS WITHOUT COMPLICATION, WITH LONG-TERM CURRENT USE OF INSULIN (H): Status: RESOLVED | Noted: 2018-06-02 | Resolved: 2022-08-03

## 2022-08-03 PROBLEM — E11.9 TYPE 2 DIABETES MELLITUS (H): Status: ACTIVE | Noted: 2022-08-03

## 2022-08-03 PROBLEM — G47.63 SLEEP RELATED BRUXISM: Status: ACTIVE | Noted: 2022-08-03

## 2022-08-03 PROBLEM — I10 BENIGN ESSENTIAL HYPERTENSION: Status: ACTIVE | Noted: 2022-08-03

## 2022-08-03 PROBLEM — E11.9 TYPE 2 DIABETES MELLITUS WITHOUT COMPLICATION, WITH LONG-TERM CURRENT USE OF INSULIN (H): Status: RESOLVED | Noted: 2018-06-02 | Resolved: 2022-08-03

## 2022-08-03 PROBLEM — F41.1 GAD (GENERALIZED ANXIETY DISORDER): Status: ACTIVE | Noted: 2022-08-03

## 2022-08-03 PROBLEM — K21.9 GASTROESOPHAGEAL REFLUX DISEASE: Status: RESOLVED | Noted: 2020-05-29 | Resolved: 2022-08-03

## 2022-08-03 PROBLEM — E78.00 PURE HYPERCHOLESTEROLEMIA: Status: ACTIVE | Noted: 2022-08-03

## 2022-08-03 PROBLEM — G47.11 HYPERSOMNIA, IDIOPATHIC: Status: ACTIVE | Noted: 2022-08-03

## 2022-08-03 PROBLEM — Z23: Status: RESOLVED | Noted: 2021-07-08 | Resolved: 2022-08-03

## 2022-08-03 PROBLEM — L93.0 DISCOID LUPUS: Status: ACTIVE | Noted: 2022-08-03

## 2022-08-03 PROBLEM — J45.20 MILD INTERMITTENT ASTHMA: Status: ACTIVE | Noted: 2022-08-03

## 2022-08-04 ENCOUNTER — OFFICE VISIT (OUTPATIENT)
Dept: INTERNAL MEDICINE | Facility: CLINIC | Age: 42
End: 2022-08-04
Payer: COMMERCIAL

## 2022-08-04 VITALS
OXYGEN SATURATION: 97 % | TEMPERATURE: 99.6 F | DIASTOLIC BLOOD PRESSURE: 95 MMHG | SYSTOLIC BLOOD PRESSURE: 142 MMHG | HEIGHT: 64 IN | HEART RATE: 134 BPM | WEIGHT: 147.8 LBS | BODY MASS INDEX: 25.23 KG/M2

## 2022-08-04 DIAGNOSIS — Z12.31 ENCOUNTER FOR SCREENING MAMMOGRAM FOR BREAST CANCER: ICD-10-CM

## 2022-08-04 DIAGNOSIS — I10 BENIGN ESSENTIAL HYPERTENSION: ICD-10-CM

## 2022-08-04 DIAGNOSIS — E78.1 HYPERTRIGLYCERIDEMIA: ICD-10-CM

## 2022-08-04 DIAGNOSIS — E11.9 TYPE 2 DIABETES MELLITUS WITHOUT COMPLICATION, WITHOUT LONG-TERM CURRENT USE OF INSULIN (H): ICD-10-CM

## 2022-08-04 DIAGNOSIS — E89.40 SURGICAL MENOPAUSE: ICD-10-CM

## 2022-08-04 DIAGNOSIS — Z76.89 ENCOUNTER TO ESTABLISH CARE: Primary | ICD-10-CM

## 2022-08-04 DIAGNOSIS — Z98.890 H/O OF RECTOPEXY: ICD-10-CM

## 2022-08-04 DIAGNOSIS — R00.0 TACHYCARDIA: ICD-10-CM

## 2022-08-04 DIAGNOSIS — Z23 NEED FOR VACCINATION: ICD-10-CM

## 2022-08-04 PROCEDURE — 90471 IMMUNIZATION ADMIN: CPT | Performed by: INTERNAL MEDICINE

## 2022-08-04 PROCEDURE — 99214 OFFICE O/P EST MOD 30 MIN: CPT | Mod: 25 | Performed by: INTERNAL MEDICINE

## 2022-08-04 PROCEDURE — 99207 PR FOOT EXAM NO CHARGE: CPT | Performed by: INTERNAL MEDICINE

## 2022-08-04 PROCEDURE — 93000 ELECTROCARDIOGRAM COMPLETE: CPT | Performed by: INTERNAL MEDICINE

## 2022-08-04 PROCEDURE — 90677 PCV20 VACCINE IM: CPT | Performed by: INTERNAL MEDICINE

## 2022-08-04 RX ORDER — FENOFIBRATE 145 MG/1
145 TABLET, COATED ORAL DAILY
Qty: 90 TABLET | Refills: 3 | Status: SHIPPED | OUTPATIENT
Start: 2022-08-04 | End: 2023-08-07

## 2022-08-04 ASSESSMENT — ASTHMA QUESTIONNAIRES: ACT_TOTALSCORE: 25

## 2022-08-04 NOTE — NURSING NOTE
Prior to injection, verified patient identity using patient's name and date of birth.  Due to injection administration, patient instructed to remain in clinic for 15 minutes afterwards, and to report any adverse reaction to me or the  staff immediately.    Drug Amount Wasted:  None.  Vial/Syringe: Syringe  Expiration Date:  08/01/2023    Screening Questionnaire for Adult Immunization     Are you sick today?   No    Do you have allergies to medications, food or any vaccine?   No    Have you ever had a serious reaction after receiving a vaccination?   No    Do you have a long-term health problem with heart disease, lung disease,  asthma, kidney disease, diabetes, anemia, metabolic or blood disease?   No    Do you have cancer, leukemia, AIDS, or any immune system problem?   No    Do you take cortisone, prednisone, other steroids, or anticancer drugs, or  have you had any x-ray (radiation) treatments?   No    Have you had a seizure, brain, or other nervous system problem?   No    During the past year, have you received a transfusion of blood or blood       products, or been given a medicine called immune (gamma) globulin?   No    For women: Are you pregnant or is there a chance you could become         pregnant during the next month?   No    Have you received any vaccinations in the past 4 weeks?   No     Immunization questionnaire answers were all negative.      MNVFC doesn't apply on this patient     Screening performed by Chuyita Love MA on 8/4/2022 at 3:32 PM.

## 2022-08-04 NOTE — PATIENT INSTRUCTIONS
EKG today.    ---    Prevnar 20 today.    ---    Recommend discontinuing Lantus - not needed.    Recommend switching from Zetia to Fenofibrate (1st line treatment for hypertriglyceridemia).    ---    Please schedule mammogram on the way out today.    Please schedule follow-up visit for blood pressure recheck and physical in ~2-4 weeks.    ---

## 2022-08-04 NOTE — PROGRESS NOTES
ASSESSMENT/PLAN                                                       (Z76.89) Encounter to establish care  (primary encounter diagnosis)  Comment: PMH, PSH, FH, SH, medications, allergies, immunizations, and preventative health measures reviewed and updated as appropriate.  Plan: see below for plans; patient will return for a physical at a later date.    (Z23) Need for vaccination  Plan: Prevnar 20 given today.     (E11.9) Type 2 diabetes mellitus without complication, without long-term current use of insulin (H)  Plan: may discontinue Lantus (not using currently); continue Metformin XR 1500mg daily; diabetes follow-up in 3-6 months.     (Z12.31) Encounter for screening mammogram for breast cancer  Plan: screening mammogram ordered - patient to schedule.     (I10) Benign essential hypertension  Comment: poorly-controlled on current regimen, but patient may not be taking full dose of Avapro.   Plan: blood pressure follow-up in 2-4 weeks.    (E78.1) Hypertriglyceridemia  Plan: REPLACE Zetia with fenofibrate; repeat fasting lipid profile in 3-6 months.     (R00.0) Tachycardia  Comment: chronic; dextroamphetamine is likely contributing.   Plan: EKG today; follow-up in 2-4 weeks to discuss treatment.     (E89.40) Surgical menopause  (Z98.890) H/O of rectopexy  Plan: referred to ob/gyn for rectopexy evaluation and to determine length of oral estrogen use (as been on oral estrogen for 20+ years); other risk factors to consider with oral estrogen use is chronic tobacco use and poorly controlled blood pressure.     Jessica Jimenez MD   [facility]  [address] [address]  T: [phone], F: [phone]    SUBJECTIVE                                                      Sharmaine José is a very pleasant 42 year old female who presents to establish care:    Blood pressure is noted to be elevated today, even on repeat. She is currently on Avapro 300mg daily, but she thinks she only took a  "half pill today. Tolerating medication(s) well - no adverse side effects.  She is asymptomatic from a blood pressure perspective: no chest pain or palpitations, no shortness of breath, no light-headedness or dizziness, no headaches or vision changes.     Heart rate is significantly elevated as well today. Patient says it always has been elevated and has never been treated. Medications significant for dextroamphetamine (used for hypersomnia).     Finally, patient feels like her colon \"is out of place.\" Has difficulty evacuating bowel movements. Denies constipation. History of rectopexy which she suspects may be causing or contributing to symptoms.     PMH, PSH, FH, SH, medications, allergies, immunizations, and preventative health measures reviewed and updated as appropriate.    Past Medical History:   Diagnosis Date     Benign essential hypertension      Discoid lupus      KIRBY (generalized anxiety disorder)      Hypersomnia, idiopathic      Hypertriglyceridemia      Mild intermittent asthma      Pure hypercholesterolemia      Sleep related bruxism      Type 2 diabetes mellitus (H)      Past Surgical History:   Procedure Laterality Date      SECTION       DAVINCI RECTOPEXY       HYSTERECTOMY TOTAL ABDOMINAL, BILATERAL SALPINGO-OOPHORECTOMY, COMBINED      in her 20s; for endometriosis, benign ovarian mass, cervical dyspasia     LAPAROSCOPIC ABLATION ENDOMETRIOSIS      multiple     URETHRAL SLING       Family History   Adopted: Yes     Social History     Occupational History     Occupation:    Tobacco Use     Smoking status: Current Every Day Smoker     Packs/day: 1.00     Years: 20.00     Pack years: 20.00     Types: Cigarettes     Smokeless tobacco: Never Used     Tobacco comment: 20 years (as of ); 0.5ppd (as of )   Vaping Use     Vaping Use: Never used   Substance and Sexual Activity     Alcohol use: Yes     Comment: 1 drink/week on average     Drug use: No     Sexual " "activity: Not Currently   Social History Narrative    Single.    Son (placed for adoption).    Swimming - few times a week.      Allergies   Allergen Reactions     Bee Venom Anaphylaxis     Tramadol Anaphylaxis     Armodafinil Rash     Cefaclor Rash     Crestor [Rosuvastatin] Muscle Pain (Myalgia)     Doxycycline GI Disturbance     Suture [Suture] Other (See Comments)     Ok with vicryl. Hives and extrusion of \"monocryl\"     Tuberculin Swelling     Current Outpatient Medications   Medication Sig     ACCU-CHEK GUIDE test strip USE TO TEST FOUR TIMES DAILY     albuterol (PROVENTIL HFA) 108 (90 Base) MCG/ACT inhaler Inhale 2 puffs into the lungs every 6 hours     alcohol swab prep pads Use to swab area of injection/robb as directed.     blood glucose calibration (NO BRAND SPECIFIED) solution To accompany: Blood Glucose Monitor Brands: per insurance.     blood glucose monitoring (NO BRAND SPECIFIED) meter device kit Use to test blood sugar 1 times daily or as directed. Preferred blood glucose meter OR supplies to accompany: Blood Glucose Monitor Brands: per insurance.     Continuous Blood Gluc  (FREESTYLE BRYANT 2 READER) MONIQUE 1 Device continuous     Continuous Blood Gluc Sensor (FREESTYLE BRYANT 2 SENSOR) MISC 1 Device every 14 days     cyclobenzaprine (FLEXERIL) 10 MG tablet TAKE 1 TABLET(10 MG) BY MOUTH EVERY NIGHT AS NEEDED FOR MUSCLE SPASMS     dextroamphetamine (DEXTROSTAT) 10 MG tablet Take 10 mg by mouth daily Takes 1-3tablets daily     DULoxetine (CYMBALTA) 20 MG capsule Take 1 capsule (20 mg) by mouth daily     EPINEPHrine (ANY BX GENERIC EQUIV) 0.3 MG/0.3ML injection 2-pack INJECT 0.3 ML INTO THE MUSCLE ONCE AS NEEDED     estrogen conj (PREMARIN) 1.25 MG tablet TAKE 1 TABLET(1.25 MG) BY MOUTH DAILY     fluticasone (FLONASE) 50 MCG/ACT nasal spray SHAKE LIQUID AND USE 1 SPRAY INTO EACH NOSTRIL DAILY     hydrocortisone (WESTCORT) 0.2 % external cream Apply topically 2 times daily Apply topically 2 times " daily     insulin pen needle (B-D U/F) 31G X 8 MM miscellaneous USE WITH LANTUS     irbesartan (AVAPRO) 300 MG tablet TAKE 1 TABLET(300 MG) BY MOUTH DAILY     lidocaine-prilocaine (EMLA) 2.5-2.5 % external cream Apply topically as needed for moderate pain Keep on file     metFORMIN (GLUCOPHAGE XR) 500 MG 24 hr tablet Take 3 tablets (1,500 mg) by mouth daily (with dinner)     mupirocin (BACTROBAN) 2 % external ointment APPLY EXTERNALLY TO THE AFFECTED AREA THREE TIMES DAILY     pravastatin (PRAVACHOL) 40 MG tablet Take 1 tablet (40 mg) by mouth daily     thin (NO BRAND SPECIFIED) lancets Use with lanceting device. To accompany: Blood Glucose Monitor Brands: per insurance.     Immunization History   Administered Date(s) Administered     COVID-19,PF,Pfizer (12+ Yrs) 03/10/2021, 03/31/2021, 10/09/2021     HEPA 02/12/2004     HepB 07/24/1996, 08/30/1996, 02/03/1997     Influenza (IIV3) PF 10/25/2004, 11/03/2005, 11/06/2006, 09/18/2011, 08/23/2012, 09/01/2013, 10/25/2015     Influenza Vaccine IM > 6 months Valent IIV4 (Alfuria,Fluzone) 09/22/2017, 10/19/2020     Influenza Vaccine, 6+MO IM (QUADRIVALENT W/PRESERVATIVES) 09/16/2014, 09/28/2016, 10/12/2018     Mantoux Tuberculin Skin Test 01/15/2003, 06/17/2003, 06/30/2004     Meningococcal (Menomune ) 07/19/2000     Pneumococcal 23 valent 02/12/2004     Rabies - IM Diploid Cell Culture 08/18/2017, 08/25/2017, 09/15/2017, 07/08/2021     TD (ADULT, 7+) 07/24/1996, 01/31/2006, 11/20/2020     TDAP Vaccine (Adacel) 04/19/2011, 01/01/2013     PREVENTATIVE HEALTH                                                      BMI: overweight  Blood pressure: elevated on current regimen  Breast CA screening: DUE  Cervical CA screening: up to date   Colon CA screening: not medically indicated at this time   Lung CA screening: patient does not meet screening criteria  Dexa: not medically indicated at this time   Screening cholesterol: n/a - already being treated for this condition  Screening  "diabetes: n/a - already being treated for this condition  STD testing: no risk factors present  Alcohol misuse screening: alcohol use reviewed - no intervention indicated at this time  Immunizations: reviewed; Prevnar 20 DUE    OBJECTIVE                                                      BP (!) 142/95   Pulse (!) 134   Temp 99.6  F (37.6  C) (Oral)   Ht 1.626 m (5' 4\")   Wt 67 kg (147 lb 12.8 oz)   LMP 10/04/2006   SpO2 97%   BMI 25.37 kg/m    Constitutional: well-appearing  Foot exam: feet intact - no lesions or ulcers; sensation intact to monofilament  Psych: normal judgment and insight; normal mood and affect; recent and remote memory intact    ---  (Note was completed, in part, with [employer] voice-recognition software. Documentation was reviewed, but some grammatical, spelling, and word errors may remain.)    "

## 2022-08-25 ENCOUNTER — ANCILLARY PROCEDURE (OUTPATIENT)
Dept: MAMMOGRAPHY | Facility: CLINIC | Age: 42
End: 2022-08-25
Attending: INTERNAL MEDICINE
Payer: COMMERCIAL

## 2022-08-25 DIAGNOSIS — Z12.31 ENCOUNTER FOR SCREENING MAMMOGRAM FOR BREAST CANCER: ICD-10-CM

## 2022-08-25 DIAGNOSIS — Z12.31 VISIT FOR SCREENING MAMMOGRAM: ICD-10-CM

## 2022-08-25 PROCEDURE — 77067 SCR MAMMO BI INCL CAD: CPT | Mod: TC | Performed by: RADIOLOGY

## 2022-08-25 PROCEDURE — 77063 BREAST TOMOSYNTHESIS BI: CPT | Mod: TC | Performed by: RADIOLOGY

## 2022-09-25 DIAGNOSIS — Z78.0 MENOPAUSE: ICD-10-CM

## 2022-09-26 NOTE — TELEPHONE ENCOUNTER
Called patient and left . Also sent patient a [email or web address] message to clarify patient's preferred pharmacy. Refill request coming from [facility]. Upon chart review, script sent to [facility] [address] on 6/2022 for 90 tabs plus 1 refill. Per previous encounter my chart message patient appears to prefer [facility].    Please accept or decline refill request as appropriate once patient replies.     Stephania Falk RN

## 2022-09-27 NOTE — TELEPHONE ENCOUNTER
Patient Contact    Attempt # 2    Was call answered?  No.  Left message on voicemail with information to call me back.    Upon call back, please confirm patient is using [facility] for her medications or if she would like a refill sent to [facility].     Audelia Klein RN

## 2022-09-28 NOTE — TELEPHONE ENCOUNTER
Per Duarte message back from the patient, patient using [facility] for her medications NOT [facility]. Patient recently picked up a 3 month supply of this medication from [facility].    Audelia Klein RN

## 2022-09-28 NOTE — TELEPHONE ENCOUNTER
"Per Duarte response back from the patient, \"I am SO SORRY!  I didn't realize [facility] sent a refill request!  I do not need any refills, I recently picked up a 3 month supply from [facility].\"    Will refuse refill request to [facility].    Audelia Klein RN       "

## 2022-10-05 ASSESSMENT — ENCOUNTER SYMPTOMS
NAUSEA: 0
CHILLS: 0
DIZZINESS: 0
HEADACHES: 0
NERVOUS/ANXIOUS: 0
SORE THROAT: 0
DYSURIA: 0
COUGH: 0
HEMATURIA: 0
HEARTBURN: 1
SHORTNESS OF BREATH: 0
DIARRHEA: 1
PALPITATIONS: 0
EYE PAIN: 0
ABDOMINAL PAIN: 0
JOINT SWELLING: 0
HEMATOCHEZIA: 0
BREAST MASS: 0
ARTHRALGIAS: 1
PARESTHESIAS: 1
FEVER: 1
FREQUENCY: 1
CONSTIPATION: 0
MYALGIAS: 1
WEAKNESS: 0

## 2022-10-06 ENCOUNTER — OFFICE VISIT (OUTPATIENT)
Dept: INTERNAL MEDICINE | Facility: CLINIC | Age: 42
End: 2022-10-06
Payer: COMMERCIAL

## 2022-10-06 VITALS
BODY MASS INDEX: 24.77 KG/M2 | WEIGHT: 144.3 LBS | OXYGEN SATURATION: 100 % | HEART RATE: 119 BPM | TEMPERATURE: 97 F | SYSTOLIC BLOOD PRESSURE: 134 MMHG | DIASTOLIC BLOOD PRESSURE: 84 MMHG

## 2022-10-06 DIAGNOSIS — R00.0 SINUS TACHYCARDIA: ICD-10-CM

## 2022-10-06 DIAGNOSIS — I10 BENIGN ESSENTIAL HYPERTENSION: ICD-10-CM

## 2022-10-06 DIAGNOSIS — E78.1 HYPERTRIGLYCERIDEMIA: ICD-10-CM

## 2022-10-06 DIAGNOSIS — E78.00 PURE HYPERCHOLESTEROLEMIA: ICD-10-CM

## 2022-10-06 DIAGNOSIS — Z00.00 ROUTINE HISTORY AND PHYSICAL EXAMINATION OF ADULT: Primary | ICD-10-CM

## 2022-10-06 DIAGNOSIS — E11.9 TYPE 2 DIABETES MELLITUS WITHOUT COMPLICATION, WITHOUT LONG-TERM CURRENT USE OF INSULIN (H): ICD-10-CM

## 2022-10-06 PROCEDURE — 99396 PREV VISIT EST AGE 40-64: CPT | Performed by: INTERNAL MEDICINE

## 2022-10-06 RX ORDER — ATENOLOL 25 MG/1
25 TABLET ORAL DAILY
Qty: 90 TABLET | Refills: 1 | Status: SHIPPED | OUTPATIENT
Start: 2022-10-06 | End: 2022-11-04

## 2022-10-06 ASSESSMENT — PAIN SCALES - GENERAL: PAINLEVEL: NO PAIN (0)

## 2022-10-06 NOTE — PATIENT INSTRUCTIONS
Fasting labs at your convenience.    ---    START atenolol 25mg daily. Goal heart rate is 60-80. Can increase atenolol dose as needed.    ---

## 2022-10-06 NOTE — PROGRESS NOTES
ASSESSMENT/PLAN                                                       (Z00.00) Routine history and physical examination of adult  (primary encounter diagnosis)  Comment: PMH, PSH, FH, SH, medications, allergies, immunizations, and preventative health measures reviewed and updated as appropriate.  Plan: see below for plans.      (I10) Benign essential hypertension  Comment: reasonably-controlled on current regimen.      (E11.9) Type 2 diabetes mellitus without complication, without long-term current use of insulin (H)  Plan: HgbA1c ordered - patient to schedule; recommendations to follow.     (R00.0) Sinus tachycardia  Plan: START atenolol 25mg daily; can increase as needed to achieve target HR (60-80).      (E78.1) Hypertriglyceridemia  (E78.00) Pure hypercholesterolemia  Plan: fasting lipid profile ordered - patient to schedule.    Jessica Jimenez MD   [address] [address]  T: [phone], F: [phone]    SUBJECTIVE                                                      Sharmaine José is a very pleasant 42 year old female who presents for a physical.    ROS:  Constitutional: no unintentional weight loss or gain reported; no fevers, chills, or sweats reported  Cardiovascular: no chest pain, palpitations, or edema reported  Respiratory: no cough, wheezing, shortness of breath, or dyspnea on exertion reported  Gastrointestinal: no nausea, vomiting, constipation, diarrhea, or abdominal pain reported  Genitourinary: no urinary frequency, urgency, dysuria, or hematuria reported  Integumentary: no rash or pruritus reported  Musculoskeletal: no back pain, muscle pain, joint pain, or joint swelling reported  Neurologic: no focal weakness, numbness, or tingling reported  Hematologic: no easy bruising or bleeding reported  Endocrine: no heat or cold intolerance reported; no polyuria or polydipsia reported  Psychiatric: no anxiety or depression reported    Past Medical  "History:   Diagnosis Date     Benign essential hypertension      Discoid lupus      KIRBY (generalized anxiety disorder)      Hypersomnia, idiopathic      Hypertriglyceridemia      Mild intermittent asthma      Pure hypercholesterolemia      Sleep related bruxism      Type 2 diabetes mellitus (H)      Past Surgical History:   Procedure Laterality Date      SECTION       DAVINCI RECTOPEXY       HYSTERECTOMY TOTAL ABDOMINAL, BILATERAL SALPINGO-OOPHORECTOMY, COMBINED      in her 20s; for endometriosis, benign ovarian mass, cervical dyspasia     LAPAROSCOPIC ABLATION ENDOMETRIOSIS      multiple     URETHRAL SLING       Family History   Adopted: Yes     Social History     Occupational History     Occupation:    Tobacco Use     Smoking status: Current Every Day Smoker     Packs/day: 1.00     Years: 20.00     Pack years: 20.00     Types: Cigarettes     Smokeless tobacco: Never Used     Tobacco comment: 20 years (as of ); 0.5ppd (as of )   Vaping Use     Vaping Use: Never used   Substance and Sexual Activity     Alcohol use: Yes     Comment: 1 drink/week on average     Drug use: No     Sexual activity: Not Currently   Social History Narrative    Single.    Son (placed for adoption).    Swimming - few times a week.      Allergies   Allergen Reactions     Bee Venom Anaphylaxis     Tramadol Anaphylaxis     Armodafinil Rash     Cefaclor Rash     Crestor [Rosuvastatin] Muscle Pain (Myalgia)     Doxycycline GI Disturbance     Suture [Suture] Other (See Comments)     Ok with vicryl. Hives and extrusion of \"monocryl\"     Tuberculin Swelling     Current Outpatient Medications   Medication Sig     albuterol (PROVENTIL HFA) 108 (90 Base) MCG/ACT inhaler Inhale 2 puffs into the lungs every 6 hours     atenolol (TENORMIN) 25 MG tablet Take 1 tablet (25 mg) by mouth daily     Continuous Blood Gluc  (FREESTYLE BRYANT 2 READER) MONIQUE 1 Device continuous     Continuous Blood Gluc Sensor " (FREESTYLE BRYANT 2 SENSOR) MISC 1 Device every 14 days     cyclobenzaprine (FLEXERIL) 10 MG tablet TAKE 1 TABLET(10 MG) BY MOUTH EVERY NIGHT AS NEEDED FOR MUSCLE SPASMS     dextroamphetamine (DEXTROSTAT) 10 MG tablet Take 10 mg by mouth daily Takes 1-3tablets daily     DULoxetine (CYMBALTA) 20 MG capsule Take 1 capsule (20 mg) by mouth daily     EPINEPHrine (ANY BX GENERIC EQUIV) 0.3 MG/0.3ML injection 2-pack INJECT 0.3 ML INTO THE MUSCLE ONCE AS NEEDED     estrogen conj (PREMARIN) 1.25 MG tablet TAKE 1 TABLET(1.25 MG) BY MOUTH DAILY     fenofibrate (TRICOR) 145 MG tablet Take 1 tablet (145 mg) by mouth daily     fluticasone (FLONASE) 50 MCG/ACT nasal spray SHAKE LIQUID AND USE 1 SPRAY INTO EACH NOSTRIL DAILY     hydrocortisone (WESTCORT) 0.2 % external cream Apply topically 2 times daily Apply topically 2 times daily     insulin pen needle (B-D U/F) 31G X 8 MM miscellaneous USE WITH LANTUS     irbesartan (AVAPRO) 300 MG tablet TAKE 1 TABLET(300 MG) BY MOUTH DAILY     lidocaine-prilocaine (EMLA) 2.5-2.5 % external cream Apply topically as needed for moderate pain Keep on file     metFORMIN (GLUCOPHAGE XR) 500 MG 24 hr tablet Take 3 tablets (1,500 mg) by mouth daily (with dinner)     mupirocin (BACTROBAN) 2 % external ointment APPLY EXTERNALLY TO THE AFFECTED AREA THREE TIMES DAILY     pravastatin (PRAVACHOL) 40 MG tablet Take 1 tablet (40 mg) by mouth daily     Immunization History   Administered Date(s) Administered     COVID-19,PF,Pfizer (12+ Yrs) 03/10/2021, 03/31/2021, 10/09/2021     HEPA 02/12/2004     HepB 07/24/1996, 08/30/1996, 02/03/1997     Influenza (IIV3) PF 10/25/2004, 11/03/2005, 11/06/2006, 09/18/2011, 08/23/2012, 09/01/2013, 10/25/2015     Influenza Vaccine IM > 6 months Valent IIV4 (Alfuria,Fluzone) 09/22/2017, 10/19/2020     Influenza Vaccine, 6+MO IM (QUADRIVALENT W/PRESERVATIVES) 09/16/2014, 09/28/2016, 10/12/2018     Mantoux Tuberculin Skin Test 01/15/2003, 06/17/2003, 06/30/2004      Meningococcal (Menomune ) 07/19/2000     Pneumococcal 20 valent Conjugate (Prevnar 20) 08/04/2022     Pneumococcal 23 valent 02/12/2004     Rabies - IM Diploid Cell Culture 08/18/2017, 08/25/2017, 09/15/2017, 07/08/2021     TD (ADULT, 7+) 07/24/1996, 01/31/2006, 11/20/2020     TDAP Vaccine (Adacel) 04/19/2011, 01/01/2013     PREVENTATIVE HEALTH                                                      BMI: within normal limits   Blood pressure: well-controlled on current regimen   Breast CA screening: up to date   Cervical CA screening: up to date   Colon CA screening: not medically indicated at this time   Lung CA screening: patient does not meet screening criteria  Dexa: not medically indicated at this time   Screening cholesterol: n/a - already being treated for this condition  Screening diabetes: n/a - already being treated for this condition  STD testing: not sexually active  Alcohol misuse screening: alcohol use reviewed - no intervention indicated at this time  Immunizations: reviewed; flu shot and COVID-19 booster DUE    OBJECTIVE                                                      /84 (BP Location: Left arm, Patient Position: Sitting, Cuff Size: Adult Regular)   Pulse 119   Temp 97  F (36.1  C) (Tympanic)   Wt 65.5 kg (144 lb 4.8 oz)   LMP 10/04/2006   SpO2 100%   BMI 24.77 kg/m    Constitutional: well-appearing  Head, Ears, and Eyes: normocephalic; normal external auditory canal and pinna; tympanic membranes visualized and normal; normal lids and conjunctivae  Neck: supple, symmetric, no thyromegaly or lymphadenopathy  Respiratory: normal respiratory effort; clear to auscultation bilaterally  Cardiovascular: regular rate and rhythm; no edema  Gastrointestinal: soft, non-tender, and non-distended; no organomegaly or masses  Musculoskeletal: normal gait and station  Psych: normal judgment and insight; normal mood and affect; recent and remote memory intact    ---  (Note was completed, in part, with  Dragon voice-recognition software. Documentation was reviewed, but some grammatical, spelling, and word errors may remain.)

## 2022-10-10 ENCOUNTER — HEALTH MAINTENANCE LETTER (OUTPATIENT)
Age: 42
End: 2022-10-10

## 2022-11-15 ENCOUNTER — MYC MEDICAL ADVICE (OUTPATIENT)
Dept: INTERNAL MEDICINE | Facility: CLINIC | Age: 42
End: 2022-11-15

## 2022-11-15 DIAGNOSIS — F41.1 GENERALIZED ANXIETY DISORDER: ICD-10-CM

## 2022-11-15 RX ORDER — DULOXETIN HYDROCHLORIDE 20 MG/1
20 CAPSULE, DELAYED RELEASE ORAL DAILY
Qty: 90 CAPSULE | Refills: 1 | Status: SHIPPED | OUTPATIENT
Start: 2022-11-15 | End: 2023-04-10

## 2023-02-17 ENCOUNTER — OFFICE VISIT (OUTPATIENT)
Dept: URGENT CARE | Facility: URGENT CARE | Age: 43
End: 2023-02-17
Payer: COMMERCIAL

## 2023-02-17 VITALS
HEART RATE: 80 BPM | DIASTOLIC BLOOD PRESSURE: 81 MMHG | TEMPERATURE: 98.2 F | WEIGHT: 150 LBS | BODY MASS INDEX: 25.75 KG/M2 | SYSTOLIC BLOOD PRESSURE: 124 MMHG | OXYGEN SATURATION: 100 %

## 2023-02-17 DIAGNOSIS — R30.0 DYSURIA: Primary | ICD-10-CM

## 2023-02-17 LAB
ALBUMIN UR-MCNC: 30 MG/DL
APPEARANCE UR: CLEAR
BACTERIA #/AREA URNS HPF: ABNORMAL /HPF
BILIRUB UR QL STRIP: NEGATIVE
COLOR UR AUTO: YELLOW
GLUCOSE UR STRIP-MCNC: NEGATIVE MG/DL
HGB UR QL STRIP: ABNORMAL
KETONES UR STRIP-MCNC: ABNORMAL MG/DL
LEUKOCYTE ESTERASE UR QL STRIP: NEGATIVE
NITRATE UR QL: NEGATIVE
PH UR STRIP: 5.5 [PH] (ref 5–7)
RBC #/AREA URNS AUTO: ABNORMAL /HPF
SP GR UR STRIP: >=1.03 (ref 1–1.03)
SQUAMOUS #/AREA URNS AUTO: ABNORMAL /LPF
UROBILINOGEN UR STRIP-ACNC: 0.2 E.U./DL
WBC #/AREA URNS AUTO: ABNORMAL /HPF

## 2023-02-17 PROCEDURE — 99213 OFFICE O/P EST LOW 20 MIN: CPT | Performed by: FAMILY MEDICINE

## 2023-02-17 PROCEDURE — 87086 URINE CULTURE/COLONY COUNT: CPT | Performed by: FAMILY MEDICINE

## 2023-02-17 PROCEDURE — 81001 URINALYSIS AUTO W/SCOPE: CPT | Performed by: FAMILY MEDICINE

## 2023-02-17 RX ORDER — NITROFURANTOIN 25; 75 MG/1; MG/1
100 CAPSULE ORAL 2 TIMES DAILY
Qty: 10 CAPSULE | Refills: 0 | Status: SHIPPED | OUTPATIENT
Start: 2023-02-17 | End: 2023-02-22

## 2023-02-17 NOTE — PROGRESS NOTES
"SUBJECTIVE: Sharmaine José is a 42 year old female who  presents today for a possible UTI.   Symptoms of dysuria and frequency have been going on for 2day(s).      Past Medical History:   Diagnosis Date     Benign essential hypertension      Discoid lupus      KIRBY (generalized anxiety disorder)      Hypersomnia, idiopathic      Hypertriglyceridemia      Mild intermittent asthma      Pure hypercholesterolemia      Sleep related bruxism      Type 2 diabetes mellitus (H)      Allergies   Allergen Reactions     Bee Venom Anaphylaxis     Tramadol Anaphylaxis     Armodafinil Rash     Cefaclor Rash     Crestor [Rosuvastatin] Muscle Pain (Myalgia)     Doxycycline GI Disturbance     Suture [Suture] Other (See Comments)     Ok with vicryl. Hives and extrusion of \"monocryl\"     Tuberculin Swelling     Social History     Tobacco Use     Smoking status: Every Day     Packs/day: 1.00     Years: 20.00     Pack years: 20.00     Types: Cigarettes     Smokeless tobacco: Never     Tobacco comments:     20 years (as of 2022); 0.5ppd (as of 2022)   Substance Use Topics     Alcohol use: Yes     Comment: 1 drink/week on average       ROS: CONSTITUTIONAL:NEGATIVE for fever, chills, change in weight    OBJECTIVE:  /81   Pulse 80   Temp 98.2  F (36.8  C)   Wt 68 kg (150 lb)   LMP 10/04/2006   SpO2 100%   BMI 25.75 kg/m      No Flank/abd pain      ICD-10-CM    1. Dysuria  R30.0 UA macro with reflex to Microscopic and Culture - Clinc Collect     Urine Microscopic     Urine Culture Aerobic Bacterial - lab collect     nitroFURantoin macrocrystal-monohydrate (MACROBID) 100 MG capsule          Drink plenty of fluids.  Prevention and treatment of UTI's discussed.Signs and symptoms of pyelonephritis mentioned.  Follow up with primary care physician if not improving      "

## 2023-02-19 LAB — BACTERIA UR CULT: NO GROWTH

## 2023-03-22 ENCOUNTER — MYC MEDICAL ADVICE (OUTPATIENT)
Dept: INTERNAL MEDICINE | Facility: CLINIC | Age: 43
End: 2023-03-22
Payer: COMMERCIAL

## 2023-03-22 DIAGNOSIS — Z78.0 MENOPAUSE: ICD-10-CM

## 2023-03-23 ENCOUNTER — OFFICE VISIT (OUTPATIENT)
Dept: FAMILY MEDICINE | Facility: CLINIC | Age: 43
End: 2023-03-23
Payer: OTHER MISCELLANEOUS

## 2023-03-23 ENCOUNTER — HOSPITAL ENCOUNTER (EMERGENCY)
Facility: CLINIC | Age: 43
Discharge: HOME | End: 2023-03-23
Attending: EMERGENCY MEDICINE | Admitting: EMERGENCY MEDICINE
Payer: OTHER MISCELLANEOUS

## 2023-03-23 ENCOUNTER — MYC MEDICAL ADVICE (OUTPATIENT)
Dept: INTERNAL MEDICINE | Facility: CLINIC | Age: 43
End: 2023-03-23
Payer: COMMERCIAL

## 2023-03-23 VITALS
SYSTOLIC BLOOD PRESSURE: 161 MMHG | OXYGEN SATURATION: 100 % | HEART RATE: 105 BPM | DIASTOLIC BLOOD PRESSURE: 98 MMHG | TEMPERATURE: 98.8 F | RESPIRATION RATE: 20 BRPM

## 2023-03-23 VITALS
DIASTOLIC BLOOD PRESSURE: 94 MMHG | OXYGEN SATURATION: 98 % | TEMPERATURE: 98.5 F | SYSTOLIC BLOOD PRESSURE: 136 MMHG | HEART RATE: 114 BPM
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
## 2023-03-23 DIAGNOSIS — S51.851A CAT BITE OF RIGHT FOREARM, INITIAL ENCOUNTER: Primary | ICD-10-CM
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
## 2023-03-23 PROCEDURE — 29125 APPL SHORT ARM SPLINT STATIC: CPT

## 2023-03-23 PROCEDURE — 99214 OFFICE O/P EST MOD 30 MIN: CPT

## 2023-03-23 PROCEDURE — 99283 EMERGENCY DEPT VISIT LOW MDM: CPT

## 2023-03-23 NOTE — PROGRESS NOTES
Assessment & Plan     Cat bite of right forearm, initial encounter         Patient Instructions   Patient to go to ER. Patient agreeable to ER.       No follow-ups on file.    [facility]    Marlon Schmid is a 42 year old female who presents to clinic today for the following health issues:  Chief Complaint   Patient presents with     Urgent Care     Work Comp     Cat Bite     Happened about an hour ago pt was helping her to restraining the cat and bite her on right forearm. Not sure about vaccine. Td- 11/20/2020. Pt did took 1 of her augmentin that she had on hand for situation like this     Patient works at a veterinary clinic. She was bitten by a cat 1.5 hours ago. She has several bite wounds. She cleaned the wounds with antiseptic solution. She reports that the cat incisors went all the way into her skin. There is pain at the site and around the site. She has diabetes and lupus. She is not currently taking any immunosuppressant medications. She took one augmentin right after the cat bite. She has been bitten by a cat previously which required an ER visit.         Review of Systems  As per HPI      Objective    LMP 09/25/2006   Physical Exam  Vitals and nursing note reviewed.   Constitutional:       General: She is not in acute distress.     Appearance: Normal appearance. She is normal weight. She is not ill-appearing.   Cardiovascular:      Rate and Rhythm: Normal rate and regular rhythm.      Heart sounds: Normal heart sounds.   Pulmonary:      Effort: Pulmonary effort is normal.      Breath sounds: Normal breath sounds.   Skin:     General: Skin is warm and dry.      Comments: 6 puncture wounds right forearm. 2 more superficial, 4 deeper. Erythema, swelling, and warmth around the area. Painful to move arm and hand. No drainage. No antecubital lymphadenopathy.    Neurological:      Mental Status: She is alert.

## 2023-03-23 NOTE — ED TRIAGE NOTES
Patient comes in with a cat bite to her right forearm from an unknown cat at her work where she works as a .  She has been vaccinated for rabies and tetanus. Some redness

## 2023-03-23 NOTE — ED PROVIDER NOTES
History     Chief Complaint:  Cat Bite    The history is provided by the patient.      Sharmaine José is a right-handed 42 year old female who presents with a cat bite. The patient words at [facility], and was bit by a cat about three hours prior to arrival. Vaccination status of this cat is unknown. Prior to arrival, she cleaned the wound with chlorhexidine. She endorses having her proflyactic rabies series, with her last booster being last year. Her last Td/Tdap immunization was in . Notes a history of Lupus (not on steroids) and diabetes (well controlled).  She also had a spare Augmentin at home so took an Augmentin immediately after this occurred.    Independent Historian:   None - Patient Only    Review of External Notes:   None     ROS:  Review of Systems    Allergies:  Bee Venom  Tramadol  Armodafinil  Cefaclor  Rosuvastatin  Doxycycline  Suture  Tuberculin     Medications:    Albuterol  Tenormin  Flexeril  Dextrostat  Cymbalta  Epinephrine  Premarin  Tricor  Flonase  Westcort  Avapro  Glucophage  Pravachol    Past Medical History:    Hypertension  Discoid lupus  Anxiety  Hypersomnia, idiopathic  Hypertriglyceridemia  Type II diabetes  Sleep related bruxism  Asthma    Past Surgical History:     section  Davinci rectopexy  Hysterectomy total abdominal, bilateral salpingo-oophorectomy, combined  Laparoscopic ablation endometriosis  Urethral sling    Social History:   reports that she has been smoking cigarettes. She has a 20.00 pack-year smoking history. She has never used smokeless tobacco. She reports current alcohol use. She reports that she does not use drugs.  PCP: Jessica Jimenez   Presents alone.     Physical Exam     Patient Vitals for the past 24 hrs:   BP Temp Temp src Pulse Resp SpO2   23 1532 (!) 161/98 98.8  F (37.1  C) Temporal 105 20 100 %        Physical Exam    Physical Exam   Constitutional:  Patient is oriented to person, place, and time. They appear  well-developed and well-nourished. Mild distress secondary to  Right arm pain   HENT:   Mouth/Throat:   Oropharynx is clear and moist.   Eyes:    Conjunctivae normal and EOM are normal. Pupils are equal, round, and reactive to light.   Neck:    Normal range of motion.   Musculoskeletal:  Normal range of motion. Patient exhibits no edema.   Neurological:   Patient is alert and oriented to person, place, and time. Patient has normal strength. No cranial nerve deficit or sensory deficit. GCS 15  Skin:  Patient has 3 puncture wounds on the volar aspect of her right far forearm there is mild ecchymosis and edema.  No erythema or purulent drainage.  No bleeding.  Pain with flexion and extension of the wrist.  No other bite wounds poet.  No foreign bodies.   Psychiatric:   Patient has a normal mood and affect. Patient's behavior is normal. Judgment and thought content normal.         Emergency Department Course     Laboratory:  Labs Ordered and Resulted from Time of ED Arrival to Time of ED Departure - No data to display     Procedures     Splint Placement     Procedure: Splint Placement     Indication: Infection    Consent: Verbal     Location: Right Forearm    Preparation: Wounds were cleansed and dressed with a non-adherent bandage     Procedure detail:   Splint was applied by Tech/Nurse with my assistance  Splint type: Sugar-tong   Splint materilal: Fiberglass  After placement I checked and adjusted the fit as needed to ensure proper positioning/fit   Sensation and circulation are intact after splint placement     Patient Status: The patient tolerated the procedure well: Yes. There were no complications.      Emergency Department Course & Assessments:       Interventions:  Medications - No data to display     Assessments:  1530 I obtained history and examined the patient as noted above.        Social Determinants of Health affecting care:   None    Disposition:  The patient was discharged to home.     Impression & Plan       Medical Decision Making:  Sharmaine José is a 42 year old female who presents for evaluation of a cat bite to right forearm. The workup here in the ED shows no signs of compartment syndrome, significant lacerations, tendon or bone injury.  No signs of foreign body or cat teeth in wound.  The patient has had her rabies series with a booster last year.  Additionally she is up-to-date on her tetanus.  The wounds were scrubbed and cleaned.  X-rays not indicated as there is no evidence of foreign body.  I will put in a splint being that this is her dominant arm.  Will start augmentin and have them observe for signs of infection (pain, redness, warmth, red streaks, etc).  She will follow close with her primary care doctor in 2 days for wound recheck.  Return to the emergency department if signs of infection.    Diagnosis:    ICD-10-CM    1. Cat bite, initial encounter  W55.01XA            Discharge Medications:  New Prescriptions    AMOXICILLIN-CLAVULANATE (AUGMENTIN) 875-125 MG TABLET    Take 1 tablet by mouth 2 times daily for 7 days      Scribe Disclosure:  I, Damian Johnson, am serving as a scribe at 3:39 PM on 3/23/2023 to document services personally performed by Jennie Johnson MD based on my observations and the provider's statements to me.   3/23/2023   Jennie Johnson MD Bochert, Michelle Ann, MD  03/23/23 [id number]

## 2023-03-23 NOTE — DISCHARGE INSTRUCTIONS
Do not used your right arm. Wear the splint for 1 week. Follow up with your provider in 2 days for a wound recheck. Cat bites can become infected. Return to ER if you have increasing pain, swelling or redness

## 2023-03-24 NOTE — TELEPHONE ENCOUNTER
Spoke with patient to assist with scheduling Hosp/ED Follow Up. Patient scheduled 3/27/2023 with Same Day provider. RN advised patient to return to ED if symptoms worsen, patient agreed with plan of care.

## 2023-03-27 ENCOUNTER — OFFICE VISIT (OUTPATIENT)
Dept: INTERNAL MEDICINE | Facility: CLINIC | Age: 43
End: 2023-03-27
Payer: OTHER MISCELLANEOUS

## 2023-03-27 VITALS
RESPIRATION RATE: 18 BRPM | BODY MASS INDEX: 27.26 KG/M2 | WEIGHT: 159.7 LBS | DIASTOLIC BLOOD PRESSURE: 100 MMHG | HEIGHT: 64 IN | OXYGEN SATURATION: 99 % | HEART RATE: 88 BPM | TEMPERATURE: 98.2 F | SYSTOLIC BLOOD PRESSURE: 160 MMHG

## 2023-03-27 DIAGNOSIS — S51.851A CAT BITE OF FOREARM, RIGHT, INITIAL ENCOUNTER: Primary | ICD-10-CM
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
## 2023-03-27 DIAGNOSIS — I10 HYPERTENSION GOAL BP (BLOOD PRESSURE) < 140/90: ICD-10-CM

## 2023-03-27 PROCEDURE — 99213 OFFICE O/P EST LOW 20 MIN: CPT | Performed by: PHYSICIAN ASSISTANT

## 2023-03-27 RX ORDER — IRBESARTAN 300 MG/1
TABLET ORAL
Qty: 90 TABLET | Refills: 3 | Status: SHIPPED | OUTPATIENT
Start: 2023-03-27 | End: 2024-07-15

## 2023-03-27 ASSESSMENT — ASTHMA QUESTIONNAIRES
QUESTION_2 LAST FOUR WEEKS HOW OFTEN HAVE YOU HAD SHORTNESS OF BREATH: NOT AT ALL
QUESTION_4 LAST FOUR WEEKS HOW OFTEN HAVE YOU USED YOUR RESCUE INHALER OR NEBULIZER MEDICATION (SUCH AS ALBUTEROL): NOT AT ALL
QUESTION_3 LAST FOUR WEEKS HOW OFTEN DID YOUR ASTHMA SYMPTOMS (WHEEZING, COUGHING, SHORTNESS OF BREATH, CHEST TIGHTNESS OR PAIN) WAKE YOU UP AT NIGHT OR EARLIER THAN USUAL IN THE MORNING: NOT AT ALL
QUESTION_5 LAST FOUR WEEKS HOW WOULD YOU RATE YOUR ASTHMA CONTROL: COMPLETELY CONTROLLED
QUESTION_1 LAST FOUR WEEKS HOW MUCH OF THE TIME DID YOUR ASTHMA KEEP YOU FROM GETTING AS MUCH DONE AT WORK, SCHOOL OR AT HOME: NONE OF THE TIME
ACT_TOTALSCORE: 25
ACT_TOTALSCORE: 25

## 2023-03-27 NOTE — PROGRESS NOTES
"  Assessment & Plan     Cat bite of forearm, right, initial encounter  Letter done for work   Ok to return and not have to wear the splint  Finish out antibiotics     BP elevated out of medications   Refill to PCP done in separate encounter as this is work comp.          MED REC REQUIRED  Post Medication Reconciliation Status: discharge medications reconciled, continue medications without change  BMI:   Estimated body mass index is 27.41 kg/m  as calculated from the following:    Height as of this encounter: 1.626 m (5' 4\").    Weight as of this encounter: 72.4 kg (159 lb 11.2 oz).   Weight management plan: Patient was referred to their PCP to discuss a diet and exercise plan.    Warm compress if desired  Doing well     Jennie Aguilar PA-C  [facility]    Marlon Schmid is a 42 year old, presenting for the following health issues:  ER F/U  No flowsheet data found.  History of Present Illness       Reason for visit:  Work injury cat bite recheck  Symptom onset:  3-7 days ago  Symptoms include:  Recheck punctures from bite  Symptom intensity:  Moderate  Symptom progression:  Improving    She eats 2-3 servings of fruits and vegetables daily.She consumes 1 sweetened beverage(s) daily.She exercises with enough effort to increase her heart rate 30 to 60 minutes per day.  She exercises with enough effort to increase her heart rate 5 days per week.   She is taking medications regularly.     Sharmaine José is a 42 year old female who presents for evaluation of a cat bite to right forearm. The workup here in the ED shows no signs of compartment syndrome, significant lacerations, tendon or bone injury.  No signs of foreign body or cat teeth in wound.  The patient has had her rabies series with a booster last year.  Additionally she is up-to-date on her tetanus.  The wounds were scrubbed and cleaned.  X-rays not indicated as there is no evidence of foreign body.  I will put in a " "splint being that this is her dominant arm.  Will start augmentin and have them observe for signs of infection (pain, redness, warmth, red streaks, etc).  She will follow close with her primary care doctor in 2 days for wound recheck.  Return to the emergency department if signs of infection.     Diagnosis:      ICD-10-CM     1. Cat bite, initial encounter  W55.01XA                Review of Systems         Objective    BP (!) 160/100   Pulse 88   Temp 98.2  F (36.8  C) (Tympanic)   Resp 18   Ht 1.626 m (5' 4\")   Wt 72.4 kg (159 lb 11.2 oz)   LMP 09/25/2006   SpO2 99%   BMI 27.41 kg/m    Body mass index is 27.41 kg/m .  Physical Exam   GENERAL: healthy, alert and no distress  SKIN: right forearm,  Healing puncture, bite wounds noted.   No redness mild pain                      "

## 2023-03-27 NOTE — LETTER
March 27, 2023      Sharmaine José  [address] [facility] [address] [facility] [address]        To Whom It May Concern:    Sharmaine José  was seen on 3/27/2023.  Sharmaine is able to return to work without splint in place. She may do her work activities as tolerated.       Sincerely,        Jennie Aguilar PA-C

## 2023-03-28 ENCOUNTER — MYC MEDICAL ADVICE (OUTPATIENT)
Dept: INTERNAL MEDICINE | Facility: CLINIC | Age: 43
End: 2023-03-28
Payer: COMMERCIAL

## 2023-03-28 DIAGNOSIS — R11.0 NAUSEA: Primary | ICD-10-CM

## 2023-03-28 RX ORDER — ONDANSETRON 4 MG/1
4 TABLET, ORALLY DISINTEGRATING ORAL EVERY 8 HOURS PRN
Qty: 30 TABLET | Refills: 0 | Status: SHIPPED | OUTPATIENT
Start: 2023-03-28 | End: 2024-07-15

## 2023-04-10 DIAGNOSIS — F41.1 GENERALIZED ANXIETY DISORDER: ICD-10-CM

## 2023-04-10 RX ORDER — DULOXETIN HYDROCHLORIDE 20 MG/1
20 CAPSULE, DELAYED RELEASE ORAL DAILY
Qty: 90 CAPSULE | Refills: 1 | Status: SHIPPED | OUTPATIENT
Start: 2023-04-10 | End: 2024-07-15

## 2023-04-18 ENCOUNTER — TRANSFERRED RECORDS (OUTPATIENT)
Dept: HEALTH INFORMATION MANAGEMENT | Facility: CLINIC | Age: 43
End: 2023-04-18
Payer: COMMERCIAL

## 2023-05-13 DIAGNOSIS — E11.9 TYPE 2 DIABETES MELLITUS WITHOUT COMPLICATION, WITH LONG-TERM CURRENT USE OF INSULIN (H): ICD-10-CM

## 2023-05-13 DIAGNOSIS — Z79.4 TYPE 2 DIABETES MELLITUS WITHOUT COMPLICATION, WITH LONG-TERM CURRENT USE OF INSULIN (H): ICD-10-CM

## 2023-05-15 RX ORDER — METFORMIN HCL 500 MG
TABLET, EXTENDED RELEASE 24 HR ORAL
Qty: 270 TABLET | Refills: 3 | OUTPATIENT
Start: 2023-05-15

## 2023-05-15 NOTE — TELEPHONE ENCOUNTER
She is overdue for diabetes labs (ordered last fall) and diabetes follow-up. Please ask her to schedule this appointment ASAP. Can refill medication temporarily if she anticipates running out prior to scheduled appointment.     Thank you.     ---    May use any virtual or virtual release spot for an in-person visit.     Patient may also be seen at noon (arrival time 11:40am) Mondays, Wednesdays, Thursdays, and Fridays OR at 1:00pm (arrival time 12:40pm) on Thursdays (during the huddle).    Please do not schedule in a same day, next day, or hospital follow-up slot.    Okay to double book lunch slot (but patient should still arrive by 11:40am).

## 2023-05-27 ENCOUNTER — HEALTH MAINTENANCE LETTER (OUTPATIENT)
Age: 43
End: 2023-05-27

## 2023-06-13 ENCOUNTER — OFFICE VISIT (OUTPATIENT)
Dept: INTERNAL MEDICINE | Facility: CLINIC | Age: 43
End: 2023-06-13
Payer: COMMERCIAL

## 2023-06-13 VITALS
DIASTOLIC BLOOD PRESSURE: 82 MMHG | HEART RATE: 100 BPM | TEMPERATURE: 98.7 F | SYSTOLIC BLOOD PRESSURE: 132 MMHG | OXYGEN SATURATION: 99 % | BODY MASS INDEX: 26.4 KG/M2 | WEIGHT: 153.8 LBS

## 2023-06-13 DIAGNOSIS — M25.511 CHRONIC RIGHT SHOULDER PAIN: ICD-10-CM

## 2023-06-13 DIAGNOSIS — L93.0 DISCOID LUPUS: Primary | ICD-10-CM

## 2023-06-13 DIAGNOSIS — Z79.4 TYPE 2 DIABETES MELLITUS WITHOUT COMPLICATION, WITH LONG-TERM CURRENT USE OF INSULIN (H): ICD-10-CM

## 2023-06-13 DIAGNOSIS — E11.9 TYPE 2 DIABETES MELLITUS WITHOUT COMPLICATION, WITH LONG-TERM CURRENT USE OF INSULIN (H): ICD-10-CM

## 2023-06-13 DIAGNOSIS — G89.29 CHRONIC RIGHT SHOULDER PAIN: ICD-10-CM

## 2023-06-13 LAB
ALBUMIN SERPL BCG-MCNC: 4.6 G/DL (ref 3.5–5.2)
ALP SERPL-CCNC: 41 U/L (ref 35–104)
ALT SERPL W P-5'-P-CCNC: 27 U/L (ref 0–50)
ANION GAP SERPL CALCULATED.3IONS-SCNC: 13 MMOL/L (ref 7–15)
AST SERPL W P-5'-P-CCNC: 27 U/L (ref 0–45)
BILIRUB SERPL-MCNC: 0.3 MG/DL
BUN SERPL-MCNC: 13.7 MG/DL (ref 6–20)
CALCIUM SERPL-MCNC: 10.3 MG/DL (ref 8.6–10)
CHLORIDE SERPL-SCNC: 101 MMOL/L (ref 98–107)
CHOLEST SERPL-MCNC: 239 MG/DL
CREAT SERPL-MCNC: 0.58 MG/DL (ref 0.51–0.95)
DEPRECATED HCO3 PLAS-SCNC: 25 MMOL/L (ref 22–29)
ERYTHROCYTE [DISTWIDTH] IN BLOOD BY AUTOMATED COUNT: 11.9 % (ref 10–15)
GFR SERPL CREATININE-BSD FRML MDRD: >90 ML/MIN/1.73M2
GLUCOSE SERPL-MCNC: 107 MG/DL (ref 70–99)
HBA1C MFR BLD: 6.2 % (ref 0–5.6)
HCT VFR BLD AUTO: 43.4 % (ref 35–47)
HDLC SERPL-MCNC: 48 MG/DL
HGB BLD-MCNC: 14.7 G/DL (ref 11.7–15.7)
LDLC SERPL CALC-MCNC: 123 MG/DL
MCH RBC QN AUTO: 31.4 PG (ref 26.5–33)
MCHC RBC AUTO-ENTMCNC: 33.9 G/DL (ref 31.5–36.5)
MCV RBC AUTO: 93 FL (ref 78–100)
NONHDLC SERPL-MCNC: 191 MG/DL
PLATELET # BLD AUTO: 249 10E3/UL (ref 150–450)
POTASSIUM SERPL-SCNC: 4.3 MMOL/L (ref 3.4–5.3)
PROT SERPL-MCNC: 7.2 G/DL (ref 6.4–8.3)
RBC # BLD AUTO: 4.68 10E6/UL (ref 3.8–5.2)
SODIUM SERPL-SCNC: 139 MMOL/L (ref 136–145)
TRIGL SERPL-MCNC: 338 MG/DL
WBC # BLD AUTO: 6.8 10E3/UL (ref 4–11)

## 2023-06-13 PROCEDURE — 36415 COLL VENOUS BLD VENIPUNCTURE: CPT | Performed by: INTERNAL MEDICINE

## 2023-06-13 PROCEDURE — 99213 OFFICE O/P EST LOW 20 MIN: CPT | Performed by: INTERNAL MEDICINE

## 2023-06-13 PROCEDURE — 83036 HEMOGLOBIN GLYCOSYLATED A1C: CPT | Performed by: INTERNAL MEDICINE

## 2023-06-13 PROCEDURE — 85027 COMPLETE CBC AUTOMATED: CPT | Performed by: INTERNAL MEDICINE

## 2023-06-13 PROCEDURE — 80061 LIPID PANEL: CPT | Performed by: INTERNAL MEDICINE

## 2023-06-13 PROCEDURE — 80053 COMPREHEN METABOLIC PANEL: CPT | Performed by: INTERNAL MEDICINE

## 2023-06-13 NOTE — PROGRESS NOTES
ASSESSMENT/PLAN                                                      (L93.0) Discoid lupus  (primary encounter diagnosis)  Comment: flare due to recent sun exposure.  Plan: continue supportive care (Westcort and Emla); avoid sun exposure and wear sunscreen SPF 30 or greater whenever outside; dermatology referral declined for now.     (M25.511,  G89.29) Chronic right shoulder pain  Comment: suspect rotator cuff tendonitis.   Plan: PT referral declined for now; patient will continue to monitor.     (E11.9,  Z79.4) Type 2 diabetes mellitus without complication, with long-term current use of insulin (H)  Plan: non-fasting labs today; recommendations to follow.     Jessica Jimenez MD   [address] [address]  T: [phone], F: [phone]    SUBJECTIVE                                                      Sharmaine José is a very pleasant 43 year old female who presents with several concerns:    Patient's has been experiencing a flare of her discoid lupus. Flare started after significant sun exposure even with sunscreen use. Using Westcort and Emla as needed, but not on any biologic or immunomodulating medications currently.    Patient complains of acute on chronic right shoulder pain. No precipitating trauma, injury, or exertion.  Patient works as a  - frequent/regular lifting of animals. Pain occurs with lifting her right arm above her head or lifting a heavy item upwards. No weakness.    Patient's diabetes is currently diet controlled. She been off of her metformin for at least a month. She is adherent to her diabetic diet. No diabetic complications. Annual diabetic eye exam is NOT up to date. She is checking her feet regularly. Her pertinent vaccinations (Pneumovax, influenza) are up to date. She is on a statin and an ACE-I or ARB. She is NOT on a daily baby aspirin. Blood pressure is well-controlled on current medications.      OBJECTIVE                                                       /82   Pulse 100   Temp 98.7  F (37.1  C)   Wt 69.8 kg (153 lb 12.8 oz)   LMP 09/25/2006   SpO2 99%   BMI 26.40 kg/m    Constitutional: well-appearing  Right shoulder:  Inspection: no swelling, bruising, discoloration, or obvious deformity or asymmetry  Tender: anterior capsule and proximal bicep tendon  Active ROM: normal throughout  Strength: normal    ---    (Note documentation was completed, in part, with [employer] voice-recognition software. Documentation was reviewed, but some grammatical, spelling, and word errors may remain.)

## 2023-07-18 ENCOUNTER — MYC MEDICAL ADVICE (OUTPATIENT)
Dept: INTERNAL MEDICINE | Facility: CLINIC | Age: 43
End: 2023-07-18
Payer: COMMERCIAL
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
## 2023-07-18 DIAGNOSIS — E78.5 HYPERLIPIDEMIA LDL GOAL <100: ICD-10-CM

## 2023-07-19 RX ORDER — PRAVASTATIN SODIUM 40 MG
40 TABLET ORAL DAILY
Qty: 90 TABLET | Refills: 3 | Status: SHIPPED | OUTPATIENT
Start: 2023-07-19 | End: 2023-08-07 | Stop reason: DRUGHIGH

## 2023-08-06 ENCOUNTER — MYC MEDICAL ADVICE (OUTPATIENT)
Dept: INTERNAL MEDICINE | Facility: CLINIC | Age: 43
End: 2023-08-06
Payer: COMMERCIAL

## 2023-08-06 DIAGNOSIS — E78.5 HYPERLIPIDEMIA LDL GOAL <100: ICD-10-CM

## 2023-08-06 DIAGNOSIS — E78.1 HYPERTRIGLYCERIDEMIA: ICD-10-CM

## 2023-08-07 RX ORDER — PRAVASTATIN SODIUM 80 MG/1
80 TABLET ORAL DAILY
Qty: 90 TABLET | Refills: 0 | Status: SHIPPED | OUTPATIENT
Start: 2023-08-07 | End: 2024-03-04

## 2023-08-07 RX ORDER — FENOFIBRATE 145 MG/1
145 TABLET, COATED ORAL DAILY
Qty: 90 TABLET | Refills: 3 | Status: SHIPPED | OUTPATIENT
Start: 2023-08-07

## 2023-08-07 RX ORDER — PRAVASTATIN SODIUM 40 MG
40 TABLET ORAL DAILY
Qty: 90 TABLET | Refills: 3 | Status: CANCELLED | OUTPATIENT
Start: 2023-08-07

## 2023-08-07 NOTE — TELEPHONE ENCOUNTER
Please see [employer] message and advise. Patient stated she was instructed to take 2 tabs of Pravastatin instead of the 1 tab, updated script not sent to pharmacy. Patient also requesting refill for Fenofibrate, script pended.     Routing to PCP for review.

## 2023-08-21 ENCOUNTER — OFFICE VISIT (OUTPATIENT)
Dept: URGENT CARE | Facility: URGENT CARE | Age: 43
End: 2023-08-21
Payer: COMMERCIAL

## 2023-08-21 VITALS
TEMPERATURE: 98.2 F | HEART RATE: 106 BPM | DIASTOLIC BLOOD PRESSURE: 107 MMHG | RESPIRATION RATE: 18 BRPM | OXYGEN SATURATION: 100 % | BODY MASS INDEX: 26.26 KG/M2 | SYSTOLIC BLOOD PRESSURE: 160 MMHG | WEIGHT: 153 LBS

## 2023-08-21 DIAGNOSIS — I10 BENIGN ESSENTIAL HYPERTENSION: ICD-10-CM

## 2023-08-21 DIAGNOSIS — L73.9 FOLLICULITIS: ICD-10-CM

## 2023-08-21 DIAGNOSIS — L29.9 PRURITIC DISORDER: ICD-10-CM

## 2023-08-21 DIAGNOSIS — E11.9 TYPE 2 DIABETES MELLITUS WITHOUT COMPLICATION, WITHOUT LONG-TERM CURRENT USE OF INSULIN (H): Primary | ICD-10-CM

## 2023-08-21 PROCEDURE — 99214 OFFICE O/P EST MOD 30 MIN: CPT | Performed by: PHYSICIAN ASSISTANT

## 2023-08-21 RX ORDER — METHYLPREDNISOLONE 4 MG
TABLET, DOSE PACK ORAL
Qty: 21 TABLET | Refills: 0 | Status: SHIPPED | OUTPATIENT
Start: 2023-08-21 | End: 2023-08-24

## 2023-08-21 RX ORDER — CETIRIZINE HYDROCHLORIDE 10 MG/1
10 TABLET ORAL DAILY
Qty: 30 TABLET | Refills: 0 | Status: SHIPPED | OUTPATIENT
Start: 2023-08-21 | End: 2024-07-15

## 2023-08-21 NOTE — PROGRESS NOTES
"  Assessment & Plan     Type 2 diabetes mellitus without complication, without long-term current use of insulin (H)    Monitor blood sugars, as medrol will temporarily increase sugar levels    Folliculitis    Folliculitis (say \"teb-BSK-elt-VICKIE-tus\") is an inflammation of the pouches (follicles) in the skin where hair grows. It can occur on any part of the body, but it is most common on the scalp, face, armpits, and groin. Bacteria, such as those found in a hot tub, can cause folliculitis. But folliculitis can also be caused by other organisms, such as fungi or parasites.  Folliculitis begins as a red, tender area near a strand of hair. The skin can itch or burn and may drain pus or blood. Sometimes folliculitis can lead to more serious skin infections    - methylPREDNISolone (MEDROL DOSEPAK) 4 MG tablet therapy pack; Follow package instructions  - amoxicillin-clavulanate (AUGMENTIN) 875-125 MG tablet; Take 1 tablet by mouth 2 times daily for 10 days    Pruritic disorder    - methylPREDNISolone (MEDROL DOSEPAK) 4 MG tablet therapy pack; Follow package instructions  - cetirizine (ZYRTEC) 10 MG tablet; Take 1 tablet (10 mg) by mouth daily    Benign essential hypertension    Patient advised to follow up with PCP for recheck blood pressure   Information given to patient on diet modifications, including lowering salt in diet, decrease calories, weight loss and exercise.  Monitor blood pressure at home and if BP maintains over 140/90 then advise having a recheck with PCP in 2 weeks.       Review of external notes as documented elsewhere in note       At today's visit with Sharmaine TRINITY Ralpher , we discussed results, diagnosis, medications and formulated a plan.  We also discussed red flags for immediate return to clinic/ER, as well as indications for follow up with PCP if not improved in 3 days. Patient understood and agreed to plan. Sharmaine José was discharged with stable vitals and has no further questions.       No " follow-ups on file.    Suresh Mayorga, [facility], PA-C  [facility] JULIETA Schmid is a 43 year old, presenting for the following health issues:  Derm Problem (Rash that is itchy all over body for 3 days)      HPI   Review of Systems   Constitutional, HEENT, cardiovascular, pulmonary, gi and gu systems are negative, except as otherwise noted.      Objective    BP (!) 160/107   Pulse 106   Temp 98.2  F (36.8  C)   Resp 18   Wt 69.4 kg (153 lb)   LMP 09/25/2006   SpO2 100%   BMI 26.26 kg/m    Body mass index is 26.26 kg/m .  Physical Exam   GENERAL: healthy, alert and no distress  RESP: lungs clear to auscultation - no rales, rhonchi or wheezes  CV: regular rate and rhythm, normal S1 S2, no S3 or S4, no murmur, click or rub, no peripheral edema and peripheral pulses strong  ABDOMEN: soft, nontender, no hepatosplenomegaly, no masses and bowel sounds normal  MS: no gross musculoskeletal defects noted, no edema  SKIN: Positive for follicular rash with some areas containing white heads, other aeas are inflamed  NEURO: Normal strength and tone, mentation intact and speech normal  PSYCH: mentation appears normal, affect normal/bright

## 2023-08-23 ENCOUNTER — NURSE TRIAGE (OUTPATIENT)
Dept: INTERNAL MEDICINE | Facility: CLINIC | Age: 43
End: 2023-08-23
Payer: COMMERCIAL

## 2023-08-23 NOTE — TELEPHONE ENCOUNTER
"Pt reporting widespread rash not improving with OTC meds and antibiotics. Requesting follow up. Pt triaged and appointment scheduled. Pt will follow up with clinic as needed.   1. APPEARANCE of RASH: \"Describe the rash.\" (e.g., spots, blisters, raised areas, skin peeling, scaly)      Little red bumps  2. SIZE: \"How big are the spots?\" (e.g., tip of pen, eraser, coin; inches, centimeters)      Tip of pen   3. LOCATION: \"Where is the rash located?\"      Neck, thighs, calves, fore arms since she was last seen.   4. COLOR: \"What color is the rash?\" (Note: It is difficult to assess rash color in people with darker-colored skin. When this situation occurs, simply ask the caller to describe what they see.)      Red, not raised   5. ONSET: \"When did the rash begin?\"      Saturday morning   6. FEVER: \"Do you have a fever?\" If Yes, ask: \"What is your temperature, how was it measured, and when did it start?\"      Reporting chills at times   7. ITCHING: \"Does the rash itch?\" If Yes, ask: \"How bad is the itch?\" (Scale 1-10; or mild, moderate, severe)      Itching mild-moderate   8. CAUSE: \"What do you think is causing the rash?\"      Unsure   9. NEW MEDICATION: \"What new medication are you taking?\" (e.g., name of antibiotic) \"When did you start taking this medication?\".      Augmentin 8/21 for sinus infection - that's improving.   10. OTHER SYMPTOMS: \"Do you have any other symptoms?\" (e.g., sore throat, fever, joint pain)        Denies   11. PREGNANCY: \"Is there any chance you are pregnant?\" \"When was your last menstrual period?\"        2008       Reason for Disposition   Mild widespread rash    Additional Information   Negative: Difficulty breathing or wheezing   Negative: Hoarseness or cough that started soon after 1st dose of drug   Negative: Swollen tongue that started soon after first dose of drug   Negative: Fever and purple or blood-colored spots or dots   Negative: Too weak or sick to stand   Negative: Sounds like a " life-threatening emergency to the triager   Negative: Rash is only on 1 part of the body (localized)   Negative: Taking new non-prescription (OTC) antihistamine, decongestant, ear drops, eye drops, or other OTC cough/cold medicine   Negative: Taking new prescription antihistamine, allergy medicine, asthma medicine, eye drops, ear drops or nose drops   Negative: Rash started more than 3 days after stopping new prescription medicine   Negative: Swollen tongue   Negative: Widespread hives and onset < 2 hours of exposure to 1st dose of drug   Negative: Patient sounds very sick or weak to the triager   Negative: Fever   Negative: Face or lip swelling   Negative: Purple or blood-colored spots or dots (no fever and sounds well to triager)   Negative: Joint pain or swelling   Negative: Bloody crusts on lips or in mouth   Negative: Large or small blisters on skin (i.e., fluid filled bubbles or sacs)   Negative: Pregnant   Negative: Rash beginning within 4 hours of a new prescription medication    Protocols used: Rash - Widespread On Drugs-A-OH, Rash or Redness - Widespread-A-OH

## 2023-08-24 ENCOUNTER — TELEPHONE (OUTPATIENT)
Dept: INTERNAL MEDICINE | Facility: CLINIC | Age: 43
End: 2023-08-24

## 2023-08-24 ENCOUNTER — OFFICE VISIT (OUTPATIENT)
Dept: INTERNAL MEDICINE | Facility: CLINIC | Age: 43
End: 2023-08-24
Payer: COMMERCIAL

## 2023-08-24 VITALS
BODY MASS INDEX: 26.85 KG/M2 | SYSTOLIC BLOOD PRESSURE: 132 MMHG | OXYGEN SATURATION: 98 % | HEART RATE: 103 BPM | RESPIRATION RATE: 18 BRPM | WEIGHT: 156.4 LBS | DIASTOLIC BLOOD PRESSURE: 82 MMHG

## 2023-08-24 DIAGNOSIS — R21 RASH: Primary | ICD-10-CM

## 2023-08-24 PROCEDURE — 99213 OFFICE O/P EST LOW 20 MIN: CPT | Performed by: INTERNAL MEDICINE

## 2023-08-24 RX ORDER — MINOCYCLINE HYDROCHLORIDE 100 MG/1
100 TABLET ORAL 2 TIMES DAILY
Qty: 60 TABLET | Refills: 2 | Status: SHIPPED | OUTPATIENT
Start: 2023-08-24 | End: 2024-07-14

## 2023-08-24 RX ORDER — MINOCYCLINE HYDROCHLORIDE 100 MG/1
100 CAPSULE ORAL 2 TIMES DAILY
Qty: 60 CAPSULE | Refills: 2 | Status: SHIPPED | OUTPATIENT
Start: 2023-08-24 | End: 2024-07-14

## 2023-08-24 NOTE — TELEPHONE ENCOUNTER
[facility] calling regarding Minocycline    They don't have tablets in stock, but they do have capsules with the same dosing     They are asking if PCP can send an new Rx for capsules instead? Pended Rx    Licha GARCIA, Triage RN  [facility]

## 2023-08-24 NOTE — PROGRESS NOTES
ASSESSMENT/PLAN                                                      (R21) Rash  (primary encounter diagnosis)  Comment: etiology unclear; not responding to Augmentin, Medrol Dosepak, or Zyrtec.  Plan: TRIAL of extended course of minocycline for antibiotic and anti-inflammatory purposes; if symptoms worsen, change, or do not improve, patient to contact MD.      Jessica Jimenez MD   [address] [address]  T: [phone], F: [phone]    SUBJECTIVE                                                      Sharmaine José is a very pleasant 43 year old female who presents with a rash:    Rash started last Saturday (5 days ago). Started on her back and has since spread to her abdomen and extremities. Rash is mildly itchy.  Seen in urgent care on Monday -prescribed Medrol Dosepak, Augmentin, and Zyrtec. Unfortunately, rash has worsened since then.    No fevers or chills per se (patient reports that she is always hot -no recent changes).  No new medications or supplements.  No recent dietary changes.  No recent travel or new environmental exposures.    OBJECTIVE                                                      /82   Pulse 103   Resp 18   Wt 70.9 kg (156 lb 6.4 oz)   LMP 09/25/2006   SpO2 98%   BMI 26.85 kg/m    Constitutional: well-appearing  Integumentary: diffuse maculopapular rash involving extremities and torso      ---    (Note documentation was completed, in part, with [employer] voice-recognition software. Documentation was reviewed, but some grammatical, spelling, and word errors may remain.)     Normal

## 2023-09-06 ENCOUNTER — MYC MEDICAL ADVICE (OUTPATIENT)
Dept: INTERNAL MEDICINE | Facility: CLINIC | Age: 43
End: 2023-09-06
Payer: COMMERCIAL

## 2023-09-06 ENCOUNTER — PATIENT OUTREACH (OUTPATIENT)
Dept: CARE COORDINATION | Facility: CLINIC | Age: 43
End: 2023-09-06
Payer: COMMERCIAL

## 2023-09-06 DIAGNOSIS — Z78.0 MENOPAUSE: ICD-10-CM

## 2023-09-20 ENCOUNTER — PATIENT OUTREACH (OUTPATIENT)
Dept: CARE COORDINATION | Facility: CLINIC | Age: 43
End: 2023-09-20
Payer: COMMERCIAL

## 2024-01-07 ENCOUNTER — HEALTH MAINTENANCE LETTER (OUTPATIENT)
Age: 44
End: 2024-01-07

## 2024-03-04 ENCOUNTER — MYC REFILL (OUTPATIENT)
Dept: INTERNAL MEDICINE | Facility: CLINIC | Age: 44
End: 2024-03-04
Payer: COMMERCIAL

## 2024-03-04 DIAGNOSIS — E78.5 HYPERLIPIDEMIA LDL GOAL <100: ICD-10-CM

## 2024-03-04 DIAGNOSIS — R00.0 SINUS TACHYCARDIA: ICD-10-CM

## 2024-03-04 DIAGNOSIS — Z78.0 MENOPAUSE: ICD-10-CM

## 2024-03-04 RX ORDER — ATENOLOL 25 MG/1
75 TABLET ORAL DAILY
Qty: 270 TABLET | Refills: 3 | Status: SHIPPED | OUTPATIENT
Start: 2024-03-04

## 2024-03-05 RX ORDER — PRAVASTATIN SODIUM 80 MG/1
80 TABLET ORAL DAILY
Qty: 90 TABLET | Refills: 0 | Status: SHIPPED | OUTPATIENT
Start: 2024-03-05 | End: 2024-06-03

## 2024-03-06 DIAGNOSIS — Z78.0 MENOPAUSE: ICD-10-CM

## 2024-03-07 ENCOUNTER — E-VISIT (OUTPATIENT)
Dept: URGENT CARE | Facility: CLINIC | Age: 44
End: 2024-03-07
Payer: COMMERCIAL

## 2024-03-07 DIAGNOSIS — N39.0 ACUTE UTI (URINARY TRACT INFECTION): Primary | ICD-10-CM

## 2024-03-07 PROCEDURE — 99421 OL DIG E/M SVC 5-10 MIN: CPT | Performed by: EMERGENCY MEDICINE

## 2024-03-07 RX ORDER — NITROFURANTOIN 25; 75 MG/1; MG/1
100 CAPSULE ORAL 2 TIMES DAILY
Qty: 10 CAPSULE | Refills: 0 | Status: SHIPPED | OUTPATIENT
Start: 2024-03-07 | End: 2024-03-12

## 2024-03-07 NOTE — TELEPHONE ENCOUNTER
Script sent to the pharmacy on 3/4/24 for 90 tablets. Patient should have a refill on file with the pharmacy.    Audelia Klein RN

## 2024-03-07 NOTE — PATIENT INSTRUCTIONS
Dear Sharmaine José    After reviewing your responses, I've been able to diagnose you with a urinary tract infection, which is a common infection of the bladder with bacteria.  This is not a sexually transmitted infection, though urinating immediately after intercourse can help prevent infections.  Drinking lots of fluids is also helpful to clear your current infection and prevent the next one.      I have sent a prescription for antibiotics to your pharmacy to treat this infection.    It is important that you take all of your prescribed medication even if your symptoms are improving after a few doses.  Taking all of your medicine helps prevent the symptoms from returning.     If your symptoms worsen, you develop pain in your back or stomach, develop fevers, or are not improving in 5 days, please contact your primary care provider for an appointment or visit any of our convenient Walk-in or Urgent Care Centers to be seen, which can be found on our website here.    Thanks again for choosing us as your health care partner,    Drew Hogan MD  Urinary Tract Infection (UTI) in Women: Care Instructions  Overview     A urinary tract infection, or UTI, is a general term for an infection anywhere between the kidneys and the urethra (where urine comes out). Most UTIs are bladder infections. They often cause pain or burning when you urinate.  UTIs are caused by bacteria and can be cured with antibiotics. Be sure to complete your treatment so that the infection does not get worse.  Follow-up care is a key part of your treatment and safety. Be sure to make and go to all appointments, and call your doctor if you are having problems. It's also a good idea to know your test results and keep a list of the medicines you take.  How can you care for yourself at home?    Take your antibiotics as directed. Do not stop taking them just because you feel better. You need to take the full course of antibiotics.    Drink extra water  "and other fluids for the next day or two. This will help make the urine less concentrated and help wash out the bacteria that are causing the infection. (If you have kidney, heart, or liver disease and have to limit fluids, talk with your doctor before you increase the amount of fluids you drink.)    Avoid drinks that are carbonated or have caffeine. They can irritate the bladder.    Urinate often. Try to empty your bladder each time.    To relieve pain, take a hot bath or lay a heating pad set on low over your lower belly or genital area. Never go to sleep with a heating pad in place.  To prevent UTIs    Drink plenty of water each day. This helps you urinate often, which clears bacteria from your system. (If you have kidney, heart, or liver disease and have to limit fluids, talk with your doctor before you increase the amount of fluids you drink.)    Urinate when you need to.    If you are sexually active, urinate right after you have sex.    Change sanitary pads often.    Avoid douches, bubble baths, feminine hygiene sprays, and other feminine hygiene products that have deodorants.    After going to the bathroom, wipe from front to back.  When should you call for help?   Call your doctor now or seek immediate medical care if:      Symptoms such as fever, chills, nausea, or vomiting get worse or appear for the first time.       You have new pain in your back just below your rib cage. This is called flank pain.       There is new blood or pus in your urine.       You have any problems with your antibiotic medicine.   Watch closely for changes in your health, and be sure to contact your doctor if:      You are not getting better after taking an antibiotic for 2 days.       Your symptoms go away but then come back.   Where can you learn more?  Go to https://www.healthwise.net/patiented  Enter K848 in the search box to learn more about \"Urinary Tract Infection (UTI) in Women: Care Instructions.\"  Current as of: " February 28, 2023               Content Version: 13.8    [id number] [employer].   Care instructions adapted under license by your healthcare professional. If you have questions about a medical condition or this instruction, always ask your healthcare professional. [employer] disclaims any warranty or liability for your use of this information.

## 2024-04-02 ENCOUNTER — TRANSFERRED RECORDS (OUTPATIENT)
Dept: HEALTH INFORMATION MANAGEMENT | Facility: CLINIC | Age: 44
End: 2024-04-02
Payer: COMMERCIAL

## 2024-04-27 NOTE — ED AVS SNAPSHOT
Emergency Department    [address] [place] [id number]    Phone:  [phone]    Fax:  [phone]                                       Sharmaine José   MRN: [id number]    Department:   Emergency Department   Date of Visit:  9/16/2018           After Visit Summary Signature Page     I have received my discharge instructions, and my questions have been answered. I have discussed any challenges I see with this plan with the nurse or doctor.    ..........................................................................................................................................  Patient/Patient Representative Signature      ..........................................................................................................................................  Patient Representative Print Name and Relationship to Patient    ..................................................               ................................................  Date                                   Time    ..........................................................................................................................................  Reviewed by Signature/Title    ...................................................              ..............................................  Date                                               Time          22EPIC Rev 08/18         None

## 2024-05-20 DIAGNOSIS — E11.9 TYPE 2 DIABETES MELLITUS WITHOUT COMPLICATION, WITH LONG-TERM CURRENT USE OF INSULIN (H): ICD-10-CM

## 2024-05-20 DIAGNOSIS — Z79.4 TYPE 2 DIABETES MELLITUS WITHOUT COMPLICATION, WITH LONG-TERM CURRENT USE OF INSULIN (H): ICD-10-CM

## 2024-05-20 NOTE — TELEPHONE ENCOUNTER
She is overdue for her annual exam and diabetes follow-up. Please ask her to schedule this appointment ASAP. Can refill medication temporarily if she anticipates running out prior to scheduled appointment.     Thank you.     ---    May use any virtual or virtual release spot for an in-person visit.     Patient may also be seen at noon (arrival time 11:40am) Mondays, Wednesdays, Thursdays, and Fridays OR at 1:00pm (arrival time 12:40pm) on Thursdays (during the huddle).    Please do not schedule in a same day, next day, or hospital follow-up slot.    Okay to double book lunch slot (but patient should still arrive by 11:40am).

## 2024-06-03 ENCOUNTER — MYC REFILL (OUTPATIENT)
Dept: INTERNAL MEDICINE | Facility: CLINIC | Age: 44
End: 2024-06-03
Payer: COMMERCIAL

## 2024-06-03 DIAGNOSIS — Z79.4 TYPE 2 DIABETES MELLITUS WITHOUT COMPLICATION, WITH LONG-TERM CURRENT USE OF INSULIN (H): ICD-10-CM

## 2024-06-03 DIAGNOSIS — E78.5 HYPERLIPIDEMIA LDL GOAL <100: ICD-10-CM

## 2024-06-03 DIAGNOSIS — E11.9 TYPE 2 DIABETES MELLITUS WITHOUT COMPLICATION, WITH LONG-TERM CURRENT USE OF INSULIN (H): ICD-10-CM

## 2024-06-03 DIAGNOSIS — Z78.0 MENOPAUSE: ICD-10-CM

## 2024-06-04 RX ORDER — PRAVASTATIN SODIUM 80 MG/1
80 TABLET ORAL DAILY
Qty: 90 TABLET | Refills: 0 | Status: SHIPPED | OUTPATIENT
Start: 2024-06-04 | End: 2024-07-19

## 2024-07-15 ENCOUNTER — OFFICE VISIT (OUTPATIENT)
Dept: INTERNAL MEDICINE | Facility: CLINIC | Age: 44
End: 2024-07-15
Payer: COMMERCIAL

## 2024-07-15 VITALS
HEART RATE: 77 BPM | TEMPERATURE: 98.9 F | HEIGHT: 64 IN | OXYGEN SATURATION: 99 % | DIASTOLIC BLOOD PRESSURE: 84 MMHG | RESPIRATION RATE: 18 BRPM | SYSTOLIC BLOOD PRESSURE: 130 MMHG | WEIGHT: 155.3 LBS | BODY MASS INDEX: 26.51 KG/M2

## 2024-07-15 DIAGNOSIS — Z79.4 TYPE 2 DIABETES MELLITUS WITHOUT COMPLICATION, WITH LONG-TERM CURRENT USE OF INSULIN (H): ICD-10-CM

## 2024-07-15 DIAGNOSIS — E11.9 TYPE 2 DIABETES MELLITUS WITHOUT COMPLICATION, WITH LONG-TERM CURRENT USE OF INSULIN (H): ICD-10-CM

## 2024-07-15 DIAGNOSIS — Z12.31 ENCOUNTER FOR SCREENING MAMMOGRAM FOR BREAST CANCER: ICD-10-CM

## 2024-07-15 DIAGNOSIS — Z00.00 ROUTINE HISTORY AND PHYSICAL EXAMINATION OF ADULT: Primary | ICD-10-CM

## 2024-07-15 DIAGNOSIS — Z90.710 S/P HYSTERECTOMY: ICD-10-CM

## 2024-07-15 DIAGNOSIS — G89.29 CHRONIC RIGHT SHOULDER PAIN: ICD-10-CM

## 2024-07-15 DIAGNOSIS — I10 BENIGN ESSENTIAL HYPERTENSION: ICD-10-CM

## 2024-07-15 DIAGNOSIS — Z12.4 SCREENING FOR CERVICAL CANCER: ICD-10-CM

## 2024-07-15 DIAGNOSIS — E78.00 PURE HYPERCHOLESTEROLEMIA: ICD-10-CM

## 2024-07-15 DIAGNOSIS — K59.00 DIFFICULTY DEFECATING: ICD-10-CM

## 2024-07-15 DIAGNOSIS — E78.1 HYPERTRIGLYCERIDEMIA: ICD-10-CM

## 2024-07-15 DIAGNOSIS — M25.511 CHRONIC RIGHT SHOULDER PAIN: ICD-10-CM

## 2024-07-15 LAB
ALBUMIN SERPL BCG-MCNC: 4.5 G/DL (ref 3.5–5.2)
ALP SERPL-CCNC: 37 U/L (ref 40–150)
ALT SERPL W P-5'-P-CCNC: 25 U/L (ref 0–50)
ANION GAP SERPL CALCULATED.3IONS-SCNC: 10 MMOL/L (ref 7–15)
AST SERPL W P-5'-P-CCNC: 29 U/L (ref 0–45)
BILIRUB SERPL-MCNC: 0.2 MG/DL
BUN SERPL-MCNC: 20.2 MG/DL (ref 6–20)
CALCIUM SERPL-MCNC: 10.1 MG/DL (ref 8.6–10)
CHLORIDE SERPL-SCNC: 102 MMOL/L (ref 98–107)
CHOLEST SERPL-MCNC: 203 MG/DL
CREAT SERPL-MCNC: 0.54 MG/DL (ref 0.51–0.95)
CREAT UR-MCNC: 73.4 MG/DL
EGFRCR SERPLBLD CKD-EPI 2021: >90 ML/MIN/1.73M2
ERYTHROCYTE [DISTWIDTH] IN BLOOD BY AUTOMATED COUNT: 11.8 % (ref 10–15)
FASTING STATUS PATIENT QL REPORTED: NO
FASTING STATUS PATIENT QL REPORTED: NO
GLUCOSE SERPL-MCNC: 115 MG/DL (ref 70–99)
HBA1C MFR BLD: 6.8 % (ref 0–5.6)
HCO3 SERPL-SCNC: 26 MMOL/L (ref 22–29)
HCT VFR BLD AUTO: 42.2 % (ref 35–47)
HDLC SERPL-MCNC: 42 MG/DL
HGB BLD-MCNC: 14.3 G/DL (ref 11.7–15.7)
LDLC SERPL CALC-MCNC: 116 MG/DL
MCH RBC QN AUTO: 31.4 PG (ref 26.5–33)
MCHC RBC AUTO-ENTMCNC: 33.9 G/DL (ref 31.5–36.5)
MCV RBC AUTO: 93 FL (ref 78–100)
MICROALBUMIN UR-MCNC: <12 MG/L
MICROALBUMIN/CREAT UR: NORMAL MG/G{CREAT}
NONHDLC SERPL-MCNC: 161 MG/DL
PLATELET # BLD AUTO: 246 10E3/UL (ref 150–450)
POTASSIUM SERPL-SCNC: 4.4 MMOL/L (ref 3.4–5.3)
PROT SERPL-MCNC: 7.2 G/DL (ref 6.4–8.3)
RBC # BLD AUTO: 4.56 10E6/UL (ref 3.8–5.2)
SODIUM SERPL-SCNC: 138 MMOL/L (ref 135–145)
TRIGL SERPL-MCNC: 225 MG/DL
WBC # BLD AUTO: 8.4 10E3/UL (ref 4–11)

## 2024-07-15 PROCEDURE — 82570 ASSAY OF URINE CREATININE: CPT | Performed by: INTERNAL MEDICINE

## 2024-07-15 PROCEDURE — 99396 PREV VISIT EST AGE 40-64: CPT | Performed by: INTERNAL MEDICINE

## 2024-07-15 PROCEDURE — 99214 OFFICE O/P EST MOD 30 MIN: CPT | Mod: 25 | Performed by: INTERNAL MEDICINE

## 2024-07-15 PROCEDURE — 99207 PR FOOT EXAM NO CHARGE: CPT | Mod: 25 | Performed by: INTERNAL MEDICINE

## 2024-07-15 PROCEDURE — 36415 COLL VENOUS BLD VENIPUNCTURE: CPT | Performed by: INTERNAL MEDICINE

## 2024-07-15 PROCEDURE — G0145 SCR C/V CYTO,THINLAYER,RESCR: HCPCS | Performed by: INTERNAL MEDICINE

## 2024-07-15 PROCEDURE — 80053 COMPREHEN METABOLIC PANEL: CPT | Performed by: INTERNAL MEDICINE

## 2024-07-15 PROCEDURE — 83036 HEMOGLOBIN GLYCOSYLATED A1C: CPT | Performed by: INTERNAL MEDICINE

## 2024-07-15 PROCEDURE — 82043 UR ALBUMIN QUANTITATIVE: CPT | Performed by: INTERNAL MEDICINE

## 2024-07-15 PROCEDURE — 87624 HPV HI-RISK TYP POOLED RSLT: CPT | Performed by: INTERNAL MEDICINE

## 2024-07-15 PROCEDURE — 80061 LIPID PANEL: CPT | Performed by: INTERNAL MEDICINE

## 2024-07-15 PROCEDURE — 85027 COMPLETE CBC AUTOMATED: CPT | Performed by: INTERNAL MEDICINE

## 2024-07-15 RX ORDER — IRBESARTAN 300 MG/1
TABLET ORAL
Qty: 90 TABLET | Refills: 3 | Status: SHIPPED | OUTPATIENT
Start: 2024-07-15

## 2024-07-15 SDOH — HEALTH STABILITY: PHYSICAL HEALTH: ON AVERAGE, HOW MANY DAYS PER WEEK DO YOU ENGAGE IN MODERATE TO STRENUOUS EXERCISE (LIKE A BRISK WALK)?: 5 DAYS

## 2024-07-15 SDOH — HEALTH STABILITY: PHYSICAL HEALTH: ON AVERAGE, HOW MANY MINUTES DO YOU ENGAGE IN EXERCISE AT THIS LEVEL?: 40 MIN

## 2024-07-15 ASSESSMENT — SOCIAL DETERMINANTS OF HEALTH (SDOH): HOW OFTEN DO YOU GET TOGETHER WITH FRIENDS OR RELATIVES?: TWICE A WEEK

## 2024-07-15 ASSESSMENT — ASTHMA QUESTIONNAIRES
ACT_TOTALSCORE: 24
QUESTION_3 LAST FOUR WEEKS HOW OFTEN DID YOUR ASTHMA SYMPTOMS (WHEEZING, COUGHING, SHORTNESS OF BREATH, CHEST TIGHTNESS OR PAIN) WAKE YOU UP AT NIGHT OR EARLIER THAN USUAL IN THE MORNING: NOT AT ALL
QUESTION_2 LAST FOUR WEEKS HOW OFTEN HAVE YOU HAD SHORTNESS OF BREATH: NOT AT ALL
QUESTION_1 LAST FOUR WEEKS HOW MUCH OF THE TIME DID YOUR ASTHMA KEEP YOU FROM GETTING AS MUCH DONE AT WORK, SCHOOL OR AT HOME: NONE OF THE TIME
QUESTION_5 LAST FOUR WEEKS HOW WOULD YOU RATE YOUR ASTHMA CONTROL: COMPLETELY CONTROLLED
QUESTION_4 LAST FOUR WEEKS HOW OFTEN HAVE YOU USED YOUR RESCUE INHALER OR NEBULIZER MEDICATION (SUCH AS ALBUTEROL): ONCE A WEEK OR LESS
ACT_TOTALSCORE: 24

## 2024-07-15 NOTE — PROGRESS NOTES
ASSESSMENT/PLAN                                                       (Z00.00) Routine history and physical examination of adult  (primary encounter diagnosis)  Comment: PMH, PSH, FH, SH, medications, allergies, immunizations, and preventative health measures reviewed and updated as appropriate.  Plan: see below for plans.      (E11.9,  Z79.4) Type 2 diabetes mellitus without complication, with long-term current use of insulin (H)  (E78.00) Pure hypercholesterolemia  (E78.1) Hypertriglyceridemia  Plan: non-fasting labs today; recommendations to follow.     (Z12.31) Encounter for screening mammogram for breast cancer  Plan: screening mammogram ordered - patient to schedule.     (Z12.4) Screening for cervical cancer  (Z90.710) S/P hysterectomy (in her 20s; for endometriosis, benign ovarian mass, cervical dysplasia)  Comment: not sure why this test is being performed since there is no cervix; history of dysplasia noted, but not convinced that swabbing the vaginal canal for decades is decreasing this patient's risk of anything.      (M25.511,  G89.29) Chronic right shoulder pain  Plan: Orthopedic  Referral placed - patient will be contacted to schedule.      (K59.00) Difficulty defecating  Plan: Adult GI  Referral - Consult Only - patient will be contacted to schedule.      (I10) Benign essential hypertension  Comment: well-controlled on current regimen.    Plan: continue present management; refills provided.     Jessica Jimenez MD   [address] [address]  T: [phone], F: [phone]    SUBJECTIVE                                                      Sharmaine José is a very pleasant 44 year old female who presents for a physical.    ROS:  Constitutional: no unintentional weight loss or gain reported; no fevers, chills, or sweats reported  Cardiovascular: no chest pain, palpitations, or edema reported  Respiratory: no cough, wheezing, shortness of  breath, or dyspnea on exertion reported  Gastrointestinal: difficulty defecating - history of rectopexy  Genitourinary: no urinary frequency, urgency, dysuria, or hematuria reported  Integumentary: no rash or pruritus reported  Musculoskeletal: chronic right shoulder pain  Neurologic: no focal weakness, numbness, or tingling reported  Hematologic: no easy bruising or bleeding reported  Endocrine: no heat or cold intolerance reported; no polyuria or polydipsia reported  Psychiatric: no anxiety or depression reported    Past Medical History:   Diagnosis Date    Benign essential hypertension     Discoid lupus     KIRBY (generalized anxiety disorder)     Hypersomnia, idiopathic     Hypertriglyceridemia     Mild intermittent asthma     Pure hypercholesterolemia     Sleep related bruxism     Type 2 diabetes mellitus (H)      Past Surgical History:   Procedure Laterality Date     SECTION      DAVINCI RECTOPEXY      HYSTERECTOMY TOTAL ABDOMINAL, BILATERAL SALPINGO-OOPHORECTOMY, COMBINED      in her 20s; for endometriosis, benign ovarian mass, cervical dyspasia    LAPAROSCOPIC ABLATION ENDOMETRIOSIS      multiple    URETHRAL SLING       Family History   Adopted: Yes     Social History     Occupational History    Occupation:    Tobacco Use    Smoking status: Every Day     Types: Cigarettes    Smokeless tobacco: Never    Tobacco comments:     22 years (as of ); 0.5-1ppd (as of ); 1ppd at her most.   Vaping Use    Vaping status: Never Used   Substance and Sexual Activity    Alcohol use: Yes     Comment: 1 drink/day on average    Drug use: No    Sexual activity: Yes     Partners: Male   Social History Narrative    Single.    Son (placed for adoption).    Swimming - few times a week.        Allergies   Allergen Reactions    Bee Venom Anaphylaxis    Tramadol Anaphylaxis    Armodafinil Rash    Cefaclor Rash    Crestor [Rosuvastatin] Muscle Pain (Myalgia)    Doxycycline GI Disturbance     "Suture [Suture] Other (See Comments)     Ok with vicryl. Hives and extrusion of \"monocryl\"    Tuberculin, Ppd Swelling     Current Outpatient Medications   Medication Sig    albuterol (PROVENTIL HFA) 108 (90 Base) MCG/ACT inhaler Inhale 2 puffs into the lungs every 6 hours    atenolol (TENORMIN) 25 MG tablet Take 3 tablets (75 mg) by mouth daily    cyclobenzaprine (FLEXERIL) 10 MG tablet TAKE 1 TABLET(10 MG) BY MOUTH EVERY NIGHT AS NEEDED FOR MUSCLE SPASMS    dextroamphetamine (DEXTROSTAT) 10 MG tablet Take 10 mg by mouth daily Takes 1-3tablets daily    EPINEPHrine (ANY BX GENERIC EQUIV) 0.3 MG/0.3ML injection 2-pack INJECT 0.3 ML INTO THE MUSCLE ONCE AS NEEDED    estrogen conj (PREMARIN) 1.25 MG tablet TAKE 1 TABLET(1.25 MG) BY MOUTH DAILY    fenofibrate (TRICOR) 145 MG tablet Take 1 tablet (145 mg) by mouth daily    fluticasone (FLONASE) 50 MCG/ACT nasal spray SHAKE LIQUID AND USE 1 SPRAY INTO EACH NOSTRIL DAILY    hydrocortisone (WESTCORT) 0.2 % external cream Apply topically 2 times daily Apply topically 2 times daily    irbesartan (AVAPRO) 300 MG tablet TAKE 1 TABLET(300 MG) BY MOUTH DAILY    lidocaine-prilocaine (EMLA) 2.5-2.5 % external cream Apply topically as needed for moderate pain Keep on file    mupirocin (BACTROBAN) 2 % external ointment APPLY EXTERNALLY TO THE AFFECTED AREA THREE TIMES DAILY    pravastatin (PRAVACHOL) 80 MG tablet Take 1 tablet (80 mg) by mouth daily    Continuous Blood Gluc  (FREESTYLE BRYANT 2 READER) MONIQUE 1 Device continuous    Continuous Glucose Sensor (FREESTYLE BRYANT 2 SENSOR) MISC 1 Device every 14 days    insulin pen needle (B-D U/F) 31G X 8 MM miscellaneous USE WITH LANTUS     Immunization History   Administered Date(s) Administered    COVID-19 MONOVALENT 12+ (Pfizer) 03/10/2021, 03/31/2021, 10/09/2021    HEPA 02/12/2004    HepB 07/24/1996, 08/30/1996, 02/03/1997    Influenza (IIV3) PF 10/25/2004, 11/03/2005, 11/06/2006, 09/18/2011, 08/23/2012, 09/01/2013, 10/25/2015 " "   Influenza Vaccine >6 months,quad, PF 09/22/2017, 10/19/2020    Influenza Vaccine, 6+MO IM (QUADRIVALENT W/PRESERVATIVES) 09/16/2014, 09/28/2016, 10/12/2018    Mantoux Tuberculin Skin Test 01/15/2003, 06/17/2003, 06/30/2004    Meningococcal (Menomune ) 07/19/2000    Pneumococcal 20 valent Conjugate (Prevnar 20) 08/04/2022    Pneumococcal 23 valent 02/12/2004    Rabies - IM Diploid Cell Culture 08/18/2017, 08/25/2017, 09/15/2017, 07/08/2021    TD,PF 7+ (Tenivac) 07/24/1996, 01/31/2006, 11/20/2020    TDAP Vaccine (Adacel) 04/19/2011, 01/01/2013     PREVENTATIVE HEALTH                                                      BMI: overweight  Blood pressure: well-controlled on current regimen   Breast CA screening: DUE  Cervical CA screening: ?? DUE  Colon CA screening: up to date   Lung CA screening: patient does not meet screening criteria  Dexa: not medically indicated at this time   Screening cholesterol: n/a - already being treated for this condition  Screening diabetes: n/a - already being treated for this condition  STD testing: STD testing offered - declined by patient  Alcohol misuse screening: alcohol use reviewed - no intervention indicated at this time  Immunizations: reviewed;  COVID-19 booster DUE - declined previously    OBJECTIVE                                                      /84   Pulse 77   Temp 98.9  F (37.2  C)   Resp 18   Ht 1.626 m (5' 4\")   Wt 70.4 kg (155 lb 4.8 oz)   LMP 09/25/2006   SpO2 99%   BMI 26.66 kg/m    Constitutional: well-appearing  Head, Ears, and Eyes: normocephalic; normal external auditory canal and pinna; tympanic membranes visualized and normal; normal lids and conjunctivae  Neck: supple, symmetric, no thyromegaly or lymphadenopathy  Respiratory: normal respiratory effort; clear to auscultation bilaterally  Cardiovascular: regular rate and rhythm; no edema  Gastrointestinal: soft, non-tender, and non-distended; no organomegaly or masses  Genitourinary: external " genitalia, urethral meatus, and vagina normal; NO cervix  Musculoskeletal: normal gait and station  Psych: normal judgment and insight; normal mood and affect; recent and remote memory intact    ---  (Note was completed, in part, with [employer] voice-recognition software. Documentation was reviewed, but some grammatical, spelling, and word errors may remain.)     No

## 2024-07-16 LAB
HPV HR 12 DNA CVX QL NAA+PROBE: NEGATIVE
HPV16 DNA CVX QL NAA+PROBE: NEGATIVE
HPV18 DNA CVX QL NAA+PROBE: NEGATIVE
HUMAN PAPILLOMA VIRUS FINAL DIAGNOSIS: NORMAL

## 2024-07-18 ENCOUNTER — TELEPHONE (OUTPATIENT)
Dept: INTERNAL MEDICINE | Facility: CLINIC | Age: 44
End: 2024-07-18
Payer: COMMERCIAL

## 2024-07-18 DIAGNOSIS — E78.00 PURE HYPERCHOLESTEROLEMIA: Primary | ICD-10-CM

## 2024-07-18 NOTE — TELEPHONE ENCOUNTER
Triage RN attempted to call the patient to relay result note from the provider. LVM for patient to either check her Mychart message or call the clinic back.     Patient Contact    Attempt # 1    Was call answered?  No.  Left message on voicemail with information to call me back.    Upon call back: please relay message below.         Audelia Klein RN

## 2024-07-19 LAB
BKR LAB AP GYN ADEQUACY: NORMAL
BKR LAB AP GYN INTERPRETATION: NORMAL
BKR LAB AP PREVIOUS ABNORMAL: NORMAL
PATH REPORT.COMMENTS IMP SPEC: NORMAL
PATH REPORT.COMMENTS IMP SPEC: NORMAL
PATH REPORT.RELEVANT HX SPEC: NORMAL

## 2024-07-19 RX ORDER — LOVASTATIN 40 MG
40 TABLET ORAL AT BEDTIME
Qty: 90 TABLET | Refills: 3 | Status: SHIPPED | OUTPATIENT
Start: 2024-07-19

## 2024-07-19 NOTE — TELEPHONE ENCOUNTER
Called and spoke with pt. Relayed providers message from below.     Pt stated she is on fenofibrate and pravastatin.     Pt stated she thinks she tried atorvastatin and it gave her side effects. Pt is willing to try lovastatin.     Routing to PCP to review and advise.

## 2024-07-19 NOTE — TELEPHONE ENCOUNTER
Lovastatin prescribed (to replace pravastatin).    She should continue fenofibrate without change.

## 2024-07-23 NOTE — TELEPHONE ENCOUNTER
Sent MC with details as call outreach was unsuccessful.    Postponing to provide pt time to receive MC.

## 2024-08-02 ENCOUNTER — MYC MEDICAL ADVICE (OUTPATIENT)
Dept: INTERNAL MEDICINE | Facility: CLINIC | Age: 44
End: 2024-08-02

## 2024-08-02 ENCOUNTER — E-VISIT (OUTPATIENT)
Dept: URGENT CARE | Facility: CLINIC | Age: 44
End: 2024-08-02
Payer: COMMERCIAL

## 2024-08-02 DIAGNOSIS — L98.9 SKIN LESION: ICD-10-CM

## 2024-08-02 DIAGNOSIS — L93.0 DISCOID LUPUS: ICD-10-CM

## 2024-08-02 DIAGNOSIS — Z22.322 MRSA NASAL COLONIZATION: ICD-10-CM

## 2024-08-02 DIAGNOSIS — J01.90 ACUTE NON-RECURRENT SINUSITIS, UNSPECIFIED LOCATION: Primary | ICD-10-CM

## 2024-08-02 PROCEDURE — 99421 OL DIG E/M SVC 5-10 MIN: CPT | Performed by: PHYSICIAN ASSISTANT

## 2024-08-02 RX ORDER — MUPIROCIN 20 MG/G
OINTMENT TOPICAL 3 TIMES DAILY
Qty: 22 G | Refills: 0 | Status: SHIPPED | OUTPATIENT
Start: 2024-08-02 | End: 2024-08-09

## 2024-08-02 NOTE — PATIENT INSTRUCTIONS
Dear Sharmaine José,    After reviewing your responses, I've been able to diagnose you with?a sinus infection caused by a virus.? You did have a negative home COVID test today. I recommend either repeating this in 48 hours or coming to the clinic for a PCR test. I placed an order for you to have one collected.    To schedule: go to your MyChart home page and scroll down to the section that says  You have an appointment that needs to be scheduled  and click the large green button that says  Schedule Now  and follow the steps to find the next available openings.      The symptoms you describe suggest a viral cause, which is much more common than a bacterial cause. Antibiotics will treat bacterial infections, but have no effect on viral infections.     Symptomatic care will help you feel better while your body is fighting the virus. It is important to stay well hydrated and get lots of rest. Unless you have been told not to take these medications by your provider, I recommend taking these to help ease your symptoms:    - Flonase (fluticasone) nasal spray, 2 sprays in each nostril daily, to reduce swelling in your nasal passages  - A decongestant like Sudafed (pseudoephedrine) which is available behind the pharmacist counter and helps to relieve congestion  - Tylenol or an NSAID such as ibuprofen or naproxen as needed for pain/fever  - A nasal rinse such as the Netti pot with bottled or distilled water and saline packets helps to flush sinuses  - Mucinex (guiafenesin) which thins mucus and may help it to loosen more quickly    You can also sit in the bathroom with the door closed and hot shower running to loosen mucus.    If your symptoms significantly worsen, you develop a severe headache, vomiting, high fever (>102F) or have not improved by day 10, please contact your primary care provider for an appointment or visit any of our convenient Walk-in Care or Urgent Care Centers to be seen which can be found on our  website?here.?     I did sent in a refill for your mupirocin. I definitely recommend using this inside your nostrils three times a day for 7 days to prevent symptoms from getting worse.    Thanks again for choosing?us?as your health care partner,?   ?  Nicol Foy PA-C?

## 2024-08-02 NOTE — TELEPHONE ENCOUNTER
Patient has lost her voice. I tried to talk with her and answering any triage message is difficult. I sent her a e-visit option.       Bettye Nicholson RN  [facility]

## 2024-08-05 ENCOUNTER — PATIENT OUTREACH (OUTPATIENT)
Dept: CARE COORDINATION | Facility: CLINIC | Age: 44
End: 2024-08-05

## 2024-09-04 DIAGNOSIS — Z78.0 MENOPAUSE: ICD-10-CM

## 2024-10-13 DIAGNOSIS — E78.1 HYPERTRIGLYCERIDEMIA: ICD-10-CM

## 2024-10-14 RX ORDER — FENOFIBRATE 145 MG/1
TABLET, COATED ORAL
Qty: 90 TABLET | Refills: 0 | Status: SHIPPED | OUTPATIENT
Start: 2024-10-14

## 2024-10-14 NOTE — TELEPHONE ENCOUNTER
Prescription approved per [facility] Refill Protocol.  Little Murillo RN  [facility]

## 2024-12-02 ENCOUNTER — MYC REFILL (OUTPATIENT)
Dept: INTERNAL MEDICINE | Facility: CLINIC | Age: 44
End: 2024-12-02

## 2024-12-02 DIAGNOSIS — Z78.0 MENOPAUSE: ICD-10-CM

## 2024-12-21 ENCOUNTER — HEALTH MAINTENANCE LETTER (OUTPATIENT)
Age: 44
End: 2024-12-21

## 2025-01-25 ENCOUNTER — HEALTH MAINTENANCE LETTER (OUTPATIENT)
Age: 45
End: 2025-01-25

## 2025-02-01 NOTE — TELEPHONE ENCOUNTER
Prior Authorization Approval    Authorization Effective Date: 1/2/2019  Authorization Expiration Date: 2/1/2020  Medication: premarin  Approved Dose/Quantity:    Reference #: [id number]   Insurance Company: [employer] - Phone [phone] Fax [phone]  Expected CoPay:       CoPay Card Available:      Foundation Assistance Needed:    Which Pharmacy is filling the prescription (Not needed for infusion/clinic administered): [facility] [address] [place] [address] [address] AT [facility] [address]  Pharmacy Notified: Yes  Patient Notified: Yes         (3) no apparent problem

## 2025-03-03 DIAGNOSIS — Z78.0 MENOPAUSE: ICD-10-CM

## 2025-03-30 DIAGNOSIS — E78.1 HYPERTRIGLYCERIDEMIA: ICD-10-CM

## 2025-03-30 DIAGNOSIS — R00.0 SINUS TACHYCARDIA: ICD-10-CM

## 2025-03-31 RX ORDER — FENOFIBRATE 145 MG/1
145 TABLET, COATED ORAL
Qty: 90 TABLET | Refills: 0 | Status: SHIPPED | OUTPATIENT
Start: 2025-03-31

## 2025-03-31 RX ORDER — ATENOLOL 25 MG/1
75 TABLET ORAL DAILY
Qty: 270 TABLET | Refills: 0 | Status: SHIPPED | OUTPATIENT
Start: 2025-03-31

## 2025-05-08 ENCOUNTER — E-VISIT (OUTPATIENT)
Dept: URGENT CARE | Facility: CLINIC | Age: 45
End: 2025-05-08

## 2025-05-08 DIAGNOSIS — R30.0 DYSURIA: Primary | ICD-10-CM

## 2025-05-08 RX ORDER — NITROFURANTOIN 25; 75 MG/1; MG/1
100 CAPSULE ORAL 2 TIMES DAILY
Qty: 10 CAPSULE | Refills: 0 | Status: SHIPPED | OUTPATIENT
Start: 2025-05-08 | End: 2025-05-13

## 2025-05-09 NOTE — PATIENT INSTRUCTIONS
Dear Sharmaine José    After reviewing your responses, I've been able to diagnose you with a urinary tract infection, which is a common infection of the bladder with bacteria.  This is not a sexually transmitted infection, though urinating immediately after intercourse can help prevent infections.  Drinking lots of fluids is also helpful to clear your current infection and prevent the next one.      I have sent a prescription for antibiotics to your pharmacy to treat this infection.    It is important that you take all of your prescribed medication even if your symptoms are improving after a few doses.  Taking all of your medicine helps prevent the symptoms from returning.     If your symptoms worsen, you develop pain in your back or stomach, develop fevers, or are not improving in 5 days, please contact your primary care provider for an appointment or visit any of our convenient Walk-in or Urgent Care Centers to be seen, which can be found on our website here.    Thanks again for choosing us as your health care partner,    Rajani Clifton MD

## 2025-05-30 ENCOUNTER — TELEPHONE (OUTPATIENT)
Dept: INTERNAL MEDICINE | Facility: CLINIC | Age: 45
End: 2025-05-30
Payer: COMMERCIAL

## 2025-05-30 NOTE — TELEPHONE ENCOUNTER
Prior Authorization Retail Medication Request    Medication/Dose: estrogen conj (PREMARIN) 1.25 MG tablet   Diagnosis and ICD code (if different than what is on RX):  [Z78.0]     Insurance   Primary: [facility] OUT OF STATE   Insurance ID:  [id number]     Pharmacy Information (if different than what is on RX)  Name:  [facility] [address] [place] [address]   Phone:  [phone]  Fax: [phone]

## 2025-06-02 NOTE — TELEPHONE ENCOUNTER
Retail Pharmacy Prior Authorization Team   Phone: [phone]    PA Initiation    Medication: PREMARIN 1.25 MG PO TABS  Insurance Company: [facility] - Phone [phone] Fax [phone]  Pharmacy Filling the Rx: [facility] [address] [place] [address]  Filling Pharmacy Phone: [phone]  Filling Pharmacy Fax:    Start Date: 6/2/2025    Started PA on CMM and a response of Prior Authorization not required for patient/medication. Ran test claim, medication paid on 05/30/2025, next fill date is 06/25/2025. Insurance only covers 30 days at a time.

## 2025-06-16 ENCOUNTER — PATIENT OUTREACH (OUTPATIENT)
Dept: CARE COORDINATION | Facility: CLINIC | Age: 45
End: 2025-06-16
Payer: COMMERCIAL

## 2025-06-30 ENCOUNTER — TRANSFERRED RECORDS (OUTPATIENT)
Dept: HEALTH INFORMATION MANAGEMENT | Facility: CLINIC | Age: 45
End: 2025-06-30
Payer: COMMERCIAL

## 2025-07-23 DIAGNOSIS — I10 BENIGN ESSENTIAL HYPERTENSION: ICD-10-CM

## 2025-07-23 RX ORDER — IRBESARTAN 300 MG/1
300 TABLET ORAL DAILY
Qty: 90 TABLET | Refills: 0 | OUTPATIENT
Start: 2025-07-23

## 2025-07-24 NOTE — TELEPHONE ENCOUNTER
Left detail msg to call back - on call back schedule pt in slots approved by PCP below    Rozina DAVIDSON

## 2025-07-26 ENCOUNTER — HEALTH MAINTENANCE LETTER (OUTPATIENT)
Age: 45
End: 2025-07-26

## 2025-07-28 DIAGNOSIS — I10 BENIGN ESSENTIAL HYPERTENSION: ICD-10-CM

## 2025-08-11 RX ORDER — IRBESARTAN 300 MG/1
300 TABLET ORAL DAILY
Qty: 90 TABLET | Refills: 0 | Status: SHIPPED | OUTPATIENT
Start: 2025-08-11

## 2025-08-22 ENCOUNTER — MYC MEDICAL ADVICE (OUTPATIENT)
Dept: INTERNAL MEDICINE | Facility: CLINIC | Age: 45
End: 2025-08-22

## 2025-08-22 ENCOUNTER — ANCILLARY PROCEDURE (OUTPATIENT)
Dept: GENERAL RADIOLOGY | Facility: CLINIC | Age: 45
End: 2025-08-22
Attending: INTERNAL MEDICINE
Payer: COMMERCIAL

## 2025-08-22 DIAGNOSIS — M79.672 LEFT FOOT PAIN: ICD-10-CM

## 2025-08-22 PROCEDURE — 73630 X-RAY EXAM OF FOOT: CPT | Mod: TC | Performed by: RADIOLOGY

## 2025-08-25 ENCOUNTER — PATIENT OUTREACH (OUTPATIENT)
Dept: CARE COORDINATION | Facility: CLINIC | Age: 45
End: 2025-08-25
Payer: COMMERCIAL

## 2025-08-27 ENCOUNTER — PATIENT OUTREACH (OUTPATIENT)
Dept: CARE COORDINATION | Facility: CLINIC | Age: 45
End: 2025-08-27
Payer: COMMERCIAL